# Patient Record
Sex: FEMALE | Race: BLACK OR AFRICAN AMERICAN | NOT HISPANIC OR LATINO | Employment: FULL TIME | URBAN - METROPOLITAN AREA
[De-identification: names, ages, dates, MRNs, and addresses within clinical notes are randomized per-mention and may not be internally consistent; named-entity substitution may affect disease eponyms.]

---

## 2023-04-23 ENCOUNTER — HOSPITAL ENCOUNTER (INPATIENT)
Facility: HOSPITAL | Age: 33
LOS: 2 days | Discharge: HOME | DRG: 641 | End: 2023-04-26
Attending: EMERGENCY MEDICINE | Admitting: INTERNAL MEDICINE
Payer: COMMERCIAL

## 2023-04-23 ENCOUNTER — APPOINTMENT (OUTPATIENT)
Dept: CT IMAGING | Facility: HOSPITAL | Age: 33
DRG: 641 | End: 2023-04-23
Payer: COMMERCIAL

## 2023-04-23 DIAGNOSIS — E87.20 METABOLIC ACIDOSIS: Primary | ICD-10-CM

## 2023-04-23 DIAGNOSIS — E86.0 DEHYDRATION: ICD-10-CM

## 2023-04-23 DIAGNOSIS — E87.29 ALCOHOLIC KETOACIDOSIS: ICD-10-CM

## 2023-04-23 LAB
ALBUMIN SERPL-MCNC: 5.8 G/DL (ref 3.5–5.2)
ALBUMIN/GLOB SERPL: 2 G/DL
ALP SERPL-CCNC: 103 U/L (ref 39–117)
ALT SERPL W P-5'-P-CCNC: 107 U/L (ref 1–33)
ANION GAP SERPL CALCULATED.3IONS-SCNC: 31 MMOL/L (ref 5–15)
AST SERPL-CCNC: 165 U/L (ref 1–32)
BASOPHILS # BLD AUTO: 0.1 10*3/MM3 (ref 0–0.2)
BASOPHILS NFR BLD AUTO: 0.8 % (ref 0–1.5)
BILIRUB SERPL-MCNC: 1.3 MG/DL (ref 0–1.2)
BUN SERPL-MCNC: 10 MG/DL (ref 6–20)
BUN/CREAT SERPL: 13.3 (ref 7–25)
CALCIUM SPEC-SCNC: 9.2 MG/DL (ref 8.6–10.5)
CHLORIDE SERPL-SCNC: 102 MMOL/L (ref 98–107)
CO2 SERPL-SCNC: 5 MMOL/L (ref 22–29)
CREAT SERPL-MCNC: 0.75 MG/DL (ref 0.57–1)
DEPRECATED RDW RBC AUTO: 53.4 FL (ref 37–54)
EGFRCR SERPLBLD CKD-EPI 2021: 109.3 ML/MIN/1.73
EOSINOPHIL # BLD AUTO: 0 10*3/MM3 (ref 0–0.4)
EOSINOPHIL NFR BLD AUTO: 0.1 % (ref 0.3–6.2)
ERYTHROCYTE [DISTWIDTH] IN BLOOD BY AUTOMATED COUNT: 13.6 % (ref 12.3–15.4)
GLOBULIN UR ELPH-MCNC: 2.9 GM/DL
GLUCOSE SERPL-MCNC: 92 MG/DL (ref 65–99)
HCG SERPL QL: NEGATIVE
HCT VFR BLD AUTO: 42.5 % (ref 34–46.6)
HGB BLD-MCNC: 13.7 G/DL (ref 12–15.9)
LIPASE SERPL-CCNC: 27 U/L (ref 13–60)
LYMPHOCYTES # BLD AUTO: 0.9 10*3/MM3 (ref 0.7–3.1)
LYMPHOCYTES NFR BLD AUTO: 5.6 % (ref 19.6–45.3)
MCH RBC QN AUTO: 33.7 PG (ref 26.6–33)
MCHC RBC AUTO-ENTMCNC: 32.2 G/DL (ref 31.5–35.7)
MCV RBC AUTO: 104.9 FL (ref 79–97)
MONOCYTES # BLD AUTO: 0.7 10*3/MM3 (ref 0.1–0.9)
MONOCYTES NFR BLD AUTO: 4.4 % (ref 5–12)
NEUTROPHILS NFR BLD AUTO: 13.9 10*3/MM3 (ref 1.7–7)
NEUTROPHILS NFR BLD AUTO: 89.1 % (ref 42.7–76)
NRBC BLD AUTO-RTO: 0.2 /100 WBC (ref 0–0.2)
PLATELET # BLD AUTO: 320 10*3/MM3 (ref 140–450)
PMV BLD AUTO: 7.2 FL (ref 6–12)
POTASSIUM SERPL-SCNC: 4.9 MMOL/L (ref 3.5–5.2)
PROT SERPL-MCNC: 8.7 G/DL (ref 6–8.5)
RBC # BLD AUTO: 4.06 10*6/MM3 (ref 3.77–5.28)
SALICYLATES SERPL-MCNC: <0.3 MG/DL
SODIUM SERPL-SCNC: 138 MMOL/L (ref 136–145)
WBC NRBC COR # BLD: 15.6 10*3/MM3 (ref 3.4–10.8)

## 2023-04-23 PROCEDURE — 25010000002 MORPHINE PER 10 MG: Performed by: EMERGENCY MEDICINE

## 2023-04-23 PROCEDURE — 74176 CT ABD & PELVIS W/O CONTRAST: CPT

## 2023-04-23 PROCEDURE — 84703 CHORIONIC GONADOTROPIN ASSAY: CPT | Performed by: EMERGENCY MEDICINE

## 2023-04-23 PROCEDURE — 83690 ASSAY OF LIPASE: CPT | Performed by: EMERGENCY MEDICINE

## 2023-04-23 PROCEDURE — 80179 DRUG ASSAY SALICYLATE: CPT | Performed by: EMERGENCY MEDICINE

## 2023-04-23 PROCEDURE — 99285 EMERGENCY DEPT VISIT HI MDM: CPT

## 2023-04-23 PROCEDURE — 25010000002 ONDANSETRON PER 1 MG: Performed by: EMERGENCY MEDICINE

## 2023-04-23 PROCEDURE — 85025 COMPLETE CBC W/AUTO DIFF WBC: CPT | Performed by: EMERGENCY MEDICINE

## 2023-04-23 PROCEDURE — 25010000002 KETOROLAC TROMETHAMINE PER 15 MG: Performed by: EMERGENCY MEDICINE

## 2023-04-23 PROCEDURE — 80053 COMPREHEN METABOLIC PANEL: CPT | Performed by: EMERGENCY MEDICINE

## 2023-04-23 RX ORDER — KETOROLAC TROMETHAMINE 15 MG/ML
15 INJECTION, SOLUTION INTRAMUSCULAR; INTRAVENOUS ONCE
Status: COMPLETED | OUTPATIENT
Start: 2023-04-23 | End: 2023-04-23

## 2023-04-23 RX ORDER — SODIUM CHLORIDE 0.9 % (FLUSH) 0.9 %
10 SYRINGE (ML) INJECTION AS NEEDED
Status: DISCONTINUED | OUTPATIENT
Start: 2023-04-23 | End: 2023-04-26 | Stop reason: HOSPADM

## 2023-04-23 RX ORDER — ONDANSETRON 2 MG/ML
4 INJECTION INTRAMUSCULAR; INTRAVENOUS ONCE
Status: COMPLETED | OUTPATIENT
Start: 2023-04-23 | End: 2023-04-23

## 2023-04-23 RX ADMIN — KETOROLAC TROMETHAMINE 15 MG: 15 INJECTION, SOLUTION INTRAMUSCULAR; INTRAVENOUS at 23:59

## 2023-04-23 RX ADMIN — ONDANSETRON 4 MG: 2 INJECTION INTRAMUSCULAR; INTRAVENOUS at 23:13

## 2023-04-23 RX ADMIN — SODIUM CHLORIDE 1000 ML: 9 INJECTION, SOLUTION INTRAVENOUS at 23:14

## 2023-04-23 NOTE — Clinical Note
Level of Care: Critical Care [6]   Admitting Physician: JANIE PAYTON [[id number]]   Attending Physician: JANIE PAYTON [[id number]]

## 2023-04-24 ENCOUNTER — APPOINTMENT (OUTPATIENT)
Dept: GENERAL RADIOLOGY | Facility: HOSPITAL | Age: 33
DRG: 641 | End: 2023-04-24
Payer: COMMERCIAL

## 2023-04-24 ENCOUNTER — APPOINTMENT (OUTPATIENT)
Dept: ULTRASOUND IMAGING | Facility: HOSPITAL | Age: 33
DRG: 641 | End: 2023-04-24
Payer: COMMERCIAL

## 2023-04-24 PROBLEM — E87.20 METABOLIC ACIDOSIS: Status: ACTIVE | Noted: 2023-04-24

## 2023-04-24 PROBLEM — E11.10 DIABETIC KETOACIDOSIS WITHOUT COMA ASSOCIATED WITH TYPE 2 DIABETES MELLITUS: Status: ACTIVE | Noted: 2023-04-24

## 2023-04-24 LAB
ACETONE BLD QL: ABNORMAL
ALBUMIN SERPL-MCNC: 4.9 G/DL (ref 3.5–5.2)
ALBUMIN/GLOB SERPL: 1.8 G/DL
ALP SERPL-CCNC: 84 U/L (ref 39–117)
ALT SERPL W P-5'-P-CCNC: 93 U/L (ref 1–33)
AMPHET+METHAMPHET UR QL: NEGATIVE
ANION GAP SERPL CALCULATED.3IONS-SCNC: 25 MMOL/L (ref 5–15)
ARTERIAL PATENCY WRIST A: POSITIVE
AST SERPL-CCNC: 142 U/L (ref 1–32)
ATMOSPHERIC PRESS: ABNORMAL MM[HG]
ATMOSPHERIC PRESS: ABNORMAL MM[HG]
BACTERIA UR QL AUTO: ABNORMAL /HPF
BARBITURATES UR QL SCN: NEGATIVE
BASE EXCESS BLDA CALC-SCNC: -17.3 MMOL/L (ref 0–3)
BASE EXCESS BLDV CALC-SCNC: -27.7 MMOL/L (ref -2–2)
BDY SITE: ABNORMAL
BDY SITE: ABNORMAL
BENZODIAZ UR QL SCN: NEGATIVE
BILIRUB SERPL-MCNC: 1.6 MG/DL (ref 0–1.2)
BILIRUB UR QL STRIP: NEGATIVE
BUN SERPL-MCNC: 9 MG/DL (ref 6–20)
BUN/CREAT SERPL: 13 (ref 7–25)
CALCIUM SPEC-SCNC: 8.4 MG/DL (ref 8.6–10.5)
CANNABINOIDS SERPL QL: NEGATIVE
CHLORIDE SERPL-SCNC: 107 MMOL/L (ref 98–107)
CLARITY UR: CLEAR
CO2 BLDA-SCNC: 9.4 MMOL/L (ref 22–29)
CO2 BLDA-SCNC: <5 MMOL/L (ref 22–29)
CO2 SERPL-SCNC: 8 MMOL/L (ref 22–29)
COCAINE UR QL: NEGATIVE
COLOR UR: YELLOW
CREAT SERPL-MCNC: 0.69 MG/DL (ref 0.57–1)
D-LACTATE SERPL-SCNC: 1.2 MMOL/L (ref 0.5–2)
D-LACTATE SERPL-SCNC: 3.3 MMOL/L (ref 0.5–2)
D-LACTATE SERPL-SCNC: 3.5 MMOL/L (ref 0.3–2)
EGFRCR SERPLBLD CKD-EPI 2021: 119.2 ML/MIN/1.73
ETHANOL UR QL: <0.01 %
GLOBULIN UR ELPH-MCNC: 2.8 GM/DL
GLUCOSE SERPL-MCNC: 119 MG/DL (ref 65–99)
GLUCOSE UR STRIP-MCNC: NEGATIVE MG/DL
HCO3 BLDA-SCNC: 8.7 MMOL/L (ref 21–28)
HCO3 BLDV-SCNC: 3.5 MMOL/L (ref 22–26)
HEMODILUTION: NO
HGB UR QL STRIP.AUTO: ABNORMAL
HYALINE CASTS UR QL AUTO: ABNORMAL /LPF
INHALED O2 CONCENTRATION: 21 %
INHALED O2 CONCENTRATION: 21 %
KETONES UR QL STRIP: ABNORMAL
LEUKOCYTE ESTERASE UR QL STRIP.AUTO: NEGATIVE
MAGNESIUM SERPL-MCNC: 1.9 MG/DL (ref 1.6–2.6)
METHADONE UR QL SCN: NEGATIVE
MODALITY: ABNORMAL
MODALITY: ABNORMAL
NITRITE UR QL STRIP: NEGATIVE
OPIATES UR QL: NEGATIVE
OXYCODONE UR QL SCN: NEGATIVE
PCO2 BLDA: 21.9 MM HG (ref 35–48)
PCO2 BLDV: 17.1 MM HG (ref 42–51)
PH BLDA: 7.21 PH UNITS (ref 7.35–7.45)
PH BLDV: 6.92 PH UNITS (ref 7.32–7.43)
PH UR STRIP.AUTO: 5.5 [PH] (ref 5–8)
PHOSPHATE SERPL-MCNC: 2.3 MG/DL (ref 2.5–4.5)
PO2 BLDA: 110 MM HG (ref 83–108)
PO2 BLDV: 72.5 MM HG (ref 40–42)
POTASSIUM SERPL-SCNC: 5 MMOL/L (ref 3.5–5.2)
PROT SERPL-MCNC: 7.7 G/DL (ref 6–8.5)
PROT UR QL STRIP: ABNORMAL
RBC # UR STRIP: ABNORMAL /HPF
REF LAB TEST METHOD: ABNORMAL
RESPIRATORY RATE: 24
SAO2 % BLDCOA: 97.3 % (ref 94–98)
SAO2 % BLDCOV: 81.3 % (ref 45–75)
SODIUM SERPL-SCNC: 140 MMOL/L (ref 136–145)
SP GR UR STRIP: 1.01 (ref 1–1.03)
SQUAMOUS #/AREA URNS HPF: ABNORMAL /HPF
UROBILINOGEN UR QL STRIP: ABNORMAL
WBC # UR STRIP: ABNORMAL /HPF
WHOLE BLOOD HOLD COAG: NORMAL

## 2023-04-24 PROCEDURE — 25010000002 MORPHINE PER 10 MG: Performed by: NURSE PRACTITIONER

## 2023-04-24 PROCEDURE — 76705 ECHO EXAM OF ABDOMEN: CPT

## 2023-04-24 PROCEDURE — 83605 ASSAY OF LACTIC ACID: CPT | Performed by: NURSE PRACTITIONER

## 2023-04-24 PROCEDURE — 93005 ELECTROCARDIOGRAM TRACING: CPT | Performed by: NURSE PRACTITIONER

## 2023-04-24 PROCEDURE — 84100 ASSAY OF PHOSPHORUS: CPT | Performed by: NURSE PRACTITIONER

## 2023-04-24 PROCEDURE — 81001 URINALYSIS AUTO W/SCOPE: CPT | Performed by: EMERGENCY MEDICINE

## 2023-04-24 PROCEDURE — 80053 COMPREHEN METABOLIC PANEL: CPT | Performed by: NURSE PRACTITIONER

## 2023-04-24 PROCEDURE — 80307 DRUG TEST PRSMV CHEM ANLYZR: CPT | Performed by: EMERGENCY MEDICINE

## 2023-04-24 PROCEDURE — 83605 ASSAY OF LACTIC ACID: CPT

## 2023-04-24 PROCEDURE — 82077 ASSAY SPEC XCP UR&BREATH IA: CPT | Performed by: EMERGENCY MEDICINE

## 2023-04-24 PROCEDURE — 82803 BLOOD GASES ANY COMBINATION: CPT

## 2023-04-24 PROCEDURE — 36600 WITHDRAWAL OF ARTERIAL BLOOD: CPT

## 2023-04-24 PROCEDURE — 71045 X-RAY EXAM CHEST 1 VIEW: CPT

## 2023-04-24 PROCEDURE — 82009 KETONE BODYS QUAL: CPT | Performed by: EMERGENCY MEDICINE

## 2023-04-24 PROCEDURE — 36415 COLL VENOUS BLD VENIPUNCTURE: CPT

## 2023-04-24 PROCEDURE — 87040 BLOOD CULTURE FOR BACTERIA: CPT | Performed by: EMERGENCY MEDICINE

## 2023-04-24 PROCEDURE — 25010000002 MORPHINE PER 10 MG: Performed by: EMERGENCY MEDICINE

## 2023-04-24 PROCEDURE — 25010000002 PROCHLORPERAZINE 10 MG/2ML SOLUTION: Performed by: INTERNAL MEDICINE

## 2023-04-24 PROCEDURE — 25010000002 ONDANSETRON PER 1 MG: Performed by: NURSE PRACTITIONER

## 2023-04-24 PROCEDURE — 83735 ASSAY OF MAGNESIUM: CPT | Performed by: NURSE PRACTITIONER

## 2023-04-24 RX ORDER — FENTANYL/ROPIVACAINE/NS/PF 2-625MCG/1
15 PLASTIC BAG, INJECTION (ML) EPIDURAL AS NEEDED
Status: DISCONTINUED | OUTPATIENT
Start: 2023-04-24 | End: 2023-04-26 | Stop reason: HOSPADM

## 2023-04-24 RX ORDER — ONDANSETRON 2 MG/ML
4 INJECTION INTRAMUSCULAR; INTRAVENOUS EVERY 6 HOURS PRN
Status: DISCONTINUED | OUTPATIENT
Start: 2023-04-24 | End: 2023-04-26 | Stop reason: HOSPADM

## 2023-04-24 RX ORDER — SODIUM CHLORIDE 0.9 % (FLUSH) 0.9 %
10 SYRINGE (ML) INJECTION AS NEEDED
Status: DISCONTINUED | OUTPATIENT
Start: 2023-04-24 | End: 2023-04-26 | Stop reason: HOSPADM

## 2023-04-24 RX ORDER — SODIUM BICARBONATE IN D5W 150/1000ML
150 PLASTIC BAG, INJECTION (ML) INTRAVENOUS CONTINUOUS
Status: DISCONTINUED | OUTPATIENT
Start: 2023-04-24 | End: 2023-04-25

## 2023-04-24 RX ORDER — PROCHLORPERAZINE EDISYLATE 5 MG/ML
2.5 INJECTION INTRAMUSCULAR; INTRAVENOUS EVERY 6 HOURS PRN
Status: DISCONTINUED | OUTPATIENT
Start: 2023-04-24 | End: 2023-04-26 | Stop reason: HOSPADM

## 2023-04-24 RX ORDER — SODIUM CHLORIDE 9 MG/ML
40 INJECTION, SOLUTION INTRAVENOUS AS NEEDED
Status: DISCONTINUED | OUTPATIENT
Start: 2023-04-24 | End: 2023-04-26 | Stop reason: HOSPADM

## 2023-04-24 RX ORDER — CYCLOBENZAPRINE HCL 10 MG
10 TABLET ORAL 3 TIMES DAILY PRN
Status: DISCONTINUED | OUTPATIENT
Start: 2023-04-24 | End: 2023-04-26 | Stop reason: HOSPADM

## 2023-04-24 RX ORDER — MORPHINE SULFATE 2 MG/ML
2 INJECTION, SOLUTION INTRAMUSCULAR; INTRAVENOUS ONCE
Status: COMPLETED | OUTPATIENT
Start: 2023-04-24 | End: 2023-04-24

## 2023-04-24 RX ORDER — SODIUM PHOSPHATE IN 0.9 % NACL 15MMOL/100
15 PLASTIC BAG, INJECTION (ML) INTRAVENOUS AS NEEDED
Status: DISCONTINUED | OUTPATIENT
Start: 2023-04-24 | End: 2023-04-26 | Stop reason: HOSPADM

## 2023-04-24 RX ORDER — HYDRALAZINE HYDROCHLORIDE 20 MG/ML
10 INJECTION INTRAMUSCULAR; INTRAVENOUS ONCE
Status: DISCONTINUED | OUTPATIENT
Start: 2023-04-24 | End: 2023-04-26 | Stop reason: HOSPADM

## 2023-04-24 RX ORDER — SODIUM CHLORIDE 0.9 % (FLUSH) 0.9 %
10 SYRINGE (ML) INJECTION EVERY 12 HOURS SCHEDULED
Status: DISCONTINUED | OUTPATIENT
Start: 2023-04-24 | End: 2023-04-26 | Stop reason: HOSPADM

## 2023-04-24 RX ORDER — SCOLOPAMINE TRANSDERMAL SYSTEM 1 MG/1
1 PATCH, EXTENDED RELEASE TRANSDERMAL
Status: DISCONTINUED | OUTPATIENT
Start: 2023-04-24 | End: 2023-04-26 | Stop reason: HOSPADM

## 2023-04-24 RX ORDER — POTASSIUM PHOS IN 0.9 % NACL 30MMOL/250
30 PLASTIC BAG, INJECTION (ML) INTRAVENOUS AS NEEDED
Status: DISCONTINUED | OUTPATIENT
Start: 2023-04-24 | End: 2023-04-26 | Stop reason: HOSPADM

## 2023-04-24 RX ORDER — ONDANSETRON 4 MG/1
4 TABLET, FILM COATED ORAL EVERY 6 HOURS PRN
Status: DISCONTINUED | OUTPATIENT
Start: 2023-04-24 | End: 2023-04-26 | Stop reason: HOSPADM

## 2023-04-24 RX ADMIN — MORPHINE SULFATE 2 MG: 2 INJECTION, SOLUTION INTRAMUSCULAR; INTRAVENOUS at 06:03

## 2023-04-24 RX ADMIN — Medication 150 MEQ: at 16:20

## 2023-04-24 RX ADMIN — SODIUM BICARBONATE 75 MEQ: 84 INJECTION, SOLUTION INTRAVENOUS at 01:43

## 2023-04-24 RX ADMIN — SODIUM PHOSPHATE, MONOBASIC, MONOHYDRATE 15 MMOL: 276; 142 INJECTION, SOLUTION INTRAVENOUS at 06:18

## 2023-04-24 RX ADMIN — Medication 10 ML: at 08:50

## 2023-04-24 RX ADMIN — SCOPALAMINE 1 PATCH: 1 PATCH, EXTENDED RELEASE TRANSDERMAL at 18:50

## 2023-04-24 RX ADMIN — Medication 10 ML: at 01:46

## 2023-04-24 RX ADMIN — SODIUM BICARBONATE 50 MEQ: 84 INJECTION, SOLUTION INTRAVENOUS at 01:39

## 2023-04-24 RX ADMIN — Medication 150 MEQ: at 05:36

## 2023-04-24 RX ADMIN — ONDANSETRON 4 MG: 2 INJECTION INTRAMUSCULAR; INTRAVENOUS at 13:27

## 2023-04-24 RX ADMIN — MORPHINE SULFATE 4 MG: 4 INJECTION INTRAVENOUS at 01:27

## 2023-04-24 RX ADMIN — SODIUM CHLORIDE, POTASSIUM CHLORIDE, SODIUM LACTATE AND CALCIUM CHLORIDE 1000 ML: 600; 310; 30; 20 INJECTION, SOLUTION INTRAVENOUS at 00:49

## 2023-04-24 RX ADMIN — PROCHLORPERAZINE EDISYLATE 2.5 MG: 5 INJECTION INTRAMUSCULAR; INTRAVENOUS at 10:23

## 2023-04-24 RX ADMIN — SODIUM CHLORIDE, POTASSIUM CHLORIDE, SODIUM LACTATE AND CALCIUM CHLORIDE 1000 ML: 600; 310; 30; 20 INJECTION, SOLUTION INTRAVENOUS at 10:19

## 2023-04-24 RX ADMIN — ONDANSETRON 4 MG: 2 INJECTION INTRAMUSCULAR; INTRAVENOUS at 05:32

## 2023-04-24 NOTE — SIGNIFICANT NOTE
Patient has been deemed appropriate for downgrade by intensivist service, hospital service consulted.

## 2023-04-24 NOTE — CASE MANAGEMENT/SOCIAL WORK
"Social Work Assessment  [facility]     Patient Name: hSirin Canales  MRN: [id number]  Today's Date: 4/24/2023    Admit Date: 4/23/2023     Substance Abuse     Row Name 04/24/23 1456       Substance Use    Substance Use Status current alcohol use    Substance Use Comment SW noted primary diagnosis is etoh related, so met with pt re:  and CD screen. Pt lying in bed, no visitors present. Pt affirmed Anxiety diagnosis and Rxs of hydroxyzine and sertraline, but stated, \"I don't take them like I'm supposed to.\" She couldn't explain this, but stated she would try to start doing better. She was previously in counseling, but couldn't recall the name of the agency. She stopped going when she could no longer pay the $50 co-pay. SW informed her of possibly starting an FSA when open enrollment comes around, but in the meantime, info for [facility] was placed on AVS - pt was informed of their services. In re: to CD, pt denies tob or illicit substance use. She said she drank an unknown amount of vodka cranberry cocktails without having eaten anything while her child was with his father, and that this was an isolated incident - she doesn't normally consume etoh on a regular basis. She denies ever having an issues with etoh or going to etoh tx. Pt denies further needs at this time.              JOHANA Boyce, [facility]  Medical Social Worker  Ph [phone]  Fax [phone]  [email or web address]    "

## 2023-04-24 NOTE — CASE MANAGEMENT/SOCIAL WORK
Discharge Planning Assessment  [facility]     Patient Name: Shirin Canales  MRN: [id number]  Today's Date: 4/24/2023    Admit Date: 4/23/2023    Plan: DC Plan: Return home with mother, PCP appt with [facility] on AVS. MSW has seen.   Discharge Needs Assessment     Row Name 04/24/23 1526       Living Environment    People in Home parent(s)    Name(s) of People in Home Mother, staying with her mother currently.    Current Living Arrangements home    Primary Care Provided by self    Provides Primary Care For child(stacey)    Family Caregiver if Needed parent(s)    Quality of Family Relationships unable to assess    Able to Return to Prior Arrangements yes       Resource/Environmental Concerns    Resource/Environmental Concerns none    Transportation Concerns none       Transition Planning    Patient/Family Anticipates Transition to home with family    Patient/Family Anticipated Services at Transition none    Transportation Anticipated family or friend will provide       Discharge Needs Assessment    Readmission Within the Last 30 Days no previous admission in last 30 days    Equipment Currently Used at Home none    Concerns to be Addressed discharge planning    Anticipated Changes Related to Illness none    Equipment Needed After Discharge none    Discharge Coordination/Progress DC Plan: Return home with mother, PCP appt with [facility] on AVS. MSW has seen.               Discharge Plan     Row Name 04/24/23 1538       Plan    Plan DC Plan: Return home with mother, PCP appt with [facility] on AVS. MSW has seen.    Plan Comments Phergan for nausea, Reglan, Ihydralaline for BP. States he has insurance per employer, note that insurance is now in system. No PCP. Pharmacy [facility] Requests Meds to Beds.              Continued Care and Services - Admitted Since 4/23/2023    Coordination has not been started for this encounter.          Demographic Summary     Row Name 04/24/23 1528       General Information    Admission  "Type inpatient    Arrived From emergency department    Referral Source admission list    Reason for Consult discharge planning    Preferred Language English    General Information Comments Spoke to pt in room.               Functional Status     Row Name 04/24/23 1525       Functional Status    Usual Activity Tolerance good    Current Activity Tolerance moderate       Functional Status, IADL    Medications independent    Meal Preparation independent    Housekeeping independent    Laundry independent    Shopping independent       Mental Status    General Appearance WDL WDL       Mental Status Summary    Recent Changes in Mental Status/Cognitive Functioning no changes              Met with patient in room.            Substance Abuse     Row Name 04/24/23 3346       Substance Use    Substance Use Status current alcohol use    Substance Use Comment MIKAYLA noted primary diagnosis is etoh related, so met with pt re: MH and CD screen. Pt lying in bed, no visitors present. Pt affirmed Anxiety diagnosis and Rxs of hydroxyzine and sertraline, but stated, \"I don't take them like I'm supposed to.\" She couldn't explain this, but stated she would try to start doing better. She was previously in counseling, but couldn't recall the name of the agency. She stopped going when she could no longer pay the $50 co-pay. MIKAYLA informed her of possibly starting an FSA when open enrollment comes around, but in the meantime, info for [facility] was placed on AVS - pt was informed of their services. In re: to CD, pt denies tob or illicit substance use. She said she drank an unknown amount of vodka cranberry cocktails without having eaten anything while her child was with his father, and that this was an isolated incident - she doesn't normally consume etoh on a regular basis. She denies ever having an issues with etoh or going to etoh tx. Pt denies further needs at this time.               Patient Forms    No documentation.                   Neetu ARIAS" Rangel, RN

## 2023-04-24 NOTE — ED PROVIDER NOTES
"Subjective   History of Present Illness  32-year-old female describes severe pain in her right flank with gradual onset starting this morning.  States it is associated with nausea vomiting all day.  She denies fevers or chills.  She reports no diarrhea or trauma or any known ill contacts.  She reports no vaginal bleeding.  She states she has had some urinary frequency but reports no dysuria  Review of Systems  She reports no chest pain or shortness of breath.  She is on Depo-Provera and states she does not have menstrual cycles.  No past medical history on file.  Reportedly negative,  Allergies   Allergen Reactions   • Latex Unknown - Low Severity   • Penicillins Unknown - High Severity       No past surgical history on file.    No family history on file.    Social History     Socioeconomic History   • Marital status: Single       Prior to Admission medications    Not on File     /99   Pulse 108   Temp 98.1 °F (36.7 °C) (Oral)   Resp 16   Ht 160 cm (63\")   Wt 59 kg (130 lb)   LMP  (LMP Unknown) Comment: patient does not have periods- on BC  SpO2 100%   BMI 23.03 kg/m²       Objective   Physical Exam  General: Well-developed well-appearing, no acute distress, alert and appropriate  Eyes:  sclera nonicteric  HEENT: Mucous membranes dry, no mucosal swelling  Neck: Supple, no nuchal rigidity,   Respirations: Respirations nonlabored, equal breath sounds bilaterally, clear lungs  Heart regular rate and rhythm, no murmurs rubs or gallops,   Abdomen soft nontender nondistended, no hepatosplenomegaly, no hernia, no mass, normal bowel sounds, mild right CVA tenderness  Extremities no clubbing cyanosis or edema, calves are symmetric and nontender  Neuro cranial nerves grossly intact, no focal limb deficits  Psych oriented, pleasant affect  Skin no rash, brisk cap refill  Procedures           ED Course  ED Course as of 04/24/23 0103   Mon Apr 24, 2023   0015 Mother reported to the nurse that the patient drank " alcohol heavily yesterday []   0043 Patient states she has not eaten over the last 2 days and drank heavily with vodka yesterday for thunder over [place].  She denies any other unusual ingestions or any toxic alcohol ingestions such as methanol or ethylene glycol. []      ED Course User Index  [] Benjamin Decker MD            Results for orders placed or performed during the hospital encounter of 04/23/23   Comprehensive Metabolic Panel    Specimen: Blood   Result Value Ref Range    Glucose 92 65 - 99 mg/dL    BUN 10 6 - 20 mg/dL    Creatinine 0.75 0.57 - 1.00 mg/dL    Sodium 138 136 - 145 mmol/L    Potassium 4.9 3.5 - 5.2 mmol/L    Chloride 102 98 - 107 mmol/L    CO2 5.0 (L) 22.0 - 29.0 mmol/L    Calcium 9.2 8.6 - 10.5 mg/dL    Total Protein 8.7 (H) 6.0 - 8.5 g/dL    Albumin 5.8 (H) 3.5 - 5.2 g/dL    ALT (SGPT) 107 (H) 1 - 33 U/L    AST (SGOT) 165 (H) 1 - 32 U/L    Alkaline Phosphatase 103 39 - 117 U/L    Total Bilirubin 1.3 (H) 0.0 - 1.2 mg/dL    Globulin 2.9 gm/dL    A/G Ratio 2.0 g/dL    BUN/Creatinine Ratio 13.3 7.0 - 25.0    Anion Gap 31.0 (H) 5.0 - 15.0 mmol/L    eGFR 109.3 >60.0 mL/min/1.73   Lipase    Specimen: Blood   Result Value Ref Range    Lipase 27 13 - 60 U/L   hCG, Serum, Qualitative    Specimen: Blood   Result Value Ref Range    HCG Qualitative Negative Negative   Urinalysis With Culture If Indicated - Urine, Clean Catch    Specimen: Urine, Clean Catch   Result Value Ref Range    Color, UA Yellow Yellow, Straw    Appearance, UA Clear Clear    pH, UA 5.5 5.0 - 8.0    Specific Gravity, UA 1.012 1.005 - 1.030    Glucose, UA Negative Negative    Ketones, UA >=160 mg/dL (4+) (A) Negative    Bilirubin, UA Negative Negative    Blood, UA Moderate (2+) (A) Negative    Protein,  mg/dL (2+) (A) Negative    Leuk Esterase, UA Negative Negative    Nitrite, UA Negative Negative    Urobilinogen, UA 0.2 E.U./dL 0.2 - 1.0 E.U./dL   CBC Auto Differential    Specimen: Blood   Result Value Ref Range     WBC 15.60 (H) 3.40 - 10.80 10*3/mm3    RBC 4.06 3.77 - 5.28 10*6/mm3    Hemoglobin 13.7 12.0 - 15.9 g/dL    Hematocrit 42.5 34.0 - 46.6 %    .9 (H) 79.0 - 97.0 fL    MCH 33.7 (H) 26.6 - 33.0 pg    MCHC 32.2 31.5 - 35.7 g/dL    RDW 13.6 12.3 - 15.4 %    RDW-SD 53.4 37.0 - 54.0 fl    MPV 7.2 6.0 - 12.0 fL    Platelets 320 140 - 450 10*3/mm3    Neutrophil % 89.1 (H) 42.7 - 76.0 %    Lymphocyte % 5.6 (L) 19.6 - 45.3 %    Monocyte % 4.4 (L) 5.0 - 12.0 %    Eosinophil % 0.1 (L) 0.3 - 6.2 %    Basophil % 0.8 0.0 - 1.5 %    Neutrophils, Absolute 13.90 (H) 1.70 - 7.00 10*3/mm3    Lymphocytes, Absolute 0.90 0.70 - 3.10 10*3/mm3    Monocytes, Absolute 0.70 0.10 - 0.90 10*3/mm3    Eosinophils, Absolute 0.00 0.00 - 0.40 10*3/mm3    Basophils, Absolute 0.10 0.00 - 0.20 10*3/mm3    nRBC 0.2 0.0 - 0.2 /100 WBC   Ethanol    Specimen: Blood   Result Value Ref Range    Ethanol % <0.010 %   Urine Drug Screen - Urine, Clean Catch    Specimen: Urine, Clean Catch   Result Value Ref Range    Amphet/Methamphet, Screen Negative Negative    Barbiturates Screen, Urine Negative Negative    Benzodiazepine Screen, Urine Negative Negative    Cocaine Screen, Urine Negative Negative    Opiate Screen Negative Negative    THC, Screen, Urine Negative Negative    Methadone Screen, Urine Negative Negative    Oxycodone Screen, Urine Negative Negative   Salicylate Level    Specimen: Blood   Result Value Ref Range    Salicylate <0.3 <=30.0 mg/dL   Acetone    Specimen: Blood   Result Value Ref Range    Acetone Moderate (A) Negative   Blood Gas, Venous -    Specimen: Venous Blood   Result Value Ref Range    Site RV     pH, Venous 6.921 (C) 7.320 - 7.430 pH Units    pCO2, Venous 17.1 (C) 42.0 - 51.0 mm Hg    pO2, Venous 72.5 (H) 40.0 - 42.0 mm Hg    HCO3, Venous 3.5 (L) 22.0 - 26.0 mmol/L    Base Excess, Venous -27.7 (L) -2.0 - 2.0 mmol/L    O2 Saturation, Venous 81.3 (H) 45.0 - 75.0 %    CO2 Content <5.0 (L) 22 - 29 mmol/L    Barometric Pressure for  "Blood Gas      Modality Room Air     FIO2 21 %   Urinalysis, Microscopic Only - Urine, Clean Catch    Specimen: Urine, Clean Catch   Result Value Ref Range    RBC, UA 3-5 (A) None Seen /HPF    WBC, UA 0-2 (A) None Seen /HPF    Bacteria, UA None Seen None Seen /HPF    Squamous Epithelial Cells, UA 0-2 None Seen, 0-2 /HPF    Hyaline Casts, UA 0-2 None Seen /LPF    Methodology Automated Microscopy    POC Lactate    Specimen: Blood   Result Value Ref Range    Lactate 3.5 (C) 0.3 - 2.0 mmol/L   Light Blue Top   Result Value Ref Range    Extra Tube Hold for add-ons.       Adult Nutrition Assessment     Row Name 04/23/23 2247       Anthropometrics    Height 160 cm (63\")    Weight 59 kg (130 lb)            CT Abdomen Pelvis Without Contrast    Result Date: 4/24/2023  Impression: 1. Nonobstructing small intrarenal calculi bilaterally without hydronephrosis. 2. Fatty liver. Electronically signed by:  Aki Hale M.D.  4/23/2023 10:06 PM Mountain Time                                      Medical Decision Making  Patient presents with vomiting and some flank discomfort.  Differential diagnosis including obstructive uropathy, bowel obstruction, ischemic bowel, dehydration    Patient was found on lab analysis to have severe anion gap metabolic acidosis.  She does appear very dehydrated she was ordered some normal saline initially and followed by lactated Ringer's.  Venous blood gas reveals severe acidemia and she was ordered bicarb infusion.  Patient is not diabetic, DKA felt to be unlikely.  Given her history of 48 hours of fasting and heavy alcohol consumption, alcohol ketoacidosis with associated lactic acidosis is most likely.  Methanol or ethylene glycol toxicity would be another possibility although there is no report of any toxic ingestion.  Salicylate level was negative.  She is not describing any other toxic medication ingestion    Patient was resting more comfortably reexamination.  She has normal airway and " respiratory pattern.  She had no further vomiting during the emergency room course and has a benign abdominal exam.  Her and her mother were advised the findings and agreeable to the plan of admission.      Alcoholic ketoacidosis: acute illness or injury  Dehydration: acute illness or injury  Metabolic acidosis: acute illness or injury  Amount and/or Complexity of Data Reviewed  Labs: ordered. Decision-making details documented in ED Course.     Details: Severe acidemia on blood gas analysis, anion gap acidosis on comprehensive metabolic panel, leukocytosis, elevated lactic acid.  hCG negative, urinalysis negative for bacteria  Radiology: ordered and independent interpretation performed.     Details: My independent interpretation of CT abdomen images no apparent bowel obstruction or obstructive uropathy  Discussion of management or test interpretation with external provider(s): Case and findings discussed with Zenia with the ICU team for admission.    Risk  Prescription drug management.  Decision regarding hospitalization.      Critical care time 40 minutes    Final diagnoses:   Metabolic acidosis   Alcoholic ketoacidosis   Dehydration       ED Disposition  ED Disposition     ED Disposition   Decision to Admit    Condition   --    Comment   Level of Care: Critical Care [6]   Admitting Physician: JANIE PAYTON [[id number]]   Attending Physician: JANIE PAYTON [[id number]]               No follow-up provider specified.       Medication List      No changes were made to your prescriptions during this visit.          Benjamin Decker MD  04/24/23 0108

## 2023-04-24 NOTE — PLAN OF CARE
Goal Outcome Evaluation:  Plan of Care Reviewed With: patient        Progress: no change  Outcome Evaluation: Patient complains of nausea that worsens upon movement. Scopolomine patch ordered. Bicarb trip continues. Patient not tolerating solid foods at this time. Patient remains sinus tachycardia with HR at 105 at rest and upon any activity HR jumps to 140s. Patient currently resting in bed at this time with no noted distress.

## 2023-04-24 NOTE — PROGRESS NOTES
[facility] Medicine Services   Daily Progress Note    Patient Name: Shirin Canales  : 1990  MRN: [id number]  Primary Care Physician:  Provider, No Known  Date of admission: 2023  Date and Time of Service: 23 at 2;45pm      Subjective      Chief Complaint: intractable nausea and vomiting    Patient was seen and examined this afternoon. Still nauseous    ROS   Constitutional: Negative for chills and fever.   HENT: Negative for congestion and sore throat.    Respiratory: Negative for cough and shortness of breath.    Cardiovascular: Negative for chest pain and palpitations.   Gastrointestinal: Positive for nausea and vomiting. Negative for abdominal pain.   Endocrine: Negative for heat intolerance and polyuria.   Genitourinary: Positive for flank pain. Negative for dysuria and urgency.   Musculoskeletal: Positive for back pain. Negative for arthralgias and myalgias.   Skin: Negative for rash and wound.   Neurological: Negative for weakness and numbness.   Psychiatric/Behavioral: Negative for suicidal ideas. The patient is not nervous/anxious.      Objective      Vitals:   Temp:  [98.1 °F (36.7 °C)-98.6 °F (37 °C)] 98.4 °F (36.9 °C)  Heart Rate:  [103-152] 103  Resp:  [16-18] 16  BP: (108-156)/() 122/91    Physical Exam      Constitutional:       Appearance: Normal appearance. She is normal weight.   HENT:      Head: Normocephalic.      Nose: Nose normal.      Mouth/Throat:      Mouth: Mucous membranes are moist.   Eyes:      Extraocular Movements: Extraocular movements intact.      Pupils: Pupils are equal, round, and reactive to light.   Cardiovascular:      Rate and Rhythm:      Pulses: Normal pulses.      Heart sounds: Normal heart sounds.   Pulmonary:      Effort: Pulmonary effort is normal.      Breath sounds: Normal breath sounds.   Abdominal:      General: Abdomen is flat. Bowel sounds are normal.      Palpations: Abdomen is soft.   Musculoskeletal:         General: Normal  range of motion.   Skin:     General: Skin is warm.   Neurological:      General: No focal deficit present.      Mental Status: She is alert.   Psychiatric:         Mood and Affect: Mood normal.         Behavior: Behavior normal.      Result Review    Result Review:  I have personally reviewed the results from the time of this admission to 4/24/2023 14:57 EDT and agree with these findings:  [x]  Laboratory  []  Microbiology  []  Radiology  []  EKG/Telemetry   []  Cardiology/Vascular   []  Pathology  []  Old records  [x]  Other:  Most notable findings include: hyperglycemia, elevated AG, elevated LFTs          Assessment & Plan      Brief Patient Summary:  Shirin Canales is a 32 y.o. female with PMH of anxiety presented to the hospital for intractable nausea and vomiting, and was admitted with a principal diagnosis of Metabolic acidosis.  Patient complaining of nausea and vomiting which started yesterday morning.  She also endorses right flank pain.  She states that she was at [facility] and drank heavily with vodka, also stating she has not eaten food in 2 days.  Patient denies chest pain, shortness of air, palpitations, dizziness or syncope, headache, chills, or fever.  Denies diarrhea, constipation, loss of weight or loss of appetite, dysuria, blood in urine or stool.     ED course: Analysis of labs completed in ER showed patient to have a severe anion gap metabolic acidosis, with severe dehydration.  She was given 2 L fluid bolus.  CT abdomen was obtained and showed fatty liver, otherwise unremarkable.  VBG showed pH 6.921, PO2 72.5, PCO2 17.1, HCO3 3.5.  Urine tox screen was negative, and EtOH was normal.  Patient does have a moderate amount of acetone on her blood work.  UA + ketones as well.  Her flank pain was treated with Toradol and morphine.  She was started on a bicarb drip and transferred to ICU. Has been on IVfs, feeling better. Still nauseous but significantly better than she was  earlier      hydrALAZINE, 10 mg, Intravenous, Once  sodium chloride, 10 mL, Intravenous, Q12H       sodium bicarbonate, 150 mEq, Last Rate: 150 mEq (04/24/23 0389)         Active Hospital Problems:  Active Hospital Problems    Diagnosis    • **Metabolic acidosis      Plan:     Non-diabetic ketoacidosis, secondary to EtOH consumption  Intractable nausea and vomiting  -Bicarb drip initiated in ED, will continue for now  -Zofran for nausea/vomiting  -on clears     Leukocytosis  -Likely reactive secondary to nausea/vomiting and acidosis   -Patient meets SIRS criteria: WBC 15.6 and HR > 90  -Lactic acid 3.5, improved  -Blood cultures pening  -Patient is not hypotensive  -CXR -no acute abnormality  -Continue to monitor off antibiotics for now     Hypertension  -No previous history of hypertension  -Likely pain related  -We will try IV hydralazine       Anxiety  -Continue home hydroxyzine after verified by pharmacy and clinically appropriate  -Continue home sertraline after verified by pharmacy and clinically appropriate     Elevated LFTs  Right flank pain  Lower back pain  -CT abdomen/pelvis with evidence of fatty liver  -We will order abdominal ultrasound  -  -  -Continue to monitor liver function  -Pain control as tolerated     Code Status (Patient has no pulse and is not breathing): CPR (Attempt to Resuscitate)  Medical Interventions (Patient has pulse or is breathing): Full Support        DVT prophylaxis:  Mechanical DVT prophylaxis orders are present.    CODE STATUS:    Code Status (Patient has no pulse and is not breathing): CPR (Attempt to Resuscitate)  Medical Interventions (Patient has pulse or is breathing): Full Support      Disposition:  I expect patient to be discharged in am.    This patient has been assessed using appropriate electronic equipment and discussed with hospital infection control department. 04/24/23      Electronically signed by Blu Jones MD, 04/24/23, 14:57 EDT.  Brenden Pollard  Hospitalist Team

## 2023-04-24 NOTE — H&P
Critical Care History and Physical     Shirin Canales : 1990 MRN:[id number] LOS:0 ROOM:      Reason for admission: Metabolic acidosis     Assessment / Plan     Non-diabetic ketoacidosis, secondary to EtOH consumption  Intractable nausea and vomiting  -Bicarb drip initiated in ED  -Zofran for nausea/vomiting  -N.p.o. except for ice chips until nausea/vomiting resolves    Leukocytosis  -Likely reactive secondary to nausea/vomiting and acidosis   -Patient meets SIRS criteria: WBC 15.6 and HR > 90  -Lactic acid 3.5, trend until normalized  -Received 2 L bolus in ED secondary to dehydration  -Blood cultures pening  -Patient is not hypotensive  -Will check CXR for possible aspiration pneumonia  -Continue to monitor off antibiotics for now    Hypertension  -No previous history of hypertension  -Likely pain related  -We will try IV hydralazine  -Consider Cardene drip if needed    Anxiety  -Continue home hydroxyzine after verified by pharmacy and clinically appropriate  -Continue home sertraline after verified by pharmacy and clinically appropriate    Elevated LFTs  Right flank pain  Lower back pain  -CT abdomen/pelvis with evidence of fatty liver  -We will order abdominal ultrasound  -  -  -Continue to monitor liver function  -Pain control as tolerated    Code Status (Patient has no pulse and is not breathing): CPR (Attempt to Resuscitate)  Medical Interventions (Patient has pulse or is breathing): Full Support       Nutrition:   NPO Diet NPO Type: Ice Chips     DVT prophylaxis:  Mechanical DVT prophylaxis orders are present.     History of Present illness     Shirin Canales is a 32 y.o. female with PMH of anxiety presented to the hospital for intractable nausea and vomiting, and was admitted with a principal diagnosis of Metabolic acidosis.  Patient complaining of nausea and vomiting which started yesterday morning.  She also endorses right flank pain.  She states that she was at thunder over  [place] and drank heavily with vodka, also stating she has not eaten food in 2 days.  Patient denies chest pain, shortness of air, palpitations, dizziness or syncope, headache, chills, or fever.  Denies diarrhea, constipation, loss of weight or loss of appetite, dysuria, blood in urine or stool.    ED course: Analysis of labs completed in ER showed patient to have a severe anion gap metabolic acidosis, with severe dehydration.  She was given 2 L fluid bolus.  CT abdomen was obtained and showed fatty liver, otherwise unremarkable.  VBG showed pH 6.921, PO2 72.5, PCO2 17.1, HCO3 3.5.  Urine tox screen was negative, and EtOH was normal.  Patient does have a moderate amount of acetone on her blood work.  UA + ketones as well.  Her flank pain was treated with Toradol and morphine.  She was started on a bicarb drip and transferred to ICU.    ACP: Patient does not have advanced directive on file at this facility.  She is alert and oriented x4 and declares herself full code.    Patient was seen and examined on 04/24/23 at 01:40 EDT .    Subjective / Review of systems     Review of Systems   Constitutional: Negative for chills and fever.   HENT: Negative for congestion and sore throat.    Respiratory: Negative for cough and shortness of breath.    Cardiovascular: Negative for chest pain and palpitations.   Gastrointestinal: Positive for nausea and vomiting. Negative for abdominal pain.   Endocrine: Negative for heat intolerance and polyuria.   Genitourinary: Positive for flank pain. Negative for dysuria and urgency.   Musculoskeletal: Positive for back pain. Negative for arthralgias and myalgias.   Skin: Negative for rash and wound.   Neurological: Negative for weakness and numbness.   Psychiatric/Behavioral: Negative for suicidal ideas. The patient is not nervous/anxious.         Past Medical/Surgical/Social/Family History & Allergies     No past medical history on file.   No past surgical history on file.   Social  History     Socioeconomic History   • Marital status: Single      No family history on file.   Allergies   Allergen Reactions   • Latex Unknown - Low Severity   • Penicillins Unknown - High Severity      Social Determinants of Health     Tobacco Use: Not on file   Alcohol Use: Not on file   Financial Resource Strain: Not on file   Food Insecurity: Not on file   Transportation Needs: Not on file   Physical Activity: Not on file   Stress: Not on file   Social Connections: Not on file   Intimate Partner Violence: Not on file   Depression: Not on file   Housing Stability: Not on file        Home Medications     Prior to Admission medications    Not on File        Objective / Physical Exam     Vital signs:  Temp: 98.1 °F (36.7 °C)  BP: (!) 156/101  Heart Rate: 116  Resp: 16  SpO2: 100 %  Weight: 59 kg (130 lb)    Admission Weight: Weight: 59 kg (130 lb)    Physical Exam  Constitutional:       Appearance: Normal appearance. She is normal weight.   HENT:      Head: Normocephalic.      Nose: Nose normal.      Mouth/Throat:      Mouth: Mucous membranes are moist.   Eyes:      Extraocular Movements: Extraocular movements intact.      Pupils: Pupils are equal, round, and reactive to light.   Cardiovascular:      Rate and Rhythm: Tachycardia present.      Pulses: Normal pulses.      Heart sounds: Normal heart sounds.   Pulmonary:      Effort: Pulmonary effort is normal.      Breath sounds: Normal breath sounds.   Abdominal:      General: Abdomen is flat. Bowel sounds are normal.      Palpations: Abdomen is soft.   Musculoskeletal:         General: Normal range of motion.   Skin:     General: Skin is warm.   Neurological:      General: No focal deficit present.      Mental Status: She is alert.   Psychiatric:         Mood and Affect: Mood normal.         Behavior: Behavior normal.          Labs     Results from last 7 days   Lab Units 04/23/23  2302   WBC 10*3/mm3 15.60*   HEMATOCRIT % 42.5   PLATELETS 10*3/mm3 320      Results  from last 7 days   Lab Units 04/23/23  2302   SODIUM mmol/L 138   POTASSIUM mmol/L 4.9   CHLORIDE mmol/L 102   CO2 mmol/L 5.0*   BUN mg/dL 10   CREATININE mg/dL 0.75        Imaging     EKG pending at time of note.    Current Medications     Scheduled Meds:  sodium chloride, 10 mL, Intravenous, Q12H         Continuous Infusions:  sodium bicarbonate drip (greater than 75 mEq/bag), 75 mEq       Patient continues to be critically ill, remains at risk of clinical deterioration or death and needed high complexity decision making. I have spent a total of 40 minutes providing critical care services to this patient including but not limited to: review of labs/ microbiology/imaging/medications, serial monitoring of vital signs, review of other consultant's notes, review of events in the last 24 hrs, monitoring input/output, review of treatment plan with bedside nurse, RT and other treatment team, management of life support and nutrition needs.     No family at bedside.    Time spent in performing separately billable procedures and updating family is not included in the critical care time.       BEVERLY Queen   Critical Care  04/24/23   01:40 EDT

## 2023-04-24 NOTE — PLAN OF CARE
Goal Outcome Evaluation:            Patient still tachycardic. Other vitals stable. ABG pH only slightly improved, bicarb drip adjusted by provider. Pain improved with morphine.

## 2023-04-25 LAB
ALBUMIN SERPL-MCNC: 4.2 G/DL (ref 3.5–5.2)
ALBUMIN/GLOB SERPL: 1.9 G/DL
ALP SERPL-CCNC: 70 U/L (ref 39–117)
ALT SERPL W P-5'-P-CCNC: 61 U/L (ref 1–33)
ANION GAP SERPL CALCULATED.3IONS-SCNC: 10 MMOL/L (ref 5–15)
AST SERPL-CCNC: 96 U/L (ref 1–32)
BASOPHILS # BLD AUTO: 0 10*3/MM3 (ref 0–0.2)
BASOPHILS # BLD AUTO: 0 10*3/MM3 (ref 0–0.2)
BASOPHILS NFR BLD AUTO: 0.2 % (ref 0–1.5)
BASOPHILS NFR BLD AUTO: 0.6 % (ref 0–1.5)
BILIRUB SERPL-MCNC: 1.8 MG/DL (ref 0–1.2)
BUN SERPL-MCNC: 7 MG/DL (ref 6–20)
BUN/CREAT SERPL: 12.1 (ref 7–25)
CALCIUM SPEC-SCNC: 9.1 MG/DL (ref 8.6–10.5)
CHLORIDE SERPL-SCNC: 97 MMOL/L (ref 98–107)
CO2 SERPL-SCNC: 33 MMOL/L (ref 22–29)
CREAT SERPL-MCNC: 0.58 MG/DL (ref 0.57–1)
DEPRECATED RDW RBC AUTO: 43.8 FL (ref 37–54)
DEPRECATED RDW RBC AUTO: 45.1 FL (ref 37–54)
EGFRCR SERPLBLD CKD-EPI 2021: 124.3 ML/MIN/1.73
EOSINOPHIL # BLD AUTO: 0 10*3/MM3 (ref 0–0.4)
EOSINOPHIL # BLD AUTO: 0 10*3/MM3 (ref 0–0.4)
EOSINOPHIL NFR BLD AUTO: 0.2 % (ref 0.3–6.2)
EOSINOPHIL NFR BLD AUTO: 0.4 % (ref 0.3–6.2)
ERYTHROCYTE [DISTWIDTH] IN BLOOD BY AUTOMATED COUNT: 12.6 % (ref 12.3–15.4)
ERYTHROCYTE [DISTWIDTH] IN BLOOD BY AUTOMATED COUNT: 12.7 % (ref 12.3–15.4)
GLOBULIN UR ELPH-MCNC: 2.2 GM/DL
GLUCOSE SERPL-MCNC: 113 MG/DL (ref 65–99)
HCT VFR BLD AUTO: 30.4 % (ref 34–46.6)
HCT VFR BLD AUTO: 32.5 % (ref 34–46.6)
HGB BLD-MCNC: 10.6 G/DL (ref 12–15.9)
HGB BLD-MCNC: 11 G/DL (ref 12–15.9)
LYMPHOCYTES # BLD AUTO: 1.3 10*3/MM3 (ref 0.7–3.1)
LYMPHOCYTES # BLD AUTO: 1.4 10*3/MM3 (ref 0.7–3.1)
LYMPHOCYTES NFR BLD AUTO: 30.6 % (ref 19.6–45.3)
LYMPHOCYTES NFR BLD AUTO: 33.4 % (ref 19.6–45.3)
MAGNESIUM SERPL-MCNC: 1.7 MG/DL (ref 1.6–2.6)
MCH RBC QN AUTO: 33.4 PG (ref 26.6–33)
MCH RBC QN AUTO: 34.1 PG (ref 26.6–33)
MCHC RBC AUTO-ENTMCNC: 33.8 G/DL (ref 31.5–35.7)
MCHC RBC AUTO-ENTMCNC: 34.7 G/DL (ref 31.5–35.7)
MCV RBC AUTO: 98.2 FL (ref 79–97)
MCV RBC AUTO: 98.8 FL (ref 79–97)
MONOCYTES # BLD AUTO: 0.3 10*3/MM3 (ref 0.1–0.9)
MONOCYTES # BLD AUTO: 0.3 10*3/MM3 (ref 0.1–0.9)
MONOCYTES NFR BLD AUTO: 7.1 % (ref 5–12)
MONOCYTES NFR BLD AUTO: 7.2 % (ref 5–12)
NEUTROPHILS NFR BLD AUTO: 2.4 10*3/MM3 (ref 1.7–7)
NEUTROPHILS NFR BLD AUTO: 2.6 10*3/MM3 (ref 1.7–7)
NEUTROPHILS NFR BLD AUTO: 58.4 % (ref 42.7–76)
NEUTROPHILS NFR BLD AUTO: 61.9 % (ref 42.7–76)
NRBC BLD AUTO-RTO: 0 /100 WBC (ref 0–0.2)
NRBC BLD AUTO-RTO: 0.3 /100 WBC (ref 0–0.2)
PHOSPHATE SERPL-MCNC: 0.5 MG/DL (ref 2.5–4.5)
PLATELET # BLD AUTO: 177 10*3/MM3 (ref 140–450)
PLATELET # BLD AUTO: 180 10*3/MM3 (ref 140–450)
PMV BLD AUTO: 7 FL (ref 6–12)
PMV BLD AUTO: 7.1 FL (ref 6–12)
POTASSIUM SERPL-SCNC: 2.7 MMOL/L (ref 3.5–5.2)
PROT SERPL-MCNC: 6.4 G/DL (ref 6–8.5)
QT INTERVAL: 359 MS
RBC # BLD AUTO: 3.1 10*6/MM3 (ref 3.77–5.28)
RBC # BLD AUTO: 3.29 10*6/MM3 (ref 3.77–5.28)
SODIUM SERPL-SCNC: 140 MMOL/L (ref 136–145)
WBC NRBC COR # BLD: 4.1 10*3/MM3 (ref 3.4–10.8)
WBC NRBC COR # BLD: 4.2 10*3/MM3 (ref 3.4–10.8)

## 2023-04-25 PROCEDURE — 36415 COLL VENOUS BLD VENIPUNCTURE: CPT | Performed by: NURSE PRACTITIONER

## 2023-04-25 PROCEDURE — 85025 COMPLETE CBC W/AUTO DIFF WBC: CPT | Performed by: NURSE PRACTITIONER

## 2023-04-25 PROCEDURE — 93005 ELECTROCARDIOGRAM TRACING: CPT | Performed by: INTERNAL MEDICINE

## 2023-04-25 PROCEDURE — 83735 ASSAY OF MAGNESIUM: CPT | Performed by: NURSE PRACTITIONER

## 2023-04-25 PROCEDURE — 80053 COMPREHEN METABOLIC PANEL: CPT | Performed by: NURSE PRACTITIONER

## 2023-04-25 PROCEDURE — 80053 COMPREHEN METABOLIC PANEL: CPT | Performed by: INTERNAL MEDICINE

## 2023-04-25 PROCEDURE — 93010 ELECTROCARDIOGRAM REPORT: CPT | Performed by: INTERNAL MEDICINE

## 2023-04-25 PROCEDURE — 84100 ASSAY OF PHOSPHORUS: CPT | Performed by: INTERNAL MEDICINE

## 2023-04-25 PROCEDURE — 84100 ASSAY OF PHOSPHORUS: CPT | Performed by: NURSE PRACTITIONER

## 2023-04-25 PROCEDURE — 97162 PT EVAL MOD COMPLEX 30 MIN: CPT

## 2023-04-25 RX ORDER — CYCLOBENZAPRINE HCL 10 MG
10 TABLET ORAL NIGHTLY PRN
COMMUNITY

## 2023-04-25 RX ADMIN — CYCLOBENZAPRINE 10 MG: 10 TABLET, FILM COATED ORAL at 18:49

## 2023-04-25 RX ADMIN — CYCLOBENZAPRINE 10 MG: 10 TABLET, FILM COATED ORAL at 08:33

## 2023-04-25 RX ADMIN — Medication 10 ML: at 20:28

## 2023-04-25 RX ADMIN — POTASSIUM PHOSPHATE, MONOBASIC AND POTASSIUM PHOSPHATE, DIBASIC 45 MMOL: 224; 236 INJECTION, SOLUTION, CONCENTRATE INTRAVENOUS at 12:15

## 2023-04-25 RX ADMIN — CYCLOBENZAPRINE 10 MG: 10 TABLET, FILM COATED ORAL at 00:46

## 2023-04-25 RX ADMIN — Medication 150 MEQ: at 02:56

## 2023-04-25 NOTE — THERAPY EVALUATION
Patient Name: Shirin Canales  : 1990    MRN: [id number]                              Today's Date: 2023       Admit Date: 2023    Visit Dx:     ICD-10-CM ICD-9-CM   1. Metabolic acidosis  E87.20 276.2   2. Alcoholic ketoacidosis  E87.29 276.2   3. Dehydration  E86.0 276.51     Patient Active Problem List   Diagnosis   • Metabolic acidosis     History reviewed. No pertinent past medical history.  History reviewed. No pertinent surgical history.   General Information     Row Name 23 [facility]          Physical Therapy Time and Intention    Document Type evaluation  -AM     Mode of Treatment physical therapy  -AM     Row Name 23 1331          General Information    Patient Profile Reviewed yes  -AM     Prior Level of Function independent:;all household mobility;community mobility;gait;transfer;bed mobility;driving  -AM     Existing Precautions/Restrictions no known precautions/restrictions  -AM     Barriers to Rehab none identified  -AM     Row Name 23 1331          Living Environment    People in Home parent(s)  -AM     Row Name 23 1331          Home Main Entrance    Number of Stairs, Main Entrance none  -AM     Row Name 23 1331          Stairs Within Home, Primary    Number of Stairs, Within Home, Primary none  -AM     Row Name 23 1331          Cognition    Orientation Status (Cognition) oriented x 4  -AM     Row Name 23 1331          Safety Issues, Functional Mobility    Impairments Affecting Function (Mobility) balance;endurance/activity tolerance;pain;strength  -AM     Comment, Safety Issues/Impairments (Mobility) gait belt utilized  -AM           User Key  (r) = Recorded By, (t) = Taken By, (c) = Cosigned By    Initials Name Provider Type    AM Sulaiman Cortez, PT Physical Therapist               Mobility     Row Name 23 133          Bed Mobility    Bed Mobility bed mobility (all) activities  -AM     All Activities, [place] (Bed Mobility) modified  independence  -AM     Row Name 04/25/23 1332          Sit-Stand Transfer    Sit-Stand [place] (Transfers) contact guard;1 person assist  -AM     [facility] Name 04/25/23 1332          Gait/Stairs (Locomotion)    [place] Level (Gait) contact guard  -AM     Distance in Feet (Gait) 40'  -AM     Comment, (Gait/Stairs) decreased speed, flexed trunk, gaze looks down at feet during the entire time despite verbal cueing to look up, decreased endurance  -AM           User Key  (r) = Recorded By, (t) = Taken By, (c) = Cosigned By    Initials Name Provider Type    AM Sulaiman Cortez, PT Physical Therapist               Obj/Interventions     [facility] Name 04/25/23 1333          Range of Motion Comprehensive    General Range of Motion no range of motion deficits identified  -AM     [facility] 04/25/23 1333          Strength Comprehensive (MMT)    Comment, General Manual Muscle Testing (MMT) Assessment 4+/5  -AM     Row Name 04/25/23 1333          Motor Skills    Motor Skills functional endurance  -AM     Functional Endurance fair  -AM     Row Name 04/25/23 1333          Balance    Balance Assessment sitting static balance;sitting dynamic balance;sit to stand dynamic balance;standing static balance;standing dynamic balance  -AM     Static Sitting Balance independent  -AM     Dynamic Sitting Balance independent  -AM     Position, Sitting Balance unsupported;sitting edge of bed  -AM     Sit to Stand Dynamic Balance contact guard  -AM     Static Standing Balance contact guard  -AM     Dynamic Standing Balance contact guard  -AM     Position/Device Used, Standing Balance unsupported  -AM     Row Name 04/25/23 1333          Sensory Assessment (Somatosensory)    Sensory Assessment (Somatosensory) --  Pt reports tingling B calves that moves up into thighs with ambulation  -AM           User Key  (r) = Recorded By, (t) = Taken By, (c) = Cosigned By    Initials Name Provider Type    AM Sulaiman Cortez, PT Physical Therapist                "Goals/Plan     Row Name 04/25/23 1332          Transfer Goal 1 (PT)    Activity/Assistive Device (Transfer Goal 1, PT) transfers, all  -AM     [place] Level/Cues Needed (Transfer Goal 1, PT) modified independence  -AM     Time Frame (Transfer Goal 1, PT) long term goal (LTG)  -AM     Row Name 04/25/23 1336          Gait Training Goal 1 (PT)    Activity/Assistive Device (Gait Training Goal 1, PT) gait (walking locomotion)  -AM     [place] Level (Gait Training Goal 1, PT) modified independence  -AM     Distance (Gait Training Goal 1, PT) 150'  -AM     Time Frame (Gait Training Goal 1, PT) long term goal (LTG)  -AM     Row Name 04/25/23 3426          Therapy Assessment/Plan (PT)    Planned Therapy Interventions (PT) balance training;gait training;strengthening;patient/family education;transfer training  -AM           User Key  (r) = Recorded By, (t) = Taken By, (c) = Cosigned By    Initials Name Provider Type    AM Sulaiman Cortez, PT Physical Therapist               Clinical Impression     Row Name 04/25/23 2778          Pain    Pretreatment Pain Rating 7/10  -AM     Posttreatment Pain Rating 5/10  -AM     Pain Location lower  -AM     Pain Location - back  -AM     Pain Intervention(s) Repositioned;Emotional support  -AM     Row Name 04/25/23 4484          Plan of Care Review    Plan of Care Reviewed With patient  -AM     Outcome Evaluation Pt is a 31 y/o female with c/o intractable nausea and vomiting with R flank pain with principle dx of metabolic acidosis and severe dehydration.  Chest X-ray: (-) acute, U/S abdomen: Heptaic steatosis.  Pt reports she plans to d/c to her mother's apartment which is on the first floor and has no steps to navigate into.  Pt was mod I with all functional mobiity tasks and did not utilize an assitive device prior to hospitalization.  Pt on room air, IV and telemetry this date.  Pt reports \"tingling in the back of my calves since all of this has happened'.  Pt was mod I for bed " mobility, CGA for transfers and ambulated 40' with CGA with decreased gait speed, gaze looking downward at feet.  Tingling sensation spreading up to thighs with ambulation.  Pt is only agreeable to PT services in the hospital declines need for PT after hospital d/c  -AM     Row Name 04/25/23 1334          Therapy Assessment/Plan (PT)    Patient/Family Therapy Goals Statement (PT) To go home  -AM     Rehab Potential (PT) good, to achieve stated therapy goals  -AM     Criteria for Skilled Interventions Met (PT) yes;skilled treatment is necessary  -AM     Therapy Frequency (PT) 3 times/wk  -AM     Predicted Duration of Therapy Intervention (PT) until d/c  -AM     Row Name 04/25/23 1334          Vital Signs    O2 Delivery Pre Treatment room air  -AM     O2 Delivery Intra Treatment room air  -AM     O2 Delivery Post Treatment room air  -AM     Pre Patient Position Supine  -AM     Intra Patient Position Standing  -AM     Post Patient Position Supine  -AM     Row Name 04/25/23 1334          Positioning and Restraints    Pre-Treatment Position in bed  -AM     Post Treatment Position bed  -AM     In Bed supine;call light within reach;encouraged to call for assist  -AM           User Key  (r) = Recorded By, (t) = Taken By, (c) = Cosigned By    Initials Name Provider Type    AM Sulaiman Cortez, PT Physical Therapist               Outcome Measures     Row Name 04/25/23 1339 04/25/23 0833       How much help from another person do you currently need...    Turning from your back to your side while in flat bed without using bedrails? 4  -AM 4  -HJ    Moving from lying on back to sitting on the side of a flat bed without bedrails? 4  -AM 4  -HJ    Moving to and from a bed to a chair (including a wheelchair)? 3  -AM 4  -HJ    Standing up from a chair using your arms (e.g., wheelchair, bedside chair)? 3  -AM 4  -HJ    Climbing 3-5 steps with a railing? 3  -AM 3  -HJ    To walk in hospital room? 3  -AM 3  -HJ    AM-PAC 6 Clicks Score  (PT) 20  -AM 22  -HJ    Highest level of mobility 6 --> Walked 10 steps or more  -AM 7 --> Walked 25 feet or more  -    Row Name 04/25/23 1339          Functional Assessment    Outcome Measure Options AM-PAC 6 Clicks Basic Mobility (PT)  -AM           User Key  (r) = Recorded By, (t) = Taken By, (c) = Cosigned By    Initials Name Provider Type    HJ Barb Eugene RN Registered Nurse    AM Sulaiman Cortez, ALEJANDRO Physical Therapist                             Physical Therapy Education     Title: PT OT SLP Therapies (Done)     Topic: Physical Therapy (Done)     Point: Mobility training (Done)     Learning Progress Summary           Patient Acceptance, E,TB, VU by AM at 4/25/2023 1340                   Point: Home exercise program (Done)     Learning Progress Summary           Patient Acceptance, E,TB, VU by AM at 4/25/2023 1340                   Point: Body mechanics (Done)     Learning Progress Summary           Patient Acceptance, E,TB, VU by AM at 4/25/2023 1340                   Point: Precautions (Done)     Learning Progress Summary           Patient Acceptance, E,TB, VU by AM at 4/25/2023 1340                               User Key     Initials Effective Dates Name Provider Type Discipline    AM 05/10/21 -  Sulaiman Cortez, ALEJANDRO Physical Therapist PT              PT Recommendation and Plan  Planned Therapy Interventions (PT): balance training, gait training, strengthening, patient/family education, transfer training  Plan of Care Reviewed With: patient  Outcome Evaluation: Pt is a 33 y/o female with c/o intractable nausea and vomiting with R flank pain with principle dx of metabolic acidosis and severe dehydration.  Chest X-ray: (-) acute, U/S abdomen: Heptaic steatosis.  Pt reports she plans to d/c to her mother's apartment which is on the first floor and has no steps to navigate into.  Pt was mod I with all functional mobiity tasks and did not utilize an assitive device prior to hospitalization.  Pt on room air,  "IV and telemetry this date.  Pt reports \"tingling in the back of my calves since all of this has happened'.  Pt was mod I for bed mobility, CGA for transfers and ambulated 40' with CGA with decreased gait speed, gaze looking downward at feet.  Tingling sensation spreading up to thighs with ambulation.  Pt is only agreeable to PT services in the hospital declines need for PT after hospital d/c     Time Calculation:    PT Charges     Row Name 04/25/23 1341             Time Calculation    Start Time 0802  -AM      Stop Time 0820  -AM      Time Calculation (min) 18 min  -AM      PT Received On 04/25/23  -AM      PT - Next Appointment 04/27/23  -AM      PT Goal Re-Cert Due Date 05/09/23  -AM            User Key  (r) = Recorded By, (t) = Taken By, (c) = Cosigned By    Initials Name Provider Type    Sulaiman Oakley, PT Physical Therapist              Therapy Charges for Today     Code Description Service Date Service Provider Modifiers Qty    14408316846 HC PT EVAL MOD COMPLEXITY 3 4/25/2023 Sulaiman Cortez, PT GP 1          PT G-Codes  Outcome Measure Options: AM-PAC 6 Clicks Basic Mobility (PT)  AM-PAC 6 Clicks Score (PT): 20  PT Discharge Summary  Anticipated Discharge Disposition (PT): home with assist (refuses HHPT at this time)    Sulaiman Cortez, PT  4/25/2023    "

## 2023-04-25 NOTE — PROGRESS NOTES
[facility] Medicine Services   Daily Progress Note    Patient Name: Shirin Canales  : 1990  MRN: [id number]  Primary Care Physician:  Provider, No Known  Date of admission: 2023  Date and Time of Service: 23 at 10am      Subjective      Chief Complaint: intractable nausea and vomiting    Patient was seen and examined this am. Tolerated dinner yesterday. Having tingling to LE, awaiting labs this am    ROS   Constitutional: Negative for chills and fever.   HENT: Negative for congestion and sore throat.    Respiratory: Negative for cough and shortness of breath.    Cardiovascular: Negative for chest pain and palpitations.   Gastrointestinal: Positive for nausea and vomiting. Negative for abdominal pain.   Endocrine: Negative for heat intolerance and polyuria.   Genitourinary: Positive for flank pain. Negative for dysuria and urgency.   Musculoskeletal: Positive for back pain. Negative for arthralgias and myalgias.   Skin: Negative for rash and wound.   Neurological: Negative for weakness and numbness.   Psychiatric/Behavioral: Negative for suicidal ideas. The patient is not nervous/anxious.      Objective      Vitals:   Temp:  [98.3 °F (36.8 °C)-100.4 °F (38 °C)] 98.7 °F (37.1 °C)  Heart Rate:  [] 98  Resp:  [13-17] 16  BP: (114-122)/(76-91) 118/81    Physical Exam      Constitutional:       Appearance: Normal appearance. She is normal weight.   HENT:      Head: Normocephalic.      Nose: Nose normal.      Mouth/Throat:      Mouth: Mucous membranes are moist.   Eyes:      Extraocular Movements: Extraocular movements intact.      Pupils: Pupils are equal, round, and reactive to light.   Cardiovascular:      Rate and Rhythm:      Pulses: Normal pulses.      Heart sounds: Normal heart sounds.   Pulmonary:      Effort: Pulmonary effort is normal.      Breath sounds: Normal breath sounds.   Abdominal:      General: Abdomen is flat. Bowel sounds are normal.      Palpations: Abdomen is  soft.   Musculoskeletal:         General: Normal range of motion.   Skin:     General: Skin is warm.   Neurological:      General: No focal deficit present.      Mental Status: She is alert.   Psychiatric:         Mood and Affect: Mood normal.         Behavior: Behavior normal.      Result Review    Result Review:  I have personally reviewed the results from the time of this admission to 4/25/2023 10:00 EDT and agree with these findings:  [x]  Laboratory  []  Microbiology  []  Radiology  []  EKG/Telemetry   []  Cardiology/Vascular   []  Pathology  []  Old records  [x]  Other:  Most notable findings include: hyperglycemia, elevated AG, elevated LFTs          Assessment & Plan      Brief Patient Summary:  Shirin Canales is a 32 y.o. female with PMH of anxiety presented to the hospital for intractable nausea and vomiting, and was admitted with a principal diagnosis of Metabolic acidosis.  Patient complaining of nausea and vomiting which started yesterday morning.  She also endorses right flank pain.  She states that she was at [facility] over [place] and drank heavily with vodka, also stating she has not eaten food in 2 days.  Patient denies chest pain, shortness of air, palpitations, dizziness or syncope, headache, chills, or fever.  Denies diarrhea, constipation, loss of weight or loss of appetite, dysuria, blood in urine or stool.     ED course: Analysis of labs completed in ER showed patient to have a severe anion gap metabolic acidosis, with severe dehydration.  She was given 2 L fluid bolus.  CT abdomen was obtained and showed fatty liver, otherwise unremarkable.  VBG showed pH 6.921, PO2 72.5, PCO2 17.1, HCO3 3.5.  Urine tox screen was negative, and EtOH was normal.  Patient does have a moderate amount of acetone on her blood work.  UA + ketones as well.  Her flank pain was treated with Toradol and morphine.  She was started on a bicarb drip and transferred to ICU. Has been on IVfs, feeling better. Still nauseous  but significantly better than she was earlier      hydrALAZINE, 10 mg, Intravenous, Once  Scopolamine, 1 patch, Transdermal, Q72H  sodium chloride, 10 mL, Intravenous, Q12H       sodium bicarbonate, 150 mEq, Last Rate: 150 mEq (04/25/23 0256)         Active Hospital Problems:  Active Hospital Problems    Diagnosis    • **Metabolic acidosis      Plan:     Non-diabetic ketoacidosis, secondary to EtOH consumption  Intractable nausea and vomiting  -Bicarb drip initiated in ED,dc today  -Zofran for nausea/vomiting  -on clears, advance diet     Leukocytosis  -Likely reactive secondary to nausea/vomiting and acidosis   -Patient meets SIRS criteria: WBC 15.6 and HR > 90  -Lactic acid 3.5, improved  -Blood cultures -no growth  -Patient is not hypotensive  -CXR -no acute abnormality  -Continue to monitor off antibiotics for now     Hypertension  -No previous history of hypertension  -Likely pain related  -We will try IV hydralazine       Anxiety  -Continue home hydroxyzine after verified by pharmacy and clinically appropriate  -Continue home sertraline after verified by pharmacy and clinically appropriate     Elevated LFTs  Right flank pain  Lower back pain  -CT abdomen/pelvis with evidence of fatty liver  -We will order abdominal ultrasound  -  -  -Continue to monitor liver function  -Pain control as tolerated     Code Status (Patient has no pulse and is not breathing): CPR (Attempt to Resuscitate)  Medical Interventions (Patient has pulse or is breathing): Full Support        DVT prophylaxis:  Mechanical DVT prophylaxis orders are present.    CODE STATUS:    Code Status (Patient has no pulse and is not breathing): CPR (Attempt to Resuscitate)  Medical Interventions (Patient has pulse or is breathing): Full Support      Disposition:  I expect patient to be discharged today.    This patient has been assessed using appropriate electronic equipment and discussed with hospital infection control department.  04/25/23      Electronically signed by Blu Jones MD, 04/25/23, 10:00 EDT.  [facility] Hospitalist Team

## 2023-04-25 NOTE — PLAN OF CARE
Problem: Adult Inpatient Plan of Care  Goal: Plan of Care Review  Outcome: Ongoing, Progressing  Goal: Patient-Specific Goal (Individualized)  Outcome: Ongoing, Progressing  Goal: Absence of Hospital-Acquired Illness or Injury  Outcome: Ongoing, Progressing  Intervention: Identify and Manage Fall Risk  Recent Flowsheet Documentation  Taken 4/25/2023 0000 by Anca Jon RN  Safety Promotion/Fall Prevention: safety round/check completed  Taken 4/24/2023 2200 by Anca Jon RN  Safety Promotion/Fall Prevention: safety round/check completed  Taken 4/24/2023 2000 by Anca Jon RN  Safety Promotion/Fall Prevention: safety round/check completed  Intervention: Prevent Infection  Recent Flowsheet Documentation  Taken 4/25/2023 0000 by Anca Jon RN  Infection Prevention: single patient room provided  Taken 4/24/2023 2200 by Anca Jon RN  Infection Prevention:   single patient room provided   rest/sleep promoted   hand hygiene promoted   equipment surfaces disinfected   environmental surveillance performed  Taken 4/24/2023 2000 by Anca Jon RN  Infection Prevention:   single patient room provided   rest/sleep promoted   hand hygiene promoted   equipment surfaces disinfected   environmental surveillance performed  Goal: Optimal Comfort and Wellbeing  Outcome: Ongoing, Progressing  Intervention: Provide Person-Centered Care  Recent Flowsheet Documentation  Taken 4/24/2023 2000 by Anca Jon RN  Trust Relationship/Rapport:   care explained   questions answered  Goal: Readiness for Transition of Care  Outcome: Ongoing, Progressing     Problem: Pain Acute  Goal: Acceptable Pain Control and Functional Ability  Outcome: Ongoing, Progressing  Intervention: Prevent or Manage Pain  Recent Flowsheet Documentation  Taken 4/25/2023 0000 by Anca Jon RN  Medication Review/Management: medications reviewed  Taken 4/24/2023 2200 by Anca Jon RN  Medication  Review/Management: medications reviewed  Taken 4/24/2023 2000 by Anca Jon RN  Medication Review/Management: medications reviewed  Intervention: Optimize Psychosocial Wellbeing  Recent Flowsheet Documentation  Taken 4/24/2023 2000 by Anca Jon RN  Diversional Activities:   smartphone   television     Problem: Adjustment to Illness (Sepsis/Septic Shock)  Goal: Optimal Coping  Outcome: Ongoing, Progressing  Intervention: Optimize Psychosocial Adjustment to Illness  Recent Flowsheet Documentation  Taken 4/24/2023 2000 by Anca Jon RN  Family/Support System Care: self-care encouraged     Problem: Bleeding (Sepsis/Septic Shock)  Goal: Absence of Bleeding  Outcome: Ongoing, Progressing     Problem: Glycemic Control Impaired (Sepsis/Septic Shock)  Goal: Blood Glucose Level Within Desired Range  Outcome: Ongoing, Progressing     Problem: Infection Progression (Sepsis/Septic Shock)  Goal: Absence of Infection Signs and Symptoms  Outcome: Ongoing, Progressing  Intervention: Initiate Sepsis Management  Recent Flowsheet Documentation  Taken 4/25/2023 0000 by Anca Jon RN  Infection Prevention: single patient room provided  Taken 4/24/2023 2200 by Anca Jon RN  Infection Prevention:   single patient room provided   rest/sleep promoted   hand hygiene promoted   equipment surfaces disinfected   environmental surveillance performed  Taken 4/24/2023 2000 by Anca Jon RN  Infection Prevention:   single patient room provided   rest/sleep promoted   hand hygiene promoted   equipment surfaces disinfected   environmental surveillance performed     Problem: Nutrition Impaired (Sepsis/Septic Shock)  Goal: Optimal Nutrition Intake  Outcome: Ongoing, Progressing   Goal Outcome Evaluation:

## 2023-04-25 NOTE — PLAN OF CARE
"Goal Outcome Evaluation:  Plan of Care Reviewed With: patient           Outcome Evaluation: Pt is a 33 y/o female with c/o intractable nausea and vomiting with R flank pain with principle dx of metabolic acidosis and severe dehydration.  Chest X-ray: (-) acute, U/S abdomen: Heptaic steatosis.  Pt reports she plans to d/c to her mother's apartment which is on the first floor and has no steps to navigate into.  Pt was mod I with all functional mobiity tasks and did not utilize an assitive device prior to hospitalization.  Pt on room air, IV and telemetry this date.  Pt reports \"tingling in the back of my calves since all of this has happened'.  Pt was mod I for bed mobility, CGA for transfers and ambulated 40' with CGA with decreased gait speed, gaze looking downward at feet.  Tingling sensation spreading up to thighs with ambulation.  Pt is only agreeable to PT services in the hospital declines need for PT after hospital d/c  "

## 2023-04-25 NOTE — PROGRESS NOTES
[facility] Medicine Services   Daily Progress Note    Patient Name: Shirin Canales  : 1990  MRN: [id number]  Primary Care Physician:  Provider, No Known  Date of admission: 2023  Date and Time of Service: 23 at 10am      Subjective      Chief Complaint: intractable nausea and vomiting    Patient was seen and examined this am. Tolerated dinner yesterday. Having tingling to LE, awaiting labs this am    ROS   Constitutional: Negative for chills and fever.   HENT: Negative for congestion and sore throat.    Respiratory: Negative for cough and shortness of breath.    Cardiovascular: Negative for chest pain and palpitations.   Gastrointestinal: Positive for nausea and vomiting. Negative for abdominal pain.   Endocrine: Negative for heat intolerance and polyuria.   Genitourinary: Positive for flank pain. Negative for dysuria and urgency.   Musculoskeletal: Positive for back pain. Negative for arthralgias and myalgias.   Skin: Negative for rash and wound.   Neurological: Negative for weakness and numbness.   Psychiatric/Behavioral: Negative for suicidal ideas. The patient is not nervous/anxious.      Objective      Vitals:   Temp:  [98.2 °F (36.8 °C)-100.4 °F (38 °C)] 98.2 °F (36.8 °C)  Heart Rate:  [] 92  Resp:  [13-17] 13  BP: (114-122)/(76-91) 114/82    Physical Exam      Constitutional:       Appearance: Normal appearance. She is normal weight.   HENT:      Head: Normocephalic.      Nose: Nose normal.      Mouth/Throat:      Mouth: Mucous membranes are moist.   Eyes:      Extraocular Movements: Extraocular movements intact.      Pupils: Pupils are equal, round, and reactive to light.   Cardiovascular:      Rate and Rhythm:      Pulses: Normal pulses.      Heart sounds: Normal heart sounds.   Pulmonary:      Effort: Pulmonary effort is normal.      Breath sounds: Normal breath sounds.   Abdominal:      General: Abdomen is flat. Bowel sounds are normal.      Palpations: Abdomen is  soft.   Musculoskeletal:         General: Normal range of motion.   Skin:     General: Skin is warm.   Neurological:      General: No focal deficit present.      Mental Status: She is alert.   Psychiatric:         Mood and Affect: Mood normal.         Behavior: Behavior normal.      Result Review    Result Review:  I have personally reviewed the results from the time of this admission to 4/25/2023 11:39 EDT and agree with these findings:  [x]  Laboratory  []  Microbiology  []  Radiology  []  EKG/Telemetry   []  Cardiology/Vascular   []  Pathology  []  Old records  [x]  Other:  Most notable findings include: hyperglycemia, elevated AG, elevated LFTs          Assessment & Plan      Brief Patient Summary:  Shirin Canales is a 32 y.o. female with PMH of anxiety presented to the hospital for intractable nausea and vomiting, and was admitted with a principal diagnosis of Metabolic acidosis.  Patient complaining of nausea and vomiting which started yesterday morning.  She also endorses right flank pain.  She states that she was at [facility] over [place] and drank heavily with vodka, also stating she has not eaten food in 2 days.  Patient denies chest pain, shortness of air, palpitations, dizziness or syncope, headache, chills, or fever.  Denies diarrhea, constipation, loss of weight or loss of appetite, dysuria, blood in urine or stool.     ED course: Analysis of labs completed in ER showed patient to have a severe anion gap metabolic acidosis, with severe dehydration.  She was given 2 L fluid bolus.  CT abdomen was obtained and showed fatty liver, otherwise unremarkable.  VBG showed pH 6.921, PO2 72.5, PCO2 17.1, HCO3 3.5.  Urine tox screen was negative, and EtOH was normal.  Patient does have a moderate amount of acetone on her blood work.  UA + ketones as well.  Her flank pain was treated with Toradol and morphine.  She was started on a bicarb drip and transferred to ICU. Has been on IVfs, feeling better. Still nauseous  but significantly better than she was earlier      hydrALAZINE, 10 mg, Intravenous, Once  Scopolamine, 1 patch, Transdermal, Q72H  sodium chloride, 10 mL, Intravenous, Q12H             Active Hospital Problems:  Active Hospital Problems    Diagnosis    • **Metabolic acidosis      Plan:     Non-diabetic ketoacidosis, secondary to EtOH consumption  Intractable nausea and vomiting  -Bicarb drip initiated in ED,dc today  -Zofran for nausea/vomiting  -on clears, advance diet     Leukocytosis  -Likely reactive secondary to nausea/vomiting and acidosis   -Patient meets SIRS criteria: WBC 15.6 and HR > 90  -Lactic acid 3.5, improved  -Blood cultures -no growth  -Patient is not hypotensive  -CXR -no acute abnormality  -Continue to monitor off antibiotics for now     Hypertension  -No previous history of hypertension  -Likely pain related  -We will try IV hydralazine       Anxiety  -Continue home hydroxyzine after verified by pharmacy and clinically appropriate  -Continue home sertraline after verified by pharmacy and clinically appropriate     Elevated LFTs  Right flank pain  Lower back pain  -CT abdomen/pelvis with evidence of fatty liver  -We will order abdominal ultrasound  -  -  -Continue to monitor liver function  -Pain control as tolerated     Code Status (Patient has no pulse and is not breathing): CPR (Attempt to Resuscitate)  Medical Interventions (Patient has pulse or is breathing): Full Support        DVT prophylaxis:  Mechanical DVT prophylaxis orders are present.    CODE STATUS:    Code Status (Patient has no pulse and is not breathing): CPR (Attempt to Resuscitate)  Medical Interventions (Patient has pulse or is breathing): Full Support      Disposition:  I expect patient to be discharged today or in am pending labs    This patient has been assessed using appropriate electronic equipment and discussed with hospital infection control department. 04/25/23      Electronically signed by Blu Jones MD,  04/25/23, 11:39 EDT.  [facility]

## 2023-04-25 NOTE — PAYOR COMM NOTE
"AUTHORIZATION PENDING:   PLEASE CALL OR FAX DETERMINATION TO CONTACT BELOW. THANK YOU.        Naila Ramos RN MSN  /UR  [facility]  [phone] office  [phone] fax  [email or web address]    [facility]  NPI: [id number]  Tax: [id number]              Shirin Metz (32 y.o. Female)     Date of Birth   1990    Social Security Number       Address   [address] [place] [address] [place] [facility]    Home Phone   [phone]    MRN   [id number]       Episcopal   None    Marital Status   Single                            Admission Date   23    Admission Type   Emergency    Admitting Provider   Janie Payton MD    Attending Provider   Blu Jones MD    Department, Room/Bed   [facility]A PEDIATR[facility],        Discharge Date       Discharge Disposition       Discharge Destination                               Attending Provider: Blu Jones MD    Allergies: Latex, Penicillins    Isolation: None   Infection: None   Code Status: CPR    Ht: 160 cm (63\")   Wt: 62.8 kg (138 lb 6.4 oz)    Admission Cmt: None   Principal Problem: Metabolic acidosis [E87.20]                 Active Insurance as of 2023     Primary Coverage     Payor Plan Insurance Group Employer/Plan Group    [facility] [employer] BLUE [facility] PPO [id number]     Payor Plan Address Payor Plan Phone Number Payor Plan Fax Number Effective Dates    [address] [phone]  2021 - None Entered    [place] [address]       Subscriber Name Subscriber Birth Date Member ID       SHIRIN METZ 1990 [id number]                 Emergency Contacts      (Rel.) Home Phone Work Phone Mobile Phone    Doris Landers (Mother) -- -- [phone]        23 0139  Inpatient Admission  Once     Completed     Level of Care: Critical Care    Diagnosis: Metabolic acidosis [282662]    Admitting Physician: JANIE PAYTON [[id number]]    Attending Physician: JANIE PAYTON " [[id number]]    Certification: I Certify That Inpatient Hospital Services Are Medically Necessary For Greater Than 2 Midnights                     History & Physical      Zenia KathleenBEVERLY mendieta at 23 0103     Attestation signed by Maximo Barriga MD at 23 1204      Attending Addendum     Subjective: Feels better, does not drink alcohol on a regular basis.  Some nausea but medications are helping her.  Able to tolerate ice chips okay.  No abdominal pain.  Requesting some Flexeril for her chronic back pain.    Exam: Alert and awake, dry oral mucosa, abdomen is soft and nontender    Assessment / Plan:   • Alcoholic ketoacidosis: pH improved, continue bicarb drip.  Slight tachycardia is likely from hypovolemia, will give an LR bolus.  Transfer to hospitalist service.    I have personally reviewed all labs and other data, reviewed all other pertinent notes, interviewed and examined this patient today.     Dr. Maximo Barriga MD MPH  Staff Intensivist  23   12:03 EDT                        Critical Care History and Physical     Shirin Canales : 1990 MRN:[id number] LOS:0 ROOM:      Reason for admission: Metabolic acidosis     Assessment / Plan     Non-diabetic ketoacidosis, secondary to EtOH consumption  Intractable nausea and vomiting  -Bicarb drip initiated in ED  -Zofran for nausea/vomiting  -N.p.o. except for ice chips until nausea/vomiting resolves    Leukocytosis  -Likely reactive secondary to nausea/vomiting and acidosis   -Patient meets SIRS criteria: WBC 15.6 and HR > 90  -Lactic acid 3.5, trend until normalized  -Received 2 L bolus in ED secondary to dehydration  -Blood cultures pening  -Patient is not hypotensive  -Will check CXR for possible aspiration pneumonia  -Continue to monitor off antibiotics for now    Hypertension  -No previous history of hypertension  -Likely pain related  -We will try IV hydralazine  -Consider Cardene drip if needed    Anxiety  -Continue  home hydroxyzine after verified by pharmacy and clinically appropriate  -Continue home sertraline after verified by pharmacy and clinically appropriate    Elevated LFTs  Right flank pain  Lower back pain  -CT abdomen/pelvis with evidence of fatty liver  -We will order abdominal ultrasound  -  -  -Continue to monitor liver function  -Pain control as tolerated    Code Status (Patient has no pulse and is not breathing): CPR (Attempt to Resuscitate)  Medical Interventions (Patient has pulse or is breathing): Full Support       Nutrition:   NPO Diet NPO Type: Ice Chips     DVT prophylaxis:  Mechanical DVT prophylaxis orders are present.     History of Present illness     Shirin Canales is a 32 y.o. female with PMH of anxiety presented to the hospital for intractable nausea and vomiting, and was admitted with a principal diagnosis of Metabolic acidosis.  Patient complaining of nausea and vomiting which started yesterday morning.  She also endorses right flank pain.  She states that she was at [facility] and drank heavily with vodka, also stating she has not eaten food in 2 days.  Patient denies chest pain, shortness of air, palpitations, dizziness or syncope, headache, chills, or fever.  Denies diarrhea, constipation, loss of weight or loss of appetite, dysuria, blood in urine or stool.    ED course: Analysis of labs completed in ER showed patient to have a severe anion gap metabolic acidosis, with severe dehydration.  She was given 2 L fluid bolus.  CT abdomen was obtained and showed fatty liver, otherwise unremarkable.  VBG showed pH 6.921, PO2 72.5, PCO2 17.1, HCO3 3.5.  Urine tox screen was negative, and EtOH was normal.  Patient does have a moderate amount of acetone on her blood work.  UA + ketones as well.  Her flank pain was treated with Toradol and morphine.  She was started on a bicarb drip and transferred to ICU.    ACP: Patient does not have advanced directive on file at this  facility.  She is alert and oriented x4 and declares herself full code.    Patient was seen and examined on 04/24/23 at 01:40 EDT .    Subjective / Review of systems     Review of Systems   Constitutional: Negative for chills and fever.   HENT: Negative for congestion and sore throat.    Respiratory: Negative for cough and shortness of breath.    Cardiovascular: Negative for chest pain and palpitations.   Gastrointestinal: Positive for nausea and vomiting. Negative for abdominal pain.   Endocrine: Negative for heat intolerance and polyuria.   Genitourinary: Positive for flank pain. Negative for dysuria and urgency.   Musculoskeletal: Positive for back pain. Negative for arthralgias and myalgias.   Skin: Negative for rash and wound.   Neurological: Negative for weakness and numbness.   Psychiatric/Behavioral: Negative for suicidal ideas. The patient is not nervous/anxious.         Past Medical/Surgical/Social/Family History & Allergies     No past medical history on file.   No past surgical history on file.   Social History     Socioeconomic History   • Marital status: Single      No family history on file.   Allergies   Allergen Reactions   • Latex Unknown - Low Severity   • Penicillins Unknown - High Severity      Social Determinants of Health     Tobacco Use: Not on file   Alcohol Use: Not on file   Financial Resource Strain: Not on file   Food Insecurity: Not on file   Transportation Needs: Not on file   Physical Activity: Not on file   Stress: Not on file   Social Connections: Not on file   Intimate Partner Violence: Not on file   Depression: Not on file   Housing Stability: Not on file        Home Medications     Prior to Admission medications    Not on File        Objective / Physical Exam     Vital signs:  Temp: 98.1 °F (36.7 °C)  BP: (!) 156/101  Heart Rate: 116  Resp: 16  SpO2: 100 %  Weight: 59 kg (130 lb)    Admission Weight: Weight: 59 kg (130 lb)    Physical Exam  Constitutional:       Appearance:  Normal appearance. She is normal weight.   HENT:      Head: Normocephalic.      Nose: Nose normal.      Mouth/Throat:      Mouth: Mucous membranes are moist.   Eyes:      Extraocular Movements: Extraocular movements intact.      Pupils: Pupils are equal, round, and reactive to light.   Cardiovascular:      Rate and Rhythm: Tachycardia present.      Pulses: Normal pulses.      Heart sounds: Normal heart sounds.   Pulmonary:      Effort: Pulmonary effort is normal.      Breath sounds: Normal breath sounds.   Abdominal:      General: Abdomen is flat. Bowel sounds are normal.      Palpations: Abdomen is soft.   Musculoskeletal:         General: Normal range of motion.   Skin:     General: Skin is warm.   Neurological:      General: No focal deficit present.      Mental Status: She is alert.   Psychiatric:         Mood and Affect: Mood normal.         Behavior: Behavior normal.          Labs     Results from last 7 days   Lab Units 04/23/23  2302   WBC 10*3/mm3 15.60*   HEMATOCRIT % 42.5   PLATELETS 10*3/mm3 320      Results from last 7 days   Lab Units 04/23/23  2302   SODIUM mmol/L 138   POTASSIUM mmol/L 4.9   CHLORIDE mmol/L 102   CO2 mmol/L 5.0*   BUN mg/dL 10   CREATININE mg/dL 0.75        Imaging     EKG pending at time of note.    Current Medications     Scheduled Meds:  sodium chloride, 10 mL, Intravenous, Q12H         Continuous Infusions:  sodium bicarbonate drip (greater than 75 mEq/bag), 75 mEq       Patient continues to be critically ill, remains at risk of clinical deterioration or death and needed high complexity decision making. I have spent a total of 40 minutes providing critical care services to this patient including but not limited to: review of labs/ microbiology/imaging/medications, serial monitoring of vital signs, review of other consultant's notes, review of events in the last 24 hrs, monitoring input/output, review of treatment plan with bedside nurse, RT and other treatment team, management of  "life support and nutrition needs.     No family at bedside.    Time spent in performing separately billable procedures and updating family is not included in the critical care time.       BEVERLY Queen   Critical Care  04/24/23   01:40 EDT       Electronically signed by Maximo Barriga MD at 04/24/23 1204          Emergency Department Notes      Benjamin Decker MD at 04/23/23 2302          Subjective   History of Present Illness  32-year-old female describes severe pain in her right flank with gradual onset starting this morning.  States it is associated with nausea vomiting all day.  She denies fevers or chills.  She reports no diarrhea or trauma or any known ill contacts.  She reports no vaginal bleeding.  She states she has had some urinary frequency but reports no dysuria  Review of Systems  She reports no chest pain or shortness of breath.  She is on Depo-Provera and states she does not have menstrual cycles.  No past medical history on file.  Reportedly negative,  Allergies   Allergen Reactions   • Latex Unknown - Low Severity   • Penicillins Unknown - High Severity       No past surgical history on file.    No family history on file.    Social History     Socioeconomic History   • Marital status: Single       Prior to Admission medications    Not on File     /99   Pulse 108   Temp 98.1 °F (36.7 °C) (Oral)   Resp 16   Ht 160 cm (63\")   Wt 59 kg (130 lb)   LMP  (LMP Unknown) Comment: patient does not have periods- on BC  SpO2 100%   BMI 23.03 kg/m²       Objective   Physical Exam  General: Well-developed well-appearing, no acute distress, alert and appropriate  Eyes:  sclera nonicteric  HEENT: Mucous membranes dry, no mucosal swelling  Neck: Supple, no nuchal rigidity,   Respirations: Respirations nonlabored, equal breath sounds bilaterally, clear lungs  Heart regular rate and rhythm, no murmurs rubs or gallops,   Abdomen soft nontender nondistended, no hepatosplenomegaly, no " hernia, no mass, normal bowel sounds, mild right CVA tenderness  Extremities no clubbing cyanosis or edema, calves are symmetric and nontender  Neuro cranial nerves grossly intact, no focal limb deficits  Psych oriented, pleasant affect  Skin no rash, brisk cap refill  Procedures          ED Course  ED Course as of 04/24/23 0103   Mon Apr 24, 2023   0015 Mother reported to the nurse that the patient drank alcohol heavily yesterday []   0043 Patient states she has not eaten over the last 2 days and drank heavily with vodka yesterday for thunder over [place].  She denies any other unusual ingestions or any toxic alcohol ingestions such as methanol or ethylene glycol. []      ED Course User Index  [] Benjamin Decker MD            Results for orders placed or performed during the hospital encounter of 04/23/23   Comprehensive Metabolic Panel    Specimen: Blood   Result Value Ref Range    Glucose 92 65 - 99 mg/dL    BUN 10 6 - 20 mg/dL    Creatinine 0.75 0.57 - 1.00 mg/dL    Sodium 138 136 - 145 mmol/L    Potassium 4.9 3.5 - 5.2 mmol/L    Chloride 102 98 - 107 mmol/L    CO2 5.0 (L) 22.0 - 29.0 mmol/L    Calcium 9.2 8.6 - 10.5 mg/dL    Total Protein 8.7 (H) 6.0 - 8.5 g/dL    Albumin 5.8 (H) 3.5 - 5.2 g/dL    ALT (SGPT) 107 (H) 1 - 33 U/L    AST (SGOT) 165 (H) 1 - 32 U/L    Alkaline Phosphatase 103 39 - 117 U/L    Total Bilirubin 1.3 (H) 0.0 - 1.2 mg/dL    Globulin 2.9 gm/dL    A/G Ratio 2.0 g/dL    BUN/Creatinine Ratio 13.3 7.0 - 25.0    Anion Gap 31.0 (H) 5.0 - 15.0 mmol/L    eGFR 109.3 >60.0 mL/min/1.73   Lipase    Specimen: Blood   Result Value Ref Range    Lipase 27 13 - 60 U/L   hCG, Serum, Qualitative    Specimen: Blood   Result Value Ref Range    HCG Qualitative Negative Negative   Urinalysis With Culture If Indicated - Urine, Clean Catch    Specimen: Urine, Clean Catch   Result Value Ref Range    Color, UA Yellow Yellow, Straw    Appearance, UA Clear Clear    pH, UA 5.5 5.0 - 8.0    Specific Gravity,  UA 1.012 1.005 - 1.030    Glucose, UA Negative Negative    Ketones, UA >=160 mg/dL (4+) (A) Negative    Bilirubin, UA Negative Negative    Blood, UA Moderate (2+) (A) Negative    Protein,  mg/dL (2+) (A) Negative    Leuk Esterase, UA Negative Negative    Nitrite, UA Negative Negative    Urobilinogen, UA 0.2 E.U./dL 0.2 - 1.0 E.U./dL   CBC Auto Differential    Specimen: Blood   Result Value Ref Range    WBC 15.60 (H) 3.40 - 10.80 10*3/mm3    RBC 4.06 3.77 - 5.28 10*6/mm3    Hemoglobin 13.7 12.0 - 15.9 g/dL    Hematocrit 42.5 34.0 - 46.6 %    .9 (H) 79.0 - 97.0 fL    MCH 33.7 (H) 26.6 - 33.0 pg    MCHC 32.2 31.5 - 35.7 g/dL    RDW 13.6 12.3 - 15.4 %    RDW-SD 53.4 37.0 - 54.0 fl    MPV 7.2 6.0 - 12.0 fL    Platelets 320 140 - 450 10*3/mm3    Neutrophil % 89.1 (H) 42.7 - 76.0 %    Lymphocyte % 5.6 (L) 19.6 - 45.3 %    Monocyte % 4.4 (L) 5.0 - 12.0 %    Eosinophil % 0.1 (L) 0.3 - 6.2 %    Basophil % 0.8 0.0 - 1.5 %    Neutrophils, Absolute 13.90 (H) 1.70 - 7.00 10*3/mm3    Lymphocytes, Absolute 0.90 0.70 - 3.10 10*3/mm3    Monocytes, Absolute 0.70 0.10 - 0.90 10*3/mm3    Eosinophils, Absolute 0.00 0.00 - 0.40 10*3/mm3    Basophils, Absolute 0.10 0.00 - 0.20 10*3/mm3    nRBC 0.2 0.0 - 0.2 /100 WBC   Ethanol    Specimen: Blood   Result Value Ref Range    Ethanol % <0.010 %   Urine Drug Screen - Urine, Clean Catch    Specimen: Urine, Clean Catch   Result Value Ref Range    Amphet/Methamphet, Screen Negative Negative    Barbiturates Screen, Urine Negative Negative    Benzodiazepine Screen, Urine Negative Negative    Cocaine Screen, Urine Negative Negative    Opiate Screen Negative Negative    THC, Screen, Urine Negative Negative    Methadone Screen, Urine Negative Negative    Oxycodone Screen, Urine Negative Negative   Salicylate Level    Specimen: Blood   Result Value Ref Range    Salicylate <0.3 <=30.0 mg/dL   Acetone    Specimen: Blood   Result Value Ref Range    Acetone Moderate (A) Negative   Blood Gas,  "Venous -    Specimen: Venous Blood   Result Value Ref Range    Site RV     pH, Venous 6.921 (C) 7.320 - 7.430 pH Units    pCO2, Venous 17.1 (C) 42.0 - 51.0 mm Hg    pO2, Venous 72.5 (H) 40.0 - 42.0 mm Hg    HCO3, Venous 3.5 (L) 22.0 - 26.0 mmol/L    Base Excess, Venous -27.7 (L) -2.0 - 2.0 mmol/L    O2 Saturation, Venous 81.3 (H) 45.0 - 75.0 %    CO2 Content <5.0 (L) 22 - 29 mmol/L    Barometric Pressure for Blood Gas      Modality Room Air     FIO2 21 %   Urinalysis, Microscopic Only - Urine, Clean Catch    Specimen: Urine, Clean Catch   Result Value Ref Range    RBC, UA 3-5 (A) None Seen /HPF    WBC, UA 0-2 (A) None Seen /HPF    Bacteria, UA None Seen None Seen /HPF    Squamous Epithelial Cells, UA 0-2 None Seen, 0-2 /HPF    Hyaline Casts, UA 0-2 None Seen /LPF    Methodology Automated Microscopy    POC Lactate    Specimen: Blood   Result Value Ref Range    Lactate 3.5 (C) 0.3 - 2.0 mmol/L   Light Blue Top   Result Value Ref Range    Extra Tube Hold for add-ons.       Adult Nutrition Assessment     Row Name 04/23/23 2247       Anthropometrics    Height 160 cm (63\")    Weight 59 kg (130 lb)            CT Abdomen Pelvis Without Contrast    Result Date: 4/24/2023  Impression: 1. Nonobstructing small intrarenal calculi bilaterally without hydronephrosis. 2. Fatty liver. Electronically signed by:  Aki Hale M.D.  4/23/2023 10:06 PM Mountain Time                                      Medical Decision Making  Patient presents with vomiting and some flank discomfort.  Differential diagnosis including obstructive uropathy, bowel obstruction, ischemic bowel, dehydration    Patient was found on lab analysis to have severe anion gap metabolic acidosis.  She does appear very dehydrated she was ordered some normal saline initially and followed by lactated Ringer's.  Venous blood gas reveals severe acidemia and she was ordered bicarb infusion.  Patient is not diabetic, DKA felt to be unlikely.  Given her history of 48 hours " of fasting and heavy alcohol consumption, alcohol ketoacidosis with associated lactic acidosis is most likely.  Methanol or ethylene glycol toxicity would be another possibility although there is no report of any toxic ingestion.  Salicylate level was negative.  She is not describing any other toxic medication ingestion    Patient was resting more comfortably reexamination.  She has normal airway and respiratory pattern.  She had no further vomiting during the emergency room course and has a benign abdominal exam.  Her and her mother were advised the findings and agreeable to the plan of admission.      Alcoholic ketoacidosis: acute illness or injury  Dehydration: acute illness or injury  Metabolic acidosis: acute illness or injury  Amount and/or Complexity of Data Reviewed  Labs: ordered. Decision-making details documented in ED Course.     Details: Severe acidemia on blood gas analysis, anion gap acidosis on comprehensive metabolic panel, leukocytosis, elevated lactic acid.  hCG negative, urinalysis negative for bacteria  Radiology: ordered and independent interpretation performed.     Details: My independent interpretation of CT abdomen images no apparent bowel obstruction or obstructive uropathy  Discussion of management or test interpretation with external provider(s): Case and findings discussed with Zenia with the ICU team for admission.    Risk  Prescription drug management.  Decision regarding hospitalization.      Critical care time 40 minutes    Final diagnoses:   Metabolic acidosis   Alcoholic ketoacidosis   Dehydration       ED Disposition  ED Disposition     ED Disposition   Decision to Admit    Condition   --    Comment   Level of Care: Critical Care [6]   Admitting Physician: JANIE PAYTON [[id number]]   Attending Physician: JANIE PAYTON [[id number]]               No follow-up provider specified.       Medication List      No changes were made to your prescriptions during this visit.           Benjamin Decker MD  238      Electronically signed by Benjamin Decker MD at 23          Physician Progress Notes (all)      Blu Jones MD at 23 4477              [facility]   Daily Progress Note    Patient Name: Shirin Canales  : 1990  MRN: [id number]  Primary Care Physician:  Provider, No Known  Date of admission: 2023  Date and Time of Service: 23 at 2;45pm      Subjective       Chief Complaint: intractable nausea and vomiting    Patient was seen and examined this afternoon. Still nauseous    ROS   Constitutional: Negative for chills and fever.   HENT: Negative for congestion and sore throat.    Respiratory: Negative for cough and shortness of breath.    Cardiovascular: Negative for chest pain and palpitations.   Gastrointestinal: Positive for nausea and vomiting. Negative for abdominal pain.   Endocrine: Negative for heat intolerance and polyuria.   Genitourinary: Positive for flank pain. Negative for dysuria and urgency.   Musculoskeletal: Positive for back pain. Negative for arthralgias and myalgias.   Skin: Negative for rash and wound.   Neurological: Negative for weakness and numbness.   Psychiatric/Behavioral: Negative for suicidal ideas. The patient is not nervous/anxious.      Objective       Vitals:   Temp:  [98.1 °F (36.7 °C)-98.6 °F (37 °C)] 98.4 °F (36.9 °C)  Heart Rate:  [103-152] 103  Resp:  [16-18] 16  BP: (108-156)/() 122/91    Physical Exam      Constitutional:       Appearance: Normal appearance. She is normal weight.   HENT:      Head: Normocephalic.      Nose: Nose normal.      Mouth/Throat:      Mouth: Mucous membranes are moist.   Eyes:      Extraocular Movements: Extraocular movements intact.      Pupils: Pupils are equal, round, and reactive to light.   Cardiovascular:      Rate and Rhythm:      Pulses: Normal pulses.      Heart sounds: Normal heart sounds.   Pulmonary:      Effort: Pulmonary effort is  normal.      Breath sounds: Normal breath sounds.   Abdominal:      General: Abdomen is flat. Bowel sounds are normal.      Palpations: Abdomen is soft.   Musculoskeletal:         General: Normal range of motion.   Skin:     General: Skin is warm.   Neurological:      General: No focal deficit present.      Mental Status: She is alert.   Psychiatric:         Mood and Affect: Mood normal.         Behavior: Behavior normal.      Result Review    Result Review:  I have personally reviewed the results from the time of this admission to 4/24/2023 14:57 EDT and agree with these findings:  [x]  Laboratory  []  Microbiology  []  Radiology  []  EKG/Telemetry   []  Cardiology/Vascular   []  Pathology  []  Old records  [x]  Other:  Most notable findings include: hyperglycemia, elevated AG, elevated LFTs          Assessment & Plan      Brief Patient Summary:  Shirin Canales is a 32 y.o. female with PMH of anxiety presented to the hospital for intractable nausea and vomiting, and was admitted with a principal diagnosis of Metabolic acidosis.  Patient complaining of nausea and vomiting which started yesterday morning.  She also endorses right flank pain.  She states that she was at [facility] and drank heavily with vodka, also stating she has not eaten food in 2 days.  Patient denies chest pain, shortness of air, palpitations, dizziness or syncope, headache, chills, or fever.  Denies diarrhea, constipation, loss of weight or loss of appetite, dysuria, blood in urine or stool.     ED course: Analysis of labs completed in ER showed patient to have a severe anion gap metabolic acidosis, with severe dehydration.  She was given 2 L fluid bolus.  CT abdomen was obtained and showed fatty liver, otherwise unremarkable.  VBG showed pH 6.921, PO2 72.5, PCO2 17.1, HCO3 3.5.  Urine tox screen was negative, and EtOH was normal.  Patient does have a moderate amount of acetone on her blood work.  UA + ketones as well.  Her flank  pain was treated with Toradol and morphine.  She was started on a bicarb drip and transferred to ICU. Has been on IVfs, feeling better. Still nauseous but significantly better than she was earlier      hydrALAZINE, 10 mg, Intravenous, Once  sodium chloride, 10 mL, Intravenous, Q12H       sodium bicarbonate, 150 mEq, Last Rate: 150 mEq (04/24/23 0536)         Active Hospital Problems:  Active Hospital Problems    Diagnosis    • **Metabolic acidosis      Plan:     Non-diabetic ketoacidosis, secondary to EtOH consumption  Intractable nausea and vomiting  -Bicarb drip initiated in ED, will continue for now  -Zofran for nausea/vomiting  -on clears     Leukocytosis  -Likely reactive secondary to nausea/vomiting and acidosis   -Patient meets SIRS criteria: WBC 15.6 and HR > 90  -Lactic acid 3.5, improved  -Blood cultures pening  -Patient is not hypotensive  -CXR -no acute abnormality  -Continue to monitor off antibiotics for now     Hypertension  -No previous history of hypertension  -Likely pain related  -We will try IV hydralazine       Anxiety  -Continue home hydroxyzine after verified by pharmacy and clinically appropriate  -Continue home sertraline after verified by pharmacy and clinically appropriate     Elevated LFTs  Right flank pain  Lower back pain  -CT abdomen/pelvis with evidence of fatty liver  -We will order abdominal ultrasound  -  -  -Continue to monitor liver function  -Pain control as tolerated     Code Status (Patient has no pulse and is not breathing): CPR (Attempt to Resuscitate)  Medical Interventions (Patient has pulse or is breathing): Full Support        DVT prophylaxis:  Mechanical DVT prophylaxis orders are present.    CODE STATUS:    Code Status (Patient has no pulse and is not breathing): CPR (Attempt to Resuscitate)  Medical Interventions (Patient has pulse or is breathing): Full Support      Disposition:  I expect patient to be discharged in am.    This patient has been assessed  using appropriate electronic equipment and discussed with hospital infection control department. 04/24/23      Electronically signed by Blu Jones MD, 04/24/23, 14:57 EDT.  [facility] Hospitalist Team             Electronically signed by Blu Jones MD at 04/24/23 1506       Consult Notes (all)    No notes of this type exist for this encounter.

## 2023-04-25 NOTE — PLAN OF CARE
Goal Outcome Evaluation:  Plan of Care Reviewed With: patient        Progress: improving  Outcome Evaluation: Patient Bicarb discontinued this morning. IV potassium phosphate infusing at this time. Redraw will be order once complete. Scopolamine patch remains intact and patient reports improvement with nausea. Patient tolerating solid foods at this time. Lower back pain controlled with PRN flexeril. Patient resting comfortably in bed at this time with no complaints or noted distress.

## 2023-04-26 VITALS
OXYGEN SATURATION: 98 % | HEIGHT: 63 IN | BODY MASS INDEX: 26.29 KG/M2 | WEIGHT: 148.37 LBS | TEMPERATURE: 98.2 F | SYSTOLIC BLOOD PRESSURE: 121 MMHG | DIASTOLIC BLOOD PRESSURE: 98 MMHG | HEART RATE: 98 BPM | RESPIRATION RATE: 14 BRPM

## 2023-04-26 LAB
ALBUMIN SERPL-MCNC: 4.2 G/DL (ref 3.5–5.2)
ALBUMIN SERPL-MCNC: 4.3 G/DL (ref 3.5–5.2)
ALBUMIN/GLOB SERPL: 1.7 G/DL
ALBUMIN/GLOB SERPL: 2 G/DL
ALP SERPL-CCNC: 74 U/L (ref 39–117)
ALP SERPL-CCNC: 75 U/L (ref 39–117)
ALT SERPL W P-5'-P-CCNC: 58 U/L (ref 1–33)
ALT SERPL W P-5'-P-CCNC: 60 U/L (ref 1–33)
ANION GAP SERPL CALCULATED.3IONS-SCNC: 12 MMOL/L (ref 5–15)
ANION GAP SERPL CALCULATED.3IONS-SCNC: 12 MMOL/L (ref 5–15)
AST SERPL-CCNC: 82 U/L (ref 1–32)
AST SERPL-CCNC: 90 U/L (ref 1–32)
BILIRUB SERPL-MCNC: 1.5 MG/DL (ref 0–1.2)
BILIRUB SERPL-MCNC: 1.6 MG/DL (ref 0–1.2)
BUN SERPL-MCNC: 4 MG/DL (ref 6–20)
BUN SERPL-MCNC: 6 MG/DL (ref 6–20)
BUN/CREAT SERPL: 7.7 (ref 7–25)
BUN/CREAT SERPL: 9.8 (ref 7–25)
CALCIUM SPEC-SCNC: 8.6 MG/DL (ref 8.6–10.5)
CALCIUM SPEC-SCNC: 9 MG/DL (ref 8.6–10.5)
CHLORIDE SERPL-SCNC: 104 MMOL/L (ref 98–107)
CHLORIDE SERPL-SCNC: 98 MMOL/L (ref 98–107)
CO2 SERPL-SCNC: 25 MMOL/L (ref 22–29)
CO2 SERPL-SCNC: 29 MMOL/L (ref 22–29)
CREAT SERPL-MCNC: 0.52 MG/DL (ref 0.57–1)
CREAT SERPL-MCNC: 0.61 MG/DL (ref 0.57–1)
EGFRCR SERPLBLD CKD-EPI 2021: 122.8 ML/MIN/1.73
EGFRCR SERPLBLD CKD-EPI 2021: 127.6 ML/MIN/1.73
GEN 5 2HR TROPONIN T REFLEX: <6 NG/L
GLOBULIN UR ELPH-MCNC: 2.1 GM/DL
GLOBULIN UR ELPH-MCNC: 2.5 GM/DL
GLUCOSE SERPL-MCNC: 100 MG/DL (ref 65–99)
GLUCOSE SERPL-MCNC: 101 MG/DL (ref 65–99)
MAGNESIUM SERPL-MCNC: 1.5 MG/DL (ref 1.6–2.6)
PHOSPHATE SERPL-MCNC: 1.3 MG/DL (ref 2.5–4.5)
PHOSPHATE SERPL-MCNC: 2 MG/DL (ref 2.5–4.5)
POTASSIUM SERPL-SCNC: 2.8 MMOL/L (ref 3.5–5.2)
POTASSIUM SERPL-SCNC: 3.3 MMOL/L (ref 3.5–5.2)
PROT SERPL-MCNC: 6.3 G/DL (ref 6–8.5)
PROT SERPL-MCNC: 6.8 G/DL (ref 6–8.5)
SODIUM SERPL-SCNC: 139 MMOL/L (ref 136–145)
SODIUM SERPL-SCNC: 141 MMOL/L (ref 136–145)
TROPONIN T DELTA: NORMAL
TROPONIN T SERPL HS-MCNC: <6 NG/L

## 2023-04-26 PROCEDURE — 84100 ASSAY OF PHOSPHORUS: CPT | Performed by: INTERNAL MEDICINE

## 2023-04-26 PROCEDURE — 84484 ASSAY OF TROPONIN QUANT: CPT | Performed by: NURSE PRACTITIONER

## 2023-04-26 PROCEDURE — 80053 COMPREHEN METABOLIC PANEL: CPT | Performed by: INTERNAL MEDICINE

## 2023-04-26 PROCEDURE — 25010000002 MAGNESIUM SULFATE 2 GM/50ML SOLUTION: Performed by: NURSE PRACTITIONER

## 2023-04-26 RX ORDER — MAGNESIUM SULFATE HEPTAHYDRATE 40 MG/ML
2 INJECTION, SOLUTION INTRAVENOUS AS NEEDED
Status: DISCONTINUED | OUTPATIENT
Start: 2023-04-26 | End: 2023-04-26 | Stop reason: HOSPADM

## 2023-04-26 RX ORDER — SODIUM CHLORIDE 9 MG/ML
75 INJECTION, SOLUTION INTRAVENOUS CONTINUOUS
Status: DISCONTINUED | OUTPATIENT
Start: 2023-04-26 | End: 2023-04-26 | Stop reason: HOSPADM

## 2023-04-26 RX ORDER — POTASSIUM CHLORIDE 7.45 MG/ML
10 INJECTION INTRAVENOUS
Status: DISCONTINUED | OUTPATIENT
Start: 2023-04-26 | End: 2023-04-26 | Stop reason: HOSPADM

## 2023-04-26 RX ORDER — HYDROXYZINE HYDROCHLORIDE 25 MG/1
25 TABLET, FILM COATED ORAL 3 TIMES DAILY PRN
Status: DISCONTINUED | OUTPATIENT
Start: 2023-04-26 | End: 2023-04-26

## 2023-04-26 RX ORDER — MAGNESIUM SULFATE HEPTAHYDRATE 40 MG/ML
4 INJECTION, SOLUTION INTRAVENOUS AS NEEDED
Status: DISCONTINUED | OUTPATIENT
Start: 2023-04-26 | End: 2023-04-26 | Stop reason: HOSPADM

## 2023-04-26 RX ORDER — HYDROXYZINE HYDROCHLORIDE 25 MG/1
25 TABLET, FILM COATED ORAL 3 TIMES DAILY PRN
Status: DISCONTINUED | OUTPATIENT
Start: 2023-04-26 | End: 2023-04-26 | Stop reason: HOSPADM

## 2023-04-26 RX ORDER — HYDROXYZINE HYDROCHLORIDE 25 MG/1
25 TABLET, FILM COATED ORAL 3 TIMES DAILY PRN
Qty: 10 TABLET | Refills: 0 | Status: SHIPPED | OUTPATIENT
Start: 2023-04-26

## 2023-04-26 RX ORDER — POTASSIUM CHLORIDE 20 MEQ/1
40 TABLET, EXTENDED RELEASE ORAL AS NEEDED
Status: DISCONTINUED | OUTPATIENT
Start: 2023-04-26 | End: 2023-04-26 | Stop reason: HOSPADM

## 2023-04-26 RX ORDER — POTASSIUM CHLORIDE 1.5 G/1.77G
40 POWDER, FOR SOLUTION ORAL AS NEEDED
Status: DISCONTINUED | OUTPATIENT
Start: 2023-04-26 | End: 2023-04-26 | Stop reason: HOSPADM

## 2023-04-26 RX ADMIN — CYCLOBENZAPRINE 10 MG: 10 TABLET, FILM COATED ORAL at 09:37

## 2023-04-26 RX ADMIN — MAGNESIUM SULFATE HEPTAHYDRATE 2 G: 2 INJECTION, SOLUTION INTRAVENOUS at 06:03

## 2023-04-26 RX ADMIN — POTASSIUM CHLORIDE 40 MEQ: 1500 TABLET, EXTENDED RELEASE ORAL at 09:37

## 2023-04-26 RX ADMIN — MAGNESIUM SULFATE HEPTAHYDRATE 2 G: 2 INJECTION, SOLUTION INTRAVENOUS at 03:39

## 2023-04-26 RX ADMIN — MAGNESIUM SULFATE HEPTAHYDRATE 2 G: 2 INJECTION, SOLUTION INTRAVENOUS at 01:34

## 2023-04-26 RX ADMIN — SODIUM CHLORIDE 75 ML/HR: 9 INJECTION, SOLUTION INTRAVENOUS at 06:04

## 2023-04-26 RX ADMIN — CYCLOBENZAPRINE 10 MG: 10 TABLET, FILM COATED ORAL at 01:31

## 2023-04-26 RX ADMIN — POTASSIUM CHLORIDE 40 MEQ: 1500 TABLET, EXTENDED RELEASE ORAL at 01:31

## 2023-04-26 RX ADMIN — POTASSIUM CHLORIDE 40 MEQ: 1500 TABLET, EXTENDED RELEASE ORAL at 05:13

## 2023-04-26 RX ADMIN — SODIUM PHOSPHATE, MONOBASIC, MONOHYDRATE 15 MMOL: 276; 142 INJECTION, SOLUTION INTRAVENOUS at 10:30

## 2023-04-26 RX ADMIN — HYDROXYZINE HYDROCHLORIDE 25 MG: 25 TABLET, FILM COATED ORAL at 01:32

## 2023-04-26 RX ADMIN — SODIUM CHLORIDE 500 ML: 9 INJECTION, SOLUTION INTRAVENOUS at 05:43

## 2023-04-26 NOTE — PROGRESS NOTES
[facility] Medicine Services   Daily Progress Note    Patient Name: Shirin Canales  : 1990  MRN: [id number]  Primary Care Physician:  Provider, No Known  Date of admission: 2023  Date and Time of Service: 23 at 8am      Subjective      Chief Complaint: intractable nausea and vomiting    Patient was seen and examined this am. Still replacing electrolytes    ROS   Constitutional: Negative for chills and fever.   HENT: Negative for congestion and sore throat.    Respiratory: Negative for cough and shortness of breath.    Cardiovascular: Negative for chest pain and palpitations.   Gastrointestinal: Positive for nausea and vomiting. Negative for abdominal pain.   Endocrine: Negative for heat intolerance and polyuria.   Genitourinary: Positive for flank pain. Negative for dysuria and urgency.   Musculoskeletal: Positive for back pain. Negative for arthralgias and myalgias.   Skin: Negative for rash and wound.   Neurological: Negative for weakness and numbness.   Psychiatric/Behavioral: Negative for suicidal ideas. The patient is not nervous/anxious.      Objective      Vitals:   Temp:  [98.2 °F (36.8 °C)-98.9 °F (37.2 °C)] 98.6 °F (37 °C)  Heart Rate:  [] 89  Resp:  [13-24] 24  BP: (104-124)/(75-88) 104/75    Physical Exam      Constitutional:       Appearance: Normal appearance. She is normal weight.   HENT:      Head: Normocephalic.      Nose: Nose normal.      Mouth/Throat:      Mouth: Mucous membranes are moist.   Eyes:      Extraocular Movements: Extraocular movements intact.      Pupils: Pupils are equal, round, and reactive to light.   Cardiovascular:      Rate and Rhythm:      Pulses: Normal pulses.      Heart sounds: Normal heart sounds.   Pulmonary:      Effort: Pulmonary effort is normal.      Breath sounds: Normal breath sounds.   Abdominal:      General: Abdomen is flat. Bowel sounds are normal.      Palpations: Abdomen is soft.   Musculoskeletal:         General: Normal  range of motion.   Skin:     General: Skin is warm.   Neurological:      General: No focal deficit present.      Mental Status: She is alert.   Psychiatric:         Mood and Affect: Mood normal.         Behavior: Behavior normal.      Result Review    Result Review:  I have personally reviewed the results from the time of this admission to 4/26/2023 09:26 EDT and agree with these findings:  [x]  Laboratory  []  Microbiology  []  Radiology  []  EKG/Telemetry   []  Cardiology/Vascular   []  Pathology  []  Old records  [x]  Other:  Most notable findings include: hyperglycemia, elevated AG, elevated LFTs          Assessment & Plan      Brief Patient Summary:  Shirin Canales is a 32 y.o. female with PMH of anxiety presented to the hospital for intractable nausea and vomiting, and was admitted with a principal diagnosis of Metabolic acidosis.  Patient complaining of nausea and vomiting which started yesterday morning.  She also endorses right flank pain.  She states that she was at [facility] and drank heavily with vodka, also stating she has not eaten food in 2 days.  Patient denies chest pain, shortness of air, palpitations, dizziness or syncope, headache, chills, or fever.  Denies diarrhea, constipation, loss of weight or loss of appetite, dysuria, blood in urine or stool.     ED course: Analysis of labs completed in ER showed patient to have a severe anion gap metabolic acidosis, with severe dehydration.  She was given 2 L fluid bolus.  CT abdomen was obtained and showed fatty liver, otherwise unremarkable.  VBG showed pH 6.921, PO2 72.5, PCO2 17.1, HCO3 3.5.  Urine tox screen was negative, and EtOH was normal.  Patient does have a moderate amount of acetone on her blood work.  UA + ketones as well.  Her flank pain was treated with Toradol and morphine.  She was started on a bicarb drip and transferred to ICU. Has been on IVfs, feeling better. Still nauseous but significantly better than she was  earlier      hydrALAZINE, 10 mg, Intravenous, Once  Scopolamine, 1 patch, Transdermal, Q72H  sodium chloride, 10 mL, Intravenous, Q12H       sodium chloride, 75 mL/hr, Last Rate: 75 mL/hr (04/26/23 0604)         Active Hospital Problems:  Active Hospital Problems    Diagnosis    • **Metabolic acidosis      Plan:     Non-diabetic ketoacidosis, secondary to EtOH consumption  Intractable nausea and vomiting  -Bicarb drip initiated in ED,dc today  -Zofran for nausea/vomiting  -on clears, advance diet     Leukocytosis  -Likely reactive secondary to nausea/vomiting and acidosis   -Patient meets SIRS criteria: WBC 15.6 and HR > 90  -Lactic acid 3.5, improved  -Blood cultures -no growth  -Patient is not hypotensive  -CXR -no acute abnormality  -Continue to monitor off antibiotics for now     Hypertension  -improved    Tachycardia related to anxiety     Anxiety  -on hydroxyzine   -cont sertraline      Elevated LFTs  Right flank pain  Lower back pain  -CT abdomen/pelvis with evidence of fatty liver  -Fatty liver on Liver US  -LFTs trending down  -Pain control as tolerated     Code Status (Patient has no pulse and is not breathing): CPR (Attempt to Resuscitate)  Medical Interventions (Patient has pulse or is breathing): Full Support        DVT prophylaxis:  Mechanical DVT prophylaxis orders are present.    CODE STATUS:    Code Status (Patient has no pulse and is not breathing): CPR (Attempt to Resuscitate)  Medical Interventions (Patient has pulse or is breathing): Full Support      Disposition:  I expect patient to be discharged later today   This patient has been assessed using appropriate electronic equipment and discussed with hospital infection control department. 04/26/23      Electronically signed by Blu Jones MD, 04/26/23, 09:26 EDT.  [facility] Hospitalist Team

## 2023-04-26 NOTE — PLAN OF CARE
Goal Outcome Evaluation:            Pt resting well. Back pain controlled w PRN flexeril. K and mag low, IV mag running and PO potassium replacement started. Phos has improved to 2.0 after IV phos administered on day shift 4/25. Will continue to replace per protocol. Pt restarted on home atarax PRN for anxiety.       0520 RN contacted on call provider regarding tachycardia. See orders.     0605 500 ml bolus administered, pt tolerated well. IVF continuous 75 ml/hr. Pt 3rd dose of IV mag started. One dose of PO potassium left per replacement protocol. Will pass on to day shift nurse to administer.

## 2023-04-26 NOTE — NURSING NOTE
Pt sinus tach 120's RN ordered EKG, call placed out for on call provider. Pt denies chest pain or palpitations at this time. No acute distress noted.      2235 telephone w readback for STAT mag and K labs. Orders placed.

## 2023-04-26 NOTE — DISCHARGE SUMMARY
[facility] Medicine Services  Discharge Summary    Date of Service: 23  Patient Name: Shirin Canales  : 1990  MRN: [id number]    Date of Admission: 2023  Discharge Diagnosis: Alcoholic ketoacidosis  Date of Discharge:  23  Primary Care Physician: Provider, No Known      Presenting Problem:   Dehydration [E86.0]  Alcoholic ketoacidosis [E87.29]  Metabolic acidosis [E87.20]    Active and Resolved Hospital Problems:  Active Hospital Problems    Diagnosis POA   • **Metabolic acidosis [E87.20] Yes      Resolved Hospital Problems   No resolved problems to display.         Hospital Course     Hospital Course:  Shirin Canales is a 32 y.o. female with PMH of anxiety presented to the hospital for intractable nausea and vomiting, and was admitted with a principal diagnosis of Metabolic acidosis.  Patient complaining of nausea and vomiting which started yesterday morning.  She also endorses right flank pain.  She states that she was at [facility] over [place] and drank heavily with vodka, also stating she has not eaten food in 2 days.  Patient denies chest pain, shortness of air, palpitations, dizziness or syncope, headache, chills, or fever.  Denies diarrhea, constipation, loss of weight or loss of appetite, dysuria, blood in urine or stool  ED course: Analysis of labs completed in ER showed patient to have a severe anion gap metabolic acidosis, with severe dehydration.  She was given 2 L fluid bolus.  CT abdomen was obtained and showed fatty liver, otherwise unremarkable.  VBG showed pH 6.921, PO2 72.5, PCO2 17.1, HCO3 3.5.  Urine tox screen was negative, and EtOH was normal.  Patient does have a moderate amount of acetone on her blood work.  UA + ketones as well.  Her flank pain was treated with Toradol and morphine.  She was started on a bicarb drip,electrlyte replacements.   She is feeling better and tolerating po. Clinically stable to dc      Non-diabetic ketoacidosis, secondary  to EtOH consumption  Intractable nausea and vomiting  Leukocytosis-reactive  HTN  Elevated LFTs  Tachycardia due to anxiety  Fatty liver      DISCHARGE Follow Up Recommendations for labs and diagnostics:       Reasons For Change In Medications and Indications for New Medications:      Day of Discharge     Vital Signs:  Temp:  [98.2 °F (36.8 °C)-98.9 °F (37.2 °C)] 98.2 °F (36.8 °C)  Heart Rate:  [] 99  Resp:  [14-24] 14  BP: (104-124)/(75-98) 121/98    Physical Exam:  Physical Exam   Constitutional:       Appearance: Normal appearance. She is normal weight.   HENT:      Head: Normocephalic.      Nose: Nose normal.      Mouth/Throat:      Mouth: Mucous membranes are moist.   Eyes:      Extraocular Movements: Extraocular movements intact.      Pupils: Pupils are equal, round, and reactive to light.   Cardiovascular:      Rate and Rhythm:      Pulses: Normal pulses.      Heart sounds: Normal heart sounds.   Pulmonary:      Effort: Pulmonary effort is normal.      Breath sounds: Normal breath sounds.   Abdominal:      General: Abdomen is flat. Bowel sounds are normal.      Palpations: Abdomen is soft.   Musculoskeletal:         General: Normal range of motion.   Skin:     General: Skin is warm.   Neurological:      General: No focal deficit present.      Mental Status: She is alert.   Psychiatric:         Mood and Affect: Mood normal.         Behavior: Behavior normal.     Pertinent  and/or Most Recent Results     LAB RESULTS:      Lab 04/25/23  2333 04/25/23  0722 04/24/23  0729 04/24/23  0329 04/24/23  0016 04/23/23  2302   WBC 4.10 4.20  --   --   --  15.60*   HEMOGLOBIN 11.0* 10.6*  --   --   --  13.7   HEMATOCRIT 32.5* 30.4*  --   --   --  42.5   PLATELETS 180 177  --   --   --  320   NEUTROS ABS 2.40 2.60  --   --   --  13.90*   LYMPHS ABS 1.40 1.30  --   --   --  0.90   MONOS ABS 0.30 0.30  --   --   --  0.70   EOS ABS 0.00 0.00  --   --   --  0.00   MCV 98.8* 98.2*  --   --   --  104.9*   LACTATE  --   --   1.2 3.3* 3.5*  --          Lab 04/26/23  0831 04/25/23  2333 04/25/23  1018 04/24/23  0354 04/23/23  2302   SODIUM 141 139 140 140 138   POTASSIUM 3.3* 2.8* 2.7* 5.0 4.9   CHLORIDE 104 98 97* 107 102   CO2 25.0 29.0 33.0* 8.0* 5.0*   ANION GAP 12.0 12.0 10.0 25.0* 31.0*   BUN 4* 6 7 9 10   CREATININE 0.52* 0.61 0.58 0.69 0.75   EGFR 127.6 122.8 124.3 119.2 109.3   GLUCOSE 100* 101* 113* 119* 92   CALCIUM 8.6 9.0 9.1 8.4* 9.2   MAGNESIUM  --  1.5* 1.7 1.9  --    PHOSPHORUS  --  2.0* 0.5* 2.3*  --          Lab 04/26/23  0831 04/25/23 2333 04/25/23  1018 04/24/23  0354 04/23/23  2302   TOTAL PROTEIN 6.8 6.3 6.4 7.7 8.7*   ALBUMIN 4.3 4.2 4.2 4.9 5.8*   GLOBULIN 2.5 2.1 2.2 2.8 2.9   ALT (SGPT) 58* 60* 61* 93* 107*   AST (SGOT) 90* 82* 96* 142* 165*   BILIRUBIN 1.6* 1.5* 1.8* 1.6* 1.3*   ALK PHOS 74 75 70 84 103   LIPASE  --   --   --   --  27         Lab 04/26/23  0831   HSTROP T <6                 Lab 04/24/23  0507 04/24/23  0007   PH, ARTERIAL 7.210*  --    PCO2, ARTERIAL 21.9*  --    PO2 .0*  --    O2 SATURATION ART 97.3  --    FIO2 21 21   HCO3 ART 8.7*  --    BASE EXCESS ART -17.3*  --      Brief Urine Lab Results  (Last result in the past 365 days)      Color   Clarity   Blood   Leuk Est   Nitrite   Protein   CREAT   Urine HCG        04/24/23 0024 Yellow   Clear   Moderate (2+)   Negative   Negative   100 mg/dL (2+)               Microbiology Results (last 10 days)     Procedure Component Value - Date/Time    Blood Culture - Blood, Arm, Left [[id number]]  (Normal) Collected: 04/24/23 0146    Lab Status: Preliminary result Specimen: Blood from Arm, Left Updated: 04/26/23 0200     Blood Culture No growth at 2 days    Blood Culture - Blood, Arm, Right [[id number]]  (Normal) Collected: 04/24/23 0146    Lab Status: Preliminary result Specimen: Blood from Arm, Right Updated: 04/26/23 0200     Blood Culture No growth at 2 days    Narrative:      Less than seven (7) mL's of blood was collected.  Insufficient  quantity may yield false negative results.          CT Abdomen Pelvis Without Contrast    Result Date: 4/24/2023  Impression: Impression: 1. Nonobstructing small intrarenal calculi bilaterally without hydronephrosis. 2. Fatty liver. Electronically signed by:  Aki Hale M.D.  4/23/2023 10:06 PM Mountain Time    XR Chest 1 View    Result Date: 4/24/2023  Impression: Impression: No acute cardiopulmonary disease. Electronically Signed: Valentino Aldridge  4/24/2023 7:43 AM EDT  Workstation ID: [id number]    US Abdomen Limited    Result Date: 4/24/2023  Impression: Impression: 1. Hepatic steatosis 2. No evidence of gallstones or biliary tract obstruction. Electronically Signed: Valentino Aldridge  4/24/2023 7:42 AM EDT  Workstation ID: [id number]                  Labs Pending at Discharge:  Pending Labs     Order Current Status    High Sensitivity Troponin T 2Hr Collected (04/26/23 1052)    Phosphorus Collected (04/26/23 1052)    Blood Culture - Blood, Arm, Left Preliminary result    Blood Culture - Blood, Arm, Right Preliminary result          Procedures Performed           Consults:   Consults     Date and Time Order Name Status Description    4/24/2023  8:39 AM Inpatient Hospitalist Consult              Discharge Details        Discharge Medications      New Medications      Instructions Start Date   hydrOXYzine 25 MG tablet  Commonly known as: ATARAX   25 mg, Oral, 3 Times Daily PRN         Continue These Medications      Instructions Start Date   cyclobenzaprine 10 MG tablet  Commonly known as: FLEXERIL   10 mg, Oral, Nightly PRN             Allergies   Allergen Reactions   • Latex Unknown - Low Severity   • Penicillins Unknown - High Severity         Discharge Disposition:   Home or Self Care    Diet:  Hospital:  Diet Order   Procedures   • Diet: Regular/House Diet; No Straw; Texture: Regular Texture (IDDSI 7); Fluid Consistency: Thin (IDDSI 0)         Discharge Activity:         CODE STATUS:  Code Status and Medical  Interventions:   Ordered at: 04/24/23 0140     Code Status (Patient has no pulse and is not breathing):    CPR (Attempt to Resuscitate)     Medical Interventions (Patient has pulse or is breathing):    Full Support         No future appointments.        Time spent on Discharge including face to face service:  35 minutes    This patient has been assessed using appropriate electronic equipment and discussed with hospital infection control department. 04/26/23      Signature: Electronically signed by Blu Jones MD, 04/26/23, 11:48 EDT.  [facility] Hospitalist Team

## 2023-04-26 NOTE — CASE MANAGEMENT/SOCIAL WORK
Continued Stay Note  [facility]     Patient Name: Shirin Canales  MRN: [id number]  Today's Date: 4/26/2023    Admit Date: 4/23/2023    Plan: DC Plan: Return home with mother, PCP appt with [facility] on AVS. MSW has seen.   Discharge Plan     Row Name 04/26/23 1016       Plan    Plan DC Plan: Return home with mother, PCP appt with [facility] on AVS. MSW has seen.    Plan Comments Barriers: pending labwork; nausea. At discharge Ms. Canales will return home with her mother. No anticipated discharge needs from               Phone communication or documentation only - no physical contact with patient or family.    Emily DIAZ,RN Case Manager  [facility]  Phone: Desk- [phone] cell- [phone]

## 2023-04-29 LAB
BACTERIA SPEC AEROBE CULT: NORMAL
BACTERIA SPEC AEROBE CULT: NORMAL
QT INTERVAL: 337 MS

## 2023-11-20 ENCOUNTER — HOSPITAL ENCOUNTER (OUTPATIENT)
Facility: HOSPITAL | Age: 33
Setting detail: OBSERVATION
Discharge: HOME | End: 2023-11-21
Attending: EMERGENCY MEDICINE | Admitting: INTERNAL MEDICINE
Payer: COMMERCIAL

## 2023-11-20 DIAGNOSIS — R51.9 ACUTE NONINTRACTABLE HEADACHE, UNSPECIFIED HEADACHE TYPE: ICD-10-CM

## 2023-11-20 DIAGNOSIS — E87.29 ALCOHOLIC KETOACIDOSIS: Primary | ICD-10-CM

## 2023-11-20 LAB
BACTERIA UR QL AUTO: ABNORMAL /HPF
BASOPHILS # BLD AUTO: 0.1 10*3/MM3 (ref 0–0.2)
BASOPHILS NFR BLD AUTO: 1.2 % (ref 0–1.5)
BILIRUB UR QL STRIP: NEGATIVE
CLARITY UR: CLEAR
COLOR UR: ABNORMAL
DEPRECATED RDW RBC AUTO: 52.5 FL (ref 37–54)
EOSINOPHIL # BLD AUTO: 0 10*3/MM3 (ref 0–0.4)
EOSINOPHIL NFR BLD AUTO: 0 % (ref 0.3–6.2)
ERYTHROCYTE [DISTWIDTH] IN BLOOD BY AUTOMATED COUNT: 13.6 % (ref 12.3–15.4)
ETHANOL UR QL: <0.01 %
GLUCOSE BLDC GLUCOMTR-MCNC: 80 MG/DL (ref 70–105)
GLUCOSE UR STRIP-MCNC: NEGATIVE MG/DL
HCG SERPL QL: NEGATIVE
HCT VFR BLD AUTO: 38.3 % (ref 34–46.6)
HGB BLD-MCNC: 13 G/DL (ref 12–15.9)
HGB UR QL STRIP.AUTO: ABNORMAL
HYALINE CASTS UR QL AUTO: ABNORMAL /LPF
KETONES UR QL STRIP: ABNORMAL
LEUKOCYTE ESTERASE UR QL STRIP.AUTO: ABNORMAL
LYMPHOCYTES # BLD AUTO: 0.9 10*3/MM3 (ref 0.7–3.1)
LYMPHOCYTES NFR BLD AUTO: 11.8 % (ref 19.6–45.3)
MCH RBC QN AUTO: 35.5 PG (ref 26.6–33)
MCHC RBC AUTO-ENTMCNC: 33.9 G/DL (ref 31.5–35.7)
MCV RBC AUTO: 104.6 FL (ref 79–97)
MONOCYTES # BLD AUTO: 0.6 10*3/MM3 (ref 0.1–0.9)
MONOCYTES NFR BLD AUTO: 7.7 % (ref 5–12)
NEUTROPHILS NFR BLD AUTO: 6.3 10*3/MM3 (ref 1.7–7)
NEUTROPHILS NFR BLD AUTO: 79.3 % (ref 42.7–76)
NITRITE UR QL STRIP: NEGATIVE
NRBC BLD AUTO-RTO: 0.1 /100 WBC (ref 0–0.2)
PH UR STRIP.AUTO: 6 [PH] (ref 5–8)
PLATELET # BLD AUTO: 280 10*3/MM3 (ref 140–450)
PMV BLD AUTO: 6.9 FL (ref 6–12)
PROT UR QL STRIP: ABNORMAL
RBC # BLD AUTO: 3.66 10*6/MM3 (ref 3.77–5.28)
RBC # UR STRIP: ABNORMAL /HPF
REF LAB TEST METHOD: ABNORMAL
SP GR UR STRIP: 1.02 (ref 1–1.03)
SQUAMOUS #/AREA URNS HPF: ABNORMAL /HPF
UROBILINOGEN UR QL STRIP: ABNORMAL
WBC # UR STRIP: ABNORMAL /HPF
WBC NRBC COR # BLD AUTO: 7.9 10*3/MM3 (ref 3.4–10.8)

## 2023-11-20 PROCEDURE — 25010000002 DIPHENHYDRAMINE PER 50 MG: Performed by: EMERGENCY MEDICINE

## 2023-11-20 PROCEDURE — 83735 ASSAY OF MAGNESIUM: CPT | Performed by: EMERGENCY MEDICINE

## 2023-11-20 PROCEDURE — 80053 COMPREHEN METABOLIC PANEL: CPT | Performed by: EMERGENCY MEDICINE

## 2023-11-20 PROCEDURE — 82948 REAGENT STRIP/BLOOD GLUCOSE: CPT

## 2023-11-20 PROCEDURE — 99284 EMERGENCY DEPT VISIT MOD MDM: CPT

## 2023-11-20 PROCEDURE — 96365 THER/PROPH/DIAG IV INF INIT: CPT

## 2023-11-20 PROCEDURE — 25010000002 METOCLOPRAMIDE PER 10 MG: Performed by: EMERGENCY MEDICINE

## 2023-11-20 PROCEDURE — 25810000003 SODIUM CHLORIDE 0.9 % SOLUTION 1,000 ML FLEX CONT: Performed by: EMERGENCY MEDICINE

## 2023-11-20 PROCEDURE — 25010000002 THIAMINE HCL 200 MG/2ML SOLUTION 2 ML VIAL: Performed by: EMERGENCY MEDICINE

## 2023-11-20 PROCEDURE — 96375 TX/PRO/DX INJ NEW DRUG ADDON: CPT

## 2023-11-20 PROCEDURE — 82077 ASSAY SPEC XCP UR&BREATH IA: CPT | Performed by: EMERGENCY MEDICINE

## 2023-11-20 PROCEDURE — 93005 ELECTROCARDIOGRAM TRACING: CPT | Performed by: EMERGENCY MEDICINE

## 2023-11-20 PROCEDURE — 87636 SARSCOV2 & INF A&B AMP PRB: CPT | Performed by: EMERGENCY MEDICINE

## 2023-11-20 PROCEDURE — 85025 COMPLETE CBC W/AUTO DIFF WBC: CPT | Performed by: EMERGENCY MEDICINE

## 2023-11-20 PROCEDURE — 84703 CHORIONIC GONADOTROPIN ASSAY: CPT | Performed by: EMERGENCY MEDICINE

## 2023-11-20 PROCEDURE — 81001 URINALYSIS AUTO W/SCOPE: CPT | Performed by: EMERGENCY MEDICINE

## 2023-11-20 RX ORDER — DIPHENHYDRAMINE HYDROCHLORIDE 50 MG/ML
25 INJECTION INTRAMUSCULAR; INTRAVENOUS ONCE
Status: COMPLETED | OUTPATIENT
Start: 2023-11-20 | End: 2023-11-20

## 2023-11-20 RX ORDER — METOCLOPRAMIDE HYDROCHLORIDE 5 MG/ML
10 INJECTION INTRAMUSCULAR; INTRAVENOUS ONCE
Status: COMPLETED | OUTPATIENT
Start: 2023-11-20 | End: 2023-11-20

## 2023-11-20 RX ADMIN — DIPHENHYDRAMINE HYDROCHLORIDE 25 MG: 50 INJECTION, SOLUTION INTRAMUSCULAR; INTRAVENOUS at 23:32

## 2023-11-20 RX ADMIN — THIAMINE HYDROCHLORIDE 1000 ML/HR: 100 INJECTION, SOLUTION INTRAMUSCULAR; INTRAVENOUS at 23:54

## 2023-11-20 RX ADMIN — METOCLOPRAMIDE 10 MG: 5 INJECTION, SOLUTION INTRAMUSCULAR; INTRAVENOUS at 23:32

## 2023-11-20 NOTE — Clinical Note
Level of Care: Telemetry [5]   Diagnosis: Alcoholic ketoacidosis [982534]   Admitting Physician: GUSTAVO VELA [[id number]]   Attending Physician: GUSTAVO VELA [[id number]]

## 2023-11-20 NOTE — LETTER
November 21, 2023     Patient: Shirin Canales   YOB: 1990   Date of Visit: 11/20/2023       To Whom It May Concern:    It is my medical opinion that Shirin Canales may return to work on 11/22/23 .           Sincerely,        No name on file    CC:   No Recipients

## 2023-11-21 ENCOUNTER — APPOINTMENT (OUTPATIENT)
Dept: CT IMAGING | Facility: HOSPITAL | Age: 33
End: 2023-11-21
Payer: COMMERCIAL

## 2023-11-21 VITALS
BODY MASS INDEX: 23.04 KG/M2 | HEIGHT: 63 IN | OXYGEN SATURATION: 97 % | HEART RATE: 108 BPM | SYSTOLIC BLOOD PRESSURE: 119 MMHG | DIASTOLIC BLOOD PRESSURE: 83 MMHG | RESPIRATION RATE: 16 BRPM | WEIGHT: 130 LBS | TEMPERATURE: 97.8 F

## 2023-11-21 PROBLEM — E87.29 ALCOHOLIC KETOACIDOSIS: Status: RESOLVED | Noted: 2023-11-21 | Resolved: 2023-11-21

## 2023-11-21 PROBLEM — E87.29 ALCOHOLIC KETOACIDOSIS: Status: ACTIVE | Noted: 2023-11-21

## 2023-11-21 LAB
ALBUMIN SERPL-MCNC: 4.5 G/DL (ref 3.5–5.2)
ALBUMIN SERPL-MCNC: 5.5 G/DL (ref 3.5–5.2)
ALBUMIN/GLOB SERPL: 1.8 G/DL
ALBUMIN/GLOB SERPL: 1.8 G/DL
ALP SERPL-CCNC: 105 U/L (ref 39–117)
ALP SERPL-CCNC: 80 U/L (ref 39–117)
ALT SERPL W P-5'-P-CCNC: 63 U/L (ref 1–33)
ALT SERPL W P-5'-P-CCNC: 84 U/L (ref 1–33)
ANION GAP SERPL CALCULATED.3IONS-SCNC: 24 MMOL/L (ref 5–15)
ANION GAP SERPL CALCULATED.3IONS-SCNC: 28 MMOL/L (ref 5–15)
AST SERPL-CCNC: 101 U/L (ref 1–32)
AST SERPL-CCNC: 140 U/L (ref 1–32)
BILIRUB SERPL-MCNC: 1.8 MG/DL (ref 0–1.2)
BILIRUB SERPL-MCNC: 2.2 MG/DL (ref 0–1.2)
BUN SERPL-MCNC: 5 MG/DL (ref 6–20)
BUN SERPL-MCNC: 6 MG/DL (ref 6–20)
BUN/CREAT SERPL: 7.4 (ref 7–25)
BUN/CREAT SERPL: 8.2 (ref 7–25)
CALCIUM SPEC-SCNC: 8.5 MG/DL (ref 8.6–10.5)
CALCIUM SPEC-SCNC: 9.9 MG/DL (ref 8.6–10.5)
CHLORIDE SERPL-SCNC: 102 MMOL/L (ref 98–107)
CHLORIDE SERPL-SCNC: 96 MMOL/L (ref 98–107)
CO2 SERPL-SCNC: 11 MMOL/L (ref 22–29)
CO2 SERPL-SCNC: 9 MMOL/L (ref 22–29)
CREAT SERPL-MCNC: 0.68 MG/DL (ref 0.57–1)
CREAT SERPL-MCNC: 0.73 MG/DL (ref 0.57–1)
DEPRECATED RDW RBC AUTO: 49.4 FL (ref 37–54)
EGFRCR SERPLBLD CKD-EPI 2021: 112.2 ML/MIN/1.73
EGFRCR SERPLBLD CKD-EPI 2021: 118.8 ML/MIN/1.73
ERYTHROCYTE [DISTWIDTH] IN BLOOD BY AUTOMATED COUNT: 13.6 % (ref 12.3–15.4)
FLUAV SUBTYP SPEC NAA+PROBE: NOT DETECTED
FLUBV RNA ISLT QL NAA+PROBE: NOT DETECTED
GLOBULIN UR ELPH-MCNC: 2.5 GM/DL
GLOBULIN UR ELPH-MCNC: 3 GM/DL
GLUCOSE SERPL-MCNC: 79 MG/DL (ref 65–99)
GLUCOSE SERPL-MCNC: 92 MG/DL (ref 65–99)
HCT VFR BLD AUTO: 33 % (ref 34–46.6)
HGB BLD-MCNC: 11 G/DL (ref 12–15.9)
MAGNESIUM SERPL-MCNC: 2 MG/DL (ref 1.6–2.6)
MCH RBC QN AUTO: 35.4 PG (ref 26.6–33)
MCHC RBC AUTO-ENTMCNC: 33.4 G/DL (ref 31.5–35.7)
MCV RBC AUTO: 105.9 FL (ref 79–97)
PLATELET # BLD AUTO: 226 10*3/MM3 (ref 140–450)
PMV BLD AUTO: 6.8 FL (ref 6–12)
POTASSIUM SERPL-SCNC: 3.8 MMOL/L (ref 3.5–5.2)
POTASSIUM SERPL-SCNC: 4.7 MMOL/L (ref 3.5–5.2)
PROT SERPL-MCNC: 7 G/DL (ref 6–8.5)
PROT SERPL-MCNC: 8.5 G/DL (ref 6–8.5)
QT INTERVAL: 355 MS
QTC INTERVAL: 481 MS
RBC # BLD AUTO: 3.11 10*6/MM3 (ref 3.77–5.28)
SARS-COV-2 RNA RESP QL NAA+PROBE: NOT DETECTED
SODIUM SERPL-SCNC: 135 MMOL/L (ref 136–145)
SODIUM SERPL-SCNC: 135 MMOL/L (ref 136–145)
WBC NRBC COR # BLD AUTO: 6.4 10*3/MM3 (ref 3.4–10.8)
WHOLE BLOOD HOLD COAG: NORMAL

## 2023-11-21 PROCEDURE — 96361 HYDRATE IV INFUSION ADD-ON: CPT

## 2023-11-21 PROCEDURE — G0378 HOSPITAL OBSERVATION PER HR: HCPCS

## 2023-11-21 PROCEDURE — 85027 COMPLETE CBC AUTOMATED: CPT | Performed by: STUDENT IN AN ORGANIZED HEALTH CARE EDUCATION/TRAINING PROGRAM

## 2023-11-21 PROCEDURE — 96367 TX/PROPH/DG ADDL SEQ IV INF: CPT

## 2023-11-21 PROCEDURE — 25010000002 HEPARIN (PORCINE) PER 1000 UNITS: Performed by: STUDENT IN AN ORGANIZED HEALTH CARE EDUCATION/TRAINING PROGRAM

## 2023-11-21 PROCEDURE — 96375 TX/PRO/DX INJ NEW DRUG ADDON: CPT

## 2023-11-21 PROCEDURE — 70450 CT HEAD/BRAIN W/O DYE: CPT

## 2023-11-21 PROCEDURE — 96368 THER/DIAG CONCURRENT INF: CPT

## 2023-11-21 PROCEDURE — 25010000002 THIAMINE HCL 200 MG/2ML SOLUTION: Performed by: STUDENT IN AN ORGANIZED HEALTH CARE EDUCATION/TRAINING PROGRAM

## 2023-11-21 PROCEDURE — 25810000003 DEXTROSE-NACL PER 500 ML: Performed by: EMERGENCY MEDICINE

## 2023-11-21 PROCEDURE — 25010000002 ONDANSETRON PER 1 MG: Performed by: STUDENT IN AN ORGANIZED HEALTH CARE EDUCATION/TRAINING PROGRAM

## 2023-11-21 PROCEDURE — 80053 COMPREHEN METABOLIC PANEL: CPT | Performed by: STUDENT IN AN ORGANIZED HEALTH CARE EDUCATION/TRAINING PROGRAM

## 2023-11-21 PROCEDURE — 96366 THER/PROPH/DIAG IV INF ADDON: CPT

## 2023-11-21 PROCEDURE — 96376 TX/PRO/DX INJ SAME DRUG ADON: CPT

## 2023-11-21 PROCEDURE — 25010000002 LEVOFLOXACIN PER 250 MG: Performed by: STUDENT IN AN ORGANIZED HEALTH CARE EDUCATION/TRAINING PROGRAM

## 2023-11-21 PROCEDURE — 96372 THER/PROPH/DIAG INJ SC/IM: CPT

## 2023-11-21 RX ORDER — SODIUM BICARBONATE 650 MG/1
650 TABLET ORAL 3 TIMES DAILY
Status: DISCONTINUED | OUTPATIENT
Start: 2023-11-21 | End: 2023-11-21 | Stop reason: HOSPADM

## 2023-11-21 RX ORDER — LORAZEPAM 2 MG/ML
2 INJECTION INTRAMUSCULAR
Status: DISCONTINUED | OUTPATIENT
Start: 2023-11-21 | End: 2023-11-21 | Stop reason: HOSPADM

## 2023-11-21 RX ORDER — SODIUM CHLORIDE 9 MG/ML
40 INJECTION, SOLUTION INTRAVENOUS AS NEEDED
Status: DISCONTINUED | OUTPATIENT
Start: 2023-11-21 | End: 2023-11-21 | Stop reason: HOSPADM

## 2023-11-21 RX ORDER — DEXTROSE AND SODIUM CHLORIDE 5; .9 G/100ML; G/100ML
125 INJECTION, SOLUTION INTRAVENOUS CONTINUOUS
Status: DISCONTINUED | OUTPATIENT
Start: 2023-11-21 | End: 2023-11-21 | Stop reason: HOSPADM

## 2023-11-21 RX ORDER — SODIUM CHLORIDE 0.9 % (FLUSH) 0.9 %
10 SYRINGE (ML) INJECTION AS NEEDED
Status: DISCONTINUED | OUTPATIENT
Start: 2023-11-21 | End: 2023-11-21 | Stop reason: HOSPADM

## 2023-11-21 RX ORDER — NITROGLYCERIN 0.4 MG/1
0.4 TABLET SUBLINGUAL
Status: DISCONTINUED | OUTPATIENT
Start: 2023-11-21 | End: 2023-11-21 | Stop reason: HOSPADM

## 2023-11-21 RX ORDER — HYDROXYZINE HYDROCHLORIDE 25 MG/1
25-50 TABLET, FILM COATED ORAL DAILY PRN
COMMUNITY

## 2023-11-21 RX ORDER — LORAZEPAM 2 MG/ML
1 INJECTION INTRAMUSCULAR
Status: DISCONTINUED | OUTPATIENT
Start: 2023-11-21 | End: 2023-11-21 | Stop reason: HOSPADM

## 2023-11-21 RX ORDER — ALUMINA, MAGNESIA, AND SIMETHICONE 2400; 2400; 240 MG/30ML; MG/30ML; MG/30ML
15 SUSPENSION ORAL EVERY 6 HOURS PRN
Status: DISCONTINUED | OUTPATIENT
Start: 2023-11-21 | End: 2023-11-21

## 2023-11-21 RX ORDER — CYCLOBENZAPRINE HCL 10 MG
10 TABLET ORAL NIGHTLY PRN
Status: DISCONTINUED | OUTPATIENT
Start: 2023-11-21 | End: 2023-11-21

## 2023-11-21 RX ORDER — ONDANSETRON 4 MG/1
4 TABLET, FILM COATED ORAL EVERY 6 HOURS PRN
Status: DISCONTINUED | OUTPATIENT
Start: 2023-11-21 | End: 2023-11-21 | Stop reason: HOSPADM

## 2023-11-21 RX ORDER — SODIUM CHLORIDE 0.9 % (FLUSH) 0.9 %
10 SYRINGE (ML) INJECTION EVERY 12 HOURS SCHEDULED
Status: DISCONTINUED | OUTPATIENT
Start: 2023-11-21 | End: 2023-11-21 | Stop reason: HOSPADM

## 2023-11-21 RX ORDER — LORAZEPAM 1 MG/1
2 TABLET ORAL
Status: DISCONTINUED | OUTPATIENT
Start: 2023-11-21 | End: 2023-11-21 | Stop reason: HOSPADM

## 2023-11-21 RX ORDER — LEVOFLOXACIN 5 MG/ML
750 INJECTION, SOLUTION INTRAVENOUS EVERY 24 HOURS
Qty: 750 ML | Refills: 0 | Status: DISCONTINUED | OUTPATIENT
Start: 2023-11-21 | End: 2023-11-21 | Stop reason: HOSPADM

## 2023-11-21 RX ORDER — HYDROXYZINE HYDROCHLORIDE 25 MG/1
25 TABLET, FILM COATED ORAL 3 TIMES DAILY PRN
Status: DISCONTINUED | OUTPATIENT
Start: 2023-11-21 | End: 2023-11-21 | Stop reason: HOSPADM

## 2023-11-21 RX ORDER — LORAZEPAM 1 MG/1
1 TABLET ORAL
Status: DISCONTINUED | OUTPATIENT
Start: 2023-11-21 | End: 2023-11-21 | Stop reason: HOSPADM

## 2023-11-21 RX ORDER — OXYCODONE HYDROCHLORIDE 5 MG/1
5 TABLET ORAL EVERY 4 HOURS PRN
Status: DISCONTINUED | OUTPATIENT
Start: 2023-11-21 | End: 2023-11-21 | Stop reason: HOSPADM

## 2023-11-21 RX ORDER — ONDANSETRON 2 MG/ML
4 INJECTION INTRAMUSCULAR; INTRAVENOUS EVERY 6 HOURS PRN
Status: DISCONTINUED | OUTPATIENT
Start: 2023-11-21 | End: 2023-11-21 | Stop reason: HOSPADM

## 2023-11-21 RX ORDER — CHLORDIAZEPOXIDE HYDROCHLORIDE 10 MG/1
10 CAPSULE, GELATIN COATED ORAL 4 TIMES DAILY PRN
Status: DISCONTINUED | OUTPATIENT
Start: 2023-11-21 | End: 2023-11-21 | Stop reason: HOSPADM

## 2023-11-21 RX ORDER — HEPARIN SODIUM 5000 [USP'U]/ML
5000 INJECTION, SOLUTION INTRAVENOUS; SUBCUTANEOUS EVERY 8 HOURS SCHEDULED
Status: DISCONTINUED | OUTPATIENT
Start: 2023-11-21 | End: 2023-11-21 | Stop reason: HOSPADM

## 2023-11-21 RX ORDER — THIAMINE HYDROCHLORIDE 100 MG/ML
200 INJECTION, SOLUTION INTRAMUSCULAR; INTRAVENOUS EVERY 8 HOURS SCHEDULED
Status: DISCONTINUED | OUTPATIENT
Start: 2023-11-21 | End: 2023-11-21 | Stop reason: HOSPADM

## 2023-11-21 RX ADMIN — SODIUM BICARBONATE 650 MG: 650 TABLET ORAL at 08:20

## 2023-11-21 RX ADMIN — ONDANSETRON 4 MG: 2 INJECTION INTRAMUSCULAR; INTRAVENOUS at 03:38

## 2023-11-21 RX ADMIN — THIAMINE HYDROCHLORIDE 200 MG: 100 INJECTION, SOLUTION INTRAMUSCULAR; INTRAVENOUS at 06:45

## 2023-11-21 RX ADMIN — HYDROXYZINE HYDROCHLORIDE 25 MG: 25 TABLET, FILM COATED ORAL at 06:53

## 2023-11-21 RX ADMIN — HEPARIN SODIUM 5000 UNITS: 5000 INJECTION INTRAVENOUS; SUBCUTANEOUS at 06:45

## 2023-11-21 RX ADMIN — Medication 10 ML: at 08:20

## 2023-11-21 RX ADMIN — CHLORDIAZEPOXIDE HYDROCHLORIDE 10 MG: 10 CAPSULE ORAL at 08:20

## 2023-11-21 RX ADMIN — DEXTROSE AND SODIUM CHLORIDE 125 ML/HR: 5; 900 INJECTION, SOLUTION INTRAVENOUS at 02:37

## 2023-11-21 RX ADMIN — FOLIC ACID 1 MG: 5 INJECTION, SOLUTION INTRAMUSCULAR; INTRAVENOUS; SUBCUTANEOUS at 06:44

## 2023-11-21 RX ADMIN — LEVOFLOXACIN 750 MG: 5 INJECTION, SOLUTION INTRAVENOUS at 03:38

## 2023-11-21 RX ADMIN — OXYCODONE 5 MG: 5 TABLET ORAL at 03:38

## 2023-11-21 RX ADMIN — OXYCODONE 5 MG: 5 TABLET ORAL at 10:33

## 2023-11-21 NOTE — ED PROVIDER NOTES
Subjective   History of Present Illness  33-year-old female who had a previous admission this year in April for alcoholic ketoacidosis presents with feeling lightheaded this morning around 9 AM with moderate frontal headache, nontraumatic, no fever, no neck stiffness, no focal weakness or numbness associated with nausea and vomiting.  Patient states she [place]  she felt nauseous and had very little p.o. intake with associated nausea and vomiting.      Review of Systems   Constitutional: Negative.    Gastrointestinal:  Positive for nausea and vomiting.   Neurological:  Positive for light-headedness and headaches.       No past medical history on file.    Allergies   Allergen Reactions    Latex Unknown - Low Severity    Penicillins Unknown - High Severity       No past surgical history on file.    No family history on file.    Social History     Socioeconomic History    Marital status: Single   Tobacco Use    Smoking status: Former     Types: Cigarettes     Quit date:      Years since quittin.8    Smokeless tobacco: Never   Vaping Use    Vaping Use: Never used   Substance and Sexual Activity    Alcohol use: Yes     Alcohol/week: 3.0 standard drinks of alcohol     Types: 3 Drinks containing 0.5 oz of alcohol per week    Drug use: Not Currently           Objective   Physical Exam  Constitutional:       Appearance: Normal appearance.   HENT:      Head: Normocephalic and atraumatic.      Mouth/Throat:      Mouth: Mucous membranes are moist.      Pharynx: Oropharynx is clear.   Eyes:      Extraocular Movements: Extraocular movements intact.      Conjunctiva/sclera: Conjunctivae normal.      Pupils: Pupils are equal, round, and reactive to light.   Cardiovascular:      Rate and Rhythm: Tachycardia present.      Heart sounds: Normal heart sounds.   Pulmonary:      Effort: Pulmonary effort is normal.      Breath sounds: Normal breath  sounds.   Abdominal:      General: Bowel sounds are normal. There is no distension.      Palpations: Abdomen is soft.      Tenderness: There is no abdominal tenderness.   Musculoskeletal:         General: No swelling or tenderness. Normal range of motion.      Cervical back: Normal range of motion and neck supple. No rigidity.   Skin:     General: Skin is warm and dry.      Capillary Refill: Capillary refill takes less than 2 seconds.   Neurological:      General: No focal deficit present.      Mental Status: She is alert and oriented to person, place, and time.   Psychiatric:         Mood and Affect: Mood normal.         Behavior: Behavior normal.         Procedures           ED Course                                           Medical Decision Making  33-year-old female with alcoholic ketoacidosis, headache resolved, CO2 11.  Patient had a worse version of this in April with a CO2 of 5.  Patient thinks her issue with anorexia is more related to anxiety than alcohol though I suspect she may be minimizing her alcohol intake.  No abdominal pain.    Case discussed with Dr. Vela, agrees with plan.    Problems Addressed:  Alcoholic ketoacidosis: complicated acute illness or injury    Amount and/or Complexity of Data Reviewed  Labs: ordered.  Radiology: ordered.  ECG/medicine tests: ordered and independent interpretation performed.     Details: EKG interpretation: Sinus tachycardia, rate 111, no acute ST elevation    Risk  Prescription drug management.  Decision regarding hospitalization.        Final diagnoses:   Alcoholic ketoacidosis   Acute nonintractable headache, unspecified headache type       ED Disposition  ED Disposition       ED Disposition   Decision to Admit    Condition   --    Comment   Level of Care: Telemetry [5]   Admitting Physician: GUSTAVO VELA [[id number]]                 No follow-up provider specified.       Medication List      No changes were made to your prescriptions during this visit.             Martín Anguiano MD  11/21/23 0137

## 2023-11-21 NOTE — H&P
[facility] Medicine Services      Patient Name: Shirin Canales  : 1990  MRN: [id number]  Primary Care Physician:  Provider, No Known  Date of admission: 2023      Subjective      Chief Complaint: nausea    History of Present Illness: Shirin Canales is a 33 y.o. female with PMHx of alcoholic hepatitis and alcohol use disorder who presented to [facility] on 2023 complaining of nausea and vomiting.  Admits to intermittent nausea, vomiting, and fatigue complicated by tremors worsening over the past two days.  [place] and [place] lot to drink, and symptoms started  and have been worsening since.  Denies chest pain, dyspnea, melena, fever, chills.  Do to worsening symptoms she presented to [facility] for evaluation.    In the ER, vitals 99.3F, HR 97, RR 18, /87, 98% RA.  Labs notable for sodium 135, Cl 96, bicarb 11, AG 28, , ALT 84, tbili 2.2.  HCG negative.  UA shows possible UTI.       ROS   12 point ROS reviewed and negative except as mentioned above      Personal History     No past medical history on file.    No past surgical history on file.    Family History: family history is not on file. Otherwise pertinent FHx was reviewed and not pertinent to current issue.    Social History:  reports that she quit smoking about 8 years ago. Her smoking use included cigarettes. She has never used smokeless tobacco. She reports current alcohol use of about 3.0 standard drinks of alcohol per week. She reports that she does not currently use drugs.    Home Medications:  Prior to Admission Medications       Prescriptions Last Dose Informant Patient Reported? Taking?    cyclobenzaprine (FLEXERIL) 10 MG tablet   Yes No    Take 1 tablet by mouth At Night As Needed for Muscle Spasms.    hydrOXYzine (ATARAX) 25 MG tablet   No No    Take 1 tablet by mouth 3 (Three) Times a Day As Needed for Anxiety.              Allergies:  Allergies   Allergen  Reactions    Latex Unknown - Low Severity    Penicillins Unknown - High Severity       Objective      Vitals:   Temp:  [99.3 °F (37.4 °C)] 99.3 °F (37.4 °C)  Heart Rate:  [] 97  Resp:  [18] 18  BP: (127-159)/() 134/87    Physical Exam  Constitutional:       General: She is not in acute distress.     Appearance: Normal appearance. She is not toxic-appearing.   HENT:      Head: Normocephalic and atraumatic.      Nose: Nose normal. No congestion.      Mouth/Throat:      Pharynx: Oropharynx is clear. No oropharyngeal exudate.   Eyes:      General: No scleral icterus.  Cardiovascular:      Rate and Rhythm: Normal rate and regular rhythm.      Heart sounds: No murmur heard.     No friction rub. No gallop.   Pulmonary:      Effort: No respiratory distress.      Breath sounds: No wheezing or rales.   Abdominal:      General: There is no distension.      Tenderness: There is no abdominal tenderness. There is no guarding.   Musculoskeletal:         General: No swelling, deformity or signs of injury.      Cervical back: Normal range of motion. No rigidity.   Skin:     Coloration: Skin is not jaundiced.      Findings: No bruising or lesion.   Neurological:      General: No focal deficit present.      Mental Status: She is alert and oriented to person, place, and time.      Motor: No weakness.          Result Review    Result Review:  I have personally reviewed the results from the time of this admission to 11/21/2023 02:50 EST and agree with these findings:  [x]  Laboratory  []  Microbiology  []  Radiology  []  EKG/Telemetry   []  Cardiology/Vascular   []  Pathology  [x]  Old records  []  Other:        Assessment & Plan        Active Hospital Problems:  There are no active hospital problems to display for this patient.    Plan:     #Alcohol ketoacidosis  #Alcohol hepatitis    - previously admitted this past April for alcohol ketoacidosis    - UA with 4+ ketones and bciarb 11 in setting of alcohol withdrawal and  use    -     - D5NS @ 125/hr    - start PO Bicarb    - , trend    - ALT 84, trend    - tbili 2.2    - hcg negative    - Zofran PRN      #Alcohol use disorder  #Alcohol withdrawal    - CIWA protocol    - thiamine daily    - folic acid    - SW consulted    - cessation recommended    #UTI - possible    - UA shows possible UTI    - cover with Levofloxacin    - urine culture    DVT prophylaxis: Heparin  No DVT prophylaxis order currently exists.    CODE STATUS:       Admission Status:  I believe this patient meets observation status.    I discussed the patient's findings and my recommendations with patient.    This patient has been examined wearing appropriate Personal Protective Equipment and discussed with  Patient . 11/21/23      Signature: Electronically signed by Salome Duran DO, 11/21/23, 3:09 AM EST.

## 2023-11-21 NOTE — DISCHARGE SUMMARY
"[facility]  Discharge Summary    Date of Service: 23  Patient Name: Shirin Canales  : 1990  MRN: [id number]    Date of Admission: 2023  Date of Discharge:  23  Primary Care Physician: Provider, No Known    Discharge Diagnosis: alcoholic ketoacidosis      Presenting Problem:   Alcoholic ketoacidosis [E87.29]    Active and Resolved Hospital Problems:  Active Hospital Problems   No active problems to display.      Resolved Hospital Problems    Diagnosis POA    **Alcoholic ketoacidosis [E87.29] Yes            As per the HPI of the H&P:   Shirin Canales is a 33 y.o. female with PMHx of alcoholic hepatitis and alcohol use disorder who presented to [facility] on 2023 complaining of nausea and vomiting.  Admits to intermittent nausea, vomiting, and fatigue complicated by tremors worsening over the past two days.  States she [place] and had a lot to drink, and symptoms started  and have been worsening since.  Denies chest pain, dyspnea, melena, fever, chills.  Do to worsening symptoms she presented to [facility] for evaluation.     In the ER, vitals 99.3F, HR 97, RR 18, /87, 98% RA.  Labs notable for sodium 135, Cl 96, bicarb 11, AG 28, , ALT 84, tbili 2.2.  HCG negative.  UA shows possible UTI.       Hospital Course:   The patient was admitted with Alcohol ketoacidosis.  She received IV fluids.  She also received antibiotics for a potential uti.  Urine with less than 5 wbc.  No antibiotics needed on dc.  She is tolerating a regular diet today.  The patient was seen and examined at bedside today, .  She denies fever, nausea, and vomiting.  Medically optimized for discharge home.  Denies chest pain.  Follow up with pcp in 7 days.       Discharge Exam    /83   Pulse 108   Temp 97.8 °F (36.6 °C) (Oral)   Resp 16   Ht 160 cm (63\")   Wt 59 kg (130 lb)   LMP  (LMP Unknown)   SpO2 97%   BMI 23.03 kg/m² "     General: Not in any acute distress  HEENT: Normocephalic, atraumatic  Neck: Supple, No JVD  CV: Regular rate and rhythm, S1 and S2 are normal, no murmurs/rubs/or gallops  Lungs: Clear to auscultation bilaterally, no rales/rhonchi/wheezes  Abdomen: Soft, non-distended, non-tender, bowel sounds present  EXT: No edema of lower extremities  Neuro: Cranial nerves II through XII intact  Skin: Intact, no rashes, no lesions, no erythema    Discharge Medications:     Discharge Medications        Continue These Medications        Instructions Start Date   hydrOXYzine 25 MG tablet  Commonly known as: ATARAX   25-50 mg, Oral, Daily PRN                  Diet:  Hospital:  Diet Order   Procedures    Diet: Cardiac Diets, Diabetic Diets; Healthy Heart (2-3 Na+); Consistent Carbohydrate; Texture: Regular Texture (IDDSI 7); Fluid Consistency: Thin (IDDSI 0)       Discharge Disposition:  Alcoholic ketoacidosis      Discharge Activity:   As tolerated    CODE STATUS:  There are no questions and answers to display.         No future appointments.      Time spent on Discharge including face to face service:  >30 minutes    Signature: Electronically signed by Kayli Maddox MD, 11/21/23, 13:37 EST.  [facility]ist Team

## 2023-12-28 ENCOUNTER — APPOINTMENT (OUTPATIENT)
Dept: CT IMAGING | Facility: HOSPITAL | Age: 33
End: 2023-12-28
Payer: COMMERCIAL

## 2023-12-28 ENCOUNTER — HOSPITAL ENCOUNTER (INPATIENT)
Facility: HOSPITAL | Age: 33
LOS: 4 days | Discharge: HOME | End: 2024-01-01
Attending: EMERGENCY MEDICINE | Admitting: FAMILY MEDICINE
Payer: COMMERCIAL

## 2023-12-28 ENCOUNTER — EMERGENCY ROOM – HOSPITAL (OUTPATIENT)
Dept: URBAN - METROPOLITAN AREA HOSPITAL 83 | Facility: HOSPITAL | Age: 33
End: 2023-12-28

## 2023-12-28 DIAGNOSIS — R94.5 ABNORMAL RESULTS OF LIVER FUNCTION STUDIES: ICD-10-CM

## 2023-12-28 DIAGNOSIS — E87.29 ALCOHOLIC KETOACIDOSIS: Primary | ICD-10-CM

## 2023-12-28 DIAGNOSIS — R10.13 EPIGASTRIC PAIN: ICD-10-CM

## 2023-12-28 DIAGNOSIS — D72.829 ELEVATED WHITE BLOOD CELL COUNT, UNSPECIFIED: ICD-10-CM

## 2023-12-28 DIAGNOSIS — K59.00 CONSTIPATION, UNSPECIFIED: ICD-10-CM

## 2023-12-28 DIAGNOSIS — K85.20 ALCOHOL-INDUCED ACUTE PANCREATITIS, UNSPECIFIED COMPLICATION STATUS: ICD-10-CM

## 2023-12-28 DIAGNOSIS — R93.3 ABNORMAL FINDINGS ON DIAGNOSTIC IMAGING OF OTHER PARTS OF DI: ICD-10-CM

## 2023-12-28 DIAGNOSIS — R11.2 NAUSEA WITH VOMITING, UNSPECIFIED: ICD-10-CM

## 2023-12-28 DIAGNOSIS — R74.8 ABNORMAL LEVELS OF OTHER SERUM ENZYMES: ICD-10-CM

## 2023-12-28 PROBLEM — Z78.9 ALCOHOL USE: Status: ACTIVE | Noted: 2023-12-28

## 2023-12-28 PROBLEM — F10.20 ALCOHOLISM: Chronic | Status: RESOLVED | Noted: 2023-12-28 | Resolved: 2023-12-28

## 2023-12-28 PROBLEM — F10.90 ALCOHOL USE: Status: ACTIVE | Noted: 2023-12-28

## 2023-12-28 PROBLEM — R42 DIZZINESS: Status: ACTIVE | Noted: 2023-12-28

## 2023-12-28 PROBLEM — F10.20 ALCOHOLISM: Chronic | Status: ACTIVE | Noted: 2023-12-28

## 2023-12-28 PROBLEM — F10.90 ALCOHOL USE: Chronic | Status: ACTIVE | Noted: 2023-12-28

## 2023-12-28 PROBLEM — R03.0 ELEVATED BLOOD PRESSURE READING: Status: ACTIVE | Noted: 2023-12-28

## 2023-12-28 PROBLEM — Z78.9 ALCOHOL USE: Chronic | Status: ACTIVE | Noted: 2023-12-28

## 2023-12-28 PROBLEM — F10.20 ALCOHOLISM: Status: ACTIVE | Noted: 2023-12-28

## 2023-12-28 PROBLEM — N20.0 KIDNEY STONE: Status: ACTIVE | Noted: 2023-12-28

## 2023-12-28 PROBLEM — K76.0 HEPATIC STEATOSIS: Status: ACTIVE | Noted: 2023-12-28

## 2023-12-28 LAB
ACETONE BLD QL: ABNORMAL
ALBUMIN SERPL-MCNC: 4.9 G/DL (ref 3.5–5.2)
ALBUMIN SERPL-MCNC: 6 G/DL (ref 3.5–5.2)
ALBUMIN/GLOB SERPL: 1.8 G/DL
ALBUMIN/GLOB SERPL: 1.8 G/DL
ALP SERPL-CCNC: 107 U/L (ref 39–117)
ALP SERPL-CCNC: 80 U/L (ref 39–117)
ALT SERPL W P-5'-P-CCNC: 59 U/L (ref 1–33)
ALT SERPL W P-5'-P-CCNC: 84 U/L (ref 1–33)
ANION GAP SERPL CALCULATED.3IONS-SCNC: 26 MMOL/L (ref 5–15)
ANION GAP SERPL CALCULATED.3IONS-SCNC: 36 MMOL/L (ref 5–15)
AST SERPL-CCNC: 114 U/L (ref 1–32)
AST SERPL-CCNC: 181 U/L (ref 1–32)
ATMOSPHERIC PRESS: ABNORMAL MM[HG]
BACTERIA UR QL AUTO: ABNORMAL /HPF
BASE EXCESS BLDV CALC-SCNC: -18.6 MMOL/L (ref -2–2)
BASE EXCESS BLDV CALC-SCNC: -20 MMOL/L (ref -2–2)
BASE EXCESS BLDV CALC-SCNC: -23.7 MMOL/L (ref -2–2)
BASOPHILS # BLD AUTO: 0 10*3/MM3 (ref 0–0.2)
BASOPHILS NFR BLD AUTO: 0.2 % (ref 0–1.5)
BDY SITE: ABNORMAL
BDY SITE: ABNORMAL
BILIRUB SERPL-MCNC: 2.5 MG/DL (ref 0–1.2)
BILIRUB SERPL-MCNC: 2.9 MG/DL (ref 0–1.2)
BILIRUB UR QL STRIP: NEGATIVE
BUN SERPL-MCNC: 6 MG/DL (ref 6–20)
BUN SERPL-MCNC: 8 MG/DL (ref 6–20)
BUN/CREAT SERPL: 7.7 (ref 7–25)
BUN/CREAT SERPL: 8.1 (ref 7–25)
CALCIUM SPEC-SCNC: 10 MG/DL (ref 8.6–10.5)
CALCIUM SPEC-SCNC: 9.3 MG/DL (ref 8.6–10.5)
CHLORIDE SERPL-SCNC: 102 MMOL/L (ref 98–107)
CHLORIDE SERPL-SCNC: 96 MMOL/L (ref 98–107)
CLARITY UR: CLEAR
CO2 BLDA-SCNC: 6.7 MMOL/L (ref 22–29)
CO2 BLDA-SCNC: 7.6 MMOL/L (ref 22–29)
CO2 BLDA-SCNC: 7.7 MMOL/L (ref 22–29)
CO2 SERPL-SCNC: 6 MMOL/L (ref 22–29)
CO2 SERPL-SCNC: 7 MMOL/L (ref 22–29)
COLOR UR: ABNORMAL
CREAT SERPL-MCNC: 0.78 MG/DL (ref 0.57–1)
CREAT SERPL-MCNC: 0.99 MG/DL (ref 0.57–1)
DEPRECATED RDW RBC AUTO: 53.4 FL (ref 37–54)
EGFRCR SERPLBLD CKD-EPI 2021: 103.6 ML/MIN/1.73
EGFRCR SERPLBLD CKD-EPI 2021: 77.9 ML/MIN/1.73
EOSINOPHIL # BLD AUTO: 0 10*3/MM3 (ref 0–0.4)
EOSINOPHIL NFR BLD AUTO: 0 % (ref 0.3–6.2)
ERYTHROCYTE [DISTWIDTH] IN BLOOD BY AUTOMATED COUNT: 13.4 % (ref 12.3–15.4)
ETHANOL UR QL: <0.01 %
GLOBULIN UR ELPH-MCNC: 2.7 GM/DL
GLOBULIN UR ELPH-MCNC: 3.3 GM/DL
GLUCOSE SERPL-MCNC: 128 MG/DL (ref 65–99)
GLUCOSE SERPL-MCNC: 177 MG/DL (ref 65–99)
GLUCOSE UR STRIP-MCNC: NEGATIVE MG/DL
HBA1C MFR BLD: 4.3 % (ref 4.8–5.6)
HCG SERPL QL: NEGATIVE
HCO3 BLDV-SCNC: 6 MMOL/L (ref 22–26)
HCO3 BLDV-SCNC: 7 MMOL/L (ref 22–26)
HCO3 BLDV-SCNC: 7 MMOL/L (ref 22–26)
HCT VFR BLD AUTO: 42.3 % (ref 34–46.6)
HGB BLD-MCNC: 13.7 G/DL (ref 12–15.9)
HGB UR QL STRIP.AUTO: ABNORMAL
HYALINE CASTS UR QL AUTO: ABNORMAL /LPF
INHALED O2 CONCENTRATION: 21 %
KETONES UR QL STRIP: ABNORMAL
LEUKOCYTE ESTERASE UR QL STRIP.AUTO: NEGATIVE
LIPASE SERPL-CCNC: 266 U/L (ref 13–60)
LIPASE SERPL-CCNC: 538 U/L (ref 13–60)
LYMPHOCYTES # BLD AUTO: 0.6 10*3/MM3 (ref 0.7–3.1)
LYMPHOCYTES NFR BLD AUTO: 4 % (ref 19.6–45.3)
MCH RBC QN AUTO: 34.4 PG (ref 26.6–33)
MCHC RBC AUTO-ENTMCNC: 32.3 G/DL (ref 31.5–35.7)
MCV RBC AUTO: 106.5 FL (ref 79–97)
MODALITY: ABNORMAL
MONOCYTES # BLD AUTO: 0.9 10*3/MM3 (ref 0.1–0.9)
MONOCYTES NFR BLD AUTO: 6.5 % (ref 5–12)
NEUTROPHILS NFR BLD AUTO: 12.8 10*3/MM3 (ref 1.7–7)
NEUTROPHILS NFR BLD AUTO: 89.3 % (ref 42.7–76)
NITRITE UR QL STRIP: NEGATIVE
NRBC BLD AUTO-RTO: 0.1 /100 WBC (ref 0–0.2)
PCO2 BLDV: 17.6 MM HG (ref 42–51)
PCO2 BLDV: 20.3 MM HG (ref 42–51)
PCO2 BLDV: 23.4 MM HG (ref 42–51)
PH BLDV: 7.02 PH UNITS (ref 7.32–7.43)
PH BLDV: 7.15 PH UNITS (ref 7.32–7.43)
PH BLDV: 7.21 PH UNITS (ref 7.32–7.43)
PH UR STRIP.AUTO: 6 [PH] (ref 5–8)
PLATELET # BLD AUTO: 250 10*3/MM3 (ref 140–450)
PMV BLD AUTO: 7.5 FL (ref 6–12)
PO2 BLDV: 29 MM HG (ref 40–42)
PO2 BLDV: 35.2 MM HG (ref 40–42)
PO2 BLDV: 49.1 MM HG (ref 40–42)
POTASSIUM SERPL-SCNC: 3.8 MMOL/L (ref 3.5–5.2)
POTASSIUM SERPL-SCNC: 4.2 MMOL/L (ref 3.5–5.2)
PROT SERPL-MCNC: 7.6 G/DL (ref 6–8.5)
PROT SERPL-MCNC: 9.3 G/DL (ref 6–8.5)
PROT UR QL STRIP: ABNORMAL
RBC # BLD AUTO: 3.97 10*6/MM3 (ref 3.77–5.28)
RBC # UR STRIP: ABNORMAL /HPF
REF LAB TEST METHOD: ABNORMAL
SAO2 % BLDCOV: 40.4 % (ref 45–75)
SAO2 % BLDCOV: 43.7 % (ref 45–75)
SAO2 % BLDCOV: 77.4 % (ref 45–75)
SODIUM SERPL-SCNC: 135 MMOL/L (ref 136–145)
SODIUM SERPL-SCNC: 138 MMOL/L (ref 136–145)
SP GR UR STRIP: 1.01 (ref 1–1.03)
SQUAMOUS #/AREA URNS HPF: ABNORMAL /HPF
UROBILINOGEN UR QL STRIP: ABNORMAL
WBC # UR STRIP: ABNORMAL /HPF
WBC NRBC COR # BLD AUTO: 14.4 10*3/MM3 (ref 3.4–10.8)

## 2023-12-28 PROCEDURE — 25010000002 THIAMINE HCL 200 MG/2ML SOLUTION: Performed by: NURSE PRACTITIONER

## 2023-12-28 PROCEDURE — 81001 URINALYSIS AUTO W/SCOPE: CPT | Performed by: EMERGENCY MEDICINE

## 2023-12-28 PROCEDURE — 25010000002 KETOROLAC TROMETHAMINE PER 15 MG: Performed by: EMERGENCY MEDICINE

## 2023-12-28 PROCEDURE — 25010000002 ONDANSETRON PER 1 MG: Performed by: EMERGENCY MEDICINE

## 2023-12-28 PROCEDURE — 83690 ASSAY OF LIPASE: CPT | Performed by: NURSE PRACTITIONER

## 2023-12-28 PROCEDURE — 99285 EMERGENCY DEPT VISIT HI MDM: CPT

## 2023-12-28 PROCEDURE — 25010000002 ONDANSETRON PER 1 MG: Performed by: FAMILY MEDICINE

## 2023-12-28 PROCEDURE — 25010000002 HYDRALAZINE PER 20 MG: Performed by: NURSE PRACTITIONER

## 2023-12-28 PROCEDURE — 82009 KETONE BODYS QUAL: CPT | Performed by: EMERGENCY MEDICINE

## 2023-12-28 PROCEDURE — 82803 BLOOD GASES ANY COMBINATION: CPT

## 2023-12-28 PROCEDURE — 80053 COMPREHEN METABOLIC PANEL: CPT | Performed by: EMERGENCY MEDICINE

## 2023-12-28 PROCEDURE — 82077 ASSAY SPEC XCP UR&BREATH IA: CPT | Performed by: EMERGENCY MEDICINE

## 2023-12-28 PROCEDURE — 82787 IGG 1 2 3 OR 4 EACH: CPT

## 2023-12-28 PROCEDURE — 84703 CHORIONIC GONADOTROPIN ASSAY: CPT | Performed by: EMERGENCY MEDICINE

## 2023-12-28 PROCEDURE — 25010000002 ONDANSETRON PER 1 MG: Performed by: NURSE PRACTITIONER

## 2023-12-28 PROCEDURE — 25810000003 LACTATED RINGERS PER 1000 ML: Performed by: NURSE PRACTITIONER

## 2023-12-28 PROCEDURE — 99284 EMERGENCY DEPT VISIT MOD MDM: CPT

## 2023-12-28 PROCEDURE — 85025 COMPLETE CBC W/AUTO DIFF WBC: CPT | Performed by: EMERGENCY MEDICINE

## 2023-12-28 PROCEDURE — 80053 COMPREHEN METABOLIC PANEL: CPT | Performed by: NURSE PRACTITIONER

## 2023-12-28 PROCEDURE — 25010000002 MORPHINE PER 10 MG: Performed by: NURSE PRACTITIONER

## 2023-12-28 PROCEDURE — 83036 HEMOGLOBIN GLYCOSYLATED A1C: CPT | Performed by: NURSE PRACTITIONER

## 2023-12-28 PROCEDURE — 83690 ASSAY OF LIPASE: CPT | Performed by: EMERGENCY MEDICINE

## 2023-12-28 PROCEDURE — 25810000003 LACTATED RINGERS SOLUTION: Performed by: EMERGENCY MEDICINE

## 2023-12-28 PROCEDURE — 74176 CT ABD & PELVIS W/O CONTRAST: CPT

## 2023-12-28 RX ORDER — SODIUM CHLORIDE, SODIUM LACTATE, POTASSIUM CHLORIDE, CALCIUM CHLORIDE 600; 310; 30; 20 MG/100ML; MG/100ML; MG/100ML; MG/100ML
200 INJECTION, SOLUTION INTRAVENOUS CONTINUOUS
Status: DISCONTINUED | OUTPATIENT
Start: 2023-12-28 | End: 2024-01-01 | Stop reason: HOSPADM

## 2023-12-28 RX ORDER — FOLIC ACID 1 MG/1
1 TABLET ORAL DAILY
Status: DISCONTINUED | OUTPATIENT
Start: 2023-12-28 | End: 2024-01-01 | Stop reason: HOSPADM

## 2023-12-28 RX ORDER — AMOXICILLIN 250 MG
2 CAPSULE ORAL 2 TIMES DAILY
Status: DISCONTINUED | OUTPATIENT
Start: 2023-12-28 | End: 2024-01-01 | Stop reason: HOSPADM

## 2023-12-28 RX ORDER — MIRTAZAPINE 45 MG/1
45 TABLET, FILM COATED ORAL NIGHTLY
COMMUNITY

## 2023-12-28 RX ORDER — SODIUM CHLORIDE, SODIUM LACTATE, POTASSIUM CHLORIDE, CALCIUM CHLORIDE 600; 310; 30; 20 MG/100ML; MG/100ML; MG/100ML; MG/100ML
125 INJECTION, SOLUTION INTRAVENOUS CONTINUOUS
Status: DISCONTINUED | OUTPATIENT
Start: 2023-12-28 | End: 2023-12-28

## 2023-12-28 RX ORDER — SODIUM CHLORIDE 0.9 % (FLUSH) 0.9 %
10 SYRINGE (ML) INJECTION AS NEEDED
Status: DISCONTINUED | OUTPATIENT
Start: 2023-12-28 | End: 2024-01-01 | Stop reason: HOSPADM

## 2023-12-28 RX ORDER — THIAMINE HYDROCHLORIDE 100 MG/ML
200 INJECTION, SOLUTION INTRAMUSCULAR; INTRAVENOUS EVERY 8 HOURS SCHEDULED
Status: DISCONTINUED | OUTPATIENT
Start: 2023-12-28 | End: 2024-01-01 | Stop reason: HOSPADM

## 2023-12-28 RX ORDER — SODIUM CHLORIDE 0.9 % (FLUSH) 0.9 %
10 SYRINGE (ML) INJECTION EVERY 12 HOURS SCHEDULED
Status: DISCONTINUED | OUTPATIENT
Start: 2023-12-28 | End: 2024-01-01 | Stop reason: HOSPADM

## 2023-12-28 RX ORDER — ONDANSETRON 2 MG/ML
4 INJECTION INTRAMUSCULAR; INTRAVENOUS ONCE
Status: COMPLETED | OUTPATIENT
Start: 2023-12-28 | End: 2023-12-28

## 2023-12-28 RX ORDER — BISACODYL 10 MG
10 SUPPOSITORY, RECTAL RECTAL DAILY PRN
Status: DISCONTINUED | OUTPATIENT
Start: 2023-12-28 | End: 2024-01-01 | Stop reason: HOSPADM

## 2023-12-28 RX ORDER — ACETAMINOPHEN 160 MG/5ML
650 SOLUTION ORAL EVERY 4 HOURS PRN
Status: DISCONTINUED | OUTPATIENT
Start: 2023-12-28 | End: 2024-01-01 | Stop reason: HOSPADM

## 2023-12-28 RX ORDER — POLYETHYLENE GLYCOL 3350 17 G/17G
17 POWDER, FOR SOLUTION ORAL DAILY PRN
Status: DISCONTINUED | OUTPATIENT
Start: 2023-12-28 | End: 2024-01-01 | Stop reason: HOSPADM

## 2023-12-28 RX ORDER — KETOROLAC TROMETHAMINE 30 MG/ML
15 INJECTION, SOLUTION INTRAMUSCULAR; INTRAVENOUS EVERY 6 HOURS PRN
Status: DISCONTINUED | OUTPATIENT
Start: 2023-12-28 | End: 2024-01-01 | Stop reason: HOSPADM

## 2023-12-28 RX ORDER — ACETAMINOPHEN 650 MG/1
650 SUPPOSITORY RECTAL EVERY 4 HOURS PRN
Status: DISCONTINUED | OUTPATIENT
Start: 2023-12-28 | End: 2024-01-01 | Stop reason: HOSPADM

## 2023-12-28 RX ORDER — MORPHINE SULFATE 2 MG/ML
2 INJECTION, SOLUTION INTRAMUSCULAR; INTRAVENOUS EVERY 4 HOURS PRN
Status: DISPENSED | OUTPATIENT
Start: 2023-12-28 | End: 2023-12-30

## 2023-12-28 RX ORDER — ACETAMINOPHEN 325 MG/1
650 TABLET ORAL EVERY 4 HOURS PRN
Status: DISCONTINUED | OUTPATIENT
Start: 2023-12-28 | End: 2024-01-01 | Stop reason: HOSPADM

## 2023-12-28 RX ORDER — SERTRALINE HYDROCHLORIDE 25 MG/1
25 TABLET, FILM COATED ORAL DAILY
COMMUNITY

## 2023-12-28 RX ORDER — BISACODYL 5 MG/1
5 TABLET, DELAYED RELEASE ORAL DAILY PRN
Status: DISCONTINUED | OUTPATIENT
Start: 2023-12-28 | End: 2024-01-01 | Stop reason: HOSPADM

## 2023-12-28 RX ORDER — ALUMINA, MAGNESIA, AND SIMETHICONE 2400; 2400; 240 MG/30ML; MG/30ML; MG/30ML
15 SUSPENSION ORAL EVERY 6 HOURS PRN
Status: DISCONTINUED | OUTPATIENT
Start: 2023-12-28 | End: 2024-01-01 | Stop reason: HOSPADM

## 2023-12-28 RX ORDER — CHOLECALCIFEROL (VITAMIN D3) 125 MCG
5 CAPSULE ORAL NIGHTLY PRN
Status: DISCONTINUED | OUTPATIENT
Start: 2023-12-28 | End: 2024-01-01 | Stop reason: HOSPADM

## 2023-12-28 RX ORDER — SODIUM CHLORIDE 9 MG/ML
40 INJECTION, SOLUTION INTRAVENOUS AS NEEDED
Status: DISCONTINUED | OUTPATIENT
Start: 2023-12-28 | End: 2024-01-01 | Stop reason: HOSPADM

## 2023-12-28 RX ORDER — PANTOPRAZOLE SODIUM 40 MG/10ML
40 INJECTION, POWDER, LYOPHILIZED, FOR SOLUTION INTRAVENOUS
Status: DISCONTINUED | OUTPATIENT
Start: 2023-12-29 | End: 2024-01-01 | Stop reason: HOSPADM

## 2023-12-28 RX ORDER — ONDANSETRON 2 MG/ML
4 INJECTION INTRAMUSCULAR; INTRAVENOUS EVERY 6 HOURS PRN
Status: DISCONTINUED | OUTPATIENT
Start: 2023-12-28 | End: 2024-01-01 | Stop reason: HOSPADM

## 2023-12-28 RX ORDER — KETOROLAC TROMETHAMINE 30 MG/ML
15 INJECTION, SOLUTION INTRAMUSCULAR; INTRAVENOUS ONCE
Status: COMPLETED | OUTPATIENT
Start: 2023-12-28 | End: 2023-12-28

## 2023-12-28 RX ORDER — HYDRALAZINE HYDROCHLORIDE 20 MG/ML
10 INJECTION INTRAMUSCULAR; INTRAVENOUS EVERY 6 HOURS PRN
Status: DISCONTINUED | OUTPATIENT
Start: 2023-12-28 | End: 2024-01-01 | Stop reason: HOSPADM

## 2023-12-28 RX ORDER — POLYETHYLENE GLYCOL 3350 17 G/17G
17 POWDER, FOR SOLUTION ORAL DAILY
Status: DISCONTINUED | OUTPATIENT
Start: 2023-12-28 | End: 2024-01-01 | Stop reason: HOSPADM

## 2023-12-28 RX ORDER — ONDANSETRON 4 MG/1
4 TABLET, FILM COATED ORAL EVERY 6 HOURS PRN
Status: DISCONTINUED | OUTPATIENT
Start: 2023-12-28 | End: 2024-01-01 | Stop reason: HOSPADM

## 2023-12-28 RX ADMIN — SODIUM CHLORIDE, POTASSIUM CHLORIDE, SODIUM LACTATE AND CALCIUM CHLORIDE 200 ML/HR: 600; 310; 30; 20 INJECTION, SOLUTION INTRAVENOUS at 16:04

## 2023-12-28 RX ADMIN — ONDANSETRON 4 MG: 2 INJECTION INTRAMUSCULAR; INTRAVENOUS at 19:11

## 2023-12-28 RX ADMIN — ONDANSETRON 4 MG: 2 INJECTION INTRAMUSCULAR; INTRAVENOUS at 08:43

## 2023-12-28 RX ADMIN — ONDANSETRON 4 MG: 2 INJECTION INTRAMUSCULAR; INTRAVENOUS at 16:05

## 2023-12-28 RX ADMIN — THIAMINE HYDROCHLORIDE 200 MG: 100 INJECTION, SOLUTION INTRAMUSCULAR; INTRAVENOUS at 22:51

## 2023-12-28 RX ADMIN — SODIUM CHLORIDE, POTASSIUM CHLORIDE, SODIUM LACTATE AND CALCIUM CHLORIDE 1000 ML: 600; 310; 30; 20 INJECTION, SOLUTION INTRAVENOUS at 11:57

## 2023-12-28 RX ADMIN — HYDRALAZINE HYDROCHLORIDE 10 MG: 20 INJECTION INTRAMUSCULAR; INTRAVENOUS at 16:05

## 2023-12-28 RX ADMIN — MORPHINE SULFATE 2 MG: 2 INJECTION, SOLUTION INTRAMUSCULAR; INTRAVENOUS at 22:51

## 2023-12-28 RX ADMIN — THIAMINE HYDROCHLORIDE 200 MG: 100 INJECTION, SOLUTION INTRAMUSCULAR; INTRAVENOUS at 14:02

## 2023-12-28 RX ADMIN — SODIUM CHLORIDE, POTASSIUM CHLORIDE, SODIUM LACTATE AND CALCIUM CHLORIDE 2000 ML: 600; 310; 30; 20 INJECTION, SOLUTION INTRAVENOUS at 08:41

## 2023-12-28 RX ADMIN — MORPHINE SULFATE 2 MG: 2 INJECTION, SOLUTION INTRAMUSCULAR; INTRAVENOUS at 16:05

## 2023-12-28 RX ADMIN — SODIUM CHLORIDE, POTASSIUM CHLORIDE, SODIUM LACTATE AND CALCIUM CHLORIDE 200 ML/HR: 600; 310; 30; 20 INJECTION, SOLUTION INTRAVENOUS at 20:30

## 2023-12-28 RX ADMIN — KETOROLAC TROMETHAMINE 15 MG: 30 INJECTION, SOLUTION INTRAMUSCULAR; INTRAVENOUS at 09:50

## 2023-12-28 NOTE — CONSULTS
GI CONSULT  NOTE:    Referring Provider:  Dr Silveira    Chief complaint: nausea/vomiting    Subjective .     History of present illness: Shirin Canales is a 33 y.o. female with history of alcohol ketoacidosis presents to the hospital with nausea/vomiting that began 2 days ago.  Patient reports she has been unable to keep anything down.  This began after drinking alcohol on [place].  She also complains of epigastric pain that radiates to her back.  This pain is now some better.  Main complaints are continued nausea/vomiting.  Denies fever, chills.  She reports that she only drinks alcohol on special occasions such as birthdays, [place] and other holidays.  She does report a history of heavier alcohol use in her teenage years.  Denies a personal or family history of pancreas problems.  She is a former smoker.  No marijuana use or other drugs.  Adults in her family have diabetes.  She reports occasional constipation.  This was worse when she was pregnant 2 years ago.  She has used laxatives previously.      Endo History:  No history of EGD/colonoscopy.    Past Medical History:  History reviewed. No pertinent past medical history.    Past Surgical History:  No past surgical history on file.    Social History:  Social History     Tobacco Use    Smoking status: Former     Types: Cigarettes     Quit date:      Years since quittin.9    Smokeless tobacco: Never   Vaping Use    Vaping Use: Never used   Substance Use Topics    Alcohol use: Yes     Alcohol/week: 3.0 standard drinks of alcohol     Types: 3 Drinks containing 0.5 oz of alcohol per week     Comment: OCCASIONALLY    Drug use: Not Currently       Family History:  History reviewed. No pertinent family history.    Medications:  (Not in a hospital admission)      Scheduled Meds:folic acid, 1 mg, Oral, Daily  [START ON 2023] pantoprazole, 40 mg, Intravenous, Q AM  thiamine (B-1) IV, 200 mg, Intravenous, Q8H   Followed by  [START ON 2024] thiamine, 100  mg, Oral, Daily      Continuous Infusions:lactated ringers, 125 mL/hr      PRN Meds:.  hydrALAZINE    Magnesium Standard Dose Replacement - Follow Nurse / BPA Driven Protocol    [COMPLETED] Insert Peripheral IV **AND** sodium chloride    ALLERGIES:  Latex and Penicillins    ROS:  The following systems were reviewed and negative;   Constitution:  No fevers, chills, no unintentional weight loss  Skin: no rash, no jaundice  Eyes:  No blurry vision, no eye pain  HENT:  No change in hearing or smell  Resp:  No dyspnea or cough  CV:  No chest pain or palpitations  :  No dysuria, hematuria  Musculoskeletal:  No leg cramps or arthralgias  Neuro:  No tremor, no numbness  Psych:  No depression or confusion    Objective     Vital Signs:   Vitals:    12/28/23 1101 12/28/23 1159 12/28/23 1201 12/28/23 1301   BP: (!) 141/104 (!) 140/103 (!) 142/102 (!) 141/105   BP Location:       Patient Position:       Pulse: 112 116 118 117   Resp: 15 16  16   Temp:       TempSrc:       SpO2: 100% 100% 100% 100%   Weight:       Height:           Physical Exam:     General Appearance:    Eyes closed but awake, ill appearing, emesis bag in hand   Head:    Normocephalic, without obvious abnormality, atraumatic   Throat:   No oral lesions, no thrush, oral mucosa moist   Lungs:     Respirations regular, even and unlabored   Chest Wall:    No abnormalities observed   Abdomen:     Soft, non-tender, no rebound or guarding, non-distended, no hepatosplenomegaly   Rectal:     Deferred   Extremities:   Moves all extremities, no edema, no cyanosis   Pulses:   Pulses palpable and equal bilaterally   Skin:   No rash, no jaundice, normal palpation       Neurologic:   Cranial nerves 2 - 12 grossly intact, no asterixis       Results Review:   I reviewed the patient's labs and imaging.  CBC    Results from last 7 days   Lab Units 12/28/23  0845   WBC 10*3/mm3 14.40*   HEMOGLOBIN g/dL 13.7   PLATELETS 10*3/mm3 250     CMP   Results from last 7 days   Lab Units  "12/28/23  0845   SODIUM mmol/L 138   POTASSIUM mmol/L 4.2   CHLORIDE mmol/L 96*   CO2 mmol/L 6.0*   BUN mg/dL 8   CREATININE mg/dL 0.99   GLUCOSE mg/dL 177*   ALBUMIN g/dL 6.0*   BILIRUBIN mg/dL 2.9*   ALK PHOS U/L 107   AST (SGOT) U/L 181*   ALT (SGPT) U/L 84*   LIPASE U/L 266*     Cr Clearance Estimated Creatinine Clearance: 68.5 mL/min (by C-G formula based on SCr of 0.99 mg/dL).  Coag     HbA1C   Lab Results   Component Value Date    HGBA1C 4.30 (L) 12/28/2023     Blood Glucose No results found for: \"POCGLU\"  Infection     UA    Results from last 7 days   Lab Units 12/28/23  1043   NITRITE UA  Negative   WBC UA /HPF 0-2   BACTERIA UA /HPF Trace*   SQUAM EPITHEL UA /HPF 3-6*     Imaging Results (Last 72 Hours)       Procedure Component Value Units Date/Time    CT Abdomen Pelvis Without Contrast [[id number]] Collected: 12/28/23 1026     Updated: 12/28/23 1035    Narrative:      CT ABDOMEN PELVIS WO CONTRAST    Date of Exam: 12/28/2023 10:15 AM EST    Indication: vomiting, elevated lipase.    Comparison: 4/23/2023     Technique: Axial CT images were obtained of the abdomen and pelvis without the administration of contrast. Sagittal and coronal reconstructions were performed.  Automated exposure control and iterative reconstruction methods were used.      Findings:  Visualized lung bases are clear. There is circumferential thickening of the distal esophagus which is nonspecific. This may be due to gastroesophageal reflux disease. Neoplasm not excluded. Unenhanced evaluation of the spleen, adrenals is grossly   unremarkable. The pancreatic head and neck appear edematous. There is surrounding inflammatory fat stranding. No focal associated, drainable fluid collections are identified. Findings suggest acute pancreatitis. There is diffusely increased density   throughout the gallbladder suggesting multiple fine noncalcified stones or sludge. Severe, diffuse fatty infiltration of the liver is again noted. No focal hepatic " mass lesion is identified. Nonobstructing bilateral 2-3 mm intrarenal calculi are   identified. Urinary bladder is distended, but otherwise grossly unremarkable. Unenhanced evaluation of the uterus adnexa is unremarkable. There is no evidence of small bowel obstruction, free air or free fluid. The appendix is normal. Colon is mostly   decompressed. No pericolonic inflammatory change identified. No aggressive osseous lesions are identified. No gross adenopathy is identified in the abdomen or pelvis.        Impression:      Impression:    1. Findings consistent with acute pancreatitis, as discussed above.  2. Uniformly increased density throughout the gallbladder suggesting multiple noncalcified stones or sludge.  3. Severe hepatic steatosis.  4. Nonspecific circumferential thickening of the distal esophagus as above.  5. Nonobstructing bilateral intrarenal calculi.            Electronically Signed: Derick Tillman MD    12/28/2023 10:33 AM EST    Workstation ID: [id number]            ASSESSMENT AND PLAN:    33-year-old female presents to the hospital with nausea, vomiting and abdominal pain after drinking heavily 2 days ago.    -Nausea, vomiting  -Epigastric pain  -Elevated lipase  -Elevated LFTs  -Leukocytosis  -acidosis  -Constipation  -Abnormal CT with pancreatitis and circumferential thickening in the distal esophagus  -History of alcohol abuse    Plan  Acute pancreatitis likely secondary to alcohol +/- gallstones.  Will  check autoimmune labs as well. CT does not show any biliary ductal dilatation.  Does show edematous head/neck of the pancreas with fat stranding as well as increased density throughout the gallbladder with stones vs sludge.  Continue to trend labs.  LFT elevation likely secondary to her alcohol use.  Consider MRCP pending progress.  Supportive care for pancreatitis with IV fluids, analgesics, antiemetics as needed.  Add PPI.  Miralax for constipation.   Recommend alcohol cessation.  Continue  supportive care.    I discussed the patients findings and my recommendations with the patient.    We appreciate the referral    Electronically signed by BEVERLY Estrada, 12/28/23, 2:28 PM EST.

## 2023-12-28 NOTE — ED PROVIDER NOTES
"Subjective   History of Present Illness  33-year-old female with history of alcoholic ketoacidosis presents for 2 days of nausea and vomiting.  Diffuse abdominal pain.  No bowel movements for couple days.  No dysuria.  No fevers.  States has been unable to sleep because she has been vomiting so much.  States that she did drink quite a bit [place] night.      Review of Systems  See HPI  History reviewed. No pertinent past medical history.    Allergies   Allergen Reactions    Latex Unknown - Low Severity    Penicillins Unknown - High Severity       No past surgical history on file.    History reviewed. No pertinent family history.    Social History     Socioeconomic History    Marital status: Single   Tobacco Use    Smoking status: Former     Types: Cigarettes     Quit date:      Years since quittin.9    Smokeless tobacco: Never   Vaping Use    Vaping Use: Never used   Substance and Sexual Activity    Alcohol use: Yes     Alcohol/week: 3.0 standard drinks of alcohol     Types: 3 Drinks containing 0.5 oz of alcohol per week     Comment: OCCASIONALLY    Drug use: Not Currently    Sexual activity: Defer           Objective   Physical Exam  Constitutional: Uncomfortable appearing  Head:  Atraumatic.  Normocephalic.   Eyes:  No scleral icterus. Normal conjunctivae  ENT: Dry oral mucosa.  No nasal discharge present.  Cardiovascular:  Well perfused.  Equal pulses.  Regular rhythm and tachycardic rate.  Normal capillary refill.    Pulmonary/Chest:  No respiratory distress.  Airway patent.  No tachypnea.  No accessory muscle usage.    Abdominal:  Nondistended.  Mild diffuse tenderness to palpation.  No rebound or guarding  Extremities:  No peripheral edema.  No Deformity  Skin:  Warm, dry  Neurological:  Alert, awake, and appropriate.  Normal speech.      Procedures           ED Course      BP (!) 141/105   Pulse 117   Temp 97 °F (36.1 °C) (Temporal)   Resp 16   Ht 160 cm (63\")   Wt 53.2 kg (117 lb 4.6 oz)   " SpO2 100%   BMI 20.78 kg/m²   Labs Reviewed   COMPREHENSIVE METABOLIC PANEL - Abnormal; Notable for the following components:       Result Value    Glucose 177 (*)     Chloride 96 (*)     CO2 6.0 (*)     Total Protein 9.3 (*)     Albumin 6.0 (*)     ALT (SGPT) 84 (*)     AST (SGOT) 181 (*)     Total Bilirubin 2.9 (*)     Anion Gap 36.0 (*)     All other components within normal limits    Narrative:     GFR Normal >60  Chronic Kidney Disease <60  Kidney Failure <15     LIPASE - Abnormal; Notable for the following components:    Lipase 266 (*)     All other components within normal limits   URINALYSIS W/ MICROSCOPIC IF INDICATED (NO CULTURE) - Abnormal; Notable for the following components:    Color, UA Dark Yellow (*)     Ketones, UA >=160 mg/dL (4+) (*)     Blood, UA Moderate (2+) (*)     Protein, UA >=300 mg/dL (3+) (*)     All other components within normal limits   CBC WITH AUTO DIFFERENTIAL - Abnormal; Notable for the following components:    WBC 14.40 (*)     .5 (*)     MCH 34.4 (*)     Neutrophil % 89.3 (*)     Lymphocyte % 4.0 (*)     Eosinophil % 0.0 (*)     Neutrophils, Absolute 12.80 (*)     Lymphocytes, Absolute 0.60 (*)     All other components within normal limits   BLOOD GAS, VENOUS - Abnormal; Notable for the following components:    pH, Venous 7.017 (*)     pCO2, Venous 23.4 (*)     pO2, Venous 35.2 (*)     HCO3, Venous 6.0 (*)     Base Excess, Venous -23.7 (*)     O2 Saturation, Venous 43.7 (*)     CO2 Content 6.7 (*)     All other components within normal limits   ACETONE - Abnormal; Notable for the following components:    Acetone Small (*)     All other components within normal limits   URINALYSIS, MICROSCOPIC ONLY - Abnormal; Notable for the following components:    RBC, UA 3-5 (*)     Bacteria, UA Trace (*)     Squamous Epithelial Cells, UA 3-6 (*)     All other components within normal limits   BLOOD GAS, VENOUS - Abnormal; Notable for the following components:    pH, Venous 7.146 (*)      pCO2, Venous 20.3 (*)     pO2, Venous 29.0 (*)     HCO3, Venous 7.0 (*)     Base Excess, Venous -20.0 (*)     O2 Saturation, Venous 40.4 (*)     CO2 Content 7.7 (*)     All other components within normal limits   HEMOGLOBIN A1C - Abnormal; Notable for the following components:    Hemoglobin A1C 4.30 (*)     All other components within normal limits   HCG, SERUM, QUALITATIVE - Normal   BLOOD GAS, VENOUS   ETHANOL    Narrative:     Plasma Ethanol Clinical Symptoms:    ETOH (%)               Clinical Symptom  .01-.05              No apparent influence  .03-.12              Euphoria, Diminished judgment and attention   .09-.25              Impaired comprehension, Muscle incoordination  .18-.30              Confusion, Staggered gait, Slurred speech  .25-.40              Markedly decreased response to stimuli, unable to stand or                        walk, vomitting, sleep or stupor  .35-.50              Comatose, Anesthesia, Subnormal body temperature       BLOOD GAS, ARTERIAL   BLOOD GAS, ARTERIAL   COMPREHENSIVE METABOLIC PANEL   IGG 4   HERMINIA   LIPASE   BLOOD GAS, VENOUS   CBC AND DIFFERENTIAL    Narrative:     The following orders were created for panel order CBC & Differential.  Procedure                               Abnormality         Status                     ---------                               -----------         ------                     CBC Auto Differential[[id number]]        Abnormal            Final result               Scan Slide[[id number]]                                                                    Please view results for these tests on the individual orders.     Medications   sodium chloride 0.9 % flush 10 mL (has no administration in time range)   sodium chloride 0.9 % flush 10 mL (has no administration in time range)   sodium chloride 0.9 % flush 10 mL (has no administration in time range)   sodium chloride 0.9 % infusion 40 mL (has no administration in time range)   acetaminophen  (TYLENOL) tablet 650 mg (has no administration in time range)     Or   acetaminophen (TYLENOL) 160 MG/5ML oral solution 650 mg (has no administration in time range)     Or   acetaminophen (TYLENOL) suppository 650 mg (has no administration in time range)   aluminum-magnesium hydroxide-simethicone (MAALOX MAX) 400-400-40 MG/5ML suspension 15 mL (has no administration in time range)   sennosides-docusate (PERICOLACE) 8.6-50 MG per tablet 2 tablet (has no administration in time range)     And   polyethylene glycol (MIRALAX) packet 17 g (has no administration in time range)     And   bisacodyl (DULCOLAX) EC tablet 5 mg (has no administration in time range)     And   bisacodyl (DULCOLAX) suppository 10 mg (has no administration in time range)   ondansetron (ZOFRAN) tablet 4 mg ( Oral Not Given:  See Alt 12/28/23 1605)     Or   ondansetron (ZOFRAN) injection 4 mg (4 mg Intravenous Given 12/28/23 1605)   melatonin tablet 5 mg (has no administration in time range)   lactated ringers infusion (200 mL/hr Intravenous New Bag 12/28/23 1604)   morphine injection 2 mg (2 mg Intravenous Given 12/28/23 1605)   Magnesium Standard Dose Replacement - Follow Nurse / BPA Driven Protocol (has no administration in time range)   thiamine (B-1) injection 200 mg (200 mg Intravenous Given 12/28/23 1402)     Followed by   thiamine (VITAMIN B-1) tablet 100 mg (has no administration in time range)   folic acid (FOLVITE) tablet 1 mg (0 mg Oral Hold 12/28/23 1357)   hydrALAZINE (APRESOLINE) injection 10 mg (10 mg Intravenous Given 12/28/23 1605)   pantoprazole (PROTONIX) injection 40 mg (has no administration in time range)   polyethylene glycol (MIRALAX) packet 17 g (17 g Oral Not Given 12/28/23 1615)   lactated ringers bolus 2,000 mL (0 mL Intravenous Stopped 12/28/23 1043)   ondansetron (ZOFRAN) injection 4 mg (4 mg Intravenous Given 12/28/23 0843)   ketorolac (TORADOL) injection 15 mg (15 mg Intravenous Given 12/28/23 2043)   lactated ringers  bolus 1,000 mL (0 mL Intravenous Stopped 12/28/23 1402)     CT Abdomen Pelvis Without Contrast    Result Date: 12/28/2023  Impression: 1. Findings consistent with acute pancreatitis, as discussed above. 2. Uniformly increased density throughout the gallbladder suggesting multiple noncalcified stones or sludge. 3. Severe hepatic steatosis. 4. Nonspecific circumferential thickening of the distal esophagus as above. 5. Nonobstructing bilateral intrarenal calculi. Electronically Signed: Derick Tillman MD  12/28/2023 10:33 AM EST  Workstation ID: [id number]                                          Medical Decision Making  Problems Addressed:  Alcoholic ketoacidosis: complicated acute illness or injury  Alcohol-induced acute pancreatitis, unspecified complication status: complicated acute illness or injury    Amount and/or Complexity of Data Reviewed  Labs: ordered.  Radiology: ordered.    Risk  Prescription drug management.  Decision regarding hospitalization.    My interpretation of CT abdomen pelvis concerning for peripancreatic inflammatory changes.  See above radiology interpretation.    Patient with elevated lipase and CT concerning with pancreatitis.  Patient quite acidotic.  Given 2 L of LR and recheck gas and pH did improve.  Consistent with patient's known history of alcoholic ketoacidosis.  Will admit to hospitalist service.    Final diagnoses:   Alcoholic ketoacidosis   Alcohol-induced acute pancreatitis, unspecified complication status       ED Disposition  ED Disposition       ED Disposition   Decision to Admit    Condition   --    Comment   Level of Care: Telemetry [5]   Diagnosis: Alcoholic ketoacidosis [598547]   Admitting Physician: MADONNA MOELLER [[id number]]   Attending Physician: MADONNA MOELLER [[id number]]   Certification: I Certify That Inpatient Hospital Services Are Medically Necessary For Greater Than 2 Midnights                 No follow-up provider specified.       Medication List      No changes  were made to your prescriptions during this visit.            Arvin Gerer MD  12/28/23 1045

## 2023-12-28 NOTE — Clinical Note
Level of Care: Progressive Care [20]   Admitting Physician: MADONNA MOELLER [[id number]]   Attending Physician: MADONNA MOELLER [[id number]]

## 2023-12-28 NOTE — H&P
[facility] Medicine Services  History & Physical    Patient Name: Shirin Canales  : 1990  MRN: [id number]  Primary Care Physician:  Provider, No Known  Date of admission: 2023  Date and Time of Service: 2023 at 1300    Subjective      Chief Complaint: nausea and vomiting     History of Present Illness: Shirin Canales is a 33 y.o. female with a PMH of alcoholic ketoacidosis who presented to [facility] on 2023  due to nausea and vomiting.  Patient reports on Tuesday she started to vomit and was unable to stop.  She explains she also began to have lower left-sided back pain that was sharp.  She explains the vomiting has happened before or after any holiday as she drinks with her family.  She reports on  she drinks a couple shots of liquor and a couple beers and did not utilize.  She denies any hematemesis, diarrhea, fever, or chills.  She explains her left-sided back pain is currently a 10 out of 10 radiating into her left leg.  She denies any aggravating factors.  She reports pain medication helps.  She denies that she drinks daily, but just around holidays.  She did also reports she gets some dizziness with standing.    In the ED, all labs Are unremarkable except pH 7.14, pO2 29.0, white blood cells 14.4, urinalysis showed moderate blood, greater than 300 protein, trace bacteria, 3-6 squamous epithelial.  CT abdomen pelvis showed Findings consistent with acute pancreatitis. Uniformly increased density throughout the gallbladder suggesting multiple noncalcified stones or sludge.   Severe hepatic steatosis.Nonspecific circumferential thickening of the distal esophagus. Nonobstructing bilateral intrarenal calculi.  All vital signs are stable except blood pressure 146/105 and heart rate 138.  Patient received Toradol, lactated ringer, Zofran in the ED.  Hospitalist were contacted to admit patient for further care management.      Review of Systems   Constitutional:  Negative.    HENT: Negative.     Respiratory: Negative.     Cardiovascular: Negative.    Gastrointestinal:  Positive for nausea and vomiting.   Genitourinary: Negative.    Musculoskeletal:  Positive for back pain.        Left leg pain   Skin: Negative.    Neurological:  Positive for dizziness.   Psychiatric/Behavioral: Negative.         Personal History     History reviewed. No pertinent past medical history.      No past surgical history on file.    Family History: family history is not on file. Otherwise pertinent FHx was reviewed and not pertinent to current issue.    Social History:  reports that she quit smoking about 8 years ago. Her smoking use included cigarettes. She has never used smokeless tobacco. She reports current alcohol use of about 3.0 standard drinks of alcohol per week. She reports that she does not currently use drugs.    Home Medications:  Prior to Admission Medications       Prescriptions Last Dose Informant Patient Reported? Taking?    hydrOXYzine (ATARAX) 25 MG tablet   Yes No    Take 1-2 tablets by mouth Daily As Needed.              Allergies:  Allergies   Allergen Reactions    Latex Unknown - Low Severity    Penicillins Unknown - High Severity       Objective      Vitals:   Temp:  [97 °F (36.1 °C)] 97 °F (36.1 °C)  Heart Rate:  [106-138] 117  Resp:  [15-17] 16  BP: (138-154)/(102-112) 141/105  Body mass index is 20.78 kg/m².  Physical Exam  Vitals reviewed.   Constitutional:       Appearance: She is normal weight. She is ill-appearing.   HENT:      Head: Normocephalic and atraumatic.      Nose: Nose normal.      Mouth/Throat:      Mouth: Mucous membranes are moist.      Pharynx: Oropharynx is clear.   Eyes:      Extraocular Movements: Extraocular movements intact.      Conjunctiva/sclera: Conjunctivae normal.      Pupils: Pupils are equal, round, and reactive to light.   Cardiovascular:      Rate and Rhythm: Normal rate and regular rhythm.      Pulses: Normal pulses.      Heart sounds:  Normal heart sounds.      Comments: S1, S2 audible  Pulmonary:      Effort: Pulmonary effort is normal.      Breath sounds: Normal breath sounds.      Comments: On room air   Abdominal:      General: Abdomen is flat. Bowel sounds are normal.      Palpations: Abdomen is soft.      Comments: Abdominal soreness, generalized   Musculoskeletal:         General: Normal range of motion.      Cervical back: Normal range of motion.   Skin:     General: Skin is warm and dry.   Neurological:      General: No focal deficit present.      Mental Status: She is alert and oriented to person, place, and time. Mental status is at baseline.   Psychiatric:         Mood and Affect: Mood normal.         Behavior: Behavior normal.         Thought Content: Thought content normal.         Judgment: Judgment normal.         Diagnostic Data:  Lab Results (last 24 hours)       Procedure Component Value Units Date/Time    Blood Gas, Venous - [[id number]]  (Abnormal) Collected: 12/28/23 1131    Specimen: Venous Blood Updated: 12/28/23 1135     pH, Venous 7.146 pH Units      pCO2, Venous 20.3 mm Hg      pO2, Venous 29.0 mm Hg      HCO3, Venous 7.0 mmol/L      Base Excess, Venous -20.0 mmol/L      Comment: Serial Number: [id number]:  [id number]        O2 Saturation, Venous 40.4 %      CO2 Content 7.7 mmol/L      Barometric Pressure for Blood Gas --     Comment: N/A        Modality Room Air     FIO2 21 %     Urinalysis With Microscopic If Indicated (No Culture) - Urine, Clean Catch [[id number]]  (Abnormal) Collected: 12/28/23 1043    Specimen: Urine, Clean Catch Updated: 12/28/23 1053     Color, UA Dark Yellow     Appearance, UA Clear     pH, UA 6.0     Specific Gravity, UA 1.014     Glucose, UA Negative     Ketones, UA >=160 mg/dL (4+)     Bilirubin, UA Negative     Blood, UA Moderate (2+)     Protein, UA >=300 mg/dL (3+)     Leuk Esterase, UA Negative     Nitrite, UA Negative     Urobilinogen, UA 1.0 E.U./dL    Urinalysis, Microscopic Only - Urine,  Clean Catch [[id number]]  (Abnormal) Collected: 12/28/23 1043    Specimen: Urine, Clean Catch Updated: 12/28/23 1053     RBC, UA 3-5 /HPF      WBC, UA 0-2 /HPF      Bacteria, UA Trace /HPF      Squamous Epithelial Cells, UA 3-6 /HPF      Hyaline Casts, UA 0-2 /LPF      Methodology Automated Microscopy    Acetone [[id number]]  (Abnormal) Collected: 12/28/23 0845    Specimen: Blood Updated: 12/28/23 0938     Acetone Small    Comprehensive Metabolic Panel [[id number]]  (Abnormal) Collected: 12/28/23 0845    Specimen: Blood Updated: 12/28/23 0931     Glucose 177 mg/dL      BUN 8 mg/dL      Creatinine 0.99 mg/dL      Sodium 138 mmol/L      Potassium 4.2 mmol/L      Chloride 96 mmol/L      CO2 6.0 mmol/L      Calcium 10.0 mg/dL      Total Protein 9.3 g/dL      Albumin 6.0 g/dL      ALT (SGPT) 84 U/L      AST (SGOT) 181 U/L      Alkaline Phosphatase 107 U/L      Total Bilirubin 2.9 mg/dL      Globulin 3.3 gm/dL      A/G Ratio 1.8 g/dL      BUN/Creatinine Ratio 8.1     Anion Gap 36.0 mmol/L      eGFR 77.9 mL/min/1.73     Narrative:      GFR Normal >60  Chronic Kidney Disease <60  Kidney Failure <15      Lipase [[id number]]  (Abnormal) Collected: 12/28/23 0845    Specimen: Blood Updated: 12/28/23 0931     Lipase 266 U/L     Ethanol [[id number]] Collected: 12/28/23 0845    Specimen: Blood Updated: 12/28/23 0931     Ethanol % <0.010 %     Narrative:      Plasma Ethanol Clinical Symptoms:    ETOH (%)               Clinical Symptom  .01-.05              No apparent influence  .03-.12              Euphoria, Diminished judgment and attention   .09-.25              Impaired comprehension, Muscle incoordination  .18-.30              Confusion, Staggered gait, Slurred speech  .25-.40              Markedly decreased response to stimuli, unable to stand or                        walk, vomitting, sleep or stupor  .35-.50              Comatose, Anesthesia, Subnormal body temperature        hCG, Serum, Qualitative [[id number]]  (Normal)  Collected: 12/28/23 0845    Specimen: Blood Updated: 12/28/23 0918     HCG Qualitative Negative    Blood Gas, Venous - [[id number]]  (Abnormal) Collected: 12/28/23 0840    Specimen: Venous Blood Updated: 12/28/23 0858     Site Left Brachial     pH, Venous 7.017 pH Units      pCO2, Venous 23.4 mm Hg      pO2, Venous 35.2 mm Hg      HCO3, Venous 6.0 mmol/L      Base Excess, Venous -23.7 mmol/L      Comment: Serial Number: [id number]:  [id number]        O2 Saturation, Venous 43.7 %      CO2 Content 6.7 mmol/L      Barometric Pressure for Blood Gas --     Comment: N/A        Modality Room Air     FIO2 21 %     CBC & Differential [[id number]]  (Abnormal) Collected: 12/28/23 0845    Specimen: Blood Updated: 12/28/23 0855    Narrative:      The following orders were created for panel order CBC & Differential.  Procedure                               Abnormality         Status                     ---------                               -----------         ------                     CBC Auto Differential[[id number]]        Abnormal            Final result               Scan Slide[[id number]]                                                                    Please view results for these tests on the individual orders.    CBC Auto Differential [[id number]]  (Abnormal) Collected: 12/28/23 0845    Specimen: Blood Updated: 12/28/23 0855     WBC 14.40 10*3/mm3      RBC 3.97 10*6/mm3      Hemoglobin 13.7 g/dL      Hematocrit 42.3 %      .5 fL      MCH 34.4 pg      MCHC 32.3 g/dL      RDW 13.4 %      RDW-SD 53.4 fl      MPV 7.5 fL      Platelets 250 10*3/mm3      Neutrophil % 89.3 %      Lymphocyte % 4.0 %      Monocyte % 6.5 %      Eosinophil % 0.0 %      Basophil % 0.2 %      Neutrophils, Absolute 12.80 10*3/mm3      Lymphocytes, Absolute 0.60 10*3/mm3      Monocytes, Absolute 0.90 10*3/mm3      Eosinophils, Absolute 0.00 10*3/mm3      Basophils, Absolute 0.00 10*3/mm3      nRBC 0.1 /100 WBC              Imaging Results (Last 24  Hours)       Procedure Component Value Units Date/Time    CT Abdomen Pelvis Without Contrast [[id number]] Collected: 12/28/23 1026     Updated: 12/28/23 1035    Narrative:      CT ABDOMEN PELVIS WO CONTRAST    Date of Exam: 12/28/2023 10:15 AM EST    Indication: vomiting, elevated lipase.    Comparison: 4/23/2023     Technique: Axial CT images were obtained of the abdomen and pelvis without the administration of contrast. Sagittal and coronal reconstructions were performed.  Automated exposure control and iterative reconstruction methods were used.      Findings:  Visualized lung bases are clear. There is circumferential thickening of the distal esophagus which is nonspecific. This may be due to gastroesophageal reflux disease. Neoplasm not excluded. Unenhanced evaluation of the spleen, adrenals is grossly   unremarkable. The pancreatic head and neck appear edematous. There is surrounding inflammatory fat stranding. No focal associated, drainable fluid collections are identified. Findings suggest acute pancreatitis. There is diffusely increased density   throughout the gallbladder suggesting multiple fine noncalcified stones or sludge. Severe, diffuse fatty infiltration of the liver is again noted. No focal hepatic mass lesion is identified. Nonobstructing bilateral 2-3 mm intrarenal calculi are   identified. Urinary bladder is distended, but otherwise grossly unremarkable. Unenhanced evaluation of the uterus adnexa is unremarkable. There is no evidence of small bowel obstruction, free air or free fluid. The appendix is normal. Colon is mostly   decompressed. No pericolonic inflammatory change identified. No aggressive osseous lesions are identified. No gross adenopathy is identified in the abdomen or pelvis.        Impression:      Impression:    1. Findings consistent with acute pancreatitis, as discussed above.  2. Uniformly increased density throughout the gallbladder suggesting multiple noncalcified stones or  sludge.  3. Severe hepatic steatosis.  4. Nonspecific circumferential thickening of the distal esophagus as above.  5. Nonobstructing bilateral intrarenal calculi.            Electronically Signed: Derick Tillman MD    12/28/2023 10:33 AM EST    Workstation ID: [id number]              Assessment & Plan      Alcoholic ketoacidosis  - ABG reviewed  - UA reviewed   - Alcohol withdraw protocol ordered  - IV ordered  - Monitor ABG- Recheck, Check hbg A1C     Acute pancreatitis  - Secondary to alcohol use  - Encourage alcohol cessation   - CT abd/pelvis reviewed  - WBC 14.4, monitor   - Received toradol, IVF and zofran ordered   - IVF, zofran, and pain medication ordered  - GI consulted     Hepatic steatosis  - Monitor LFT's     Nonobstructing bilateral intrarenal calculi   - Monitor kidney function labs   - UA reviewed     Acute dizziness  - Likely secondary to dehydration   - Fall risk precautions    Uncontrolled Blood pressure  - /103 upon admission  - Monitor blood pressure  - IV hydralazine ordered PRN             DVT prophylaxis: SCD's     The patient desires to be as follows:    CODE STATUS:  Full   Level Of Support Discussed With: Patient  Code Status (Patient has no pulse and is not breathing): CPR (Attempt to Resuscitate)  Medical Interventions (Patient has pulse or is breathing): Full Support        It is expected that patient's hospitalization will require greater than 2 midnights, therefore patient will be admitted as an inpatient.          PDMP and Medication Dispenses via Sidebar reviewed and consistent with patient reported medications.    I discussed the patient's findings and my recommendations with patient.      Signature:     This document has been electronically signed by BEVERLY Jennings on December 28, 2023 13:05 [place]   [facility] Hospitalist Team   Unable to answer due to medical condition/unresponsive/etc...

## 2023-12-29 ENCOUNTER — INPATIENT HOSPITAL (OUTPATIENT)
Dept: URBAN - METROPOLITAN AREA HOSPITAL 84 | Facility: HOSPITAL | Age: 33
End: 2023-12-29

## 2023-12-29 DIAGNOSIS — R74.8 ABNORMAL LEVELS OF OTHER SERUM ENZYMES: ICD-10-CM

## 2023-12-29 DIAGNOSIS — D72.829 ELEVATED WHITE BLOOD CELL COUNT, UNSPECIFIED: ICD-10-CM

## 2023-12-29 DIAGNOSIS — R10.10 UPPER ABDOMINAL PAIN, UNSPECIFIED: ICD-10-CM

## 2023-12-29 DIAGNOSIS — R94.5 ABNORMAL RESULTS OF LIVER FUNCTION STUDIES: ICD-10-CM

## 2023-12-29 DIAGNOSIS — R93.3 ABNORMAL FINDINGS ON DIAGNOSTIC IMAGING OF OTHER PARTS OF DI: ICD-10-CM

## 2023-12-29 DIAGNOSIS — K59.00 CONSTIPATION, UNSPECIFIED: ICD-10-CM

## 2023-12-29 DIAGNOSIS — K85.90 ACUTE PANCREATITIS WITHOUT NECROSIS OR INFECTION, UNSPECIFIE: ICD-10-CM

## 2023-12-29 LAB
ALBUMIN SERPL-MCNC: 3.8 G/DL (ref 3.5–5.2)
ALBUMIN/GLOB SERPL: 1.7 G/DL
ALP SERPL-CCNC: 61 U/L (ref 39–117)
ALT SERPL W P-5'-P-CCNC: 33 U/L (ref 1–33)
ANION GAP SERPL CALCULATED.3IONS-SCNC: 16 MMOL/L (ref 5–15)
AST SERPL-CCNC: 50 U/L (ref 1–32)
BASOPHILS # BLD AUTO: 0 10*3/MM3 (ref 0–0.2)
BASOPHILS NFR BLD AUTO: 0.6 % (ref 0–1.5)
BILIRUB SERPL-MCNC: 1.7 MG/DL (ref 0–1.2)
BUN SERPL-MCNC: 5 MG/DL (ref 6–20)
BUN/CREAT SERPL: 8.5 (ref 7–25)
CALCIUM SPEC-SCNC: 9.2 MG/DL (ref 8.6–10.5)
CHLORIDE SERPL-SCNC: 103 MMOL/L (ref 98–107)
CO2 SERPL-SCNC: 16 MMOL/L (ref 22–29)
CREAT SERPL-MCNC: 0.59 MG/DL (ref 0.57–1)
CRP SERPL-MCNC: 2.12 MG/DL (ref 0–0.5)
DEPRECATED RDW RBC AUTO: 45.5 FL (ref 37–54)
EGFRCR SERPLBLD CKD-EPI 2021: 123 ML/MIN/1.73
EOSINOPHIL # BLD AUTO: 0.1 10*3/MM3 (ref 0–0.4)
EOSINOPHIL NFR BLD AUTO: 1.4 % (ref 0.3–6.2)
ERYTHROCYTE [DISTWIDTH] IN BLOOD BY AUTOMATED COUNT: 12.6 % (ref 12.3–15.4)
GLOBULIN UR ELPH-MCNC: 2.3 GM/DL
GLUCOSE SERPL-MCNC: 81 MG/DL (ref 65–99)
HCT VFR BLD AUTO: 31.6 % (ref 34–46.6)
HGB BLD-MCNC: 10.8 G/DL (ref 12–15.9)
IGG4 SER-MCNC: 90 MG/DL (ref 2–96)
LIPASE SERPL-CCNC: 1160 U/L (ref 13–60)
LYMPHOCYTES # BLD AUTO: 0.8 10*3/MM3 (ref 0.7–3.1)
LYMPHOCYTES NFR BLD AUTO: 14 % (ref 19.6–45.3)
MAGNESIUM SERPL-MCNC: 1.6 MG/DL (ref 1.6–2.6)
MCH RBC QN AUTO: 35.6 PG (ref 26.6–33)
MCHC RBC AUTO-ENTMCNC: 34 G/DL (ref 31.5–35.7)
MCV RBC AUTO: 104.7 FL (ref 79–97)
MONOCYTES # BLD AUTO: 0.4 10*3/MM3 (ref 0.1–0.9)
MONOCYTES NFR BLD AUTO: 7.5 % (ref 5–12)
NEUTROPHILS NFR BLD AUTO: 4.4 10*3/MM3 (ref 1.7–7)
NEUTROPHILS NFR BLD AUTO: 76.5 % (ref 42.7–76)
NRBC BLD AUTO-RTO: 0.3 /100 WBC (ref 0–0.2)
PLATELET # BLD AUTO: 134 10*3/MM3 (ref 140–450)
PMV BLD AUTO: 7.7 FL (ref 6–12)
POTASSIUM SERPL-SCNC: 3.1 MMOL/L (ref 3.5–5.2)
PROT SERPL-MCNC: 6.1 G/DL (ref 6–8.5)
RBC # BLD AUTO: 3.02 10*6/MM3 (ref 3.77–5.28)
SODIUM SERPL-SCNC: 135 MMOL/L (ref 136–145)
WBC NRBC COR # BLD AUTO: 5.7 10*3/MM3 (ref 3.4–10.8)

## 2023-12-29 PROCEDURE — 25010000002 MORPHINE PER 10 MG: Performed by: NURSE PRACTITIONER

## 2023-12-29 PROCEDURE — 25010000002 KETOROLAC TROMETHAMINE PER 15 MG: Performed by: FAMILY MEDICINE

## 2023-12-29 PROCEDURE — 25010000002 THIAMINE HCL 200 MG/2ML SOLUTION: Performed by: NURSE PRACTITIONER

## 2023-12-29 PROCEDURE — 25010000002 ONDANSETRON PER 1 MG: Performed by: NURSE PRACTITIONER

## 2023-12-29 PROCEDURE — 25810000003 LACTATED RINGERS PER 1000 ML: Performed by: NURSE PRACTITIONER

## 2023-12-29 PROCEDURE — 99232 SBSQ HOSP IP/OBS MODERATE 35: CPT

## 2023-12-29 PROCEDURE — 80053 COMPREHEN METABOLIC PANEL: CPT

## 2023-12-29 PROCEDURE — 86140 C-REACTIVE PROTEIN: CPT

## 2023-12-29 PROCEDURE — 83690 ASSAY OF LIPASE: CPT

## 2023-12-29 PROCEDURE — 36415 COLL VENOUS BLD VENIPUNCTURE: CPT

## 2023-12-29 PROCEDURE — 85025 COMPLETE CBC W/AUTO DIFF WBC: CPT

## 2023-12-29 PROCEDURE — 83735 ASSAY OF MAGNESIUM: CPT | Performed by: INTERNAL MEDICINE

## 2023-12-29 RX ORDER — LUBIPROSTONE 24 UG/1
24 CAPSULE ORAL 2 TIMES DAILY WITH MEALS
Status: DISCONTINUED | OUTPATIENT
Start: 2023-12-29 | End: 2024-01-01 | Stop reason: HOSPADM

## 2023-12-29 RX ORDER — POTASSIUM CHLORIDE 20 MEQ/1
40 TABLET, EXTENDED RELEASE ORAL EVERY 4 HOURS
Status: COMPLETED | OUTPATIENT
Start: 2023-12-29 | End: 2023-12-30

## 2023-12-29 RX ADMIN — MORPHINE SULFATE 2 MG: 2 INJECTION, SOLUTION INTRAMUSCULAR; INTRAVENOUS at 16:28

## 2023-12-29 RX ADMIN — SODIUM CHLORIDE, POTASSIUM CHLORIDE, SODIUM LACTATE AND CALCIUM CHLORIDE 200 ML/HR: 600; 310; 30; 20 INJECTION, SOLUTION INTRAVENOUS at 22:20

## 2023-12-29 RX ADMIN — SODIUM CHLORIDE, POTASSIUM CHLORIDE, SODIUM LACTATE AND CALCIUM CHLORIDE 200 ML/HR: 600; 310; 30; 20 INJECTION, SOLUTION INTRAVENOUS at 11:16

## 2023-12-29 RX ADMIN — MORPHINE SULFATE 2 MG: 2 INJECTION, SOLUTION INTRAMUSCULAR; INTRAVENOUS at 11:52

## 2023-12-29 RX ADMIN — THIAMINE HYDROCHLORIDE 200 MG: 100 INJECTION, SOLUTION INTRAMUSCULAR; INTRAVENOUS at 21:00

## 2023-12-29 RX ADMIN — METOPROLOL TARTRATE 25 MG: 25 TABLET, FILM COATED ORAL at 10:54

## 2023-12-29 RX ADMIN — KETOROLAC TROMETHAMINE 15 MG: 30 INJECTION, SOLUTION INTRAMUSCULAR; INTRAVENOUS at 08:35

## 2023-12-29 RX ADMIN — Medication 10 ML: at 20:57

## 2023-12-29 RX ADMIN — KETOROLAC TROMETHAMINE 15 MG: 30 INJECTION, SOLUTION INTRAMUSCULAR; INTRAVENOUS at 18:53

## 2023-12-29 RX ADMIN — ONDANSETRON 4 MG: 2 INJECTION INTRAMUSCULAR; INTRAVENOUS at 11:52

## 2023-12-29 RX ADMIN — SODIUM CHLORIDE, POTASSIUM CHLORIDE, SODIUM LACTATE AND CALCIUM CHLORIDE 200 ML/HR: 600; 310; 30; 20 INJECTION, SOLUTION INTRAVENOUS at 05:48

## 2023-12-29 RX ADMIN — SENNOSIDES AND DOCUSATE SODIUM 2 TABLET: 50; 8.6 TABLET ORAL at 20:57

## 2023-12-29 RX ADMIN — ONDANSETRON 4 MG: 2 INJECTION INTRAMUSCULAR; INTRAVENOUS at 05:51

## 2023-12-29 RX ADMIN — FOLIC ACID 1 MG: 1 TABLET ORAL at 08:35

## 2023-12-29 RX ADMIN — SODIUM CHLORIDE, POTASSIUM CHLORIDE, SODIUM LACTATE AND CALCIUM CHLORIDE 200 ML/HR: 600; 310; 30; 20 INJECTION, SOLUTION INTRAVENOUS at 01:00

## 2023-12-29 RX ADMIN — SENNOSIDES AND DOCUSATE SODIUM 2 TABLET: 50; 8.6 TABLET ORAL at 08:41

## 2023-12-29 RX ADMIN — SODIUM CHLORIDE, POTASSIUM CHLORIDE, SODIUM LACTATE AND CALCIUM CHLORIDE 200 ML/HR: 600; 310; 30; 20 INJECTION, SOLUTION INTRAVENOUS at 17:34

## 2023-12-29 RX ADMIN — THIAMINE HYDROCHLORIDE 200 MG: 100 INJECTION, SOLUTION INTRAMUSCULAR; INTRAVENOUS at 15:28

## 2023-12-29 RX ADMIN — THIAMINE HYDROCHLORIDE 200 MG: 100 INJECTION, SOLUTION INTRAMUSCULAR; INTRAVENOUS at 05:48

## 2023-12-29 RX ADMIN — METOPROLOL TARTRATE 25 MG: 25 TABLET, FILM COATED ORAL at 20:56

## 2023-12-29 RX ADMIN — MORPHINE SULFATE 2 MG: 2 INJECTION, SOLUTION INTRAMUSCULAR; INTRAVENOUS at 20:57

## 2023-12-29 RX ADMIN — POTASSIUM CHLORIDE 40 MEQ: 1500 TABLET, EXTENDED RELEASE ORAL at 16:28

## 2023-12-29 RX ADMIN — MORPHINE SULFATE 2 MG: 2 INJECTION, SOLUTION INTRAMUSCULAR; INTRAVENOUS at 06:03

## 2023-12-29 RX ADMIN — LUBIPROSTONE 24 MCG: 24 CAPSULE, GELATIN COATED ORAL at 18:03

## 2023-12-29 RX ADMIN — POTASSIUM CHLORIDE 40 MEQ: 1500 TABLET, EXTENDED RELEASE ORAL at 20:57

## 2023-12-29 RX ADMIN — PANTOPRAZOLE SODIUM 40 MG: 40 INJECTION, POWDER, FOR SOLUTION INTRAVENOUS at 05:48

## 2023-12-29 RX ADMIN — ONDANSETRON 4 MG: 2 INJECTION INTRAMUSCULAR; INTRAVENOUS at 18:49

## 2023-12-29 NOTE — PROGRESS NOTES
[facility] MEDICINE SERVICE  DAILY PROGRESS NOTE    NAME: Shirin Canales  : 1990  MRN: [id number]      LOS: 1 day     PROVIDER OF SERVICE: Jhony Gustafson MD    Chief Complaint: Alcoholic ketoacidosis    Subjective:     Interval History:  History taken from: patient    Patient seen today.  Feeling better.  Vomited this AM but none since.  Mild abdominal pains.  Lipase 538.  Seen by GI.  Tachycardic    Review of Systems:   Review of Systems    Objective:     Vital Signs  Temp:  [97.6 °F (36.4 °C)-98.3 °F (36.8 °C)] 98.1 °F (36.7 °C)  Heart Rate:  [100-135] 100  Resp:  [13-20] 16  BP: (117-130)/(81-97) 117/81   Body mass index is 20.78 kg/m².    Physical Exam  Physical Exam  Constitutional:       General: She is not in acute distress.     Appearance: Normal appearance. She is not toxic-appearing.   HENT:      Nose: Nose normal.      Mouth/Throat:      Mouth: Mucous membranes are dry.   Eyes:      Pupils: Pupils are equal, round, and reactive to light.   Cardiovascular:      Rate and Rhythm: Regular rhythm. Tachycardia present.   Pulmonary:      Effort: Pulmonary effort is normal.      Breath sounds: Normal breath sounds.   Abdominal:      General: Bowel sounds are normal.      Tenderness: There is abdominal tenderness. There is no guarding.   Musculoskeletal:         General: Normal range of motion.      Cervical back: Normal range of motion and neck supple.   Skin:     General: Skin is warm.      Capillary Refill: Capillary refill takes less than 2 seconds.   Neurological:      General: No focal deficit present.      Mental Status: She is alert.   Psychiatric:         Mood and Affect: Mood normal.         Scheduled Meds   folic acid, 1 mg, Oral, Daily  lubiprostone, 24 mcg, Oral, BID With Meals  metoprolol tartrate, 25 mg, Oral, Q12H  pantoprazole, 40 mg, Intravenous, Q AM  polyethylene glycol, 17 g, Oral, Daily  potassium chloride ER, 40 mEq, Oral, Q4H  senna-docusate sodium, 2 tablet, Oral,  BID  sodium chloride, 10 mL, Intravenous, Q12H  thiamine (B-1) IV, 200 mg, Intravenous, Q8H   Followed by  [START ON 1/2/2024] thiamine, 100 mg, Oral, Daily       PRN Meds     acetaminophen **OR** acetaminophen **OR** acetaminophen    aluminum-magnesium hydroxide-simethicone    senna-docusate sodium **AND** polyethylene glycol **AND** bisacodyl **AND** bisacodyl    hydrALAZINE    ketorolac    Magnesium Standard Dose Replacement - Follow Nurse / BPA Driven Protocol    melatonin    Morphine    ondansetron **OR** ondansetron    Potassium Replacement - Follow Nurse / BPA Driven Protocol    [COMPLETED] Insert Peripheral IV **AND** sodium chloride    sodium chloride    sodium chloride   Infusions  lactated ringers, 200 mL/hr, Last Rate: 200 mL/hr (12/29/23 1734)          Diagnostic Data    Results from last 7 days   Lab Units 12/29/23  1439   WBC 10*3/mm3 5.70   HEMOGLOBIN g/dL 10.8*   HEMATOCRIT % 31.6*   PLATELETS 10*3/mm3 134*   GLUCOSE mg/dL 81   CREATININE mg/dL 0.59   BUN mg/dL 5*   SODIUM mmol/L 135*   POTASSIUM mmol/L 3.1*   AST (SGOT) U/L 50*   ALT (SGPT) U/L 33   ALK PHOS U/L 61   BILIRUBIN mg/dL 1.7*   ANION GAP mmol/L 16.0*       CT Abdomen Pelvis Without Contrast    Result Date: 12/28/2023  Impression: 1. Findings consistent with acute pancreatitis, as discussed above. 2. Uniformly increased density throughout the gallbladder suggesting multiple noncalcified stones or sludge. 3. Severe hepatic steatosis. 4. Nonspecific circumferential thickening of the distal esophagus as above. 5. Nonobstructing bilateral intrarenal calculi. Electronically Signed: Derick Tillman MD  12/28/2023 10:33 AM EST  Workstation ID: [id number]       I reviewed the patient's new clinical results.    Assessment/Plan:     Active and Resolved Problems  Active Hospital Problems    Diagnosis  POA    **Alcoholic ketoacidosis [E87.29]  Yes    Elevated blood pressure reading [R03.0]  Yes    Alcohol induced acute pancreatitis without necrosis or  infection [K85.20]  Yes    Kidney stone [N20.0]  Unknown    Hepatic steatosis [K76.0]  Unknown    Dizziness [R42]  Unknown    Alcohol use [Z78.9]  Unknown      Resolved Hospital Problems    Diagnosis Date Resolved POA    Alcoholism [F10.20] 12/28/2023 Yes       Acute alcoholic pancreatitis    2. Alcoholic ketoacidosis    3. Hypokalemia    4. Alcohol abuse    5. Sinus tachycardia-sec to some anxiety and early alcohol withdrawal.    PLAN:  -Continue IVF  -Add metoprolol 25 mg BID.  Monitor HR  -Slowly advance diet if able  -Monitor lipase, CMP  -Needs to STOP drinking completely  -Here few more days  -GI signed off.      DVT prophylaxis:  Mechanical DVT prophylaxis orders are present.     Code status is   Code Status and Medical Interventions:   Ordered at: 12/28/23 1257     Level Of Support Discussed With:    Patient     Code Status (Patient has no pulse and is not breathing):    CPR (Attempt to Resuscitate)     Medical Interventions (Patient has pulse or is breathing):    Full Support       Plan for disposition:Home in 2 days    Time: 30 minutes    Signature: Electronically signed by Jhony Gustafson MD, 12/29/23, 18:11 EST.  [facility] Hospitalist Team

## 2023-12-29 NOTE — PLAN OF CARE
Goal Outcome Evaluation:  Plan of Care Reviewed With: patient        Progress: improving  Outcome Evaluation: VSS, vomiting has improved over the shift, remains on IVF, up w/minimal assist to BSC. Pain improved, nausea improved. Alcohol withdrawal monitoring. Remains NPO.

## 2023-12-29 NOTE — PLAN OF CARE
Problem: Adult Inpatient Plan of Care  Goal: Plan of Care Review  Outcome: Ongoing, Progressing  Goal: Patient-Specific Goal (Individualized)  Outcome: Ongoing, Progressing  Goal: Absence of Hospital-Acquired Illness or Injury  Outcome: Ongoing, Progressing  Intervention: Identify and Manage Fall Risk  Recent Flowsheet Documentation  Taken 12/29/2023 1147 by Mary Bardales RN  Safety Promotion/Fall Prevention:   safety round/check completed   room organization consistent   nonskid shoes/slippers when out of bed  Taken 12/29/2023 1102 by Mary Bardales RN  Safety Promotion/Fall Prevention:   safety round/check completed   room organization consistent   nonskid shoes/slippers when out of bed  Taken 12/29/2023 0900 by Mary Bardales RN  Safety Promotion/Fall Prevention:   room organization consistent   safety round/check completed   nonskid shoes/slippers when out of bed  Taken 12/29/2023 0808 by Mary Bardales RN  Safety Promotion/Fall Prevention:   safety round/check completed   room organization consistent   nonskid shoes/slippers when out of bed  Intervention: Prevent Skin Injury  Recent Flowsheet Documentation  Taken 12/29/2023 1147 by Mary Bardales RN  Body Position: position changed independently  Taken 12/29/2023 1102 by Mary Bardales RN  Body Position: position changed independently  Taken 12/29/2023 0900 by Mary Bardales RN  Body Position: position changed independently  Taken 12/29/2023 0808 by Mary Bardales RN  Body Position: position changed independently  Intervention: Prevent and Manage VTE (Venous Thromboembolism) Risk  Recent Flowsheet Documentation  Taken 12/29/2023 1147 by Mary Bardales RN  Activity Management: up ad jenifer  Taken 12/29/2023 1102 by Mary Bardales RN  Activity Management: up ad jenifer  Taken 12/29/2023 0900 by Mary Bardales RN  Activity Management: up ad jenifer  Taken 12/29/2023 0808 by Mary Bardales RN  Activity Management:  up ad jenifer  Goal: Optimal Comfort and Wellbeing  Outcome: Ongoing, Progressing  Intervention: Provide Person-Centered Care  Recent Flowsheet Documentation  Taken 12/29/2023 0808 by Mary Bardales, RN  Trust Relationship/Rapport:   choices provided   care explained   empathic listening provided   questions answered   questions encouraged   reassurance provided   thoughts/feelings acknowledged  Goal: Readiness for Transition of Care  Outcome: Ongoing, Progressing     Problem: Fall Injury Risk  Goal: Absence of Fall and Fall-Related Injury  Outcome: Ongoing, Progressing  Intervention: Identify and Manage Contributors  Recent Flowsheet Documentation  Taken 12/29/2023 0808 by Mary Bardales, RN  Medication Review/Management: medications reviewed  Intervention: Promote Injury-Free Environment  Recent Flowsheet Documentation  Taken 12/29/2023 1147 by Mary Bardales, RN  Safety Promotion/Fall Prevention:   safety round/check completed   room organization consistent   nonskid shoes/slippers when out of bed  Taken 12/29/2023 1102 by Mary Bardales RN  Safety Promotion/Fall Prevention:   safety round/check completed   room organization consistent   nonskid shoes/slippers when out of bed  Taken 12/29/2023 0900 by Mary Bardales RN  Safety Promotion/Fall Prevention:   room organization consistent   safety round/check completed   nonskid shoes/slippers when out of bed  Taken 12/29/2023 0808 by Mary Bardales, RN  Safety Promotion/Fall Prevention:   safety round/check completed   room organization consistent   nonskid shoes/slippers when out of bed     Problem: Alcohol Withdrawal  Goal: Alcohol Withdrawal Symptom Control  Outcome: Ongoing, Progressing     Problem: Acute Neurologic Deterioration (Alcohol Withdrawal)  Goal: Optimal Neurologic Function  Outcome: Ongoing, Progressing     Problem: Substance Misuse (Alcohol Withdrawal)  Goal: Readiness for Change Identified  Outcome: Ongoing, Progressing   Goal  Outcome Evaluation:

## 2023-12-29 NOTE — PAYOR COMM NOTE
"Shirin Metz (33 y.o. Female)  1990  Policy no. [id number]   Requesting IP Auth for ER Medical Admission    AUTHORIZATION PENDING  PLEASE FORWARD DETERMINATION TO FOLLOWING CONTACT:    JOSEFINA HUDDLESTON LPN UR  Utilization Review Nurse  [facility]  Direct & confidential phone # [phone]  Fax # [phone]        Date of Birth   1990    Social Security Number       Address   [address] [place] [address] [place] [address]    Home Phone   [phone]    MRN   [id number]       Restorationism   None    Marital Status   Single                            Admission Date   23    Admission Type   Emergency    Admitting Provider   Tera Silveira MD    Attending Provider   Jhony Gustafson MD    Department, Room/Bed   [facility] EMERGENCY DEPARTMENT, [facility]       Discharge Date       Discharge Disposition       Discharge Destination                                 Attending Provider: Jhony Gustafson MD    Allergies: Latex, Penicillins    Isolation: None   Infection: None   Code Status: CPR    Ht: 160 cm (63\")   Wt: 53.2 kg (117 lb 4.6 oz)    Admission Cmt: None   Principal Problem: Alcoholic ketoacidosis [E87.29]                   Active Insurance as of 2023       Primary Coverage       Payor Plan Insurance Group Employer/Plan Group    [facility] [employer] [facility] [employer] BLUE [facility] PPO [id number]       Payor Plan Address Payor Plan Phone Number Payor Plan Fax Number Effective Dates    [address] [phone]  2021 - None Entered    [place] [address]         Subscriber Name Subscriber Birth Date Member ID       SHIRIN METZ 1990 [id number]                     Emergency Contacts        (Rel.) Home Phone Work Phone Mobile Phone    Doris Landers (Mother) -- -- [phone]    Sujey Salgado (Son) [phone] -- --                 History & Physical        Danii Pineda, APRN at 23 1203       Attestation signed by Tera Silveira " MD Fermín at 23 4329    I have reviewed this documentation and agree.                      [facility] Medicine Services  History & Physical    Patient Name: Shirin Canales  : 1990  MRN: [id number]  Primary Care Physician:  Provider, No Known  Date of admission: 2023  Date and Time of Service: 2023 at 1300    Subjective      Chief Complaint: nausea and vomiting     History of Present Illness: Shirin Canales is a 33 y.o. female with a PMH of alcoholic ketoacidosis who presented to [facility] on 2023  due to nausea and vomiting.  Patient reports on Tuesday she started to vomit and was unable to stop.  She explains she also began to have lower left-sided back pain that was sharp.  She explains the vomiting has happened before or after any holiday as she drinks with her family.  She reports on  she drinks a couple shots of liquor and a couple beers and did not utilize.  She denies any hematemesis, diarrhea, fever, or chills.  She explains her left-sided back pain is currently a 10 out of 10 radiating into her left leg.  She denies any aggravating factors.  She reports pain medication helps.  She denies that she drinks daily, but just around holidays.  She did also reports she gets some dizziness with standing.    In the ED, all labs Are unremarkable except pH 7.14, pO2 29.0, white blood cells 14.4, urinalysis showed moderate blood, greater than 300 protein, trace bacteria, 3-6 squamous epithelial.  CT abdomen pelvis showed Findings consistent with acute pancreatitis. Uniformly increased density throughout the gallbladder suggesting multiple noncalcified stones or sludge.   Severe hepatic steatosis.Nonspecific circumferential thickening of the distal esophagus. Nonobstructing bilateral intrarenal calculi.  All vital signs are stable except blood pressure 146/105 and heart rate 138.  Patient received Toradol, lactated ringer, Zofran in the ED.  Hospitalist were contacted  to admit patient for further care management.      Review of Systems   Constitutional: Negative.    HENT: Negative.     Respiratory: Negative.     Cardiovascular: Negative.    Gastrointestinal:  Positive for nausea and vomiting.   Genitourinary: Negative.    Musculoskeletal:  Positive for back pain.        Left leg pain   Skin: Negative.    Neurological:  Positive for dizziness.   Psychiatric/Behavioral: Negative.         Personal History     History reviewed. No pertinent past medical history.      No past surgical history on file.    Family History: family history is not on file. Otherwise pertinent FHx was reviewed and not pertinent to current issue.    Social History:  reports that she quit smoking about 8 years ago. Her smoking use included cigarettes. She has never used smokeless tobacco. She reports current alcohol use of about 3.0 standard drinks of alcohol per week. She reports that she does not currently use drugs.    Home Medications:  Prior to Admission Medications       Prescriptions Last Dose Informant Patient Reported? Taking?    hydrOXYzine (ATARAX) 25 MG tablet   Yes No    Take 1-2 tablets by mouth Daily As Needed.              Allergies:  Allergies   Allergen Reactions    Latex Unknown - Low Severity    Penicillins Unknown - High Severity       Objective      Vitals:   Temp:  [97 °F (36.1 °C)] 97 °F (36.1 °C)  Heart Rate:  [106-138] 117  Resp:  [15-17] 16  BP: (138-154)/(102-112) 141/105  Body mass index is 20.78 kg/m².  Physical Exam  Vitals reviewed.   Constitutional:       Appearance: She is normal weight. She is ill-appearing.   HENT:      Head: Normocephalic and atraumatic.      Nose: Nose normal.      Mouth/Throat:      Mouth: Mucous membranes are moist.      Pharynx: Oropharynx is clear.   Eyes:      Extraocular Movements: Extraocular movements intact.      Conjunctiva/sclera: Conjunctivae normal.      Pupils: Pupils are equal, round, and reactive to light.   Cardiovascular:      Rate and  Rhythm: Normal rate and regular rhythm.      Pulses: Normal pulses.      Heart sounds: Normal heart sounds.      Comments: S1, S2 audible  Pulmonary:      Effort: Pulmonary effort is normal.      Breath sounds: Normal breath sounds.      Comments: On room air   Abdominal:      General: Abdomen is flat. Bowel sounds are normal.      Palpations: Abdomen is soft.      Comments: Abdominal soreness, generalized   Musculoskeletal:         General: Normal range of motion.      Cervical back: Normal range of motion.   Skin:     General: Skin is warm and dry.   Neurological:      General: No focal deficit present.      Mental Status: She is alert and oriented to person, place, and time. Mental status is at baseline.   Psychiatric:         Mood and Affect: Mood normal.         Behavior: Behavior normal.         Thought Content: Thought content normal.         Judgment: Judgment normal.         Diagnostic Data:  Lab Results (last 24 hours)       Procedure Component Value Units Date/Time    Blood Gas, Venous - [[id number]]  (Abnormal) Collected: 12/28/23 1131    Specimen: Venous Blood Updated: 12/28/23 1135     pH, Venous 7.146 pH Units      pCO2, Venous 20.3 mm Hg      pO2, Venous 29.0 mm Hg      HCO3, Venous 7.0 mmol/L      Base Excess, Venous -20.0 mmol/L      Comment: Serial Number: [id number]:  [id number]        O2 Saturation, Venous 40.4 %      CO2 Content 7.7 mmol/L      Barometric Pressure for Blood Gas --     Comment: N/A        Modality Room Air     FIO2 21 %     Urinalysis With Microscopic If Indicated (No Culture) - Urine, Clean Catch [[id number]]  (Abnormal) Collected: 12/28/23 1043    Specimen: Urine, Clean Catch Updated: 12/28/23 1053     Color, UA Dark Yellow     Appearance, UA Clear     pH, UA 6.0     Specific Gravity, UA 1.014     Glucose, UA Negative     Ketones, UA >=160 mg/dL (4+)     Bilirubin, UA Negative     Blood, UA Moderate (2+)     Protein, UA >=300 mg/dL (3+)     Leuk Esterase, UA Negative     Nitrite,  UA Negative     Urobilinogen, UA 1.0 E.U./dL    Urinalysis, Microscopic Only - Urine, Clean Catch [[id number]]  (Abnormal) Collected: 12/28/23 1043    Specimen: Urine, Clean Catch Updated: 12/28/23 1053     RBC, UA 3-5 /HPF      WBC, UA 0-2 /HPF      Bacteria, UA Trace /HPF      Squamous Epithelial Cells, UA 3-6 /HPF      Hyaline Casts, UA 0-2 /LPF      Methodology Automated Microscopy    Acetone [[id number]]  (Abnormal) Collected: 12/28/23 0845    Specimen: Blood Updated: 12/28/23 0938     Acetone Small    Comprehensive Metabolic Panel [[id number]]  (Abnormal) Collected: 12/28/23 0845    Specimen: Blood Updated: 12/28/23 0931     Glucose 177 mg/dL      BUN 8 mg/dL      Creatinine 0.99 mg/dL      Sodium 138 mmol/L      Potassium 4.2 mmol/L      Chloride 96 mmol/L      CO2 6.0 mmol/L      Calcium 10.0 mg/dL      Total Protein 9.3 g/dL      Albumin 6.0 g/dL      ALT (SGPT) 84 U/L      AST (SGOT) 181 U/L      Alkaline Phosphatase 107 U/L      Total Bilirubin 2.9 mg/dL      Globulin 3.3 gm/dL      A/G Ratio 1.8 g/dL      BUN/Creatinine Ratio 8.1     Anion Gap 36.0 mmol/L      eGFR 77.9 mL/min/1.73     Narrative:      GFR Normal >60  Chronic Kidney Disease <60  Kidney Failure <15      Lipase [[id number]]  (Abnormal) Collected: 12/28/23 0845    Specimen: Blood Updated: 12/28/23 0931     Lipase 266 U/L     Ethanol [[id number]] Collected: 12/28/23 0845    Specimen: Blood Updated: 12/28/23 0931     Ethanol % <0.010 %     Narrative:      Plasma Ethanol Clinical Symptoms:    ETOH (%)               Clinical Symptom  .01-.05              No apparent influence  .03-.12              Euphoria, Diminished judgment and attention   .09-.25              Impaired comprehension, Muscle incoordination  .18-.30              Confusion, Staggered gait, Slurred speech  .25-.40              Markedly decreased response to stimuli, unable to stand or                        walk, vomitting, sleep or stupor  .35-.50              Comatose, Anesthesia,  Subnormal body temperature        hCG, Serum, Qualitative [[id number]]  (Normal) Collected: 12/28/23 0845    Specimen: Blood Updated: 12/28/23 0918     HCG Qualitative Negative    Blood Gas, Venous - [[id number]]  (Abnormal) Collected: 12/28/23 0840    Specimen: Venous Blood Updated: 12/28/23 0858     Site Left Brachial     pH, Venous 7.017 pH Units      pCO2, Venous 23.4 mm Hg      pO2, Venous 35.2 mm Hg      HCO3, Venous 6.0 mmol/L      Base Excess, Venous -23.7 mmol/L      Comment: Serial Number: [id number]:  [id number]        O2 Saturation, Venous 43.7 %      CO2 Content 6.7 mmol/L      Barometric Pressure for Blood Gas --     Comment: N/A        Modality Room Air     FIO2 21 %     CBC & Differential [[id number]]  (Abnormal) Collected: 12/28/23 0845    Specimen: Blood Updated: 12/28/23 0855    Narrative:      The following orders were created for panel order CBC & Differential.  Procedure                               Abnormality         Status                     ---------                               -----------         ------                     CBC Auto Differential[[id number]]        Abnormal            Final result               Scan Slide[[id number]]                                                                    Please view results for these tests on the individual orders.    CBC Auto Differential [[id number]]  (Abnormal) Collected: 12/28/23 0845    Specimen: Blood Updated: 12/28/23 0855     WBC 14.40 10*3/mm3      RBC 3.97 10*6/mm3      Hemoglobin 13.7 g/dL      Hematocrit 42.3 %      .5 fL      MCH 34.4 pg      MCHC 32.3 g/dL      RDW 13.4 %      RDW-SD 53.4 fl      MPV 7.5 fL      Platelets 250 10*3/mm3      Neutrophil % 89.3 %      Lymphocyte % 4.0 %      Monocyte % 6.5 %      Eosinophil % 0.0 %      Basophil % 0.2 %      Neutrophils, Absolute 12.80 10*3/mm3      Lymphocytes, Absolute 0.60 10*3/mm3      Monocytes, Absolute 0.90 10*3/mm3      Eosinophils, Absolute 0.00 10*3/mm3      Basophils,  Absolute 0.00 10*3/mm3      nRBC 0.1 /100 WBC              Imaging Results (Last 24 Hours)       Procedure Component Value Units Date/Time    CT Abdomen Pelvis Without Contrast [[id number]] Collected: 12/28/23 1026     Updated: 12/28/23 1035    Narrative:      CT ABDOMEN PELVIS WO CONTRAST    Date of Exam: 12/28/2023 10:15 AM EST    Indication: vomiting, elevated lipase.    Comparison: 4/23/2023     Technique: Axial CT images were obtained of the abdomen and pelvis without the administration of contrast. Sagittal and coronal reconstructions were performed.  Automated exposure control and iterative reconstruction methods were used.      Findings:  Visualized lung bases are clear. There is circumferential thickening of the distal esophagus which is nonspecific. This may be due to gastroesophageal reflux disease. Neoplasm not excluded. Unenhanced evaluation of the spleen, adrenals is grossly   unremarkable. The pancreatic head and neck appear edematous. There is surrounding inflammatory fat stranding. No focal associated, drainable fluid collections are identified. Findings suggest acute pancreatitis. There is diffusely increased density   throughout the gallbladder suggesting multiple fine noncalcified stones or sludge. Severe, diffuse fatty infiltration of the liver is again noted. No focal hepatic mass lesion is identified. Nonobstructing bilateral 2-3 mm intrarenal calculi are   identified. Urinary bladder is distended, but otherwise grossly unremarkable. Unenhanced evaluation of the uterus adnexa is unremarkable. There is no evidence of small bowel obstruction, free air or free fluid. The appendix is normal. Colon is mostly   decompressed. No pericolonic inflammatory change identified. No aggressive osseous lesions are identified. No gross adenopathy is identified in the abdomen or pelvis.        Impression:      Impression:    1. Findings consistent with acute pancreatitis, as discussed above.  2. Uniformly  increased density throughout the gallbladder suggesting multiple noncalcified stones or sludge.  3. Severe hepatic steatosis.  4. Nonspecific circumferential thickening of the distal esophagus as above.  5. Nonobstructing bilateral intrarenal calculi.            Electronically Signed: Derick Tillman MD    12/28/2023 10:33 AM EST    Workstation ID: [id number]              Assessment & Plan      Alcoholic ketoacidosis  - ABG reviewed  - UA reviewed   - Alcohol withdraw protocol ordered  - IV ordered  - Monitor ABG- Recheck, Check hbg A1C     Acute pancreatitis  - Secondary to alcohol use  - Encourage alcohol cessation   - CT abd/pelvis reviewed  - WBC 14.4, monitor   - Received toradol, IVF and zofran ordered   - IVF, zofran, and pain medication ordered  - GI consulted     Hepatic steatosis  - Monitor LFT's     Nonobstructing bilateral intrarenal calculi   - Monitor kidney function labs   - UA reviewed     Acute dizziness  - Likely secondary to dehydration   - Fall risk precautions    Uncontrolled Blood pressure  - /103 upon admission  - Monitor blood pressure  - IV hydralazine ordered PRN             DVT prophylaxis: SCD's     The patient desires to be as follows:    CODE STATUS:  Full   Level Of Support Discussed With: Patient  Code Status (Patient has no pulse and is not breathing): CPR (Attempt to Resuscitate)  Medical Interventions (Patient has pulse or is breathing): Full Support        It is expected that patient's hospitalization will require greater than 2 midnights, therefore patient will be admitted as an inpatient.          PDMP and Medication Dispenses via Sidebar reviewed and consistent with patient reported medications.    I discussed the patient's findings and my recommendations with patient.      Signature:     This document has been electronically signed by BEVERLY Jennings on December 28, 2023 13:05 [facility] Hospitalist Team    Electronically signed by Tera Silveira MD at  23 1556          Emergency Department Notes        Samina Pulido at 23 0950          [employer] transport requested to CT rm 3    Electronically signed by Samina Pulido at 23 0974       Arvin Greer MD at 23 0811          Subjective   History of Present Illness  33-year-old female with history of alcoholic ketoacidosis presents for 2 days of nausea and vomiting.  Diffuse abdominal pain.  No bowel movements for couple days.  No dysuria.  No fevers.  States has been unable to sleep because she has been vomiting so much.  States that she did drink quite a bit Brenna night.      Review of Systems  See HPI  History reviewed. No pertinent past medical history.    Allergies   Allergen Reactions    Latex Unknown - Low Severity    Penicillins Unknown - High Severity       No past surgical history on file.    History reviewed. No pertinent family history.    Social History     Socioeconomic History    Marital status: Single   Tobacco Use    Smoking status: Former     Types: Cigarettes     Quit date:      Years since quittin.9    Smokeless tobacco: Never   Vaping Use    Vaping Use: Never used   Substance and Sexual Activity    Alcohol use: Yes     Alcohol/week: 3.0 standard drinks of alcohol     Types: 3 Drinks containing 0.5 oz of alcohol per week     Comment: OCCASIONALLY    Drug use: Not Currently    Sexual activity: Defer           Objective   Physical Exam  Constitutional: Uncomfortable appearing  Head:  Atraumatic.  Normocephalic.   Eyes:  No scleral icterus. Normal conjunctivae  ENT: Dry oral mucosa.  No nasal discharge present.  Cardiovascular:  Well perfused.  Equal pulses.  Regular rhythm and tachycardic rate.  Normal capillary refill.    Pulmonary/Chest:  No respiratory distress.  Airway patent.  No tachypnea.  No accessory muscle usage.    Abdominal:  Nondistended.  Mild diffuse tenderness to palpation.  No rebound or guarding  Extremities:  No peripheral edema.  No  "Deformity  Skin:  Warm, dry  Neurological:  Alert, awake, and appropriate.  Normal speech.      Procedures          ED Course      BP (!) 141/105   Pulse 117   Temp 97 °F (36.1 °C) (Temporal)   Resp 16   Ht 160 cm (63\")   Wt 53.2 kg (117 lb 4.6 oz)   SpO2 100%   BMI 20.78 kg/m²   Labs Reviewed   COMPREHENSIVE METABOLIC PANEL - Abnormal; Notable for the following components:       Result Value    Glucose 177 (*)     Chloride 96 (*)     CO2 6.0 (*)     Total Protein 9.3 (*)     Albumin 6.0 (*)     ALT (SGPT) 84 (*)     AST (SGOT) 181 (*)     Total Bilirubin 2.9 (*)     Anion Gap 36.0 (*)     All other components within normal limits    Narrative:     GFR Normal >60  Chronic Kidney Disease <60  Kidney Failure <15     LIPASE - Abnormal; Notable for the following components:    Lipase 266 (*)     All other components within normal limits   URINALYSIS W/ MICROSCOPIC IF INDICATED (NO CULTURE) - Abnormal; Notable for the following components:    Color, UA Dark Yellow (*)     Ketones, UA >=160 mg/dL (4+) (*)     Blood, UA Moderate (2+) (*)     Protein, UA >=300 mg/dL (3+) (*)     All other components within normal limits   CBC WITH AUTO DIFFERENTIAL - Abnormal; Notable for the following components:    WBC 14.40 (*)     .5 (*)     MCH 34.4 (*)     Neutrophil % 89.3 (*)     Lymphocyte % 4.0 (*)     Eosinophil % 0.0 (*)     Neutrophils, Absolute 12.80 (*)     Lymphocytes, Absolute 0.60 (*)     All other components within normal limits   BLOOD GAS, VENOUS - Abnormal; Notable for the following components:    pH, Venous 7.017 (*)     pCO2, Venous 23.4 (*)     pO2, Venous 35.2 (*)     HCO3, Venous 6.0 (*)     Base Excess, Venous -23.7 (*)     O2 Saturation, Venous 43.7 (*)     CO2 Content 6.7 (*)     All other components within normal limits   ACETONE - Abnormal; Notable for the following components:    Acetone Small (*)     All other components within normal limits   URINALYSIS, MICROSCOPIC ONLY - Abnormal; Notable " for the following components:    RBC, UA 3-5 (*)     Bacteria, UA Trace (*)     Squamous Epithelial Cells, UA 3-6 (*)     All other components within normal limits   BLOOD GAS, VENOUS - Abnormal; Notable for the following components:    pH, Venous 7.146 (*)     pCO2, Venous 20.3 (*)     pO2, Venous 29.0 (*)     HCO3, Venous 7.0 (*)     Base Excess, Venous -20.0 (*)     O2 Saturation, Venous 40.4 (*)     CO2 Content 7.7 (*)     All other components within normal limits   HEMOGLOBIN A1C - Abnormal; Notable for the following components:    Hemoglobin A1C 4.30 (*)     All other components within normal limits   HCG, SERUM, QUALITATIVE - Normal   BLOOD GAS, VENOUS   ETHANOL    Narrative:     Plasma Ethanol Clinical Symptoms:    ETOH (%)               Clinical Symptom  .01-.05              No apparent influence  .03-.12              Euphoria, Diminished judgment and attention   .09-.25              Impaired comprehension, Muscle incoordination  .18-.30              Confusion, Staggered gait, Slurred speech  .25-.40              Markedly decreased response to stimuli, unable to stand or                        walk, vomitting, sleep or stupor  .35-.50              Comatose, Anesthesia, Subnormal body temperature       BLOOD GAS, ARTERIAL   BLOOD GAS, ARTERIAL   COMPREHENSIVE METABOLIC PANEL   IGG 4   HERMINIA   LIPASE   BLOOD GAS, VENOUS   CBC AND DIFFERENTIAL    Narrative:     The following orders were created for panel order CBC & Differential.  Procedure                               Abnormality         Status                     ---------                               -----------         ------                     CBC Auto Differential[[id number]]        Abnormal            Final result               Scan Slide[[id number]]                                                                    Please view results for these tests on the individual orders.     Medications   sodium chloride 0.9 % flush 10 mL (has no administration in  time range)   sodium chloride 0.9 % flush 10 mL (has no administration in time range)   sodium chloride 0.9 % flush 10 mL (has no administration in time range)   sodium chloride 0.9 % infusion 40 mL (has no administration in time range)   acetaminophen (TYLENOL) tablet 650 mg (has no administration in time range)     Or   acetaminophen (TYLENOL) 160 MG/5ML oral solution 650 mg (has no administration in time range)     Or   acetaminophen (TYLENOL) suppository 650 mg (has no administration in time range)   aluminum-magnesium hydroxide-simethicone (MAALOX MAX) 400-400-40 MG/5ML suspension 15 mL (has no administration in time range)   sennosides-docusate (PERICOLACE) 8.6-50 MG per tablet 2 tablet (has no administration in time range)     And   polyethylene glycol (MIRALAX) packet 17 g (has no administration in time range)     And   bisacodyl (DULCOLAX) EC tablet 5 mg (has no administration in time range)     And   bisacodyl (DULCOLAX) suppository 10 mg (has no administration in time range)   ondansetron (ZOFRAN) tablet 4 mg ( Oral Not Given:  See Alt 12/28/23 1605)     Or   ondansetron (ZOFRAN) injection 4 mg (4 mg Intravenous Given 12/28/23 1605)   melatonin tablet 5 mg (has no administration in time range)   lactated ringers infusion (200 mL/hr Intravenous New Bag 12/28/23 1604)   morphine injection 2 mg (2 mg Intravenous Given 12/28/23 1605)   Magnesium Standard Dose Replacement - Follow Nurse / BPA Driven Protocol (has no administration in time range)   thiamine (B-1) injection 200 mg (200 mg Intravenous Given 12/28/23 1402)     Followed by   thiamine (VITAMIN B-1) tablet 100 mg (has no administration in time range)   folic acid (FOLVITE) tablet 1 mg (0 mg Oral Hold 12/28/23 1357)   hydrALAZINE (APRESOLINE) injection 10 mg (10 mg Intravenous Given 12/28/23 1605)   pantoprazole (PROTONIX) injection 40 mg (has no administration in time range)   polyethylene glycol (MIRALAX) packet 17 g (17 g Oral Not Given 12/28/23  1615)   lactated ringers bolus 2,000 mL (0 mL Intravenous Stopped 12/28/23 1043)   ondansetron (ZOFRAN) injection 4 mg (4 mg Intravenous Given 12/28/23 0843)   ketorolac (TORADOL) injection 15 mg (15 mg Intravenous Given 12/28/23 0950)   lactated ringers bolus 1,000 mL (0 mL Intravenous Stopped 12/28/23 1402)     CT Abdomen Pelvis Without Contrast    Result Date: 12/28/2023  Impression: 1. Findings consistent with acute pancreatitis, as discussed above. 2. Uniformly increased density throughout the gallbladder suggesting multiple noncalcified stones or sludge. 3. Severe hepatic steatosis. 4. Nonspecific circumferential thickening of the distal esophagus as above. 5. Nonobstructing bilateral intrarenal calculi. Electronically Signed: Derick Tillman MD  12/28/2023 10:33 AM EST  Workstation ID: [id number]                                          Medical Decision Making  Problems Addressed:  Alcoholic ketoacidosis: complicated acute illness or injury  Alcohol-induced acute pancreatitis, unspecified complication status: complicated acute illness or injury    Amount and/or Complexity of Data Reviewed  Labs: ordered.  Radiology: ordered.    Risk  Prescription drug management.  Decision regarding hospitalization.    My interpretation of CT abdomen pelvis concerning for peripancreatic inflammatory changes.  See above radiology interpretation.    Patient with elevated lipase and CT concerning with pancreatitis.  Patient quite acidotic.  Given 2 L of LR and recheck gas and pH did improve.  Consistent with patient's known history of alcoholic ketoacidosis.  Will admit to hospitalist service.    Final diagnoses:   Alcoholic ketoacidosis   Alcohol-induced acute pancreatitis, unspecified complication status       ED Disposition  ED Disposition       ED Disposition   Decision to Admit    Condition   --    Comment   Level of Care: Telemetry [5]   Diagnosis: Alcoholic ketoacidosis [689445]   Admitting Physician: MADONNA MOELLER  [[id number]]   Attending Physician: MADONNA MOELLER [[id number]]   Certification: I Certify That Inpatient Hospital Services Are Medically Necessary For Greater Than 2 Midnights                 No follow-up provider specified.       Medication List      No changes were made to your prescriptions during this visit.            Arvin Greer MD  12/28/23 1622      Electronically signed by Arvin Greer MD at 12/28/23 1622          Physician Progress Notes (last 48 hours)        Gianna Thomas APRN at 12/29/23 1329           LOS: 1 day   Patient Care Team:  Provider, No Known as PCP - General      Subjective     Interval History:     Subjective: Feeling slightly improved today.  She did have some nausea overnight, but Zofran helps.  Her pain is also improved, but is still present.  Has not yet had a bowel movement.      ROS:   No chest pain, shortness of breath, or cough.        Medication Review:     Current Facility-Administered Medications:     acetaminophen (TYLENOL) tablet 650 mg, 650 mg, Oral, Q4H PRN **OR** acetaminophen (TYLENOL) 160 MG/5ML oral solution 650 mg, 650 mg, Oral, Q4H PRN **OR** acetaminophen (TYLENOL) suppository 650 mg, 650 mg, Rectal, Q4H PRN, Danii Pineda, APRN    aluminum-magnesium hydroxide-simethicone (MAALOX MAX) 400-400-40 MG/5ML suspension 15 mL, 15 mL, Oral, Q6H PRN, Chandni Pinedasea Z, APRN    sennosides-docusate (PERICOLACE) 8.6-50 MG per tablet 2 tablet, 2 tablet, Oral, BID, 2 tablet at 12/29/23 0841 **AND** polyethylene glycol (MIRALAX) packet 17 g, 17 g, Oral, Daily PRN **AND** bisacodyl (DULCOLAX) EC tablet 5 mg, 5 mg, Oral, Daily PRN **AND** bisacodyl (DULCOLAX) suppository 10 mg, 10 mg, Rectal, Daily PRN, Danii Pineda, APRN    folic acid (FOLVITE) tablet 1 mg, 1 mg, Oral, Daily, Danii Pineda, APRN, 1 mg at 12/29/23 0835    hydrALAZINE (APRESOLINE) injection 10 mg, 10 mg, Intravenous, Q6H PRN, Danii Pineda, APRN, 10 mg at 12/28/23 160    ketorolac  (TORADOL) injection 15 mg, 15 mg, Intravenous, Q6H PRN, Tera Silveira MD, 15 mg at 12/29/23 0835    lactated ringers infusion, 200 mL/hr, Intravenous, Continuous, Britt Pinedaa SHRADDHA, APRN, Last Rate: 200 mL/hr at 12/29/23 1116, 200 mL/hr at 12/29/23 1116    Magnesium Standard Dose Replacement - Follow Nurse / BPA Driven Protocol, , Does not apply, PRN, Chandni Pinedasea Z, APRN    melatonin tablet 5 mg, 5 mg, Oral, Nightly PRN, Chandni Pinedasea Z, APRN    metoprolol tartrate (LOPRESSOR) tablet 25 mg, 25 mg, Oral, Q12H, Jhony Gustafson MD, 25 mg at 12/29/23 1054    morphine injection 2 mg, 2 mg, Intravenous, Q4H PRN, Chandni Pinedasea SHRADDHA, APRN, 2 mg at 12/29/23 1152    ondansetron (ZOFRAN) tablet 4 mg, 4 mg, Oral, Q6H PRN **OR** ondansetron (ZOFRAN) injection 4 mg, 4 mg, Intravenous, Q6H PRN, Danii Pineda, APRN, 4 mg at 12/29/23 1152    pantoprazole (PROTONIX) injection 40 mg, 40 mg, Intravenous, Q AM, Gianna Thomas, APRN, 40 mg at 12/29/23 0548    polyethylene glycol (MIRALAX) packet 17 g, 17 g, Oral, Daily, IlGianna de la torre, APRN    [COMPLETED] Insert Peripheral IV, , , Once **AND** sodium chloride 0.9 % flush 10 mL, 10 mL, Intravenous, PRN, Arvin Greer MD    sodium chloride 0.9 % flush 10 mL, 10 mL, Intravenous, Q12H, Chandni Pinedasea Z, APRN    sodium chloride 0.9 % flush 10 mL, 10 mL, Intravenous, PRN, Edwin Danii Z, APRN    sodium chloride 0.9 % infusion 40 mL, 40 mL, Intravenous, PRN, G[place] Danii Z, APRN    thiamine (B-1) injection 200 mg, 200 mg, Intravenous, Q8H, 200 mg at 12/29/23 0548 **FOLLOWED BY** [START ON 1/2/2024] thiamine (VITAMIN B-1) tablet 100 mg, 100 mg, Oral, Daily, Danii Pineda, BEVERLY    Current Outpatient Medications:     hydrOXYzine (ATARAX) 25 MG tablet, Take 1-2 tablets by mouth Daily As Needed., Disp: , Rfl:     mirtazapine (REMERON) 45 MG tablet, Take 1 tablet by mouth Every Night., Disp: , Rfl:     sertraline (ZOLOFT) 25 MG tablet, Take 1 tablet by  "mouth Daily., Disp: , Rfl:       Objective     Vital Signs  Vitals:    12/29/23 0100 12/29/23 0808 12/29/23 1054 12/29/23 1146   BP: 128/83 118/89  125/84   BP Location: Left arm Right arm  Right arm   Patient Position: Lying Lying  Lying   Pulse: (!) 122 (!) 135 117 112   Resp: 20 15  13   Temp: 98.2 °F (36.8 °C) 98.3 °F (36.8 °C)  97.6 °F (36.4 °C)   TempSrc: Oral Oral  Oral   SpO2: 100% 100%  100%   Weight:       Height:           Physical Exam:     General Appearance:    Awake and alert, in no acute distress   Head:    Normocephalic, without obvious abnormality   Eyes:          Conjunctivae normal, anicteric sclera   Throat:   No oral lesions, no thrush, oral mucosa moist   Neck:   No adenopathy, supple, no JVD   Lungs:     respirations regular, even and unlabored   Abdomen:     Soft, non-tender, no rebound or guarding, non-distended, no hepatosplenomegaly   Rectal:     Deferred   Extremities:   No edema, no cyanosis   Skin:   No bruising or rash, no jaundice        Results Review:    CBC    Results from last 7 days   Lab Units 12/28/23  0845   WBC 10*3/mm3 14.40*   HEMOGLOBIN g/dL 13.7   PLATELETS 10*3/mm3 250     CMP   Results from last 7 days   Lab Units 12/28/23  1600 12/28/23  0845   SODIUM mmol/L 135* 138   POTASSIUM mmol/L 3.8 4.2   CHLORIDE mmol/L 102 96*   CO2 mmol/L 7.0* 6.0*   BUN mg/dL 6 8   CREATININE mg/dL 0.78 0.99   GLUCOSE mg/dL 128* 177*   ALBUMIN g/dL 4.9 6.0*   BILIRUBIN mg/dL 2.5* 2.9*   ALK PHOS U/L 80 107   AST (SGOT) U/L 114* 181*   ALT (SGPT) U/L 59* 84*   LIPASE U/L 538* 266*     Cr Clearance Estimated Creatinine Clearance: 87 mL/min (by C-G formula based on SCr of 0.78 mg/dL).  Coag     HbA1C   Lab Results   Component Value Date    HGBA1C 4.30 (L) 12/28/2023     Blood Glucose No results found for: \"POCGLU\"  Infection     UA    Results from last 7 days   Lab Units 12/28/23  1043   NITRITE UA  Negative   WBC UA /HPF 0-2   BACTERIA UA /HPF Trace*   SQUAM EPITHEL UA /HPF 3-6*     Imaging " Results (Last 72 Hours)       Procedure Component Value Units Date/Time    CT Abdomen Pelvis Without Contrast [[id number]] Collected: 12/28/23 1026     Updated: 12/28/23 1035    Narrative:      CT ABDOMEN PELVIS WO CONTRAST    Date of Exam: 12/28/2023 10:15 AM EST    Indication: vomiting, elevated lipase.    Comparison: 4/23/2023     Technique: Axial CT images were obtained of the abdomen and pelvis without the administration of contrast. Sagittal and coronal reconstructions were performed.  Automated exposure control and iterative reconstruction methods were used.      Findings:  Visualized lung bases are clear. There is circumferential thickening of the distal esophagus which is nonspecific. This may be due to gastroesophageal reflux disease. Neoplasm not excluded. Unenhanced evaluation of the spleen, adrenals is grossly   unremarkable. The pancreatic head and neck appear edematous. There is surrounding inflammatory fat stranding. No focal associated, drainable fluid collections are identified. Findings suggest acute pancreatitis. There is diffusely increased density   throughout the gallbladder suggesting multiple fine noncalcified stones or sludge. Severe, diffuse fatty infiltration of the liver is again noted. No focal hepatic mass lesion is identified. Nonobstructing bilateral 2-3 mm intrarenal calculi are   identified. Urinary bladder is distended, but otherwise grossly unremarkable. Unenhanced evaluation of the uterus adnexa is unremarkable. There is no evidence of small bowel obstruction, free air or free fluid. The appendix is normal. Colon is mostly   decompressed. No pericolonic inflammatory change identified. No aggressive osseous lesions are identified. No gross adenopathy is identified in the abdomen or pelvis.        Impression:      Impression:    1. Findings consistent with acute pancreatitis, as discussed above.  2. Uniformly increased density throughout the gallbladder suggesting multiple  noncalcified stones or sludge.  3. Severe hepatic steatosis.  4. Nonspecific circumferential thickening of the distal esophagus as above.  5. Nonobstructing bilateral intrarenal calculi.            Electronically Signed: Derick Tillman MD    12/28/2023 10:33 AM EST    Workstation ID: [id number]            Assessment & Plan      33-year-old female presents to the hospital with nausea, vomiting and abdominal pain after drinking heavily 2 days ago.     -Nausea, vomiting  -Epigastric pain  -Elevated lipase  -Elevated LFTs  -Leukocytosis  -acidosis  -Constipation  -Abnormal CT with pancreatitis and circumferential thickening in the distal esophagus  -History of alcohol abuse     Plan  Acute pancreatitis likely secondary to alcohol +/- gallstones.  Autoimmune workup is pending. CT does not show any biliary ductal dilatation.  Does show edematous head/neck of the pancreas with fat stranding as well as increased density throughout the gallbladder with stones vs sludge.  Continue to trend labs.    Labs from yesterday afternoon -  (181), ALT 59 (84), total bilirubin 2.5 (2.9), lipase 538 (266).  Labs today pending collection.  Consider MRCP pending progress/if LFTs worsen.   Supportive care for pancreatitis with IV fluids, analgesics, antiemetics as needed.  She is feeling some better today. Trial clear liquid diet. Possibly advance to low fat diet tomorrow.   Continue PPI.  Continue MiraLAX.  Add Amitiza.  Recommend alcohol cessation.  Monitor for alcohol withdrawal.  Continue supportive care.     I discussed the patients findings and my recommendations with the patient.     We appreciate the referral    Electronically signed by BEVERLY Estrada, 12/29/23, 1:30 PM EST.            Electronically signed by Gianna Thomas APRN at 12/29/23 1335          Consult Notes (last 48 hours)        Gianna Thomas APRN at 12/28/23 1428        Consult Orders    1. Inpatient Gastroenterology Consult [[id number]] ordered by  Danii Pineda, APRN at 12/28/23 6779              Attestation signed by LORETA العلي MD at 12/28/23 1526    Electronically signed on 12/28/23 15:25 EST by JALEEL العلي MD  The patient was seen and examined with an APRN. I personally performed the entire medical decision making, and physical exam. Labs and imaging and outpatient records reviewed, ordered.    33-year-old female with history of alcohol abuse presenting with nausea and vomiting for the past 2 days, unable to keep anything down.  Also with upper abdominal discomfort with radiation to the back.  Denies history of pancreatitis in the past.  She is a former smoker.  On exam she has mild tenderness, worst in the epigastric area.  Labs reviewed lipase elevated to 66, mild transaminitis AST predominant.  Bilirubin 2.9.  CT with stranding around the pancreas consistent with acute pancreatitis.  Suspect acute pancreatitis mostly due to alcohol versus less likely gallstones.  Checking autoimmune serologies.  Recommend IV fluids with lactated Ringer's.  No biliary dilatation on imaging.  If worsening bilirubin could consider MRCP to rule out choledocholithiasis.  Add PPI for possible peptic ulcer disease with alcohol abuse.  Recommend MiraLAX daily for constipation                  GI CONSULT  NOTE:    Referring Provider:  Dr Silveira    Chief complaint: nausea/vomiting    Subjective .     History of present illness: Shirin Canales is a 33 y.o. female with history of alcohol ketoacidosis presents to the hospital with nausea/vomiting that began 2 days ago.  Patient reports she has been unable to keep anything down.  This began after drinking alcohol on [place].  She also complains of epigastric pain that radiates to her back.  This pain is now some better.  Main complaints are continued nausea/vomiting.  Denies fever, chills.  She reports that she only drinks alcohol on special occasions such as birthdays, Brenna and other holidays.  She does  report a history of heavier alcohol use in her teenage years.  Denies a personal or family history of pancreas problems.  She is a former smoker.  No marijuana use or other drugs.  Adults in her family have diabetes.  She reports occasional constipation.  This was worse when she was pregnant 2 years ago.  She has used laxatives previously.      Endo History:  No history of EGD/colonoscopy.    Past Medical History:  History reviewed. No pertinent past medical history.    Past Surgical History:  No past surgical history on file.    Social History:  Social History     Tobacco Use    Smoking status: Former     Types: Cigarettes     Quit date:      Years since quittin.9    Smokeless tobacco: Never   Vaping Use    Vaping Use: Never used   Substance Use Topics    Alcohol use: Yes     Alcohol/week: 3.0 standard drinks of alcohol     Types: 3 Drinks containing 0.5 oz of alcohol per week     Comment: OCCASIONALLY    Drug use: Not Currently       Family History:  History reviewed. No pertinent family history.    Medications:  (Not in a hospital admission)      Scheduled Meds:folic acid, 1 mg, Oral, Daily  [START ON 2023] pantoprazole, 40 mg, Intravenous, Q AM  thiamine (B-1) IV, 200 mg, Intravenous, Q8H   Followed by  [START ON 2024] thiamine, 100 mg, Oral, Daily      Continuous Infusions:lactated ringers, 125 mL/hr      PRN Meds:.  hydrALAZINE    Magnesium Standard Dose Replacement - Follow Nurse / BPA Driven Protocol    [COMPLETED] Insert Peripheral IV **AND** sodium chloride    ALLERGIES:  Latex and Penicillins    ROS:  The following systems were reviewed and negative;   Constitution:  No fevers, chills, no unintentional weight loss  Skin: no rash, no jaundice  Eyes:  No blurry vision, no eye pain  HENT:  No change in hearing or smell  Resp:  No dyspnea or cough  CV:  No chest pain or palpitations  :  No dysuria, hematuria  Musculoskeletal:  No leg cramps or arthralgias  Neuro:  No tremor, no  "numbness  Psych:  No depression or confusion    Objective     Vital Signs:   Vitals:    12/28/23 1101 12/28/23 1159 12/28/23 1201 12/28/23 1301   BP: (!) 141/104 (!) 140/103 (!) 142/102 (!) 141/105   BP Location:       Patient Position:       Pulse: 112 116 118 117   Resp: 15 16  16   Temp:       TempSrc:       SpO2: 100% 100% 100% 100%   Weight:       Height:           Physical Exam:     General Appearance:    Eyes closed but awake, ill appearing, emesis bag in hand   Head:    Normocephalic, without obvious abnormality, atraumatic   Throat:   No oral lesions, no thrush, oral mucosa moist   Lungs:     Respirations regular, even and unlabored   Chest Wall:    No abnormalities observed   Abdomen:     Soft, non-tender, no rebound or guarding, non-distended, no hepatosplenomegaly   Rectal:     Deferred   Extremities:   Moves all extremities, no edema, no cyanosis   Pulses:   Pulses palpable and equal bilaterally   Skin:   No rash, no jaundice, normal palpation       Neurologic:   Cranial nerves 2 - 12 grossly intact, no asterixis       Results Review:   I reviewed the patient's labs and imaging.  CBC    Results from last 7 days   Lab Units 12/28/23  0845   WBC 10*3/mm3 14.40*   HEMOGLOBIN g/dL 13.7   PLATELETS 10*3/mm3 250     CMP   Results from last 7 days   Lab Units 12/28/23  0845   SODIUM mmol/L 138   POTASSIUM mmol/L 4.2   CHLORIDE mmol/L 96*   CO2 mmol/L 6.0*   BUN mg/dL 8   CREATININE mg/dL 0.99   GLUCOSE mg/dL 177*   ALBUMIN g/dL 6.0*   BILIRUBIN mg/dL 2.9*   ALK PHOS U/L 107   AST (SGOT) U/L 181*   ALT (SGPT) U/L 84*   LIPASE U/L 266*     Cr Clearance Estimated Creatinine Clearance: 68.5 mL/min (by C-G formula based on SCr of 0.99 mg/dL).  Coag     HbA1C   Lab Results   Component Value Date    HGBA1C 4.30 (L) 12/28/2023     Blood Glucose No results found for: \"POCGLU\"  Infection     UA    Results from last 7 days   Lab Units 12/28/23  1043   NITRITE UA  Negative   WBC UA /HPF 0-2   BACTERIA UA /HPF Trace* "   SQUJOSELO EPITHEL UA /HPF 3-6*     Imaging Results (Last 72 Hours)       Procedure Component Value Units Date/Time    CT Abdomen Pelvis Without Contrast [[id number]] Collected: 12/28/23 1026     Updated: 12/28/23 1035    Narrative:      CT ABDOMEN PELVIS WO CONTRAST    Date of Exam: 12/28/2023 10:15 AM EST    Indication: vomiting, elevated lipase.    Comparison: 4/23/2023     Technique: Axial CT images were obtained of the abdomen and pelvis without the administration of contrast. Sagittal and coronal reconstructions were performed.  Automated exposure control and iterative reconstruction methods were used.      Findings:  Visualized lung bases are clear. There is circumferential thickening of the distal esophagus which is nonspecific. This may be due to gastroesophageal reflux disease. Neoplasm not excluded. Unenhanced evaluation of the spleen, adrenals is grossly   unremarkable. The pancreatic head and neck appear edematous. There is surrounding inflammatory fat stranding. No focal associated, drainable fluid collections are identified. Findings suggest acute pancreatitis. There is diffusely increased density   throughout the gallbladder suggesting multiple fine noncalcified stones or sludge. Severe, diffuse fatty infiltration of the liver is again noted. No focal hepatic mass lesion is identified. Nonobstructing bilateral 2-3 mm intrarenal calculi are   identified. Urinary bladder is distended, but otherwise grossly unremarkable. Unenhanced evaluation of the uterus adnexa is unremarkable. There is no evidence of small bowel obstruction, free air or free fluid. The appendix is normal. Colon is mostly   decompressed. No pericolonic inflammatory change identified. No aggressive osseous lesions are identified. No gross adenopathy is identified in the abdomen or pelvis.        Impression:      Impression:    1. Findings consistent with acute pancreatitis, as discussed above.  2. Uniformly increased density throughout the  gallbladder suggesting multiple noncalcified stones or sludge.  3. Severe hepatic steatosis.  4. Nonspecific circumferential thickening of the distal esophagus as above.  5. Nonobstructing bilateral intrarenal calculi.            Electronically Signed: Derick Tillman MD    12/28/2023 10:33 AM EST    Workstation ID: [id number]            ASSESSMENT AND PLAN:    33-year-old female presents to the hospital with nausea, vomiting and abdominal pain after drinking heavily 2 days ago.    -Nausea, vomiting  -Epigastric pain  -Elevated lipase  -Elevated LFTs  -Leukocytosis  -acidosis  -Constipation  -Abnormal CT with pancreatitis and circumferential thickening in the distal esophagus  -History of alcohol abuse    Plan  Acute pancreatitis likely secondary to alcohol +/- gallstones.  Will  check autoimmune labs as well. CT does not show any biliary ductal dilatation.  Does show edematous head/neck of the pancreas with fat stranding as well as increased density throughout the gallbladder with stones vs sludge.  Continue to trend labs.  LFT elevation likely secondary to her alcohol use.  Consider MRCP pending progress.  Supportive care for pancreatitis with IV fluids, analgesics, antiemetics as needed.  Add PPI.  Miralax for constipation.   Recommend alcohol cessation.  Continue supportive care.    I discussed the patients findings and my recommendations with the patient.    We appreciate the referral    Electronically signed by BEVERLY Estrada, 12/28/23, 2:28 PM EST.             Electronically signed by LORETA العلي MD at 12/28/23 5603

## 2023-12-29 NOTE — PROGRESS NOTES
LOS: 1 day   Patient Care Team:  Provider, No Known as PCP - General      Subjective     Interval History:     Subjective: Feeling slightly improved today.  She did have some nausea overnight, but Zofran helps.  Her pain is also improved, but is still present.  Has not yet had a bowel movement.      ROS:   No chest pain, shortness of breath, or cough.        Medication Review:     Current Facility-Administered Medications:     acetaminophen (TYLENOL) tablet 650 mg, 650 mg, Oral, Q4H PRN **OR** acetaminophen (TYLENOL) 160 MG/5ML oral solution 650 mg, 650 mg, Oral, Q4H PRN **OR** acetaminophen (TYLENOL) suppository 650 mg, 650 mg, Rectal, Q4H PRN, Danii Pineda, APRN    aluminum-magnesium hydroxide-simethicone (MAALOX MAX) 400-400-40 MG/5ML suspension 15 mL, 15 mL, Oral, Q6H PRN, Chandni Pinedasea Z, APRN    sennosides-docusate (PERICOLACE) 8.6-50 MG per tablet 2 tablet, 2 tablet, Oral, BID, 2 tablet at 12/29/23 0841 **AND** polyethylene glycol (MIRALAX) packet 17 g, 17 g, Oral, Daily PRN **AND** bisacodyl (DULCOLAX) EC tablet 5 mg, 5 mg, Oral, Daily PRN **AND** bisacodyl (DULCOLAX) suppository 10 mg, 10 mg, Rectal, Daily PRN, Chandni Pinedasea Z, APRN    folic acid (FOLVITE) tablet 1 mg, 1 mg, Oral, Daily, Chandni Pinedasea Z, APRN, 1 mg at 12/29/23 0835    hydrALAZINE (APRESOLINE) injection 10 mg, 10 mg, Intravenous, Q6H PRN, Danii Pineda, APRN, 10 mg at 12/28/23 1605    ketorolac (TORADOL) injection 15 mg, 15 mg, Intravenous, Q6H PRN, Tera Silveira MD, 15 mg at 12/29/23 0835    lactated ringers infusion, 200 mL/hr, Intravenous, Continuous, Danii Pineda APRN, Last Rate: 200 mL/hr at 12/29/23 1116, 200 mL/hr at 12/29/23 1116    Magnesium Standard Dose Replacement - Follow Nurse / BPA Driven Protocol, , Does not apply, Edwin RIDDLE Chelsea Z, APRN    melatonin tablet 5 mg, 5 mg, Oral, Nightly PRNEdwin Chelsea Z, APRN    metoprolol tartrate (LOPRESSOR) tablet 25 mg, 25 mg, Oral, Q12H,  Jhony Gustafson MD, 25 mg at 12/29/23 1054    morphine injection 2 mg, 2 mg, Intravenous, Q4H PRN, Danii Pineda APRN, 2 mg at 12/29/23 1152    ondansetron (ZOFRAN) tablet 4 mg, 4 mg, Oral, Q6H PRN **OR** ondansetron (ZOFRAN) injection 4 mg, 4 mg, Intravenous, Q6H PRN, Danii Pineda APRN, 4 mg at 12/29/23 1152    pantoprazole (PROTONIX) injection 40 mg, 40 mg, Intravenous, Q AM, Gianna Thomas, APRN, 40 mg at 12/29/23 0548    polyethylene glycol (MIRALAX) packet 17 g, 17 g, Oral, Daily, Gianna Thomas, APRN    [COMPLETED] Insert Peripheral IV, , , Once **AND** sodium chloride 0.9 % flush 10 mL, 10 mL, Intravenous, PRN, Arvin Greer MD    sodium chloride 0.9 % flush 10 mL, 10 mL, Intravenous, Q12H, Britt Pinedaa SHRADDHA, APRN    sodium chloride 0.9 % flush 10 mL, 10 mL, Intravenous, PRN, Britt Pinedaa SHRADDHA, APRN    sodium chloride 0.9 % infusion 40 mL, 40 mL, Intravenous, PRN, Chandni Pinedasea Z, APRN    thiamine (B-1) injection 200 mg, 200 mg, Intravenous, Q8H, 200 mg at 12/29/23 0548 **FOLLOWED BY** [START ON 1/2/2024] thiamine (VITAMIN B-1) tablet 100 mg, 100 mg, Oral, Daily, Danii Pineda, APRN    Current Outpatient Medications:     hydrOXYzine (ATARAX) 25 MG tablet, Take 1-2 tablets by mouth Daily As Needed., Disp: , Rfl:     mirtazapine (REMERON) 45 MG tablet, Take 1 tablet by mouth Every Night., Disp: , Rfl:     sertraline (ZOLOFT) 25 MG tablet, Take 1 tablet by mouth Daily., Disp: , Rfl:       Objective     Vital Signs  Vitals:    12/29/23 0100 12/29/23 0808 12/29/23 1054 12/29/23 1146   BP: 128/83 118/89  125/84   BP Location: Left arm Right arm  Right arm   Patient Position: Lying Lying  Lying   Pulse: (!) 122 (!) 135 117 112   Resp: 20 15  13   Temp: 98.2 °F (36.8 °C) 98.3 °F (36.8 °C)  97.6 °F (36.4 °C)   TempSrc: Oral Oral  Oral   SpO2: 100% 100%  100%   Weight:       Height:           Physical Exam:     General Appearance:    Awake and alert, in no acute distress   Head:     "Normocephalic, without obvious abnormality   Eyes:          Conjunctivae normal, anicteric sclera   Throat:   No oral lesions, no thrush, oral mucosa moist   Neck:   No adenopathy, supple, no JVD   Lungs:     respirations regular, even and unlabored   Abdomen:     Soft, non-tender, no rebound or guarding, non-distended, no hepatosplenomegaly   Rectal:     Deferred   Extremities:   No edema, no cyanosis   Skin:   No bruising or rash, no jaundice        Results Review:    CBC    Results from last 7 days   Lab Units 12/28/23  0845   WBC 10*3/mm3 14.40*   HEMOGLOBIN g/dL 13.7   PLATELETS 10*3/mm3 250     CMP   Results from last 7 days   Lab Units 12/28/23  1600 12/28/23  0845   SODIUM mmol/L 135* 138   POTASSIUM mmol/L 3.8 4.2   CHLORIDE mmol/L 102 96*   CO2 mmol/L 7.0* 6.0*   BUN mg/dL 6 8   CREATININE mg/dL 0.78 0.99   GLUCOSE mg/dL 128* 177*   ALBUMIN g/dL 4.9 6.0*   BILIRUBIN mg/dL 2.5* 2.9*   ALK PHOS U/L 80 107   AST (SGOT) U/L 114* 181*   ALT (SGPT) U/L 59* 84*   LIPASE U/L 538* 266*     Cr Clearance Estimated Creatinine Clearance: 87 mL/min (by C-G formula based on SCr of 0.78 mg/dL).  Coag     HbA1C   Lab Results   Component Value Date    HGBA1C 4.30 (L) 12/28/2023     Blood Glucose No results found for: \"POCGLU\"  Infection     UA    Results from last 7 days   Lab Units 12/28/23  1043   NITRITE UA  Negative   WBC UA /HPF 0-2   BACTERIA UA /HPF Trace*   SQUAM EPITHEL UA /HPF 3-6*     Imaging Results (Last 72 Hours)       Procedure Component Value Units Date/Time    CT Abdomen Pelvis Without Contrast [[id number]] Collected: 12/28/23 1026     Updated: 12/28/23 1035    Narrative:      CT ABDOMEN PELVIS WO CONTRAST    Date of Exam: 12/28/2023 10:15 AM EST    Indication: vomiting, elevated lipase.    Comparison: 4/23/2023     Technique: Axial CT images were obtained of the abdomen and pelvis without the administration of contrast. Sagittal and coronal reconstructions were performed.  Automated exposure control and " iterative reconstruction methods were used.      Findings:  Visualized lung bases are clear. There is circumferential thickening of the distal esophagus which is nonspecific. This may be due to gastroesophageal reflux disease. Neoplasm not excluded. Unenhanced evaluation of the spleen, adrenals is grossly   unremarkable. The pancreatic head and neck appear edematous. There is surrounding inflammatory fat stranding. No focal associated, drainable fluid collections are identified. Findings suggest acute pancreatitis. There is diffusely increased density   throughout the gallbladder suggesting multiple fine noncalcified stones or sludge. Severe, diffuse fatty infiltration of the liver is again noted. No focal hepatic mass lesion is identified. Nonobstructing bilateral 2-3 mm intrarenal calculi are   identified. Urinary bladder is distended, but otherwise grossly unremarkable. Unenhanced evaluation of the uterus adnexa is unremarkable. There is no evidence of small bowel obstruction, free air or free fluid. The appendix is normal. Colon is mostly   decompressed. No pericolonic inflammatory change identified. No aggressive osseous lesions are identified. No gross adenopathy is identified in the abdomen or pelvis.        Impression:      Impression:    1. Findings consistent with acute pancreatitis, as discussed above.  2. Uniformly increased density throughout the gallbladder suggesting multiple noncalcified stones or sludge.  3. Severe hepatic steatosis.  4. Nonspecific circumferential thickening of the distal esophagus as above.  5. Nonobstructing bilateral intrarenal calculi.            Electronically Signed: Derick Tillman MD    12/28/2023 10:33 AM EST    Workstation ID: [id number]            Assessment & Plan      33-year-old female presents to the hospital with nausea, vomiting and abdominal pain after drinking heavily 2 days ago.     -Nausea, vomiting  -Epigastric pain  -Elevated lipase  -Elevated  LFTs  -Leukocytosis  -acidosis  -Constipation  -Abnormal CT with pancreatitis and circumferential thickening in the distal esophagus  -History of alcohol abuse     Plan  Acute pancreatitis likely secondary to alcohol +/- gallstones.  Autoimmune workup is pending. CT does not show any biliary ductal dilatation.  Does show edematous head/neck of the pancreas with fat stranding as well as increased density throughout the gallbladder with stones vs sludge.  Continue to trend labs.    Labs from yesterday afternoon -  (181), ALT 59 (84), total bilirubin 2.5 (2.9), lipase 538 (266).  Labs today pending collection.  Consider MRCP pending progress/if LFTs worsen.   Supportive care for pancreatitis with IV fluids, analgesics, antiemetics as needed.  She is feeling some better today. Trial clear liquid diet. Possibly advance to low fat diet tomorrow.   Continue PPI.  Continue MiraLAX.  Add Amitiza.  Recommend alcohol cessation.  Monitor for alcohol withdrawal.  Continue supportive care.     I discussed the patients findings and my recommendations with the patient.     We appreciate the referral    Electronically signed by BEVERLY Estrada, 12/29/23, 1:30 PM [facility].

## 2023-12-29 NOTE — DISCHARGE INSTR - APPOINTMENTS
[facility] patient care [facility]  [phone] option 1  [facility] primary care [phone]  [facility] [phone]

## 2023-12-30 LAB
ALBUMIN SERPL-MCNC: 4.1 G/DL (ref 3.5–5.2)
ALBUMIN/GLOB SERPL: 1.6 G/DL
ALP SERPL-CCNC: 64 U/L (ref 39–117)
ALT SERPL W P-5'-P-CCNC: 35 U/L (ref 1–33)
ANION GAP SERPL CALCULATED.3IONS-SCNC: 16 MMOL/L (ref 5–15)
AST SERPL-CCNC: 68 U/L (ref 1–32)
BASOPHILS # BLD AUTO: 0 10*3/MM3 (ref 0–0.2)
BASOPHILS NFR BLD AUTO: 0 % (ref 0–1.5)
BILIRUB SERPL-MCNC: 1.8 MG/DL (ref 0–1.2)
BUN SERPL-MCNC: 4 MG/DL (ref 6–20)
BUN/CREAT SERPL: 7.5 (ref 7–25)
CALCIUM SPEC-SCNC: 9.9 MG/DL (ref 8.6–10.5)
CHLORIDE SERPL-SCNC: 100 MMOL/L (ref 98–107)
CO2 SERPL-SCNC: 20 MMOL/L (ref 22–29)
CREAT SERPL-MCNC: 0.53 MG/DL (ref 0.57–1)
CRP SERPL-MCNC: 5.07 MG/DL (ref 0–0.5)
DEPRECATED RDW RBC AUTO: 45.1 FL (ref 37–54)
EGFRCR SERPLBLD CKD-EPI 2021: 126.2 ML/MIN/1.73
EOSINOPHIL # BLD AUTO: 0 10*3/MM3 (ref 0–0.4)
EOSINOPHIL NFR BLD AUTO: 0.7 % (ref 0.3–6.2)
ERYTHROCYTE [DISTWIDTH] IN BLOOD BY AUTOMATED COUNT: 12.5 % (ref 12.3–15.4)
GLOBULIN UR ELPH-MCNC: 2.5 GM/DL
GLUCOSE SERPL-MCNC: 83 MG/DL (ref 65–99)
HCT VFR BLD AUTO: 29.1 % (ref 34–46.6)
HGB BLD-MCNC: 10.1 G/DL (ref 12–15.9)
LIPASE SERPL-CCNC: 1046 U/L (ref 13–60)
LYMPHOCYTES # BLD AUTO: 0.6 10*3/MM3 (ref 0.7–3.1)
LYMPHOCYTES NFR BLD AUTO: 12.5 % (ref 19.6–45.3)
MCH RBC QN AUTO: 35.8 PG (ref 26.6–33)
MCHC RBC AUTO-ENTMCNC: 34.8 G/DL (ref 31.5–35.7)
MCV RBC AUTO: 102.9 FL (ref 79–97)
MONOCYTES # BLD AUTO: 0.3 10*3/MM3 (ref 0.1–0.9)
MONOCYTES NFR BLD AUTO: 6.1 % (ref 5–12)
NEUTROPHILS NFR BLD AUTO: 3.7 10*3/MM3 (ref 1.7–7)
NEUTROPHILS NFR BLD AUTO: 80.7 % (ref 42.7–76)
NRBC BLD AUTO-RTO: 0.3 /100 WBC (ref 0–0.2)
PLATELET # BLD AUTO: 142 10*3/MM3 (ref 140–450)
PMV BLD AUTO: 7.9 FL (ref 6–12)
POTASSIUM SERPL-SCNC: 3.5 MMOL/L (ref 3.5–5.2)
POTASSIUM SERPL-SCNC: 3.5 MMOL/L (ref 3.5–5.2)
POTASSIUM SERPL-SCNC: 3.8 MMOL/L (ref 3.5–5.2)
PROT SERPL-MCNC: 6.6 G/DL (ref 6–8.5)
RBC # BLD AUTO: 2.83 10*6/MM3 (ref 3.77–5.28)
SODIUM SERPL-SCNC: 136 MMOL/L (ref 136–145)
WBC NRBC COR # BLD AUTO: 4.5 10*3/MM3 (ref 3.4–10.8)

## 2023-12-30 PROCEDURE — 36415 COLL VENOUS BLD VENIPUNCTURE: CPT | Performed by: INTERNAL MEDICINE

## 2023-12-30 PROCEDURE — 84132 ASSAY OF SERUM POTASSIUM: CPT | Performed by: INTERNAL MEDICINE

## 2023-12-30 PROCEDURE — 86140 C-REACTIVE PROTEIN: CPT

## 2023-12-30 PROCEDURE — 83690 ASSAY OF LIPASE: CPT

## 2023-12-30 PROCEDURE — 25010000002 THIAMINE HCL 200 MG/2ML SOLUTION: Performed by: NURSE PRACTITIONER

## 2023-12-30 PROCEDURE — 25010000002 MORPHINE PER 10 MG: Performed by: NURSE PRACTITIONER

## 2023-12-30 PROCEDURE — 25810000003 LACTATED RINGERS PER 1000 ML: Performed by: NURSE PRACTITIONER

## 2023-12-30 PROCEDURE — 25010000002 ONDANSETRON PER 1 MG: Performed by: NURSE PRACTITIONER

## 2023-12-30 PROCEDURE — 80053 COMPREHEN METABOLIC PANEL: CPT

## 2023-12-30 PROCEDURE — 85025 COMPLETE CBC W/AUTO DIFF WBC: CPT

## 2023-12-30 RX ORDER — HYDROCODONE BITARTRATE AND ACETAMINOPHEN 5; 325 MG/1; MG/1
1 TABLET ORAL EVERY 6 HOURS PRN
Status: DISCONTINUED | OUTPATIENT
Start: 2023-12-30 | End: 2024-01-01 | Stop reason: HOSPADM

## 2023-12-30 RX ORDER — POTASSIUM CHLORIDE 20 MEQ/1
40 TABLET, EXTENDED RELEASE ORAL EVERY 4 HOURS
Status: COMPLETED | OUTPATIENT
Start: 2023-12-30 | End: 2023-12-30

## 2023-12-30 RX ADMIN — PANTOPRAZOLE SODIUM 40 MG: 40 INJECTION, POWDER, FOR SOLUTION INTRAVENOUS at 05:51

## 2023-12-30 RX ADMIN — LUBIPROSTONE 24 MCG: 24 CAPSULE, GELATIN COATED ORAL at 08:38

## 2023-12-30 RX ADMIN — HYDROCODONE BITARTRATE AND ACETAMINOPHEN 1 TABLET: 5; 325 TABLET ORAL at 23:30

## 2023-12-30 RX ADMIN — HYDROCODONE BITARTRATE AND ACETAMINOPHEN 1 TABLET: 5; 325 TABLET ORAL at 08:41

## 2023-12-30 RX ADMIN — POTASSIUM CHLORIDE 40 MEQ: 1500 TABLET, EXTENDED RELEASE ORAL at 11:21

## 2023-12-30 RX ADMIN — POTASSIUM CHLORIDE 40 MEQ: 1500 TABLET, EXTENDED RELEASE ORAL at 15:21

## 2023-12-30 RX ADMIN — FOLIC ACID 1 MG: 1 TABLET ORAL at 08:38

## 2023-12-30 RX ADMIN — ALUMINUM HYDROXIDE, MAGNESIUM HYDROXIDE, AND DIMETHICONE 15 ML: 400; 400; 40 SUSPENSION ORAL at 23:35

## 2023-12-30 RX ADMIN — ONDANSETRON 4 MG: 2 INJECTION INTRAMUSCULAR; INTRAVENOUS at 01:36

## 2023-12-30 RX ADMIN — POTASSIUM CHLORIDE 40 MEQ: 1500 TABLET, EXTENDED RELEASE ORAL at 00:08

## 2023-12-30 RX ADMIN — THIAMINE HYDROCHLORIDE 200 MG: 100 INJECTION, SOLUTION INTRAMUSCULAR; INTRAVENOUS at 15:21

## 2023-12-30 RX ADMIN — SENNOSIDES AND DOCUSATE SODIUM 2 TABLET: 50; 8.6 TABLET ORAL at 08:38

## 2023-12-30 RX ADMIN — SENNOSIDES AND DOCUSATE SODIUM 2 TABLET: 50; 8.6 TABLET ORAL at 21:19

## 2023-12-30 RX ADMIN — SODIUM CHLORIDE, POTASSIUM CHLORIDE, SODIUM LACTATE AND CALCIUM CHLORIDE 200 ML/HR: 600; 310; 30; 20 INJECTION, SOLUTION INTRAVENOUS at 03:09

## 2023-12-30 RX ADMIN — LUBIPROSTONE 24 MCG: 24 CAPSULE, GELATIN COATED ORAL at 17:22

## 2023-12-30 RX ADMIN — SODIUM CHLORIDE, POTASSIUM CHLORIDE, SODIUM LACTATE AND CALCIUM CHLORIDE 200 ML/HR: 600; 310; 30; 20 INJECTION, SOLUTION INTRAVENOUS at 18:56

## 2023-12-30 RX ADMIN — THIAMINE HYDROCHLORIDE 200 MG: 100 INJECTION, SOLUTION INTRAMUSCULAR; INTRAVENOUS at 05:52

## 2023-12-30 RX ADMIN — METOPROLOL TARTRATE 25 MG: 25 TABLET, FILM COATED ORAL at 08:38

## 2023-12-30 RX ADMIN — Medication 10 ML: at 21:20

## 2023-12-30 RX ADMIN — METOPROLOL TARTRATE 25 MG: 25 TABLET, FILM COATED ORAL at 21:20

## 2023-12-30 RX ADMIN — MORPHINE SULFATE 2 MG: 2 INJECTION, SOLUTION INTRAMUSCULAR; INTRAVENOUS at 01:36

## 2023-12-30 RX ADMIN — HYDROCODONE BITARTRATE AND ACETAMINOPHEN 1 TABLET: 5; 325 TABLET ORAL at 17:22

## 2023-12-30 RX ADMIN — SODIUM CHLORIDE, POTASSIUM CHLORIDE, SODIUM LACTATE AND CALCIUM CHLORIDE 200 ML/HR: 600; 310; 30; 20 INJECTION, SOLUTION INTRAVENOUS at 23:30

## 2023-12-30 RX ADMIN — Medication 10 ML: at 08:38

## 2023-12-30 RX ADMIN — THIAMINE HYDROCHLORIDE 200 MG: 100 INJECTION, SOLUTION INTRAMUSCULAR; INTRAVENOUS at 21:20

## 2023-12-30 NOTE — PROGRESS NOTES
[facility] MEDICINE SERVICE  DAILY PROGRESS NOTE    NAME: Shirni Canales  : 1990  MRN: [id number]      LOS: 2 days     PROVIDER OF SERVICE: Jhony Gustafson MD    Chief Complaint: Alcoholic ketoacidosis    Subjective:     Interval History:  History taken from: patient    Patient seen today.  Feeling better. No nausea or vomiting this AM.  Mild abdominal pains.  On clears right now. Labs pending this AM    Review of Systems:   Review of Systems    Objective:     Vital Signs  Temp:  [97.6 °F (36.4 °C)-98.7 °F (37.1 °C)] 98.7 °F (37.1 °C)  Heart Rate:  [] 88  Resp:  [13-20] 16  BP: ()/(68-84) 98/68   Body mass index is 20.78 kg/m².    Physical Exam  Physical Exam  Constitutional:       General: She is not in acute distress.     Appearance: Normal appearance. She is not toxic-appearing.   HENT:      Nose: Nose normal.      Mouth/Throat:      Mouth: Mucous membranes are dry.   Eyes:      Pupils: Pupils are equal, round, and reactive to light.   Cardiovascular:      Rate and Rhythm: Regular rhythm. Tachycardia present.   Pulmonary:      Effort: Pulmonary effort is normal.      Breath sounds: Normal breath sounds.   Abdominal:      General: Bowel sounds are normal.      Tenderness: There is abdominal tenderness. There is no guarding.   Musculoskeletal:         General: Normal range of motion.      Cervical back: Normal range of motion and neck supple.   Skin:     General: Skin is warm.      Capillary Refill: Capillary refill takes less than 2 seconds.   Neurological:      General: No focal deficit present.      Mental Status: She is alert.   Psychiatric:         Mood and Affect: Mood normal.         Scheduled Meds   folic acid, 1 mg, Oral, Daily  lubiprostone, 24 mcg, Oral, BID With Meals  metoprolol tartrate, 25 mg, Oral, Q12H  pantoprazole, 40 mg, Intravenous, Q AM  polyethylene glycol, 17 g, Oral, Daily  senna-docusate sodium, 2 tablet, Oral, BID  sodium chloride, 10 mL, Intravenous,  Q12H  thiamine (B-1) IV, 200 mg, Intravenous, Q8H   Followed by  [START ON 1/2/2024] thiamine, 100 mg, Oral, Daily       PRN Meds     acetaminophen **OR** acetaminophen **OR** acetaminophen    aluminum-magnesium hydroxide-simethicone    senna-docusate sodium **AND** polyethylene glycol **AND** bisacodyl **AND** bisacodyl    hydrALAZINE    HYDROcodone-acetaminophen    ketorolac    Magnesium Standard Dose Replacement - Follow Nurse / BPA Driven Protocol    melatonin    Morphine    ondansetron **OR** ondansetron    Potassium Replacement - Follow Nurse / BPA Driven Protocol    [COMPLETED] Insert Peripheral IV **AND** sodium chloride    sodium chloride    sodium chloride   Infusions  lactated ringers, 200 mL/hr, Last Rate: 200 mL/hr (12/30/23 0309)          Diagnostic Data    Results from last 7 days   Lab Units 12/30/23  0730   WBC 10*3/mm3 4.50   HEMOGLOBIN g/dL 10.1*   HEMATOCRIT % 29.1*   PLATELETS 10*3/mm3 142   GLUCOSE mg/dL 83   CREATININE mg/dL 0.53*   BUN mg/dL 4*   SODIUM mmol/L 136   POTASSIUM mmol/L 3.5  3.5   AST (SGOT) U/L 68*   ALT (SGPT) U/L 35*   ALK PHOS U/L 64   BILIRUBIN mg/dL 1.8*   ANION GAP mmol/L 16.0*       CT Abdomen Pelvis Without Contrast    Result Date: 12/28/2023  Impression: 1. Findings consistent with acute pancreatitis, as discussed above. 2. Uniformly increased density throughout the gallbladder suggesting multiple noncalcified stones or sludge. 3. Severe hepatic steatosis. 4. Nonspecific circumferential thickening of the distal esophagus as above. 5. Nonobstructing bilateral intrarenal calculi. Electronically Signed: Derick Tillman MD  12/28/2023 10:33 AM EST  Workstation ID: [id number]       I reviewed the patient's new clinical results.    Assessment/Plan:     Active and Resolved Problems  Active Hospital Problems    Diagnosis  POA    **Alcoholic ketoacidosis [E87.29]  Yes    Elevated blood pressure reading [R03.0]  Yes    Alcohol induced acute pancreatitis without necrosis or infection  [K85.20]  Yes    Kidney stone [N20.0]  Unknown    Hepatic steatosis [K76.0]  Unknown    Dizziness [R42]  Unknown    Alcohol use [Z78.9]  Unknown      Resolved Hospital Problems    Diagnosis Date Resolved POA    Alcoholism [F10.20] 12/28/2023 Yes       Acute alcoholic pancreatitis-slowly improving    2. Alcoholic ketoacidosis    3. Hypokalemia    4. Alcohol abuse    5. Sinus tachycardia-sec to some anxiety and early alcohol withdrawal.  Metoprolol 25 mg BID has been started.    PLAN:  -Advance diet to full liquid today  -Norco ordered  -Labs pending  -PPI  -IVF  -Keep inpatient 1-2 more days.    -Needs to abstain from alcohol completely.  -GI signed off    DVT prophylaxis:  Mechanical DVT prophylaxis orders are present.     Code status is   Code Status and Medical Interventions:   Ordered at: 12/28/23 1257     Level Of Support Discussed With:    Patient     Code Status (Patient has no pulse and is not breathing):    CPR (Attempt to Resuscitate)     Medical Interventions (Patient has pulse or is breathing):    Full Support       Plan for disposition:Home in 2 days    Time: 30 minutes    Signature: Electronically signed by Jhony Gustafson MD, 12/30/23, 08:30 EST.  [facility] Hospitalist Team

## 2023-12-30 NOTE — CONSULTS
"Nutrition Services    Patient Name: Shirin Canales  YOB: 1990  MRN: [id number]  Admission date: 12/28/2023    Comment:  -- Diet advancement per GI    -- Patient declined ONS      PPE Documentation        PPE Worn By Provider gloves   PPE Worn By Patient  None      CLINICAL NUTRITION ASSESSMENT      Reason for Assessment 12/31: MST of 2 (patient unsure of weight loss, no recent decrease in appetite), NPO/liquids x 3 days      H&P      History reviewed. No pertinent past medical history.    No past surgical history on file.     Current Problems   Alcoholic ketoacidosis  - CIWA     Acute pancreatitis  - Secondary to alcohol use  - GI following      Hepatic steatosis     Nonobstructing bilateral intrarenal calculi      Acute dizziness     Uncontrolled Blood pressure       Encounter Information        Trending Narrative     12/31: Admitted for N/V.  RD visited patient at bedside.  Patient reports previous nausea (none current), no chewing/swallowing issues noted, has not eaten since 12/25, reports weight loss from 178# to CBW.  NFPE completed and not consistent with nutrition diagnosis of malnutrition at this time using AND/ASPEN criteria.         Anthropometrics        Current Height, Weight Height: 160 cm (63\")  Weight: 53.2 kg (117 lb 4.6 oz) (12/28/23 0800)       Usual Body Weight (UBW) 178# per patient        Trending Weight Hx     This admission: 12/31: 117#             PTA: 10% weight loss x 8 months     Wt Readings from Last 30 Encounters:   12/28/23 0800 53.2 kg (117 lb 4.6 oz)   11/21/23 0801 59 kg (130 lb)   11/21/23 0448 52.2 kg (115 lb)   11/21/23 0315 52.2 kg (115 lb)   11/20/23 2252 52.2 kg (115 lb 1.3 oz)   04/26/23 0508 67.3 kg (148 lb 5.9 oz)   04/25/23 0316 62.8 kg (138 lb 6.4 oz)   04/24/23 0141 61.4 kg (135 lb 5.8 oz)   04/23/23 2247 59 kg (130 lb)      BMI kg/m2 Body mass index is 20.78 kg/m².       Labs        Pertinent Labs    Results from last 7 days   Lab Units 12/30/23  0730 " 12/29/23  1439 12/28/23  1600   SODIUM mmol/L 136 135* 135*   POTASSIUM mmol/L 3.5  3.5 3.1* 3.8   CHLORIDE mmol/L 100 103 102   CO2 mmol/L 20.0* 16.0* 7.0*   BUN mg/dL 4* 5* 6   CREATININE mg/dL 0.53* 0.59 0.78   CALCIUM mg/dL 9.9 9.2 9.3   BILIRUBIN mg/dL 1.8* 1.7* 2.5*   ALK PHOS U/L 64 61 80   ALT (SGPT) U/L 35* 33 59*   AST (SGOT) U/L 68* 50* 114*   GLUCOSE mg/dL 83 81 128*     Results from last 7 days   Lab Units 12/30/23  0730 12/29/23  1439   MAGNESIUM mg/dL  --  1.6   HEMOGLOBIN g/dL 10.1* 10.8*   HEMATOCRIT % 29.1* 31.6*     Lab Results   Component Value Date    HGBA1C 4.30 (L) 12/28/2023        Medications    Scheduled Medications folic acid, 1 mg, Oral, Daily  lubiprostone, 24 mcg, Oral, BID With Meals  metoprolol tartrate, 25 mg, Oral, Q12H  pantoprazole, 40 mg, Intravenous, Q AM  polyethylene glycol, 17 g, Oral, Daily  potassium chloride ER, 40 mEq, Oral, Q4H  senna-docusate sodium, 2 tablet, Oral, BID  sodium chloride, 10 mL, Intravenous, Q12H  thiamine (B-1) IV, 200 mg, Intravenous, Q8H   Followed by  [START ON 1/2/2024] thiamine, 100 mg, Oral, Daily        Infusions lactated ringers, 200 mL/hr, Last Rate: 200 mL/hr (12/30/23 0838)        PRN Medications   acetaminophen **OR** acetaminophen **OR** acetaminophen    aluminum-magnesium hydroxide-simethicone    senna-docusate sodium **AND** polyethylene glycol **AND** bisacodyl **AND** bisacodyl    hydrALAZINE    HYDROcodone-acetaminophen    ketorolac    Magnesium Standard Dose Replacement - Follow Nurse / BPA Driven Protocol    melatonin    Morphine    ondansetron **OR** ondansetron    Potassium Replacement - Follow Nurse / BPA Driven Protocol    [COMPLETED] Insert Peripheral IV **AND** sodium chloride    sodium chloride    sodium chloride     Physical Findings        Trending Physical   Appearance, NFPE 12/31: NFPE completed and not consistent with nutrition diagnosis of malnutrition at this time using AND/ASPEN criteria      --  Edema  No edema  documented      Bowel Function Last documented BM 12/30 (yesterday)     Tubes No feeding tube      Chewing/Swallowing Patient reports no issues      Skin Intact      --  Current Nutrition Orders & Evaluation of Intake       Oral Nutrition     Food Allergies NKFA   Current PO Diet Diet: Liquid Diets; Full Liquid; Fluid Consistency: Thin (IDDSI 0)   Supplement None ordered   PO Evaluation     Trending % PO Intake 12/31: Minimal related to liquids    --  Nutritional Risk Screening        NRS-2002 Score          Nutrition Diagnosis         Nutrition Dx Problem 1 Inadequate energy intake related to clinical course as evidenced by FLD.        Nutrition Dx Problem 2        Intervention Goal         Intervention Goal(s) Diet advancement per MD  PO intakes at least 50%     Nutrition Intervention        RD Action Monitor for diet advancement and PO intakes      Nutrition Prescription          Diet Prescription FLD   Supplement Prescription Patient declined    --  Monitor/Evaluation        Monitor Per protocol, I&O, PO intake, Supplement intake, Pertinent labs, Weight, Skin status, GI status, Symptoms, POC/GOC       Electronically signed by:  Chacha Juarez RD  12/30/23 14:09 EST

## 2023-12-30 NOTE — PLAN OF CARE
Goal Outcome Evaluation:  Plan of Care Reviewed With: patient           Outcome Evaluation: Pt. transferred to [facility] this evening. Able to make needs known. Up adlib. Personal items and call light within reach. Plan of care ongoing.

## 2023-12-30 NOTE — CASE MANAGEMENT/SOCIAL WORK
Discharge Planning Assessment  [facility]     Patient Name: Shirin Canales  MRN: [id number]  Today's Date: 12/30/2023    Admit Date: 12/28/2023    Plan: home.  pcp phone numbers provided on AVS- pt wanted to make appt for her self.          Emmy Kat RN

## 2023-12-30 NOTE — CASE MANAGEMENT/SOCIAL WORK
Discharge Planning Assessment   [facility]     Patient Name: Shirin Canales  MRN: [id number]  Today's Date: 12/30/2023    Admit Date: 12/28/2023    Plan: home.  pcp phone numbers provided on AVS- pt wanted to make appt for her self.   Discharge Needs Assessment       Row Name 12/30/23 1834       Living Environment    People in Home child(stacey), dependent    Current Living Arrangements home    Potentially Unsafe Housing Conditions none    Primary Care Provided by self    Provides Primary Care For no one    Quality of Family Relationships helpful;involved;supportive    Able to Return to Prior Arrangements yes       Resource/Environmental Concerns    Resource/Environmental Concerns none    Transportation Concerns none       Transition Planning    Patient/Family Anticipates Transition to home    Patient/Family Anticipated Services at Transition none    Transportation Anticipated car, drives self;family or friend will provide       Discharge Needs Assessment    Readmission Within the Last 30 Days no previous admission in last 30 days    Anticipated Changes Related to Illness none    Equipment Needed After Discharge none                   Discharge Plan       Row Name 12/30/23 3724       Plan    Plan Comments no pcp noted.  pt would like to schedule her own appt.  provided numbers on AVS. lives at home with 2 yr old son- pt's mother and brother are caring for him while she is in hospital.  notified SW on 12/29 of etoh abut.  no home dmd.  I wiht adls.  pharm verified.                  Continued Care and Services - Admitted Since 12/28/2023    Coordination has not been started for this encounter.       Expected Discharge Date and Time       Expected Discharge Date Expected Discharge Time    Dec 31, 2023            Demographic Summary       Row Name 12/30/23 1834       General Information    Admission Type inpatient    Arrived From emergency department    Required Notices Provided Important Message from Medicare    Referral  Source admission list    Reason for Consult discharge planning    Preferred Language English                   Functional Status       Row Name 12/30/23 1834       Functional Status    Usual Activity Tolerance good    Current Activity Tolerance good       Functional Status, IADL    Medications independent    Meal Preparation independent    Housekeeping independent    Laundry independent    Shopping independent       Mental Status    General Appearance WDL WDL       Mental Status Summary    Recent Changes in Mental Status/Cognitive Functioning no changes                   Psychosocial    No documentation.                  Abuse/Neglect    No documentation.                  Legal    No documentation.                  Substance Abuse    No documentation.                  Patient Forms    No documentation.                     Emmy Kat RN

## 2023-12-31 LAB
ALBUMIN SERPL-MCNC: 3.9 G/DL (ref 3.5–5.2)
ALBUMIN/GLOB SERPL: 1.6 G/DL
ALP SERPL-CCNC: 63 U/L (ref 39–117)
ALT SERPL W P-5'-P-CCNC: 33 U/L (ref 1–33)
ANION GAP SERPL CALCULATED.3IONS-SCNC: 10 MMOL/L (ref 5–15)
AST SERPL-CCNC: 71 U/L (ref 1–32)
BILIRUB SERPL-MCNC: 1.6 MG/DL (ref 0–1.2)
BUN SERPL-MCNC: 2 MG/DL (ref 6–20)
BUN/CREAT SERPL: 5.4 (ref 7–25)
CALCIUM SPEC-SCNC: 9.6 MG/DL (ref 8.6–10.5)
CHLORIDE SERPL-SCNC: 99 MMOL/L (ref 98–107)
CO2 SERPL-SCNC: 25 MMOL/L (ref 22–29)
CREAT SERPL-MCNC: 0.37 MG/DL (ref 0.57–1)
EGFRCR SERPLBLD CKD-EPI 2021: 137.6 ML/MIN/1.73
GLOBULIN UR ELPH-MCNC: 2.4 GM/DL
GLUCOSE SERPL-MCNC: 88 MG/DL (ref 65–99)
LIPASE SERPL-CCNC: 693 U/L (ref 13–60)
POTASSIUM SERPL-SCNC: 3.5 MMOL/L (ref 3.5–5.2)
POTASSIUM SERPL-SCNC: 3.8 MMOL/L (ref 3.5–5.2)
PROT SERPL-MCNC: 6.3 G/DL (ref 6–8.5)
SODIUM SERPL-SCNC: 134 MMOL/L (ref 136–145)

## 2023-12-31 PROCEDURE — 25010000002 THIAMINE HCL 200 MG/2ML SOLUTION: Performed by: NURSE PRACTITIONER

## 2023-12-31 PROCEDURE — 25010000002 KETOROLAC TROMETHAMINE PER 15 MG: Performed by: FAMILY MEDICINE

## 2023-12-31 PROCEDURE — 80053 COMPREHEN METABOLIC PANEL: CPT | Performed by: INTERNAL MEDICINE

## 2023-12-31 PROCEDURE — 25810000003 LACTATED RINGERS PER 1000 ML: Performed by: NURSE PRACTITIONER

## 2023-12-31 PROCEDURE — 84132 ASSAY OF SERUM POTASSIUM: CPT | Performed by: FAMILY MEDICINE

## 2023-12-31 PROCEDURE — 83690 ASSAY OF LIPASE: CPT | Performed by: INTERNAL MEDICINE

## 2023-12-31 RX ORDER — POTASSIUM CHLORIDE 20 MEQ/1
40 TABLET, EXTENDED RELEASE ORAL EVERY 4 HOURS
Status: COMPLETED | OUTPATIENT
Start: 2023-12-31 | End: 2023-12-31

## 2023-12-31 RX ADMIN — HYDROCODONE BITARTRATE AND ACETAMINOPHEN 1 TABLET: 5; 325 TABLET ORAL at 06:50

## 2023-12-31 RX ADMIN — METOPROLOL TARTRATE 25 MG: 25 TABLET, FILM COATED ORAL at 09:03

## 2023-12-31 RX ADMIN — HYDROCODONE BITARTRATE AND ACETAMINOPHEN 1 TABLET: 5; 325 TABLET ORAL at 15:27

## 2023-12-31 RX ADMIN — KETOROLAC TROMETHAMINE 15 MG: 30 INJECTION, SOLUTION INTRAMUSCULAR; INTRAVENOUS at 09:03

## 2023-12-31 RX ADMIN — THIAMINE HYDROCHLORIDE 200 MG: 100 INJECTION, SOLUTION INTRAMUSCULAR; INTRAVENOUS at 05:28

## 2023-12-31 RX ADMIN — POTASSIUM CHLORIDE 40 MEQ: 1500 TABLET, EXTENDED RELEASE ORAL at 09:03

## 2023-12-31 RX ADMIN — LUBIPROSTONE 24 MCG: 24 CAPSULE, GELATIN COATED ORAL at 09:03

## 2023-12-31 RX ADMIN — SODIUM CHLORIDE, POTASSIUM CHLORIDE, SODIUM LACTATE AND CALCIUM CHLORIDE 200 ML/HR: 600; 310; 30; 20 INJECTION, SOLUTION INTRAVENOUS at 04:29

## 2023-12-31 RX ADMIN — THIAMINE HYDROCHLORIDE 200 MG: 100 INJECTION, SOLUTION INTRAMUSCULAR; INTRAVENOUS at 15:22

## 2023-12-31 RX ADMIN — KETOROLAC TROMETHAMINE 15 MG: 30 INJECTION, SOLUTION INTRAMUSCULAR; INTRAVENOUS at 20:02

## 2023-12-31 RX ADMIN — Medication 10 ML: at 09:06

## 2023-12-31 RX ADMIN — METOPROLOL TARTRATE 25 MG: 25 TABLET, FILM COATED ORAL at 20:02

## 2023-12-31 RX ADMIN — THIAMINE HYDROCHLORIDE 200 MG: 100 INJECTION, SOLUTION INTRAMUSCULAR; INTRAVENOUS at 21:23

## 2023-12-31 RX ADMIN — FOLIC ACID 1 MG: 1 TABLET ORAL at 09:03

## 2023-12-31 RX ADMIN — Medication 10 ML: at 20:02

## 2023-12-31 RX ADMIN — SODIUM CHLORIDE, POTASSIUM CHLORIDE, SODIUM LACTATE AND CALCIUM CHLORIDE 200 ML/HR: 600; 310; 30; 20 INJECTION, SOLUTION INTRAVENOUS at 12:57

## 2023-12-31 RX ADMIN — HYDROCODONE BITARTRATE AND ACETAMINOPHEN 1 TABLET: 5; 325 TABLET ORAL at 23:09

## 2023-12-31 RX ADMIN — KETOROLAC TROMETHAMINE 15 MG: 30 INJECTION, SOLUTION INTRAMUSCULAR; INTRAVENOUS at 01:27

## 2023-12-31 RX ADMIN — SENNOSIDES AND DOCUSATE SODIUM 2 TABLET: 50; 8.6 TABLET ORAL at 09:03

## 2023-12-31 RX ADMIN — POTASSIUM CHLORIDE 40 MEQ: 1500 TABLET, EXTENDED RELEASE ORAL at 05:28

## 2023-12-31 RX ADMIN — POLYETHYLENE GLYCOL 3350 17 G: 17 POWDER, FOR SOLUTION ORAL at 09:04

## 2023-12-31 RX ADMIN — SODIUM CHLORIDE, POTASSIUM CHLORIDE, SODIUM LACTATE AND CALCIUM CHLORIDE 200 ML/HR: 600; 310; 30; 20 INJECTION, SOLUTION INTRAVENOUS at 18:04

## 2023-12-31 RX ADMIN — PANTOPRAZOLE SODIUM 40 MG: 40 INJECTION, POWDER, FOR SOLUTION INTRAVENOUS at 05:28

## 2023-12-31 NOTE — PLAN OF CARE
Goal Outcome Evaluation:  Plan of Care Reviewed With: patient           Outcome Evaluation: Pt. able to make needs known. Potassium replaced per protocol. IVF continue per order. Plan of care ongoing.

## 2023-12-31 NOTE — PLAN OF CARE
Goal Outcome Evaluation:         Patient alert oriented able to make needs known. Pain controlled with po pain medication and IV Toradol. Patient on room ai. LR administering at 200ml/hr. Patient voiding with no difficulty. CIWA protocol in place. Full liquid diet tolerates well. Cardiac monitored. Patient ambulates independently in room. Bed in low position, call light in reach, room near nursing station.

## 2023-12-31 NOTE — CASE MANAGEMENT/SOCIAL WORK
Case Management/Social Work    Patient Name:  Shirin Canales  YOB: 1990  MRN: [id number]  Admit Date:  12/28/2023      SW spoke with Pt regarding alcohol cessation. Pt acknowledged that she would benefit from assistance with quitting alcohol use. She shared interest in an outpatient program as she needs to be available for her child. SW praised Pt for wanting to make a change. SW discussed programs and likelihood of success of each option. Pt shared that she has good support from family. Pt adamantly wants outpatient treatment. SW placed outpatient treatment programs on Pt's AVS.    Eliana Milligan, SISSYW, LSW, APHSW-C  Medical Social Worker  [place] [id number]  Office: [phone]  Fax: [phone]

## 2023-12-31 NOTE — PROGRESS NOTES
[facility] MEDICINE SERVICE  DAILY PROGRESS NOTE    NAME: Shirin Canales  : 1990  MRN: [id number]      LOS: 3 days     PROVIDER OF SERVICE: Christofer Jones MD    Chief Complaint: Alcoholic ketoacidosis    Subjective:     Interval History:  History taken from: patient      Patient reports her nausea has been improving.  She has been tolerating liquid diet.    Review of Systems:   Review of Systems   Respiratory:  Negative for shortness of breath.        Objective:     Vital Signs  Temp:  [98.9 °F (37.2 °C)-99.2 °F (37.3 °C)] 99.1 °F (37.3 °C)  Heart Rate:  [] 107  Resp:  [14-19] 14  BP: (101-126)/(64-84) 104/66   Body mass index is 24.3 kg/m².    Physical Exam  Physical Exam  Vitals and nursing note reviewed.   Constitutional:       General: She is not in acute distress.     Appearance: She is well-developed. She is not diaphoretic.   HENT:      Head: Normocephalic and atraumatic.   Cardiovascular:      Rate and Rhythm: Normal rate.   Pulmonary:      Effort: Pulmonary effort is normal. No respiratory distress.      Breath sounds: No wheezing.   Abdominal:      General: There is no distension.      Palpations: Abdomen is soft.   Musculoskeletal:         General: Normal range of motion.   Skin:     General: Skin is warm and dry.   Neurological:      Mental Status: She is alert.      Cranial Nerves: No cranial nerve deficit.   Psychiatric:         Behavior: Behavior normal.         Thought Content: Thought content normal.         Judgment: Judgment normal.         Scheduled Meds   folic acid, 1 mg, Oral, Daily  lubiprostone, 24 mcg, Oral, BID With Meals  metoprolol tartrate, 25 mg, Oral, Q12H  pantoprazole, 40 mg, Intravenous, Q AM  polyethylene glycol, 17 g, Oral, Daily  senna-docusate sodium, 2 tablet, Oral, BID  sodium chloride, 10 mL, Intravenous, Q12H  thiamine (B-1) IV, 200 mg, Intravenous, Q8H   Followed by  [START ON 2024] thiamine, 100 mg, Oral, Daily       PRN Meds      acetaminophen **OR** acetaminophen **OR** acetaminophen    aluminum-magnesium hydroxide-simethicone    senna-docusate sodium **AND** polyethylene glycol **AND** bisacodyl **AND** bisacodyl    hydrALAZINE    HYDROcodone-acetaminophen    ketorolac    Magnesium Standard Dose Replacement - Follow Nurse / BPA Driven Protocol    melatonin    ondansetron **OR** ondansetron    Potassium Replacement - Follow Nurse / BPA Driven Protocol    [COMPLETED] Insert Peripheral IV **AND** sodium chloride    sodium chloride    sodium chloride   Infusions  lactated ringers, 200 mL/hr, Last Rate: 200 mL/hr (12/31/23 1257)          Diagnostic Data    Results from last 7 days   Lab Units 12/31/23  0117 12/30/23  1846 12/30/23  0730   WBC 10*3/mm3  --   --  4.50   HEMOGLOBIN g/dL  --   --  10.1*   HEMATOCRIT %  --   --  29.1*   PLATELETS 10*3/mm3  --   --  142   GLUCOSE mg/dL 88  --  83   CREATININE mg/dL 0.37*  --  0.53*   BUN mg/dL 2*  --  4*   SODIUM mmol/L 134*  --  136   POTASSIUM mmol/L 3.5   < > 3.5  3.5   AST (SGOT) U/L 71*  --  68*   ALT (SGPT) U/L 33  --  35*   ALK PHOS U/L 63  --  64   BILIRUBIN mg/dL 1.6*  --  1.8*   ANION GAP mmol/L 10.0  --  16.0*    < > = values in this interval not displayed.       No radiology results for the last day      I reviewed the patient's new clinical results.    Assessment/Plan:     Active and Resolved Problems  Active Hospital Problems    Diagnosis  POA    **Alcoholic ketoacidosis [E87.29]  Yes    Elevated blood pressure reading [R03.0]  Yes    Alcohol induced acute pancreatitis without necrosis or infection [K85.20]  Yes    Kidney stone [N20.0]  Unknown    Hepatic steatosis [K76.0]  Unknown    Dizziness [R42]  Unknown    Alcohol use [Z78.9]  Unknown      Resolved Hospital Problems    Diagnosis Date Resolved POA    Alcoholism [F10.20] 12/28/2023 Yes       Alcoholic pancreatitis-continue with IV fluids continue monitoring lipase level    Alcoholic ketoacidosis-continue monitoring labs and  continue with IV fluids    Hypokalemia-continue to monitor and replace    Alcohol abuse-continue with CIWA protocol    DVT prophylaxis-SCD    Patient is full code        DVT prophylaxis:  Mechanical DVT prophylaxis orders are present.     Code status is   Code Status and Medical Interventions:   Ordered at: 12/28/23 1257     Level Of Support Discussed With:    Patient     Code Status (Patient has no pulse and is not breathing):    CPR (Attempt to Resuscitate)     Medical Interventions (Patient has pulse or is breathing):    Full Support       Plan for disposition: Home in 1-2 days    Time: 30 minutes    Signature: Electronically signed by Christofer Jones MD, 12/31/23, 16:57 EST.  [facility] Hospitalist Team

## 2024-01-01 ENCOUNTER — READMISSION MANAGEMENT (OUTPATIENT)
Dept: CALL CENTER | Facility: HOSPITAL | Age: 34
End: 2024-01-01
Payer: COMMERCIAL

## 2024-01-01 VITALS
TEMPERATURE: 98.9 F | WEIGHT: 139.99 LBS | BODY MASS INDEX: 24.8 KG/M2 | HEIGHT: 63 IN | OXYGEN SATURATION: 100 % | SYSTOLIC BLOOD PRESSURE: 117 MMHG | DIASTOLIC BLOOD PRESSURE: 87 MMHG | HEART RATE: 96 BPM | RESPIRATION RATE: 15 BRPM

## 2024-01-01 LAB
ALBUMIN SERPL-MCNC: 3.9 G/DL (ref 3.5–5.2)
ALBUMIN/GLOB SERPL: 1.5 G/DL
ALP SERPL-CCNC: 69 U/L (ref 39–117)
ALT SERPL W P-5'-P-CCNC: 29 U/L (ref 1–33)
ANION GAP SERPL CALCULATED.3IONS-SCNC: 10 MMOL/L (ref 5–15)
AST SERPL-CCNC: 52 U/L (ref 1–32)
BASOPHILS # BLD AUTO: 0.1 10*3/MM3 (ref 0–0.2)
BASOPHILS NFR BLD AUTO: 1.1 % (ref 0–1.5)
BILIRUB SERPL-MCNC: 0.9 MG/DL (ref 0–1.2)
BUN SERPL-MCNC: 3 MG/DL (ref 6–20)
BUN/CREAT SERPL: 9.1 (ref 7–25)
CALCIUM SPEC-SCNC: 9.3 MG/DL (ref 8.6–10.5)
CHLORIDE SERPL-SCNC: 100 MMOL/L (ref 98–107)
CO2 SERPL-SCNC: 27 MMOL/L (ref 22–29)
CREAT SERPL-MCNC: 0.33 MG/DL (ref 0.57–1)
DEPRECATED RDW RBC AUTO: 44.6 FL (ref 37–54)
EGFRCR SERPLBLD CKD-EPI 2021: 141.5 ML/MIN/1.73
EOSINOPHIL # BLD AUTO: 0.2 10*3/MM3 (ref 0–0.4)
EOSINOPHIL NFR BLD AUTO: 3.4 % (ref 0.3–6.2)
ERYTHROCYTE [DISTWIDTH] IN BLOOD BY AUTOMATED COUNT: 12.5 % (ref 12.3–15.4)
GLOBULIN UR ELPH-MCNC: 2.6 GM/DL
GLUCOSE SERPL-MCNC: 98 MG/DL (ref 65–99)
HCT VFR BLD AUTO: 26.5 % (ref 34–46.6)
HGB BLD-MCNC: 9.3 G/DL (ref 12–15.9)
LIPASE SERPL-CCNC: 584 U/L (ref 13–60)
LYMPHOCYTES # BLD AUTO: 1 10*3/MM3 (ref 0.7–3.1)
LYMPHOCYTES NFR BLD AUTO: 21.5 % (ref 19.6–45.3)
MCH RBC QN AUTO: 35.6 PG (ref 26.6–33)
MCHC RBC AUTO-ENTMCNC: 35 G/DL (ref 31.5–35.7)
MCV RBC AUTO: 101.8 FL (ref 79–97)
MONOCYTES # BLD AUTO: 0.6 10*3/MM3 (ref 0.1–0.9)
MONOCYTES NFR BLD AUTO: 12.7 % (ref 5–12)
NEUTROPHILS NFR BLD AUTO: 2.9 10*3/MM3 (ref 1.7–7)
NEUTROPHILS NFR BLD AUTO: 61.3 % (ref 42.7–76)
NRBC BLD AUTO-RTO: 0 /100 WBC (ref 0–0.2)
PLATELET # BLD AUTO: 178 10*3/MM3 (ref 140–450)
PMV BLD AUTO: 7.5 FL (ref 6–12)
POTASSIUM SERPL-SCNC: 3.4 MMOL/L (ref 3.5–5.2)
PROT SERPL-MCNC: 6.5 G/DL (ref 6–8.5)
RBC # BLD AUTO: 2.6 10*6/MM3 (ref 3.77–5.28)
SODIUM SERPL-SCNC: 137 MMOL/L (ref 136–145)
WBC NRBC COR # BLD AUTO: 4.8 10*3/MM3 (ref 3.4–10.8)

## 2024-01-01 PROCEDURE — 25010000002 THIAMINE HCL 200 MG/2ML SOLUTION: Performed by: NURSE PRACTITIONER

## 2024-01-01 PROCEDURE — 80053 COMPREHEN METABOLIC PANEL: CPT | Performed by: FAMILY MEDICINE

## 2024-01-01 PROCEDURE — 25010000002 KETOROLAC TROMETHAMINE PER 15 MG: Performed by: FAMILY MEDICINE

## 2024-01-01 PROCEDURE — 83690 ASSAY OF LIPASE: CPT | Performed by: FAMILY MEDICINE

## 2024-01-01 PROCEDURE — 85025 COMPLETE CBC W/AUTO DIFF WBC: CPT | Performed by: FAMILY MEDICINE

## 2024-01-01 RX ORDER — ONDANSETRON 4 MG/1
4 TABLET, FILM COATED ORAL EVERY 8 HOURS PRN
Qty: 12 TABLET | Refills: 0 | Status: SHIPPED | OUTPATIENT
Start: 2024-01-01

## 2024-01-01 RX ORDER — LANOLIN ALCOHOL/MO/W.PET/CERES
100 CREAM (GRAM) TOPICAL DAILY
Qty: 30 TABLET | Refills: 0 | Status: SHIPPED | OUTPATIENT
Start: 2024-01-02

## 2024-01-01 RX ORDER — FOLIC ACID 1 MG/1
1 TABLET ORAL DAILY
Qty: 30 TABLET | Refills: 0 | Status: SHIPPED | OUTPATIENT
Start: 2024-01-02

## 2024-01-01 RX ORDER — POTASSIUM CHLORIDE 20 MEQ/1
40 TABLET, EXTENDED RELEASE ORAL EVERY 4 HOURS
Status: COMPLETED | OUTPATIENT
Start: 2024-01-01 | End: 2024-01-01

## 2024-01-01 RX ORDER — KETOROLAC TROMETHAMINE 10 MG/1
10 TABLET, FILM COATED ORAL EVERY 6 HOURS PRN
Qty: 12 TABLET | Refills: 0 | Status: SHIPPED | OUTPATIENT
Start: 2024-01-01

## 2024-01-01 RX ORDER — PANTOPRAZOLE SODIUM 40 MG/1
40 TABLET, DELAYED RELEASE ORAL DAILY
Qty: 30 TABLET | Refills: 0 | Status: SHIPPED | OUTPATIENT
Start: 2024-01-01

## 2024-01-01 RX ADMIN — POTASSIUM CHLORIDE 40 MEQ: 1500 TABLET, EXTENDED RELEASE ORAL at 04:36

## 2024-01-01 RX ADMIN — METOPROLOL TARTRATE 25 MG: 25 TABLET, FILM COATED ORAL at 09:07

## 2024-01-01 RX ADMIN — HYDROCODONE BITARTRATE AND ACETAMINOPHEN 1 TABLET: 5; 325 TABLET ORAL at 09:46

## 2024-01-01 RX ADMIN — THIAMINE HYDROCHLORIDE 200 MG: 100 INJECTION, SOLUTION INTRAMUSCULAR; INTRAVENOUS at 03:22

## 2024-01-01 RX ADMIN — KETOROLAC TROMETHAMINE 15 MG: 30 INJECTION, SOLUTION INTRAMUSCULAR; INTRAVENOUS at 03:22

## 2024-01-01 RX ADMIN — POTASSIUM CHLORIDE 40 MEQ: 1500 TABLET, EXTENDED RELEASE ORAL at 09:07

## 2024-01-01 RX ADMIN — FOLIC ACID 1 MG: 1 TABLET ORAL at 09:07

## 2024-01-01 RX ADMIN — PANTOPRAZOLE SODIUM 40 MG: 40 INJECTION, POWDER, FOR SOLUTION INTRAVENOUS at 03:22

## 2024-01-01 NOTE — PLAN OF CARE
Goal Outcome Evaluation: Pt A&Ox4. Up ad jenifer. Pain treated per MAR. Pt to discharge home with family.

## 2024-01-01 NOTE — DISCHARGE SUMMARY
[facility] Medicine Services  Discharge Summary    Date of Service: 2024  Patient Name: Shirin Canales  : 1990  MRN: [id number]    Date of Admission: 2023  Discharge Diagnosis: pancreatitis  Date of Discharge:  2024  Primary Care Physician: Provider, No Known      Presenting Problem:   Alcoholic ketoacidosis [E87.29]  Alcohol-induced acute pancreatitis, unspecified complication status [K85.20]    Active and Resolved Hospital Problems:  Active Hospital Problems    Diagnosis POA    **Alcoholic ketoacidosis [E87.29] Yes    Elevated blood pressure reading [R03.0] Yes    Alcohol induced acute pancreatitis without necrosis or infection [K85.20] Yes    Kidney stone [N20.0] Unknown    Hepatic steatosis [K76.0] Unknown    Dizziness [R42] Unknown    Alcohol use [Z78.9] Unknown      Resolved Hospital Problems    Diagnosis POA    Alcoholism [F10.20] Yes         Hospital Course       Hospital Course:    33 y.o. female with a PMH of alcoholic ketoacidosis who presented to [facility] on 2023  due to nausea and vomiting.  Patient reports on Tuesday she started to vomit and was unable to stop.  She explains she also began to have lower left-sided back pain that was sharp.  She explains the vomiting has happened before or after any holiday as she drinks with her family.  She reports on  she drinks a couple shots of liquor and a couple beers and did not utilize.  She denies any hematemesis, diarrhea, fever, or chills.  She explains her left-sided back pain is currently a 10 out of 10 radiating into her left leg.  She denies any aggravating factors.  She reports pain medication helps.  She denies that she drinks daily, but just around holidays.  She did also reports she gets some dizziness with standing.     In the ED, all labs Are unremarkable except pH 7.14, pO2 29.0, white blood cells 14.4, urinalysis showed moderate blood, greater than 300 protein, trace bacteria, 3-6  squamous epithelial.  CT abdomen pelvis showed Findings consistent with acute pancreatitis. Uniformly increased density throughout the gallbladder suggesting multiple noncalcified stones or sludge.   Severe hepatic steatosis.Nonspecific circumferential thickening of the distal esophagus. Nonobstructing bilateral intrarenal calculi.  All vital signs are stable except blood pressure 146/105 and heart rate 138.  Patient received Toradol, lactated ringer, Zofran in the ED.  Hospitalist were contacted to admit patient for further care management.  Patient was admitted to the hospital and gastroenterology had also seen the patient.  Her lipase slowly improved after she was given IV fluids.  Her diet was slowly advanced.  Her bilirubin also trended down.  She was also given potassium replacement accordingly as well.  Patient was then discharged home with outpatient follow-up with PCP and gastroenterology.  Alcohol cessation was advised.      DISCHARGE Follow Up Recommendations for labs and diagnostics: Follow-up with PCP and gastroenterology and follow-up for labs            Day of Discharge     Vital Signs:  Temp:  [98.9 °F (37.2 °C)-99.1 °F (37.3 °C)] 98.9 °F (37.2 °C)  Heart Rate:  [] 96  Resp:  [14-15] 15  BP: (104-117)/(66-87) 117/87    Physical Exam:  Physical Exam  Vitals and nursing note reviewed.   Constitutional:       General: She is not in acute distress.     Appearance: She is well-developed. She is not diaphoretic.   HENT:      Head: Normocephalic and atraumatic.   Cardiovascular:      Rate and Rhythm: Normal rate.   Pulmonary:      Effort: Pulmonary effort is normal. No respiratory distress.      Breath sounds: No wheezing.   Abdominal:      General: There is no distension.      Palpations: Abdomen is soft.   Musculoskeletal:         General: Normal range of motion.   Skin:     General: Skin is warm and dry.   Neurological:      Mental Status: She is alert.      Cranial Nerves: No cranial nerve  deficit.   Psychiatric:         Behavior: Behavior normal.         Thought Content: Thought content normal.         Judgment: Judgment normal.            Pertinent  and/or Most Recent Results     LAB RESULTS:      Lab 01/01/24  0307 12/30/23  0730 12/29/23  1439 12/28/23  0845   WBC 4.80 4.50 5.70 14.40*   HEMOGLOBIN 9.3* 10.1* 10.8* 13.7   HEMATOCRIT 26.5* 29.1* 31.6* 42.3   PLATELETS 178 142 134* 250   NEUTROS ABS 2.90 3.70 4.40 12.80*   LYMPHS ABS 1.00 0.60* 0.80 0.60*   MONOS ABS 0.60 0.30 0.40 0.90   EOS ABS 0.20 0.00 0.10 0.00   .8* 102.9* 104.7* 106.5*   CRP  --  5.07* 2.12*  --          Lab 01/01/24  0307 12/31/23  1646 12/31/23  0117 12/30/23  1846 12/30/23  0730 12/29/23  1439 12/28/23  1600 12/28/23  0845   SODIUM 137  --  134*  --  136 135* 135* 138   POTASSIUM 3.4* 3.8 3.5 3.8 3.5  3.5 3.1* 3.8 4.2   CHLORIDE 100  --  99  --  100 103 102 96*   CO2 27.0  --  25.0  --  20.0* 16.0* 7.0* 6.0*   ANION GAP 10.0  --  10.0  --  16.0* 16.0* 26.0* 36.0*   BUN 3*  --  2*  --  4* 5* 6 8   CREATININE 0.33*  --  0.37*  --  0.53* 0.59 0.78 0.99   EGFR 141.5  --  137.6  --  126.2 123.0 103.6 77.9   GLUCOSE 98  --  88  --  83 81 128* 177*   CALCIUM 9.3  --  9.6  --  9.9 9.2 9.3 10.0   MAGNESIUM  --   --   --   --   --  1.6  --   --    HEMOGLOBIN A1C  --   --   --   --   --   --   --  4.30*         Lab 01/01/24  0307 12/31/23  0117 12/30/23  0730 12/29/23  1439 12/28/23  1600   TOTAL PROTEIN 6.5 6.3 6.6 6.1 7.6   ALBUMIN 3.9 3.9 4.1 3.8 4.9   GLOBULIN 2.6 2.4 2.5 2.3 2.7   ALT (SGPT) 29 33 35* 33 59*   AST (SGOT) 52* 71* 68* 50* 114*   BILIRUBIN 0.9 1.6* 1.8* 1.7* 2.5*   ALK PHOS 69 63 64 61 80   LIPASE 584* 693* 1,046* 1,160* 538*                     Lab 12/28/23  1557 12/28/23  1131 12/28/23  0840   FIO2 21 21 21     Brief Urine Lab Results  (Last result in the past 365 days)        Color   Clarity   Blood   Leuk Est   Nitrite   Protein   CREAT   Urine HCG        12/28/23 1043 Dark Yellow   Clear   Moderate  (2+)   Negative   Negative   >=300 mg/dL (3+)                 Microbiology Results (last 10 days)       ** No results found for the last 240 hours. **            CT Abdomen Pelvis Without Contrast    Result Date: 12/28/2023  Impression: Impression: 1. Findings consistent with acute pancreatitis, as discussed above. 2. Uniformly increased density throughout the gallbladder suggesting multiple noncalcified stones or sludge. 3. Severe hepatic steatosis. 4. Nonspecific circumferential thickening of the distal esophagus as above. 5. Nonobstructing bilateral intrarenal calculi. Electronically Signed: Derick Tillman MD  12/28/2023 10:33 AM EST  Workstation ID: [id number]                          Consults:   Consults       Date and Time Order Name Status Description    12/28/2023 12:59 PM Inpatient Gastroenterology Consult Completed               Discharge Details        Discharge Medications        New Medications        Instructions Start Date   folic acid 1 MG tablet  Commonly known as: FOLVITE   1 mg, Oral, Daily   Start Date: January 2, 2024     ketorolac 10 MG tablet  Commonly known as: TORADOL   10 mg, Oral, Every 6 Hours PRN      metoprolol tartrate 25 MG tablet  Commonly known as: LOPRESSOR   25 mg, Oral, Every 12 Hours Scheduled      ondansetron 4 MG tablet  Commonly known as: Zofran   4 mg, Oral, Every 8 Hours PRN      pantoprazole 40 MG EC tablet  Commonly known as: PROTONIX   40 mg, Oral, Daily      thiamine 100 MG tablet  Commonly known as: VITAMIN B1   100 mg, Oral, Daily   Start Date: January 2, 2024            Continue These Medications        Instructions Start Date   hydrOXYzine 25 MG tablet  Commonly known as: ATARAX   25-50 mg, Oral, Daily PRN      mirtazapine 45 MG tablet  Commonly known as: REMERON   45 mg, Oral, Nightly      sertraline 25 MG tablet  Commonly known as: ZOLOFT   25 mg, Oral, Daily               Allergies   Allergen Reactions    Latex Unknown - Low Severity    Penicillins Unknown - High  Severity         Discharge Disposition:   Home or Self Care    Diet:  Hospital:  Diet Order   Procedures    Diet: Liquid Diets; Full Liquid; Fluid Consistency: Thin (IDDSI 0)         Discharge Activity:   Activity Instructions       Activity as Tolerated                CODE STATUS:  Code Status and Medical Interventions:   Ordered at: 12/28/23 1257     Level Of Support Discussed With:    Patient     Code Status (Patient has no pulse and is not breathing):    CPR (Attempt to Resuscitate)     Medical Interventions (Patient has pulse or is breathing):    Full Support         No future appointments.    Additional Instructions for the Follow-ups that You Need to Schedule       Discharge Follow-up with PCP   As directed       Currently Documented PCP:    Provider, No Known    PCP Phone Number:    None     Follow Up Details: 1wk        Discharge Follow-up with Specified Provider: gastroenterology; 1 Week   As directed      To: gastroenterology   Follow Up: 1 Week   Follow Up Details: pancreatitis                Time spent on Discharge including face to face service:  >35 minutes    Signature: Electronically signed by Christofer Jones MD, 01/01/24, 10:38 [facility].  [facility] Hospitalist Team

## 2024-01-01 NOTE — PLAN OF CARE
Goal Outcome Evaluation:  Plan of Care Reviewed With: patient        Progress: improving  Outcome Evaluation: Pt pain managed with po norco and iv toradol. Able to ambulate independently. Tolerating full liquids. VSS and call light within reach. Plan ongoing.

## 2024-01-01 NOTE — OUTREACH NOTE
Prep Survey      Flowsheet Row Responses   [facility] patient discharged from? [facility]   Is LACE score < 7 ? No   Eligibility Readm Mgmt   Discharge diagnosis Alcoholic ketoacidosis   Does the patient have one of the following disease processes/diagnoses(primary or secondary)? Other   Does the patient have Home health ordered? No   Is there a DME ordered? No   Medication alerts for this patient see AVS   Prep survey completed? Yes            Rosie KAN - Registered Nurse

## 2024-01-02 NOTE — CASE MANAGEMENT/SOCIAL WORK
Case Management Discharge Note      Final Note: HOME         Selected Continued Care - Discharged on 1/1/2024 Admission date: 12/28/2023 - Discharge disposition: Home or Self Care            Transportation Services  Private: Car    Final Discharge Disposition Code: 01 - home or self-care

## 2024-01-04 NOTE — PAYOR COMM NOTE
"Shirin Metz (33 y.o. Female)       Date of Birth   1990    Social Security Number       Address   [address] [place] [address] [place] [facility]    Home Phone   [phone]    MRN   [id number]       Lutheran   None    Marital Status   Single                            Admission Date   23    Admission Type   Emergency    Admitting Provider   Christofer Jones MD    Attending Provider       Department, Room/Bed   [facility] SURGICAL INPATIENT, /       Discharge Date   2024    Discharge Disposition   Home or Self Care    Discharge Destination                                 Attending Provider: (none)   Allergies: Latex, Penicillins    Isolation: None   Infection: None   Code Status: Prior    Ht: 160 cm (62.99\")   Wt: 63.5 kg (139 lb 15.9 oz)    Admission Cmt: None   Principal Problem: Alcoholic ketoacidosis [E87.29]                   Active Insurance as of 2023       Primary Coverage       Payor Plan Insurance Group Employer/Plan Group    [employer] BLUE CROSS [employer] BLUE CROSS BLUE [employer]IELD PPO [id number]       Payor Plan Address Payor Plan Phone Number Payor Plan Fax Number Effective Dates    [address] [phone]  2021 - None Entered    [place] [address]         Subscriber Name Subscriber Birth Date Member ID       SHIRIN METZ 1990 [id number]                     Emergency Contacts        (Rel.) Home Phone Work Phone Mobile Phone    Doris Landers (Mother) -- -- [phone]    Sujey Salgado (Son) [phone] -- --                 Discharge Summary        Christofer Jones MD at [address] [place] [address] Medicine Services  Discharge Summary    Date of Service: 2024  Patient Name: Shirin Metz  : 1990  MRN: [id number]    Date of Admission: 2023  Discharge Diagnosis: pancreatitis  Date of Discharge:  2024  Primary Care Physician: Provider, No Known      Presenting Problem:   Alcoholic ketoacidosis " [E87.29]  Alcohol-induced acute pancreatitis, unspecified complication status [K85.20]    Active and Resolved Hospital Problems:  Active Hospital Problems    Diagnosis POA    **Alcoholic ketoacidosis [E87.29] Yes    Elevated blood pressure reading [R03.0] Yes    Alcohol induced acute pancreatitis without necrosis or infection [K85.20] Yes    Kidney stone [N20.0] Unknown    Hepatic steatosis [K76.0] Unknown    Dizziness [R42] Unknown    Alcohol use [Z78.9] Unknown      Resolved Hospital Problems    Diagnosis POA    Alcoholism [F10.20] Yes         Hospital Course       Hospital Course:    33 y.o. female with a PMH of alcoholic ketoacidosis who presented to [facility] on 12/28/2023  due to nausea and vomiting.  Patient reports on Tuesday she started to vomit and was unable to stop.  She explains she also began to have lower left-sided back pain that was sharp.  She explains the vomiting has happened before or after any holiday as she drinks with her family.  She reports on Christmas she drinks a couple shots of liquor and a couple beers and did not utilize.  She denies any hematemesis, diarrhea, fever, or chills.  She explains her left-sided back pain is currently a 10 out of 10 radiating into her left leg.  She denies any aggravating factors.  She reports pain medication helps.  She denies that she drinks daily, but just around holidays.  She did also reports she gets some dizziness with standing.     In the ED, all labs Are unremarkable except pH 7.14, pO2 29.0, white blood cells 14.4, urinalysis showed moderate blood, greater than 300 protein, trace bacteria, 3-6 squamous epithelial.  CT abdomen pelvis showed Findings consistent with acute pancreatitis. Uniformly increased density throughout the gallbladder suggesting multiple noncalcified stones or sludge.   Severe hepatic steatosis.Nonspecific circumferential thickening of the distal esophagus. Nonobstructing bilateral intrarenal calculi.  All vital  signs are stable except blood pressure 146/105 and heart rate 138.  Patient received Toradol, lactated ringer, Zofran in the ED.  Hospitalist were contacted to admit patient for further care management.  Patient was admitted to the hospital and gastroenterology had also seen the patient.  Her lipase slowly improved after she was given IV fluids.  Her diet was slowly advanced.  Her bilirubin also trended down.  She was also given potassium replacement accordingly as well.  Patient was then discharged home with outpatient follow-up with PCP and gastroenterology.  Alcohol cessation was advised.      DISCHARGE Follow Up Recommendations for labs and diagnostics: Follow-up with PCP and gastroenterology and follow-up for labs            Day of Discharge     Vital Signs:  Temp:  [98.9 °F (37.2 °C)-99.1 °F (37.3 °C)] 98.9 °F (37.2 °C)  Heart Rate:  [] 96  Resp:  [14-15] 15  BP: (104-117)/(66-87) 117/87    Physical Exam:  Physical Exam  Vitals and nursing note reviewed.   Constitutional:       General: She is not in acute distress.     Appearance: She is well-developed. She is not diaphoretic.   HENT:      Head: Normocephalic and atraumatic.   Cardiovascular:      Rate and Rhythm: Normal rate.   Pulmonary:      Effort: Pulmonary effort is normal. No respiratory distress.      Breath sounds: No wheezing.   Abdominal:      General: There is no distension.      Palpations: Abdomen is soft.   Musculoskeletal:         General: Normal range of motion.   Skin:     General: Skin is warm and dry.   Neurological:      Mental Status: She is alert.      Cranial Nerves: No cranial nerve deficit.   Psychiatric:         Behavior: Behavior normal.         Thought Content: Thought content normal.         Judgment: Judgment normal.            Pertinent  and/or Most Recent Results     LAB RESULTS:      Lab 01/01/24  0307 12/30/23  0730 12/29/23  1439 12/28/23  0845   WBC 4.80 4.50 5.70 14.40*   HEMOGLOBIN 9.3* 10.1* 10.8* 13.7    HEMATOCRIT 26.5* 29.1* 31.6* 42.3   PLATELETS 178 142 134* 250   NEUTROS ABS 2.90 3.70 4.40 12.80*   LYMPHS ABS 1.00 0.60* 0.80 0.60*   MONOS ABS 0.60 0.30 0.40 0.90   EOS ABS 0.20 0.00 0.10 0.00   .8* 102.9* 104.7* 106.5*   CRP  --  5.07* 2.12*  --          Lab 01/01/24 0307 12/31/23  1646 12/31/23 0117 12/30/23  1846 12/30/23  0730 12/29/23  1439 12/28/23  1600 12/28/23  0845   SODIUM 137  --  134*  --  136 135* 135* 138   POTASSIUM 3.4* 3.8 3.5 3.8 3.5  3.5 3.1* 3.8 4.2   CHLORIDE 100  --  99  --  100 103 102 96*   CO2 27.0  --  25.0  --  20.0* 16.0* 7.0* 6.0*   ANION GAP 10.0  --  10.0  --  16.0* 16.0* 26.0* 36.0*   BUN 3*  --  2*  --  4* 5* 6 8   CREATININE 0.33*  --  0.37*  --  0.53* 0.59 0.78 0.99   EGFR 141.5  --  137.6  --  126.2 123.0 103.6 77.9   GLUCOSE 98  --  88  --  83 81 128* 177*   CALCIUM 9.3  --  9.6  --  9.9 9.2 9.3 10.0   MAGNESIUM  --   --   --   --   --  1.6  --   --    HEMOGLOBIN A1C  --   --   --   --   --   --   --  4.30*         Lab 01/01/24 0307 12/31/23  0117 12/30/23  0730 12/29/23  1439 12/28/23  1600   TOTAL PROTEIN 6.5 6.3 6.6 6.1 7.6   ALBUMIN 3.9 3.9 4.1 3.8 4.9   GLOBULIN 2.6 2.4 2.5 2.3 2.7   ALT (SGPT) 29 33 35* 33 59*   AST (SGOT) 52* 71* 68* 50* 114*   BILIRUBIN 0.9 1.6* 1.8* 1.7* 2.5*   ALK PHOS 69 63 64 61 80   LIPASE 584* 693* 1,046* 1,160* 538*                     Lab 12/28/23  1557 12/28/23  1131 12/28/23  0840   FIO2 21 21 21     Brief Urine Lab Results  (Last result in the past 365 days)        Color   Clarity   Blood   Leuk Est   Nitrite   Protein   CREAT   Urine HCG        12/28/23 1043 Dark Yellow   Clear   Moderate (2+)   Negative   Negative   >=300 mg/dL (3+)                 Microbiology Results (last 10 days)       ** No results found for the last 240 hours. **            CT Abdomen Pelvis Without Contrast    Result Date: 12/28/2023  Impression: Impression: 1. Findings consistent with acute pancreatitis, as discussed above. 2. Uniformly increased  density throughout the gallbladder suggesting multiple noncalcified stones or sludge. 3. Severe hepatic steatosis. 4. Nonspecific circumferential thickening of the distal esophagus as above. 5. Nonobstructing bilateral intrarenal calculi. Electronically Signed: Derick Tillman MD  12/28/2023 10:33 AM EST  Workstation ID: [id number]                          Consults:   Consults       Date and Time Order Name Status Description    12/28/2023 12:59 PM Inpatient Gastroenterology Consult Completed               Discharge Details        Discharge Medications        New Medications        Instructions Start Date   folic acid 1 MG tablet  Commonly known as: FOLVITE   1 mg, Oral, Daily   Start Date: January 2, 2024     ketorolac 10 MG tablet  Commonly known as: TORADOL   10 mg, Oral, Every 6 Hours PRN      metoprolol tartrate 25 MG tablet  Commonly known as: LOPRESSOR   25 mg, Oral, Every 12 Hours Scheduled      ondansetron 4 MG tablet  Commonly known as: Zofran   4 mg, Oral, Every 8 Hours PRN      pantoprazole 40 MG EC tablet  Commonly known as: PROTONIX   40 mg, Oral, Daily      thiamine 100 MG tablet  Commonly known as: VITAMIN B1   100 mg, Oral, Daily   Start Date: January 2, 2024            Continue These Medications        Instructions Start Date   hydrOXYzine 25 MG tablet  Commonly known as: ATARAX   25-50 mg, Oral, Daily PRN      mirtazapine 45 MG tablet  Commonly known as: REMERON   45 mg, Oral, Nightly      sertraline 25 MG tablet  Commonly known as: ZOLOFT   25 mg, Oral, Daily               Allergies   Allergen Reactions    Latex Unknown - Low Severity    Penicillins Unknown - High Severity         Discharge Disposition:   Home or Self Care    Diet:  Hospital:  Diet Order   Procedures    Diet: Liquid Diets; Full Liquid; Fluid Consistency: Thin (IDDSI 0)         Discharge Activity:   Activity Instructions       Activity as Tolerated                CODE STATUS:  Code Status and Medical Interventions:   Ordered at:  12/28/23 1257     Level Of Support Discussed With:    Patient     Code Status (Patient has no pulse and is not breathing):    CPR (Attempt to Resuscitate)     Medical Interventions (Patient has pulse or is breathing):    Full Support         No future appointments.    Additional Instructions for the Follow-ups that You Need to Schedule       Discharge Follow-up with PCP   As directed       Currently Documented PCP:    Provider, No Known    PCP Phone Number:    None     Follow Up Details: 1wk        Discharge Follow-up with Specified Provider: gastroenterology; 1 Week   As directed      To: gastroenterology   Follow Up: 1 Week   Follow Up Details: pancreatitis                Time spent on Discharge including face to face service:  >35 minutes    Signature: Electronically signed by Christofer Jones MD, 01/01/24, 10:38 EST.  [facility] Hospitalist Team     Electronically signed by Christofer Jones MD at 01/01/24 1040

## 2024-01-11 ENCOUNTER — READMISSION MANAGEMENT (OUTPATIENT)
Dept: CALL CENTER | Facility: HOSPITAL | Age: 34
End: 2024-01-11
Payer: COMMERCIAL

## 2024-01-11 NOTE — OUTREACH NOTE
Medical Week 2 Survey      Flowsheet Row Responses   [facility] patient discharged from? [facility]   Does the patient have one of the following disease processes/diagnoses(primary or secondary)? Other   Week 2 attempt successful? Yes   Call start time 1224   Discharge diagnosis Alcoholic ketoacidosis   Call end time 1225   Meds reviewed with patient/caregiver? Yes   Is the patient taking all medications as directed (includes completed medication regime)? Yes   Does the patient have a primary care provider?  Yes   Has the patient kept scheduled appointments due by today? Yes   Has home health visited the patient within 72 hours of discharge? N/A   Psychosocial issues? No   Did the patient receive a copy of their discharge instructions? Yes   Nursing interventions Reviewed instructions with patient   What is the patient's perception of their health status since discharge? Returned to baseline/stable   Is the patient/caregiver able to teach back signs and symptoms related to disease process for when to call PCP? Yes   Is the patient/caregiver able to teach back signs and symptoms related to disease process for when to call 911? Yes   Is the patient/caregiver able to teach back the hierarchy of who to call/visit for symptoms/problems? PCP, Specialist, Home health nurse, Urgent Care, ED, 911 Yes   If the patient is a current smoker, are they able to teach back resources for cessation? Not a smoker   Week 2 Call Completed? Yes   Graduated Yes   Did the patient feel the follow up calls were helpful during their recovery period? Yes   Was the number of calls appropriate? Yes   Is the patient interested in additional calls from an ambulatory ? No   Would this patient benefit from a Referral to [facility] Social Work? No   Call end time 1225            Tamia Blackwood Nurse

## 2024-06-26 ENCOUNTER — HOSPITAL ENCOUNTER (INPATIENT)
Facility: HOSPITAL | Age: 34
LOS: 3 days | Discharge: HOME | DRG: 439 | End: 2024-06-29
Attending: EMERGENCY MEDICINE | Admitting: INTERNAL MEDICINE
Payer: COMMERCIAL

## 2024-06-26 ENCOUNTER — APPOINTMENT (OUTPATIENT)
Dept: CT IMAGING | Facility: HOSPITAL | Age: 34
DRG: 439 | End: 2024-06-26
Payer: COMMERCIAL

## 2024-06-26 DIAGNOSIS — N17.9 ACUTE KIDNEY INJURY: ICD-10-CM

## 2024-06-26 DIAGNOSIS — K85.90 ACUTE PANCREATITIS, UNSPECIFIED COMPLICATION STATUS, UNSPECIFIED PANCREATITIS TYPE: Primary | ICD-10-CM

## 2024-06-26 DIAGNOSIS — R11.10 VOMITING, UNSPECIFIED VOMITING TYPE, UNSPECIFIED WHETHER NAUSEA PRESENT: ICD-10-CM

## 2024-06-26 LAB
ALBUMIN SERPL-MCNC: 5.4 G/DL (ref 3.5–5.2)
ALBUMIN/GLOB SERPL: 1.2 G/DL
ALP SERPL-CCNC: 148 U/L (ref 39–117)
ALT SERPL W P-5'-P-CCNC: 87 U/L (ref 1–33)
AMPHET+METHAMPHET UR QL: NEGATIVE
ANION GAP SERPL CALCULATED.3IONS-SCNC: 21.9 MMOL/L (ref 5–15)
APAP SERPL-MCNC: <5 MCG/ML (ref 0–30)
ARTERIAL PATENCY WRIST A: POSITIVE
AST SERPL-CCNC: 97 U/L (ref 1–32)
ATMOSPHERIC PRESS: ABNORMAL MM[HG]
B PARAPERT DNA SPEC QL NAA+PROBE: NOT DETECTED
B PERT DNA SPEC QL NAA+PROBE: NOT DETECTED
B-HCG UR QL: NEGATIVE
BACTERIA UR QL AUTO: ABNORMAL /HPF
BARBITURATES UR QL SCN: NEGATIVE
BASE EXCESS BLDA CALC-SCNC: -15.1 MMOL/L (ref 0–3)
BASOPHILS # BLD AUTO: 0.02 10*3/MM3 (ref 0–0.2)
BASOPHILS NFR BLD AUTO: 0.1 % (ref 0–1.5)
BDY SITE: ABNORMAL
BENZODIAZ UR QL SCN: NEGATIVE
BILIRUB SERPL-MCNC: 1.8 MG/DL (ref 0–1.2)
BILIRUB UR QL STRIP: ABNORMAL
BUN SERPL-MCNC: 46 MG/DL (ref 6–20)
BUN/CREAT SERPL: 22 (ref 7–25)
C PNEUM DNA NPH QL NAA+NON-PROBE: NOT DETECTED
CALCIUM SPEC-SCNC: 12 MG/DL (ref 8.6–10.5)
CANNABINOIDS SERPL QL: NEGATIVE
CHLORIDE SERPL-SCNC: 107 MMOL/L (ref 98–107)
CK SERPL-CCNC: 14 U/L (ref 20–180)
CLARITY UR: ABNORMAL
CO2 BLDA-SCNC: 8.8 MMOL/L (ref 22–29)
CO2 SERPL-SCNC: 9.1 MMOL/L (ref 22–29)
COCAINE UR QL: NEGATIVE
COLOR UR: ABNORMAL
CREAT SERPL-MCNC: 2.09 MG/DL (ref 0.57–1)
D-LACTATE SERPL-SCNC: 1.1 MMOL/L (ref 0.5–2)
D-LACTATE SERPL-SCNC: 2 MMOL/L (ref 0.5–2)
DEPRECATED RDW RBC AUTO: 49.6 FL (ref 37–54)
EGFRCR SERPLBLD CKD-EPI 2021: 31.8 ML/MIN/1.73
EOSINOPHIL # BLD AUTO: 0 10*3/MM3 (ref 0–0.4)
EOSINOPHIL NFR BLD AUTO: 0 % (ref 0.3–6.2)
EOSINOPHIL SPEC QL MICRO: 0 % EOS/100 CELLS (ref 0–0)
ERYTHROCYTE [DISTWIDTH] IN BLOOD BY AUTOMATED COUNT: 13.3 % (ref 12.3–15.4)
ETHANOL UR QL: <0.01 %
FLUAV SUBTYP SPEC NAA+PROBE: NOT DETECTED
FLUBV RNA ISLT QL NAA+PROBE: NOT DETECTED
GLOBULIN UR ELPH-MCNC: 4.5 GM/DL
GLUCOSE SERPL-MCNC: 238 MG/DL (ref 65–99)
GLUCOSE UR STRIP-MCNC: NEGATIVE MG/DL
HADV DNA SPEC NAA+PROBE: NOT DETECTED
HCO3 BLDA-SCNC: 8.3 MMOL/L (ref 21–28)
HCOV 229E RNA SPEC QL NAA+PROBE: NOT DETECTED
HCOV HKU1 RNA SPEC QL NAA+PROBE: NOT DETECTED
HCOV NL63 RNA SPEC QL NAA+PROBE: NOT DETECTED
HCOV OC43 RNA SPEC QL NAA+PROBE: NOT DETECTED
HCT VFR BLD AUTO: 41.6 % (ref 34–46.6)
HEMODILUTION: NO
HGB BLD-MCNC: 14.4 G/DL (ref 12–15.9)
HGB UR QL STRIP.AUTO: ABNORMAL
HMPV RNA NPH QL NAA+NON-PROBE: NOT DETECTED
HOLD SPECIMEN: NORMAL
HPIV1 RNA ISLT QL NAA+PROBE: NOT DETECTED
HPIV2 RNA SPEC QL NAA+PROBE: NOT DETECTED
HPIV3 RNA NPH QL NAA+PROBE: NOT DETECTED
HPIV4 P GENE NPH QL NAA+PROBE: NOT DETECTED
HYALINE CASTS UR QL AUTO: ABNORMAL /LPF
IMM GRANULOCYTES # BLD AUTO: 0.1 10*3/MM3 (ref 0–0.05)
IMM GRANULOCYTES NFR BLD AUTO: 0.6 % (ref 0–0.5)
INHALED O2 CONCENTRATION: 21 %
KETONES UR QL STRIP: ABNORMAL
LEUKOCYTE ESTERASE UR QL STRIP.AUTO: ABNORMAL
LIPASE SERPL-CCNC: 461 U/L (ref 13–60)
LYMPHOCYTES # BLD AUTO: 0.53 10*3/MM3 (ref 0.7–3.1)
LYMPHOCYTES NFR BLD AUTO: 3.3 % (ref 19.6–45.3)
Lab: ABNORMAL
M PNEUMO IGG SER IA-ACNC: NOT DETECTED
MCH RBC QN AUTO: 35 PG (ref 26.6–33)
MCHC RBC AUTO-ENTMCNC: 34.6 G/DL (ref 31.5–35.7)
MCV RBC AUTO: 101.2 FL (ref 79–97)
METHADONE UR QL SCN: NEGATIVE
MODALITY: ABNORMAL
MONOCYTES # BLD AUTO: 1.54 10*3/MM3 (ref 0.1–0.9)
MONOCYTES NFR BLD AUTO: 9.7 % (ref 5–12)
NEUTROPHILS NFR BLD AUTO: 13.74 10*3/MM3 (ref 1.7–7)
NEUTROPHILS NFR BLD AUTO: 86.3 % (ref 42.7–76)
NITRITE UR QL STRIP: POSITIVE
NOTIFIED WHO: ABNORMAL
NRBC BLD AUTO-RTO: 0 /100 WBC (ref 0–0.2)
OPIATES UR QL: NEGATIVE
OSMOLALITY SERPL: 322 MOSM/KG (ref 275–295)
OXYCODONE UR QL SCN: NEGATIVE
PCO2 BLDA: 16.2 MM HG (ref 35–48)
PH BLDA: 7.32 PH UNITS (ref 7.35–7.45)
PH UR STRIP.AUTO: 5.5 [PH] (ref 5–8)
PLATELET # BLD AUTO: 169 10*3/MM3 (ref 140–450)
PMV BLD AUTO: 9.8 FL (ref 6–12)
PO2 BLD: 539 MM[HG] (ref 0–500)
PO2 BLDA: 113.2 MM HG (ref 83–108)
POTASSIUM SERPL-SCNC: 4 MMOL/L (ref 3.5–5.2)
PROT SERPL-MCNC: 9.9 G/DL (ref 6–8.5)
PROT UR QL STRIP: ABNORMAL
PTH-INTACT SERPL-MCNC: 7.9 PG/ML (ref 15–65)
RBC # BLD AUTO: 4.11 10*6/MM3 (ref 3.77–5.28)
RBC # UR STRIP: ABNORMAL /HPF
READ BACK: ABNORMAL
REF LAB TEST METHOD: ABNORMAL
RHINOVIRUS RNA SPEC NAA+PROBE: DETECTED
RSV RNA NPH QL NAA+NON-PROBE: NOT DETECTED
SALICYLATES SERPL-MCNC: <0.5 MG/DL
SAO2 % BLDCOA: 98.2 % (ref 94–98)
SARS-COV-2 RNA NPH QL NAA+NON-PROBE: NOT DETECTED
SODIUM SERPL-SCNC: 138 MMOL/L (ref 136–145)
SODIUM UR-SCNC: <20 MMOL/L
SP GR UR STRIP: 1.02 (ref 1–1.03)
SQUAMOUS #/AREA URNS HPF: ABNORMAL /HPF
TSH SERPL DL<=0.05 MIU/L-ACNC: 0.87 UIU/ML (ref 0.27–4.2)
URATE SERPL-MCNC: 12.3 MG/DL (ref 2.4–5.7)
UROBILINOGEN UR QL STRIP: ABNORMAL
WBC # UR STRIP: ABNORMAL /HPF
WBC CASTS #/AREA URNS LPF: ABNORMAL /LPF
WBC NRBC COR # BLD AUTO: 15.93 10*3/MM3 (ref 3.4–10.8)

## 2024-06-26 PROCEDURE — 80307 DRUG TEST PRSMV CHEM ANLYZR: CPT | Performed by: NURSE PRACTITIONER

## 2024-06-26 PROCEDURE — 83605 ASSAY OF LACTIC ACID: CPT | Performed by: NURSE PRACTITIONER

## 2024-06-26 PROCEDURE — 25810000003 SODIUM CHLORIDE 0.9 % SOLUTION: Performed by: EMERGENCY MEDICINE

## 2024-06-26 PROCEDURE — 25010000002 ONDANSETRON PER 1 MG: Performed by: EMERGENCY MEDICINE

## 2024-06-26 PROCEDURE — 82693 ASSAY OF ETHYLENE GLYCOL: CPT | Performed by: INTERNAL MEDICINE

## 2024-06-26 PROCEDURE — 85007 BL SMEAR W/DIFF WBC COUNT: CPT | Performed by: NURSE PRACTITIONER

## 2024-06-26 PROCEDURE — 84165 PROTEIN E-PHORESIS SERUM: CPT | Performed by: INTERNAL MEDICINE

## 2024-06-26 PROCEDURE — 82010 KETONE BODYS QUAN: CPT | Performed by: INTERNAL MEDICINE

## 2024-06-26 PROCEDURE — 84155 ASSAY OF PROTEIN SERUM: CPT | Performed by: INTERNAL MEDICINE

## 2024-06-26 PROCEDURE — 80050 GENERAL HEALTH PANEL: CPT | Performed by: EMERGENCY MEDICINE

## 2024-06-26 PROCEDURE — 25010000002 FOMEPIZOLE 1.5 GM/1.5ML SOLUTION 1.5 ML VIAL: Performed by: INTERNAL MEDICINE

## 2024-06-26 PROCEDURE — 83930 ASSAY OF BLOOD OSMOLALITY: CPT | Performed by: INTERNAL MEDICINE

## 2024-06-26 PROCEDURE — 82803 BLOOD GASES ANY COMBINATION: CPT | Performed by: NURSE PRACTITIONER

## 2024-06-26 PROCEDURE — 81025 URINE PREGNANCY TEST: CPT | Performed by: EMERGENCY MEDICINE

## 2024-06-26 PROCEDURE — 25010000002 HYDRALAZINE PER 20 MG: Performed by: INTERNAL MEDICINE

## 2024-06-26 PROCEDURE — 36600 WITHDRAWAL OF ARTERIAL BLOOD: CPT | Performed by: NURSE PRACTITIONER

## 2024-06-26 PROCEDURE — G0480 DRUG TEST DEF 1-7 CLASSES: HCPCS | Performed by: INTERNAL MEDICINE

## 2024-06-26 PROCEDURE — 83970 ASSAY OF PARATHORMONE: CPT | Performed by: INTERNAL MEDICINE

## 2024-06-26 PROCEDURE — 84300 ASSAY OF URINE SODIUM: CPT | Performed by: INTERNAL MEDICINE

## 2024-06-26 PROCEDURE — 80143 DRUG ASSAY ACETAMINOPHEN: CPT | Performed by: INTERNAL MEDICINE

## 2024-06-26 PROCEDURE — 84550 ASSAY OF BLOOD/URIC ACID: CPT | Performed by: INTERNAL MEDICINE

## 2024-06-26 PROCEDURE — 84600 ASSAY OF VOLATILES: CPT | Performed by: INTERNAL MEDICINE

## 2024-06-26 PROCEDURE — 80320 DRUG SCREEN QUANTALCOHOLS: CPT | Performed by: INTERNAL MEDICINE

## 2024-06-26 PROCEDURE — 82330 ASSAY OF CALCIUM: CPT | Performed by: INTERNAL MEDICINE

## 2024-06-26 PROCEDURE — 87040 BLOOD CULTURE FOR BACTERIA: CPT | Performed by: NURSE PRACTITIONER

## 2024-06-26 PROCEDURE — 25010000002 HYDROMORPHONE 1 MG/ML SOLUTION: Performed by: NURSE PRACTITIONER

## 2024-06-26 PROCEDURE — 25010000002 ENOXAPARIN PER 10 MG: Performed by: NURSE PRACTITIONER

## 2024-06-26 PROCEDURE — 80179 DRUG ASSAY SALICYLATE: CPT | Performed by: INTERNAL MEDICINE

## 2024-06-26 PROCEDURE — 99285 EMERGENCY DEPT VISIT HI MDM: CPT

## 2024-06-26 PROCEDURE — 25010000002 CEFTRIAXONE PER 250 MG: Performed by: NURSE PRACTITIONER

## 2024-06-26 PROCEDURE — 85025 COMPLETE CBC W/AUTO DIFF WBC: CPT | Performed by: NURSE PRACTITIONER

## 2024-06-26 PROCEDURE — 82077 ASSAY SPEC XCP UR&BREATH IA: CPT | Performed by: INTERNAL MEDICINE

## 2024-06-26 PROCEDURE — 83690 ASSAY OF LIPASE: CPT | Performed by: EMERGENCY MEDICINE

## 2024-06-26 PROCEDURE — 93005 ELECTROCARDIOGRAM TRACING: CPT | Performed by: EMERGENCY MEDICINE

## 2024-06-26 PROCEDURE — 87205 SMEAR GRAM STAIN: CPT | Performed by: INTERNAL MEDICINE

## 2024-06-26 PROCEDURE — 83605 ASSAY OF LACTIC ACID: CPT | Performed by: INTERNAL MEDICINE

## 2024-06-26 PROCEDURE — 36415 COLL VENOUS BLD VENIPUNCTURE: CPT

## 2024-06-26 PROCEDURE — 0202U NFCT DS 22 TRGT SARS-COV-2: CPT | Performed by: EMERGENCY MEDICINE

## 2024-06-26 PROCEDURE — 25810000003 SODIUM CHLORIDE 0.9 % SOLUTION: Performed by: NURSE PRACTITIONER

## 2024-06-26 PROCEDURE — 74176 CT ABD & PELVIS W/O CONTRAST: CPT

## 2024-06-26 PROCEDURE — 87086 URINE CULTURE/COLONY COUNT: CPT | Performed by: EMERGENCY MEDICINE

## 2024-06-26 PROCEDURE — 82550 ASSAY OF CK (CPK): CPT | Performed by: INTERNAL MEDICINE

## 2024-06-26 PROCEDURE — 82077 ASSAY SPEC XCP UR&BREATH IA: CPT | Performed by: NURSE PRACTITIONER

## 2024-06-26 PROCEDURE — 81001 URINALYSIS AUTO W/SCOPE: CPT | Performed by: EMERGENCY MEDICINE

## 2024-06-26 RX ORDER — METOPROLOL TARTRATE 1 MG/ML
5 INJECTION, SOLUTION INTRAVENOUS ONCE
Status: COMPLETED | OUTPATIENT
Start: 2024-06-26 | End: 2024-06-26

## 2024-06-26 RX ORDER — BISACODYL 5 MG/1
5 TABLET, DELAYED RELEASE ORAL DAILY PRN
Status: DISCONTINUED | OUTPATIENT
Start: 2024-06-26 | End: 2024-06-29 | Stop reason: HOSPADM

## 2024-06-26 RX ORDER — POLYETHYLENE GLYCOL 3350 17 G/17G
17 POWDER, FOR SOLUTION ORAL DAILY PRN
Status: DISCONTINUED | OUTPATIENT
Start: 2024-06-26 | End: 2024-06-29 | Stop reason: HOSPADM

## 2024-06-26 RX ORDER — HYDRALAZINE HYDROCHLORIDE 20 MG/ML
10 INJECTION INTRAMUSCULAR; INTRAVENOUS EVERY 6 HOURS PRN
Status: DISCONTINUED | OUTPATIENT
Start: 2024-06-26 | End: 2024-06-29 | Stop reason: HOSPADM

## 2024-06-26 RX ORDER — SODIUM CHLORIDE 9 MG/ML
100 INJECTION, SOLUTION INTRAVENOUS CONTINUOUS
Status: DISCONTINUED | OUTPATIENT
Start: 2024-06-26 | End: 2024-06-26

## 2024-06-26 RX ORDER — SODIUM CHLORIDE 0.9 % (FLUSH) 0.9 %
10 SYRINGE (ML) INJECTION EVERY 12 HOURS SCHEDULED
Status: DISCONTINUED | OUTPATIENT
Start: 2024-06-26 | End: 2024-06-29 | Stop reason: HOSPADM

## 2024-06-26 RX ORDER — SODIUM CHLORIDE 9 MG/ML
40 INJECTION, SOLUTION INTRAVENOUS AS NEEDED
Status: DISCONTINUED | OUTPATIENT
Start: 2024-06-26 | End: 2024-06-29 | Stop reason: HOSPADM

## 2024-06-26 RX ORDER — PANTOPRAZOLE SODIUM 40 MG/10ML
40 INJECTION, POWDER, LYOPHILIZED, FOR SOLUTION INTRAVENOUS
Status: DISCONTINUED | OUTPATIENT
Start: 2024-06-26 | End: 2024-06-29 | Stop reason: HOSPADM

## 2024-06-26 RX ORDER — BISACODYL 10 MG
10 SUPPOSITORY, RECTAL RECTAL DAILY PRN
Status: DISCONTINUED | OUTPATIENT
Start: 2024-06-26 | End: 2024-06-29 | Stop reason: HOSPADM

## 2024-06-26 RX ORDER — SODIUM CHLORIDE 0.9 % (FLUSH) 0.9 %
10 SYRINGE (ML) INJECTION AS NEEDED
Status: DISCONTINUED | OUTPATIENT
Start: 2024-06-26 | End: 2024-06-29 | Stop reason: HOSPADM

## 2024-06-26 RX ORDER — ONDANSETRON 2 MG/ML
4 INJECTION INTRAMUSCULAR; INTRAVENOUS ONCE
Status: COMPLETED | OUTPATIENT
Start: 2024-06-26 | End: 2024-06-26

## 2024-06-26 RX ORDER — ENOXAPARIN SODIUM 100 MG/ML
40 INJECTION SUBCUTANEOUS EVERY 24 HOURS
Status: DISCONTINUED | OUTPATIENT
Start: 2024-06-26 | End: 2024-06-29 | Stop reason: HOSPADM

## 2024-06-26 RX ORDER — ONDANSETRON 4 MG/1
4 TABLET, FILM COATED ORAL EVERY 6 HOURS PRN
COMMUNITY

## 2024-06-26 RX ORDER — AMOXICILLIN 250 MG
2 CAPSULE ORAL 2 TIMES DAILY PRN
Status: DISCONTINUED | OUTPATIENT
Start: 2024-06-26 | End: 2024-06-29 | Stop reason: HOSPADM

## 2024-06-26 RX ADMIN — METOPROLOL TARTRATE 5 MG: 1 INJECTION, SOLUTION INTRAVENOUS at 14:20

## 2024-06-26 RX ADMIN — PANTOPRAZOLE SODIUM 40 MG: 40 INJECTION, POWDER, FOR SOLUTION INTRAVENOUS at 13:38

## 2024-06-26 RX ADMIN — SODIUM BICARBONATE: 84 INJECTION, SOLUTION INTRAVENOUS at 19:15

## 2024-06-26 RX ADMIN — SODIUM CHLORIDE 100 ML/HR: 9 INJECTION, SOLUTION INTRAVENOUS at 13:38

## 2024-06-26 RX ADMIN — ENOXAPARIN SODIUM 40 MG: 100 INJECTION SUBCUTANEOUS at 16:22

## 2024-06-26 RX ADMIN — HYDROMORPHONE HYDROCHLORIDE 0.5 MG: 1 INJECTION, SOLUTION INTRAMUSCULAR; INTRAVENOUS; SUBCUTANEOUS at 16:48

## 2024-06-26 RX ADMIN — FOMEPIZOLE 950 MG: 1 INJECTION, SOLUTION INTRAVENOUS at 21:14

## 2024-06-26 RX ADMIN — CEFTRIAXONE 1000 MG: 1 INJECTION, POWDER, FOR SOLUTION INTRAMUSCULAR; INTRAVENOUS at 16:22

## 2024-06-26 RX ADMIN — ONDANSETRON 4 MG: 2 INJECTION INTRAMUSCULAR; INTRAVENOUS at 09:02

## 2024-06-26 RX ADMIN — Medication 10 ML: at 19:21

## 2024-06-26 RX ADMIN — SODIUM CHLORIDE 1000 ML: 9 INJECTION, SOLUTION INTRAVENOUS at 09:01

## 2024-06-26 RX ADMIN — HYDRALAZINE HYDROCHLORIDE 10 MG: 20 INJECTION, SOLUTION INTRAMUSCULAR; INTRAVENOUS at 22:14

## 2024-06-26 NOTE — CASE MANAGEMENT/SOCIAL WORK
Discharge Planning Assessment   [facility]     Patient Name: Shirin Canales  MRN: [id number]  Today's Date: 6/26/2024    Admit Date: 6/26/2024    Plan: From routine home.   Discharge Needs Assessment       Row Name 06/26/24 1607       Living Environment    People in Home alone    Current Living Arrangements home    Potentially Unsafe Housing Conditions none    In the past 12 months has the electric, gas, oil, or water company threatened to shut off services in your home? No    Primary Care Provided by self    Provides Primary Care For no one    Family Caregiver if Needed none    Quality of Family Relationships unable to assess    Able to Return to Prior Arrangements yes       Resource/Environmental Concerns    Resource/Environmental Concerns none    Transportation Concerns none       Transportation Needs    In the past 12 months, has lack of transportation kept you from medical appointments or from getting medications? no    In the past 12 months, has lack of transportation kept you from meetings, work, or from getting things needed for daily living? No       Food Insecurity    Within the past 12 months, you worried that your food would run out before you got the money to buy more. Never true    Within the past 12 months, the food you bought just didn't last and you didn't have money to get more. Never true       Transition Planning    Patient/Family Anticipates Transition to home    Patient/Family Anticipated Services at Transition none    Transportation Anticipated car, drives self       Discharge Needs Assessment    Readmission Within the Last 30 Days no previous admission in last 30 days    Equipment Currently Used at Home none    Concerns to be Addressed no discharge needs identified;denies needs/concerns at this time    Anticipated Changes Related to Illness none    Equipment Needed After Discharge none    Provided Post Acute Provider List? N/A    Provided Post Acute Provider Quality & Resource List? N/A                    Discharge Plan       Row Name 06/26/24 1608       Plan    Plan From routine home.    Patient/Family in Agreement with Plan yes    Provided Post Acute Provider List? N/A    Provided Post Acute Provider Quality & Resource List? N/A    Plan Comments CM met with patient at the bedside but patient was very lethargic. Confirmed PCP (Sophie Landers but unable to locate in directory), insurance, and pharmacy. Patient declines [facility]. Patient denies any difficulty affording food, utilities, or medications. Patient is not current with any HHC/OPPT/OT services. Patient lives at home alone, is IADLs and drives. Patient plans to drive herself at d/c. DC Barriers: Nephrology/GI/Nutrition following, IV abx, IVF, urine cultures pending, Elevated AST and ALT, elevated Lipase                  Continued Care and Services - Admitted Since 6/26/2024    No active coordination exists for this encounter.          Demographic Summary       Row Name 06/26/24 1605       General Information    Admission Type inpatient    Arrived From emergency department    Referral Source admission list    Reason for Consult discharge planning    Preferred Language English       Contact Information    Permission Granted to Share Info With     Contact Information Obtained for                    Functional Status       Row Name 06/26/24 1605       Functional Status    Usual Activity Tolerance good    Current Activity Tolerance good       Functional Status, IADL    Medications independent    Meal Preparation independent    Housekeeping independent    Laundry independent    Shopping independent       Mental Status    General Appearance WDL WDL       Mental Status Summary    Recent Changes in Mental Status/Cognitive Functioning no changes             Tisha Hanks RN     [phone]  [email or web address]

## 2024-06-26 NOTE — ED PROVIDER NOTES
"Subjective   History of Present Illness  Chief complaint: Vomiting    33-year-old female presents with nausea and vomiting.  Patient states she has not felt well for 2 days.  He has had general weakness and lightheadedness.  She reports some mild abdominal pain associated with her vomiting.  She has had a cough as well.  She states she had a fever yesterday up to 101.  She states her child is also sick with a respiratory virus.    History provided by:  Patient      Review of Systems   Constitutional:  Positive for fever.   HENT:  Negative for congestion.    Respiratory:  Negative for cough and shortness of breath.    Cardiovascular:  Negative for chest pain.   Gastrointestinal:  Positive for abdominal pain, nausea and vomiting. Negative for diarrhea.   Musculoskeletal:  Negative for back pain.   Neurological:  Positive for light-headedness. Negative for headaches.   Psychiatric/Behavioral:  Negative for confusion.        History reviewed. No pertinent past medical history.    Allergies   Allergen Reactions    Latex Unknown - Low Severity    Penicillins Unknown - High Severity       History reviewed. No pertinent surgical history.    History reviewed. No pertinent family history.    Social History     Socioeconomic History    Marital status: Single   Tobacco Use    Smoking status: Former     Current packs/day: 0.00     Types: Cigarettes     Quit date:      Years since quittin.4    Smokeless tobacco: Never   Vaping Use    Vaping status: Never Used   Substance and Sexual Activity    Alcohol use: Yes     Alcohol/week: 3.0 standard drinks of alcohol     Types: 3 Drinks containing 0.5 oz of alcohol per week     Comment: OCCASIONALLY    Drug use: Not Currently    Sexual activity: Defer       BP (!) 155/118   Pulse 116   Temp 97.6 °F (36.4 °C) (Oral)   Resp 16   Ht 160 cm (63\")   Wt 63 kg (139 lb)   SpO2 100%   BMI 24.62 kg/m²       Objective   Physical Exam  Vitals and nursing note reviewed. "   Constitutional:       Appearance: Normal appearance.   HENT:      Head: Normocephalic and atraumatic.      Mouth/Throat:      Mouth: Mucous membranes are moist.   Cardiovascular:      Rate and Rhythm: Regular rhythm. Tachycardia present.      Heart sounds: Normal heart sounds.   Pulmonary:      Effort: Pulmonary effort is normal. No respiratory distress.      Breath sounds: Normal breath sounds.   Abdominal:      Palpations: Abdomen is soft.      Tenderness: There is abdominal tenderness in the epigastric area. There is no guarding or rebound.   Skin:     General: Skin is warm and dry.   Neurological:      Mental Status: She is alert and oriented to person, place, and time.         Procedures           ED Course      My interpretation of EKG shows sinus tachycardia, rate of 129, no ST elevation                           Results for orders placed or performed during the hospital encounter of 06/26/24   Respiratory Panel PCR w/COVID-19(SARS-CoV-2) [facility]ILY/ROSEMARIE/MARY/[facility]/ANGEL In-House, NP Swab in UTM/VTM, 2 HR TAT - Swab, Nasopharynx    Specimen: Nasopharynx; Swab   Result Value Ref Range    ADENOVIRUS, PCR Not Detected Not Detected    Coronavirus 229E Not Detected Not Detected    Coronavirus HKU1 Not Detected Not Detected    Coronavirus NL63 Not Detected Not Detected    Coronavirus OC43 Not Detected Not Detected    COVID19 Not Detected Not Detected - Ref. Range    Human Metapneumovirus Not Detected Not Detected    Human Rhinovirus/Enterovirus Detected (A) Not Detected    Influenza A PCR Not Detected Not Detected    Influenza B PCR Not Detected Not Detected    Parainfluenza Virus 1 Not Detected Not Detected    Parainfluenza Virus 2 Not Detected Not Detected    Parainfluenza Virus 3 Not Detected Not Detected    Parainfluenza Virus 4 Not Detected Not Detected    RSV, PCR Not Detected Not Detected    Bordetella pertussis pcr Not Detected Not Detected    Bordetella parapertussis PCR Not Detected Not Detected    Chlamydophila  pneumoniae PCR Not Detected Not Detected    Mycoplasma pneumo by PCR Not Detected Not Detected   Comprehensive Metabolic Panel    Specimen: Blood   Result Value Ref Range    Glucose 238 (H) 65 - 99 mg/dL    BUN 46 (H) 6 - 20 mg/dL    Creatinine 2.09 (H) 0.57 - 1.00 mg/dL    Sodium 138 136 - 145 mmol/L    Potassium 4.0 3.5 - 5.2 mmol/L    Chloride 107 98 - 107 mmol/L    CO2 9.1 (C) 22.0 - 29.0 mmol/L    Calcium 12.0 (H) 8.6 - 10.5 mg/dL    Total Protein 9.9 (H) 6.0 - 8.5 g/dL    Albumin 5.4 (H) 3.5 - 5.2 g/dL    ALT (SGPT) 87 (H) 1 - 33 U/L    AST (SGOT) 97 (H) 1 - 32 U/L    Alkaline Phosphatase 148 (H) 39 - 117 U/L    Total Bilirubin 1.8 (H) 0.0 - 1.2 mg/dL    Globulin 4.5 gm/dL    A/G Ratio 1.2 g/dL    BUN/Creatinine Ratio 22.0 7.0 - 25.0    Anion Gap 21.9 (H) 5.0 - 15.0 mmol/L    eGFR 31.8 (L) >60.0 mL/min/1.73   Lipase    Specimen: Blood   Result Value Ref Range    Lipase 461 (H) 13 - 60 U/L   Urinalysis With Culture If Indicated - Urine, Clean Catch    Specimen: Urine, Clean Catch   Result Value Ref Range    Color, UA Dark Yellow (A) Yellow, Straw    Appearance, UA Hazy (A) Clear    pH, UA 5.5 5.0 - 8.0    Specific Gravity, UA 1.023 1.005 - 1.030    Glucose, UA Negative Negative    Ketones, UA 15 mg/dL (1+) (A) Negative    Bilirubin, UA Large (3+) (A) Negative    Blood, UA Moderate (2+) (A) Negative    Protein, UA >=300 mg/dL (3+) (A) Negative    Leuk Esterase, UA Small (1+) (A) Negative    Nitrite, UA Positive (A) Negative    Urobilinogen, UA 1.0 E.U./dL 0.2 - 1.0 E.U./dL   Pregnancy, Urine - Urine, Clean Catch    Specimen: Urine, Clean Catch   Result Value Ref Range    HCG, Urine QL Negative Negative   CBC Auto Differential    Specimen: Blood   Result Value Ref Range    WBC 15.93 (H) 3.40 - 10.80 10*3/mm3    RBC 4.11 3.77 - 5.28 10*6/mm3    Hemoglobin 14.4 12.0 - 15.9 g/dL    Hematocrit 41.6 34.0 - 46.6 %    .2 (H) 79.0 - 97.0 fL    MCH 35.0 (H) 26.6 - 33.0 pg    MCHC 34.6 31.5 - 35.7 g/dL    RDW 13.3  12.3 - 15.4 %    RDW-SD 49.6 37.0 - 54.0 fl    MPV 9.8 6.0 - 12.0 fL    Platelets 169 140 - 450 10*3/mm3    Neutrophil % 86.3 (H) 42.7 - 76.0 %    Lymphocyte % 3.3 (L) 19.6 - 45.3 %    Monocyte % 9.7 5.0 - 12.0 %    Eosinophil % 0.0 (L) 0.3 - 6.2 %    Basophil % 0.1 0.0 - 1.5 %    Immature Grans % 0.6 (H) 0.0 - 0.5 %    Neutrophils, Absolute 13.74 (H) 1.70 - 7.00 10*3/mm3    Lymphocytes, Absolute 0.53 (L) 0.70 - 3.10 10*3/mm3    Monocytes, Absolute 1.54 (H) 0.10 - 0.90 10*3/mm3    Eosinophils, Absolute 0.00 0.00 - 0.40 10*3/mm3    Basophils, Absolute 0.02 0.00 - 0.20 10*3/mm3    Immature Grans, Absolute 0.10 (H) 0.00 - 0.05 10*3/mm3    nRBC 0.0 0.0 - 0.2 /100 WBC   Urinalysis, Microscopic Only - Urine, Clean Catch    Specimen: Urine, Clean Catch   Result Value Ref Range    RBC, UA 0-2 None Seen, 0-2 /HPF    WBC, UA 6-10 (A) None Seen, 0-2 /HPF    Bacteria, UA Trace (A) None Seen /HPF    Squamous Epithelial Cells, UA 13-20 (A) None Seen, 0-2 /HPF    Hyaline Casts, UA 3-6 None Seen /LPF    WBC Casts 0-2 None Seen /LPF    Methodology Manual Light Microscopy    ECG 12 Lead Tachycardia   Result Value Ref Range    QT Interval 289 ms    QTC Interval 424 ms   Gold Top - SST   Result Value Ref Range    Extra Tube Hold for add-ons.      CT Abdomen Pelvis Without Contrast    Result Date: 6/26/2024  Impression: Severe fatty liver, stable. Findings suggest noncalcified stones or sludge throughout the gallbladder although no evidence of acute cholecystitis. Nonobstructing renal stones. No acute process. Electronically Signed: Gabriela Agrawal MD  6/26/2024 11:10 AM EDT  Workstation ID: [id number]               Medical Decision Making  Amount and/or Complexity of Data Reviewed  Labs: ordered.  Radiology: ordered.  ECG/medicine tests: ordered.    Risk  Prescription drug management.      Patient had the above evaluation.  Results were discussed with the patient.  White blood cell count is elevated at 15.9.  CMP significant for mild  elevation of LFTs as well as acute kidney injury with BUN 46 and creatinine 2.09.  Bicarb is low at 9.1.  Lipase is elevated at 461.  CT of the abdomen pelvis was obtained which showed findings suggestive of noncalcified stones or sludge throughout the gallbladder although no evidence of cholecystitis.  Patient was initially tachycardic with a heart rate in the 130s and 140s.  She was given IV fluids and heart rate is improving.  Blood pressure has been stable.  I discussed with the nurse practitioner on-call for the hospitalist and the patient will be admitted for further evaluation and management.      Final diagnoses:   Acute pancreatitis, unspecified complication status, unspecified pancreatitis type   Vomiting, unspecified vomiting type, unspecified whether nausea present   Acute kidney injury       ED Disposition  ED Disposition       ED Disposition   Decision to Admit    Condition   --    Comment   Level of Care: Med/Surg [1]   Diagnosis: Vomiting [592100]   Admitting Physician: BARBRA OCHOA [[id number]]   Attending Physician: BARRBA OCHOA [[id number]]   Isolate for COVID?: No [0]   Certification: I Certify That Inpatient Hospital Services Are Medically Necessary For Greater Than 2 Midnights                 No follow-up provider specified.       Medication List      No changes were made to your prescriptions during this visit.            David Decker MD  06/26/24 6020

## 2024-06-26 NOTE — H&P
[facility] Medicine Services  History & Physical    Patient Name: Shirin Canales  : 1990  MRN: [id number]  Primary Care Physician:  Provider, No Known  Date of admission: 2024  Date and Time of Service: 2024 at 12:25 PM EDT      Subjective      Chief Complaint: Vomiting, abdominal pain    History of Present Illness: Shirin Canales is a 33 y.o. female with a CMH of pancreatitis, alcoholic ketoacidosis, kidney stone, hepatic steatosis who presented to [facility] on 2024 with vomiting x 2 days the emesis has been bilious, nonbloody patient states exacerbating factors include movement.  There has been nausea.  Patient states that she does consume alcohol, however informed her last drink was 2 weeks ago.  Patient denies any drug use..    While in the emergency department labs remarkable for CO2 of 9.1.  Anion gap of 21.9.  BUN 46, creatinine 2.09, EGFR 31.8, alk phos 148, albumin 5.4, AST 97, ALT 87, lipase 461, WBC 15.93.  Her respiratory panel was positive for rhinovirus.  Her urine appeared dark and hazy, positive ketones blood positive nitrites and leuks greater than 300 protein, large bilirubin, 6-10 whites.  CT scan of abdomen and pelvis noted severe fatty liver, which is stable.  Also noncalcified stones or sludge throughout the gallbladder but no evidence of acute cholecystitis.    Review of Systems  Review of Systems   Constitutional: Negative.    HENT: Negative.     Respiratory: Negative.     Cardiovascular: Negative.    Gastrointestinal:  Positive for nausea and vomiting.   Genitourinary: Negative.    Musculoskeletal: Negative.  Positive for weakness  Skin: Negative.    Neurological:  Positive for dizziness.  Psychiatric/Behavioral: Negative.  Personal History     History reviewed. No pertinent past medical history.    History reviewed. No pertinent surgical history.    Family History: family history is not on file. Otherwise pertinent FHx was reviewed and not pertinent  to current issue.    Social History:  reports that she quit smoking about 9 years ago. Her smoking use included cigarettes. She has never used smokeless tobacco. She reports current alcohol use of about 3.0 standard drinks of alcohol per week. She reports that she does not currently use drugs.    Home Medications:  Prior to Admission Medications       Prescriptions Last Dose Informant Patient Reported? Taking?    mirtazapine (REMERON) 45 MG tablet Past Week  Yes Yes    Take 1 tablet by mouth At Night As Needed.    ondansetron (ZOFRAN) 4 MG tablet 6/26/2024  Yes Yes    Take 1 tablet by mouth Every 6 (Six) Hours As Needed for Nausea or Vomiting.              Allergies:  Allergies   Allergen Reactions    Latex Unknown - Low Severity    Penicillins Unknown - High Severity       Objective      Vitals:   Temp:  [97.6 °F (36.4 °C)] 97.6 °F (36.4 °C)  Heart Rate:  [116-146] 116  Resp:  [16] 16  BP: (126-155)/() 155/118  Body mass index is 24.62 kg/m².  Physical Exam  PHYSICAL EXAM  Constitutional:  Well developed, well nourished, no acute distress, non-toxic appearance   Eyes:  PERRL, conjunctiva normal, EOMI   HENT:  Atraumatic, external ears normal, nose normal, oropharynx moist, no pharyngeal exudates. Neck-normal range of motion, no tenderness, supple, trachea midline  Respiratory: CTAB, non-labored respirations without accessory muscle use  Cardiovascular:  Normal rate, normal rhythm, no murmurs, no gallops, no rubs   GI:  Soft, nondistended, normal bowel sounds, tenderness noted, no organomegaly, no mass, no rebound, no guarding   :  No costovertebral angle tenderness   Musculoskeletal:  No edema, no tenderness, no deformities  Integument:  Well hydrated, no rash   Lymphatic:  No lymphadenopathy noted   Neurologic: Drowsy & oriented x 3, CN 2-12 normal, normal motor function, normal sensory function, no focal deficits noted   Psychiatric:  Speech and behavior sluggish    Diagnostic Data:  Lab Results (last 24  hours)       Procedure Component Value Units Date/Time    Urinalysis, Microscopic Only - Urine, Clean Catch [[id number]]  (Abnormal) Collected: 06/26/24 1032    Specimen: Urine, Clean Catch Updated: 06/26/24 1057     RBC, UA 0-2 /HPF      WBC, UA 6-10 /HPF      Bacteria, UA Trace /HPF      Squamous Epithelial Cells, UA 13-20 /HPF      Hyaline Casts, UA 3-6 /LPF      WBC Casts 0-2 /LPF      Methodology Manual Light Microscopy    Urine Culture - Urine, Urine, Clean Catch [[id number]] Collected: 06/26/24 1032    Specimen: Urine, Clean Catch Updated: 06/26/24 1057    Urinalysis With Culture If Indicated - Urine, Clean Catch [[id number]]  (Abnormal) Collected: 06/26/24 1032    Specimen: Urine, Clean Catch Updated: 06/26/24 1043     Color, UA Dark Yellow     Appearance, UA Hazy     pH, UA 5.5     Specific Gravity, UA 1.023     Glucose, UA Negative     Ketones, UA 15 mg/dL (1+)     Bilirubin, UA Large (3+)     Comment: Confirmation testing is unavailable.  A serum bilirubin is recommended for further assessment.        Blood, UA Moderate (2+)     Protein, UA >=300 mg/dL (3+)     Leuk Esterase, UA Small (1+)     Nitrite, UA Positive     Urobilinogen, UA 1.0 E.U./dL    Narrative:      In absence of clinical symptoms, the presence of pyuria, bacteria, and/or nitrites on the urinalysis result does not correlate with infection.    Pregnancy, Urine - Urine, Clean Catch [[id number]]  (Normal) Collected: 06/26/24 1032    Specimen: Urine, Clean Catch Updated: 06/26/24 1043     HCG, Urine QL Negative    Extra Tubes [[id number]] Collected: 06/26/24 1026    Specimen: Blood, Venous Line Updated: 06/26/24 1030    Narrative:      The following orders were created for panel order Extra Tubes.  Procedure                               Abnormality         Status                     ---------                               -----------         ------                     Gold Top - SST[[id number]]                                   Final result                  Please view results for these tests on the individual orders.    [facility] [[id number]] Collected: 06/26/24 1026    Specimen: Blood Updated: 06/26/24 1030     Extra Tube Hold for add-ons.     Comment: Auto resulted.       Comprehensive Metabolic Panel [[id number]]  (Abnormal) Collected: 06/26/24 0900    Specimen: Blood Updated: 06/26/24 1010     Glucose 238 mg/dL      BUN 46 mg/dL      Creatinine 2.09 mg/dL      Sodium 138 mmol/L      Potassium 4.0 mmol/L      Chloride 107 mmol/L      CO2 9.1 mmol/L      Calcium 12.0 mg/dL      Total Protein 9.9 g/dL      Albumin 5.4 g/dL      ALT (SGPT) 87 U/L      AST (SGOT) 97 U/L      Alkaline Phosphatase 148 U/L      Total Bilirubin 1.8 mg/dL      Globulin 4.5 gm/dL      A/G Ratio 1.2 g/dL      BUN/Creatinine Ratio 22.0     Anion Gap 21.9 mmol/L      eGFR 31.8 mL/min/1.73     Narrative:      GFR Normal >60  Chronic Kidney Disease <60  Kidney Failure <15      Respiratory Panel PCR w/COVID-19(SARS-CoV-2) HAILY/ROSEMARIE/MARY/PAD/COR/ANGEL In-House, NP Swab in UTM/VTM, 2 HR TAT - Swab, Nasopharynx [[id number]]  (Abnormal) Collected: 06/26/24 0901    Specimen: Swab from Nasopharynx Updated: 06/26/24 1002     ADENOVIRUS, PCR Not Detected     Coronavirus 229E Not Detected     Coronavirus HKU1 Not Detected     Coronavirus NL63 Not Detected     Coronavirus OC43 Not Detected     COVID19 Not Detected     Human Metapneumovirus Not Detected     Human Rhinovirus/Enterovirus Detected     Influenza A PCR Not Detected     Influenza B PCR Not Detected     Parainfluenza Virus 1 Not Detected     Parainfluenza Virus 2 Not Detected     Parainfluenza Virus 3 Not Detected     Parainfluenza Virus 4 Not Detected     RSV, PCR Not Detected     Bordetella pertussis pcr Not Detected     Bordetella parapertussis PCR Not Detected     Chlamydophila pneumoniae PCR Not Detected     Mycoplasma pneumo by PCR Not Detected    Narrative:      In the setting of a positive respiratory panel with a viral infection PLUS  a negative procalcitonin without other underlying concern for bacterial infection, consider observing off antibiotics or discontinuation of antibiotics and continue supportive care. If the respiratory panel is positive for atypical bacterial infection (Bordetella pertussis, Chlamydophila pneumoniae, or Mycoplasma pneumoniae), consider antibiotic de-escalation to target atypical bacterial infection.    Lipase [[id number]]  (Abnormal) Collected: 06/26/24 0900    Specimen: Blood Updated: 06/26/24 0944     Lipase 461 U/L     CBC & Differential [[id number]]  (Abnormal) Collected: 06/26/24 0900    Specimen: Blood Updated: 06/26/24 0913    Narrative:      The following orders were created for panel order CBC & Differential.  Procedure                               Abnormality         Status                     ---------                               -----------         ------                     CBC Auto Differential[[id number]]        Abnormal            Final result                 Please view results for these tests on the individual orders.    CBC Auto Differential [[id number]]  (Abnormal) Collected: 06/26/24 0900    Specimen: Blood Updated: 06/26/24 0913     WBC 15.93 10*3/mm3      RBC 4.11 10*6/mm3      Hemoglobin 14.4 g/dL      Hematocrit 41.6 %      .2 fL      MCH 35.0 pg      MCHC 34.6 g/dL      RDW 13.3 %      RDW-SD 49.6 fl      MPV 9.8 fL      Platelets 169 10*3/mm3      Neutrophil % 86.3 %      Lymphocyte % 3.3 %      Monocyte % 9.7 %      Eosinophil % 0.0 %      Basophil % 0.1 %      Immature Grans % 0.6 %      Neutrophils, Absolute 13.74 10*3/mm3      Lymphocytes, Absolute 0.53 10*3/mm3      Monocytes, Absolute 1.54 10*3/mm3      Eosinophils, Absolute 0.00 10*3/mm3      Basophils, Absolute 0.02 10*3/mm3      Immature Grans, Absolute 0.10 10*3/mm3      nRBC 0.0 /100 WBC              Imaging Results (Last 24 Hours)       Procedure Component Value Units Date/Time    CT Abdomen Pelvis Without Contrast  [[id number]] Collected: 06/26/24 1107     Updated: 06/26/24 1112    Narrative:      CT ABDOMEN PELVIS WO CONTRAST    Date of Exam: 6/26/2024 11:02 AM EDT    Indication: Abdominal pain, vomiting.    Comparison: 12/28/2023    Technique: Axial CT images were obtained of the abdomen and pelvis without the administration of contrast. Sagittal and coronal reconstructions were performed.  Automated exposure control and iterative reconstruction methods were used.      Findings:  Severe fatty liver is again identified. The nonopacified solid organs are normal in size. There is diffuse increased density throughout the gallbladder as before. There is no gallbladder wall thickening or edema or pericholecystic fluid and there is no   bile duct dilatation. Punctate nonobstructing renal stones are again seen. There is no hydronephrosis. The bladder is poorly distended. There is no pelvic mass or free fluid. There is no large or small bowel dilatation. There are no focal inflammatory   changes. The abdominal aorta is normal in size.      Impression:      Impression:  Severe fatty liver, stable. Findings suggest noncalcified stones or sludge throughout the gallbladder although no evidence of acute cholecystitis.    Nonobstructing renal stones.    No acute process.            Electronically Signed: Gabriela Agrawal MD    6/26/2024 11:10 AM EDT    Workstation ID: [id number]              Assessment & Plan        This is a 33 y.o. female with:    Active and Resolved Problems  Active Hospital Problems    Diagnosis  POA    **Vomiting [R11.10]  Yes      Resolved Hospital Problems   No resolved problems to display.       Vomiting    VTE Prophylaxis:  Pharmacologic VTE prophylaxis orders are present.    Vomiting  Transaminitis  - History of alcohol consumption  - Lipase 461  - Elevated AST and ALT  - Abdominal CT scan noted severe fatty liver, stones as well as sludge in the gallbladder, no acute cholecystitis  - GI consult    UTI  -Positive  nitrates, ketones, protein in urine positive WBCs  - Rocephin ordered  -Urine culture pending    ZACHARIAH  - BUN 46, creatinine 2.08  - EGFR 31.8  - Proteinuria  - Normal saline IV fluids  - Nephrology consult    Weakness  - Likely secondary to symptoms  - PT OT      The patient desires to be as follows:    CODE STATUS:    Level Of Support Discussed With: Patient  Code Status (Patient has no pulse and is not breathing): CPR (Attempt to Resuscitate)  Medical Interventions (Patient has pulse or is breathing): Full Support        Doris Landers, who can be contacted at [phone], is the designated person to make medical decisions on the patient's behalf if She is incapable of doing so. This was clarified with patient and/or next of kin on 6/26/2024 during the course of this H&P.    Admission Status:  I believe this patient meets inpatient status.    Expected Length of Stay: 2 days    PDMP and Medication Dispenses via Sidebar reviewed and consistent with patient reported medications.    I discussed the patient's findings and my recommendations with patient and family.      Signature:     This document has been electronically signed by BEVERLY Ford on June 26, 2024 12:25 EDT   [facility] Hospitalist Team

## 2024-06-26 NOTE — CONSULTS
NEPHROLOGY CONSULTATION-----[facility]    [facility]  [phone]  Alec Morrow MD    Patient Care Team:  Provider, No Known as PCP - Yoli Arzola, RN as Registered Nurse  Odalys, MD David as Consulting Physician (Nephrology)    CC/REASON FOR CONSULTATION: RENAL FAILURE/ACIDOSIS    PHYSICIAN REQUESTING CONSULTATION:     History of Present Illness    HPI    Patient is a 33 y.o. WF  whom I was asked to see in consultation for evaluation and management of renal failure/elevated serum creatinine and acidosis. Patient was admitted after presenting to ER with generalized weakness, n/v, abdominal pain and lightheadedness. Patient denies any toxic ingestions or alcohol use.  Patient denies prior knowledge of functional kidney disease, proteinuria, or hematuria. No NSAIDs or recent IV dye exposure. No known h/o hepatitis, TB, rheumatic fever, jaundice, SLE, bleeding/bruising disorders.  No urinary sx. No edema or fluid retention.  +Compliance with home meds. Was not on diuretics prior to admission. Was not on ACE-I/ARB prior to admission. No herbal med use outside of a new B12/MVI/iron/folate/Lysine (vitamin) supplement.    Review of Systems   Constitutional:  Positive for activity change, appetite change and fatigue. Negative for chills, diaphoresis, fever and unexpected weight change.   HENT:  Negative for congestion, dental problem, drooling, ear discharge, ear pain, facial swelling, hearing loss, mouth sores, nosebleeds, postnasal drip, rhinorrhea, sinus pressure, sinus pain, sneezing, sore throat, tinnitus, trouble swallowing and voice change.    Eyes:  Negative for photophobia, pain, discharge, redness, itching and visual disturbance.   Respiratory:  Negative for apnea, cough, choking, chest tightness, shortness of breath, wheezing and stridor.    Cardiovascular:  Negative for chest pain, palpitations and leg swelling.    Gastrointestinal:  Positive for abdominal pain, nausea and vomiting. Negative for abdominal distention, anal bleeding, blood in stool, constipation, diarrhea and rectal pain.   Endocrine: Negative for cold intolerance, heat intolerance, polydipsia, polyphagia and polyuria.   Genitourinary:  Negative for decreased urine volume, difficulty urinating, dysuria, enuresis, flank pain, frequency, genital sores, hematuria and urgency.   Musculoskeletal:  Positive for arthralgias, back pain and myalgias. Negative for gait problem, joint swelling, neck pain and neck stiffness.   Skin:  Negative for color change, pallor, rash and wound.   Allergic/Immunologic: Negative for environmental allergies, food allergies and immunocompromised state.   Neurological:  Positive for weakness. Negative for dizziness, tremors, seizures, syncope, facial asymmetry, speech difficulty, light-headedness, numbness and headaches.   Hematological:  Negative for adenopathy. Does not bruise/bleed easily.   Psychiatric/Behavioral:  Positive for dysphoric mood. Negative for agitation, behavioral problems, confusion, decreased concentration, hallucinations, self-injury, sleep disturbance and suicidal ideas. The patient is not nervous/anxious and is not hyperactive.           History reviewed. No pertinent past medical history.    History reviewed. No pertinent surgical history.    History reviewed. No pertinent family history.    Social History     Tobacco Use    Smoking status: Former     Current packs/day: 0.00     Types: Cigarettes     Quit date:      Years since quittin.4    Smokeless tobacco: Never   Vaping Use    Vaping status: Never Used   Substance Use Topics    Alcohol use: Yes     Alcohol/week: 3.0 standard drinks of alcohol     Types: 3 Drinks containing 0.5 oz of alcohol per week     Comment: OCCASIONALLY    Drug use: Not Currently       Home Meds: (Not in a hospital admission)      Scheduled Meds:  cefTRIAXone, 1,000 mg,  Intravenous, Q24H  enoxaparin, 40 mg, Subcutaneous, Q24H  pantoprazole, 40 mg, Intravenous, Q AM  sodium chloride, 10 mL, Intravenous, Q12H        Continuous Infusions:  sodium chloride, 100 mL/hr, Last Rate: 100 mL/hr (06/26/24 1825)        PRN Meds:    senna-docusate sodium **AND** polyethylene glycol **AND** bisacodyl **AND** bisacodyl    Calcium Replacement - Follow Nurse / BPA Driven Protocol    HYDROmorphone    Magnesium Standard Dose Replacement - Follow Nurse / BPA Driven Protocol    Phosphorus Replacement - Follow Nurse / BPA Driven Protocol    Potassium Replacement - Follow Nurse / BPA Driven Protocol    [COMPLETED] Insert Peripheral IV **AND** sodium chloride    sodium chloride    sodium chloride    Allergies:  Latex and Penicillins    OBJECTIVE    Vital Signs  Temp:  [97.3 °F (36.3 °C)-97.6 °F (36.4 °C)] 97.4 °F (36.3 °C)  Heart Rate:  [103-146] 118  Resp:  [16-18] 18  BP: (126-155)/() 142/115    I/O this shift:  In: 1000 [IV Piggyback:1000]  Out: -   No intake/output data recorded.    Physical Exam:  General Appearance: alert, appears stated age and cooperative BUT TIRED AND WEAK  Head: normocephalic, without obvious abnormality and atraumatic. +DRY AND PARCHED OP  Eyes: conjunctivae and sclerae normal and no icterus  Neck: supple and no JVD  Lungs: clear to auscultation and respirations regular  Heart: regular rhythm & normal rate and normal S1, S2  Chest Wall: no abnormalities observed  Abdomen: normal bowel sounds and soft, nontender  Extremities: moves extremities well, no edema, no cyanosis  Skin: no bleeding, bruising or rash. +DECREASED SKIN TURGOR  Neurologic: Alert, and oriented x 4 BUT LITTLE LETHARGIC. No focal deficits    Results Review:    I reviewed the patient's new clinical results.    WBC WBC   Date Value Ref Range Status   06/26/2024 15.93 (H) 3.40 - 10.80 10*3/mm3 Final      HGB Hemoglobin   Date Value Ref Range Status   06/26/2024 14.4 12.0 - 15.9 g/dL Final      HCT Hematocrit  "  Date Value Ref Range Status   06/26/2024 41.6 34.0 - 46.6 % Final      Platelets No results found for: \"LABPLAT\"   MCV MCV   Date Value Ref Range Status   06/26/2024 101.2 (H) 79.0 - 97.0 fL Final          Sodium Sodium   Date Value Ref Range Status   06/26/2024 138 136 - 145 mmol/L Final      Potassium Potassium   Date Value Ref Range Status   06/26/2024 4.0 3.5 - 5.2 mmol/L Final      Chloride Chloride   Date Value Ref Range Status   06/26/2024 107 98 - 107 mmol/L Final      CO2 CO2   Date Value Ref Range Status   06/26/2024 9.1 (C) 22.0 - 29.0 mmol/L Final      BUN BUN   Date Value Ref Range Status   06/26/2024 46 (H) 6 - 20 mg/dL Final      Creatinine Creatinine   Date Value Ref Range Status   06/26/2024 2.09 (H) 0.57 - 1.00 mg/dL Final      Calcium Calcium   Date Value Ref Range Status   06/26/2024 12.0 (H) 8.6 - 10.5 mg/dL Final      PO4 No results found for: \"CAPO4\"   Albumin Albumin   Date Value Ref Range Status   06/26/2024 5.4 (H) 3.5 - 5.2 g/dL Final      Magnesium No results found for: \"MG\"   Uric Acid No results found for: \"URICACID\"       Imaging Results (Last 72 Hours)       Procedure Component Value Units Date/Time    CT Abdomen Pelvis Without Contrast [[id number]] Collected: 06/26/24 1107     Updated: 06/26/24 1112    Narrative:      CT ABDOMEN PELVIS WO CONTRAST    Date of Exam: 6/26/2024 11:02 AM EDT    Indication: Abdominal pain, vomiting.    Comparison: 12/28/2023    Technique: Axial CT images were obtained of the abdomen and pelvis without the administration of contrast. Sagittal and coronal reconstructions were performed.  Automated exposure control and iterative reconstruction methods were used.      Findings:  Severe fatty liver is again identified. The nonopacified solid organs are normal in size. There is diffuse increased density throughout the gallbladder as before. There is no gallbladder wall thickening or edema or pericholecystic fluid and there is no   bile duct dilatation. Punctate " nonobstructing renal stones are again seen. There is no hydronephrosis. The bladder is poorly distended. There is no pelvic mass or free fluid. There is no large or small bowel dilatation. There are no focal inflammatory   changes. The abdominal aorta is normal in size.      Impression:      Impression:  Severe fatty liver, stable. Findings suggest noncalcified stones or sludge throughout the gallbladder although no evidence of acute cholecystitis.    Nonobstructing renal stones.    No acute process.            Electronically Signed: Gabriela Agrawal MD    6/26/2024 11:10 AM EDT    Workstation ID: [id number]              Results for orders placed during the hospital encounter of 04/23/23    XR Chest 1 View    Narrative  XR CHEST 1 VW    Date of Exam: 4/24/2023 1:50 AM EDT    Indication: possible sepsis, r/o aspiration    Comparison: None available.    Findings:  Heart size and pulmonary vessels are within normal limits. Lungs are clear bilaterally. There are no pleural effusions. There is no evidence of pneumothorax.    Impression  Impression:  No acute cardiopulmonary disease.      Electronically Signed: Valentino Aldridge  4/24/2023 7:43 AM EDT  Workstation ID: [id number]            ASSESSMENT / PLAN      Vomiting      RENAL FAILURE------Nonoliguric. +ARF/ZACHARIAH with what appears to be normal   Normal baseline renal function. +ARF/ZACHARIAH appears to be secondary to severe prerenal state/intravascular volume depletion and ATN from hypercalcemia. Concern for toxic ingestion given lethargy, acidosis, and hypercalcemia. Hydrate aggressively with bicarb gtt. Check urine and serum studies and renal US. No NSAIDs or IV dye. Dose meds for CrCl less than 10 cc/min. Follow for dialysis need if acidosis is refractory or if renal function/azotemia worsen    2. ACIDOSIS------Metabolic. Acute. Severe. Elevated AGAP. Elevated Osmolar gap. STAT Ethylene Glycol and Methanol levels ordered. Lactic normal. Will also check volatile compounds,  Salicylate, Acetaminophen, Paraldahyde levels. Again, patient denies toxic ingestions but there is concern for Ethylene Glycol ingestion given acidosis, elevated osmloar gap, hypercalcemia, and lethargy. Follow for hemodialysis need    3. HYPERCALCEMIA------Follow with hydration. Check urine and serum studies and ethylene glycol levels    4. HUMAN RHINOVIRUS INFECTION    5. ABDOMINAL PAIN/N/V/FATTY LIVER/ELEVATED TRANSAMINASES    6. TOBACCO ABUSE      I discussed the patient's findings and my recommendations with patient and nursing staff and Pharmacist    Will follow along closely. Thank you for allowing us to see this patient in renal consultation.    [facility]  [phone]  MD Alec Kern MD  06/26/24  18:27 EDT

## 2024-06-26 NOTE — LETTER
June 29, 2024     Patient: Shirin Canales   YOB: 1990   Date of Visit: 6/26/2024       To Whom It May Concern:    It is my medical opinion that Shirin Canales may return to work on 7/1/2024 .           Sincerely,      Kaitlynn Freeman RN

## 2024-06-26 NOTE — PLAN OF CARE
Problem: Adult Inpatient Plan of Care  Goal: Plan of Care Review  Outcome: Ongoing, Progressing  Flowsheets (Taken 6/26/2024 1539)  Progress: no change  Outcome Evaluation: Pt admitted for intermittent nausea/vomiting. IV fluids started. GI and Neph consults pending.  Goal: Patient-Specific Goal (Individualized)  Outcome: Ongoing, Progressing  Goal: Absence of Hospital-Acquired Illness or Injury  Outcome: Ongoing, Progressing  Intervention: Identify and Manage Fall Risk  Recent Flowsheet Documentation  Taken 6/26/2024 1400 by Yoli Garza RN  Safety Promotion/Fall Prevention:   safety round/check completed   nonskid shoes/slippers when out of bed  Taken 6/26/2024 1340 by Yoli Garza RN  Safety Promotion/Fall Prevention:   safety round/check completed   nonskid shoes/slippers when out of bed  Goal: Optimal Comfort and Wellbeing  Outcome: Ongoing, Progressing  Goal: Readiness for Transition of Care  Outcome: Ongoing, Progressing  Intervention: Mutually Develop Transition Plan  Recent Flowsheet Documentation  Taken 6/26/2024 1459 by Yoli Garza RN  Equipment Currently Used at Home: none  Transportation Anticipated:   car, drives self   family or friend will provide  Patient/Family Anticipated Services at Transition: none  Patient/Family Anticipates Transition to: home with family     Problem: Fluid Imbalance (Pancreatitis)  Goal: Fluid Balance  Outcome: Ongoing, Progressing     Problem: Infection (Pancreatitis)  Goal: Infection Symptom Resolution  Outcome: Ongoing, Progressing     Problem: Nutrition Impaired (Pancreatitis)  Goal: Optimal Nutrition Intake  Outcome: Ongoing, Progressing     Problem: Pain (Pancreatitis)  Goal: Acceptable Pain Control  Outcome: Ongoing, Progressing     Problem: Respiratory Compromise (Pancreatitis)  Goal: Effective Oxygenation and Ventilation  Outcome: Ongoing, Progressing   Goal Outcome Evaluation:           Progress: no change  Outcome Evaluation: Pt admitted for intermittent  nausea/vomiting. IV fluids started. GI and Neph consults pending.

## 2024-06-27 ENCOUNTER — INPATIENT HOSPITAL (OUTPATIENT)
Dept: URBAN - METROPOLITAN AREA HOSPITAL 84 | Facility: HOSPITAL | Age: 34
End: 2024-06-27

## 2024-06-27 DIAGNOSIS — D53.9 NUTRITIONAL ANEMIA, UNSPECIFIED: ICD-10-CM

## 2024-06-27 DIAGNOSIS — K82.9 DISEASE OF GALLBLADDER, UNSPECIFIED: ICD-10-CM

## 2024-06-27 DIAGNOSIS — R11.2 NAUSEA WITH VOMITING, UNSPECIFIED: ICD-10-CM

## 2024-06-27 DIAGNOSIS — K70.10 ALCOHOLIC HEPATITIS WITHOUT ASCITES: ICD-10-CM

## 2024-06-27 DIAGNOSIS — K76.0 FATTY (CHANGE OF) LIVER, NOT ELSEWHERE CLASSIFIED: ICD-10-CM

## 2024-06-27 DIAGNOSIS — D69.59 OTHER SECONDARY THROMBOCYTOPENIA: ICD-10-CM

## 2024-06-27 LAB
ALBUMIN SERPL-MCNC: 4.5 G/DL (ref 3.5–5.2)
ALBUMIN/GLOB SERPL: 1.3 G/DL
ALP SERPL-CCNC: 115 U/L (ref 39–117)
ALT SERPL W P-5'-P-CCNC: 65 U/L (ref 1–33)
ANION GAP SERPL CALCULATED.3IONS-SCNC: 18.5 MMOL/L (ref 5–15)
ANISOCYTOSIS BLD QL: NORMAL
AST SERPL-CCNC: 86 U/L (ref 1–32)
BASOPHILS # BLD AUTO: 0.03 10*3/MM3 (ref 0–0.2)
BASOPHILS NFR BLD AUTO: 0.2 % (ref 0–1.5)
BILIRUB SERPL-MCNC: 1.6 MG/DL (ref 0–1.2)
BUN SERPL-MCNC: 44 MG/DL (ref 6–20)
BUN/CREAT SERPL: 37.9 (ref 7–25)
CA-I SERPL ISE-MCNC: 1.42 MMOL/L (ref 1.2–1.3)
CALCIUM SPEC-SCNC: 10.6 MG/DL (ref 8.6–10.5)
CHLORIDE SERPL-SCNC: 109 MMOL/L (ref 98–107)
CO2 SERPL-SCNC: 14.5 MMOL/L (ref 22–29)
CREAT SERPL-MCNC: 1.16 MG/DL (ref 0.57–1)
DEPRECATED RDW RBC AUTO: 50.7 FL (ref 37–54)
EGFRCR SERPLBLD CKD-EPI 2021: 64.4 ML/MIN/1.73
EOSINOPHIL # BLD AUTO: 0.01 10*3/MM3 (ref 0–0.4)
EOSINOPHIL NFR BLD AUTO: 0.1 % (ref 0.3–6.2)
ERYTHROCYTE [DISTWIDTH] IN BLOOD BY AUTOMATED COUNT: 13.6 % (ref 12.3–15.4)
GLOBULIN UR ELPH-MCNC: 3.4 GM/DL
GLUCOSE SERPL-MCNC: 145 MG/DL (ref 65–99)
HCT VFR BLD AUTO: 34.2 % (ref 34–46.6)
HGB BLD-MCNC: 11.8 G/DL (ref 12–15.9)
IMM GRANULOCYTES # BLD AUTO: 0.08 10*3/MM3 (ref 0–0.05)
IMM GRANULOCYTES NFR BLD AUTO: 0.6 % (ref 0–0.5)
LYMPHOCYTES # BLD AUTO: 1.12 10*3/MM3 (ref 0.7–3.1)
LYMPHOCYTES NFR BLD AUTO: 7.8 % (ref 19.6–45.3)
MAGNESIUM SERPL-MCNC: 2.1 MG/DL (ref 1.6–2.6)
MCH RBC QN AUTO: 34.9 PG (ref 26.6–33)
MCHC RBC AUTO-ENTMCNC: 34.5 G/DL (ref 31.5–35.7)
MCV RBC AUTO: 101.2 FL (ref 79–97)
MONOCYTES # BLD AUTO: 1.57 10*3/MM3 (ref 0.1–0.9)
MONOCYTES NFR BLD AUTO: 10.9 % (ref 5–12)
NEUTROPHILS NFR BLD AUTO: 11.6 10*3/MM3 (ref 1.7–7)
NEUTROPHILS NFR BLD AUTO: 80.4 % (ref 42.7–76)
NRBC BLD AUTO-RTO: 0 /100 WBC (ref 0–0.2)
PHOSPHATE SERPL-MCNC: 0.4 MG/DL (ref 2.5–4.5)
PLATELET # BLD AUTO: 133 10*3/MM3 (ref 140–450)
PMV BLD AUTO: 10.8 FL (ref 6–12)
POTASSIUM SERPL-SCNC: 3.3 MMOL/L (ref 3.5–5.2)
PROT SERPL-MCNC: 7.9 G/DL (ref 6–8.5)
QT INTERVAL: 289 MS
QTC INTERVAL: 424 MS
RBC # BLD AUTO: 3.38 10*6/MM3 (ref 3.77–5.28)
REF LAB TEST METHOD: NORMAL
SMALL PLATELETS BLD QL SMEAR: ADEQUATE
SODIUM SERPL-SCNC: 142 MMOL/L (ref 136–145)
WBC MORPH BLD: NORMAL
WBC NRBC COR # BLD AUTO: 14.41 10*3/MM3 (ref 3.4–10.8)

## 2024-06-27 PROCEDURE — 25010000002 HYDROMORPHONE 1 MG/ML SOLUTION: Performed by: NURSE PRACTITIONER

## 2024-06-27 PROCEDURE — 83735 ASSAY OF MAGNESIUM: CPT | Performed by: INTERNAL MEDICINE

## 2024-06-27 PROCEDURE — 99223 1ST HOSP IP/OBS HIGH 75: CPT | Performed by: NURSE PRACTITIONER

## 2024-06-27 PROCEDURE — 82150 ASSAY OF AMYLASE: CPT | Performed by: NURSE PRACTITIONER

## 2024-06-27 PROCEDURE — 25010000002 ENOXAPARIN PER 10 MG: Performed by: NURSE PRACTITIONER

## 2024-06-27 PROCEDURE — 25010000002 CEFTRIAXONE PER 250 MG: Performed by: NURSE PRACTITIONER

## 2024-06-27 PROCEDURE — 84100 ASSAY OF PHOSPHORUS: CPT | Performed by: HOSPITALIST

## 2024-06-27 PROCEDURE — 83690 ASSAY OF LIPASE: CPT | Performed by: NURSE PRACTITIONER

## 2024-06-27 PROCEDURE — 82330 ASSAY OF CALCIUM: CPT | Performed by: INTERNAL MEDICINE

## 2024-06-27 PROCEDURE — 85025 COMPLETE CBC W/AUTO DIFF WBC: CPT | Performed by: NURSE PRACTITIONER

## 2024-06-27 PROCEDURE — 80053 COMPREHEN METABOLIC PANEL: CPT | Performed by: NURSE PRACTITIONER

## 2024-06-27 PROCEDURE — 97162 PT EVAL MOD COMPLEX 30 MIN: CPT

## 2024-06-27 PROCEDURE — 25810000003 SODIUM CHLORIDE 0.9 % SOLUTION: Performed by: INTERNAL MEDICINE

## 2024-06-27 PROCEDURE — 36415 COLL VENOUS BLD VENIPUNCTURE: CPT | Performed by: NURSE PRACTITIONER

## 2024-06-27 PROCEDURE — 80053 COMPREHEN METABOLIC PANEL: CPT | Performed by: INTERNAL MEDICINE

## 2024-06-27 PROCEDURE — 84100 ASSAY OF PHOSPHORUS: CPT | Performed by: INTERNAL MEDICINE

## 2024-06-27 RX ORDER — FENTANYL/ROPIVACAINE/NS/PF 2-625MCG/1
15 PLASTIC BAG, INJECTION (ML) EPIDURAL
Status: COMPLETED | OUTPATIENT
Start: 2024-06-27 | End: 2024-06-27

## 2024-06-27 RX ORDER — POTASSIUM CHLORIDE 20 MEQ/1
40 TABLET, EXTENDED RELEASE ORAL EVERY 4 HOURS
Status: DISCONTINUED | OUTPATIENT
Start: 2024-06-27 | End: 2024-06-27 | Stop reason: ALTCHOICE

## 2024-06-27 RX ORDER — CARVEDILOL 3.12 MG/1
3.12 TABLET ORAL 2 TIMES DAILY WITH MEALS
Status: DISCONTINUED | OUTPATIENT
Start: 2024-06-27 | End: 2024-06-29 | Stop reason: HOSPADM

## 2024-06-27 RX ORDER — ALLOPURINOL 100 MG/1
100 TABLET ORAL DAILY
Status: DISCONTINUED | OUTPATIENT
Start: 2024-06-27 | End: 2024-06-29 | Stop reason: HOSPADM

## 2024-06-27 RX ORDER — METOPROLOL TARTRATE 1 MG/ML
5 INJECTION, SOLUTION INTRAVENOUS ONCE
Status: COMPLETED | OUTPATIENT
Start: 2024-06-27 | End: 2024-06-27

## 2024-06-27 RX ORDER — AMLODIPINE BESYLATE 5 MG/1
5 TABLET ORAL
Status: DISCONTINUED | OUTPATIENT
Start: 2024-06-27 | End: 2024-06-29

## 2024-06-27 RX ORDER — POTASSIUM CHLORIDE 20 MEQ/1
40 TABLET, EXTENDED RELEASE ORAL EVERY 6 HOURS
Status: COMPLETED | OUTPATIENT
Start: 2024-06-27 | End: 2024-06-27

## 2024-06-27 RX ADMIN — POTASSIUM CHLORIDE 40 MEQ: 1500 TABLET, EXTENDED RELEASE ORAL at 07:33

## 2024-06-27 RX ADMIN — PANTOPRAZOLE SODIUM 40 MG: 40 INJECTION, POWDER, FOR SOLUTION INTRAVENOUS at 04:52

## 2024-06-27 RX ADMIN — SODIUM BICARBONATE: 84 INJECTION, SOLUTION INTRAVENOUS at 07:42

## 2024-06-27 RX ADMIN — HYDROMORPHONE HYDROCHLORIDE 0.5 MG: 1 INJECTION, SOLUTION INTRAMUSCULAR; INTRAVENOUS; SUBCUTANEOUS at 16:59

## 2024-06-27 RX ADMIN — CEFTRIAXONE 2000 MG: 2 INJECTION, POWDER, FOR SOLUTION INTRAMUSCULAR; INTRAVENOUS at 16:59

## 2024-06-27 RX ADMIN — Medication 10 ML: at 10:24

## 2024-06-27 RX ADMIN — CARVEDILOL 3.12 MG: 3.12 TABLET, FILM COATED ORAL at 16:59

## 2024-06-27 RX ADMIN — HYDROMORPHONE HYDROCHLORIDE 0.5 MG: 1 INJECTION, SOLUTION INTRAMUSCULAR; INTRAVENOUS; SUBCUTANEOUS at 12:18

## 2024-06-27 RX ADMIN — POTASSIUM PHOSPHATE, MONOBASIC POTASSIUM PHOSPHATE, DIBASIC 15 MMOL: 224; 236 INJECTION, SOLUTION, CONCENTRATE INTRAVENOUS at 08:45

## 2024-06-27 RX ADMIN — ENOXAPARIN SODIUM 40 MG: 100 INJECTION SUBCUTANEOUS at 16:59

## 2024-06-27 RX ADMIN — ALLOPURINOL 100 MG: 100 TABLET ORAL at 10:24

## 2024-06-27 RX ADMIN — Medication 10 ML: at 07:33

## 2024-06-27 RX ADMIN — POTASSIUM CHLORIDE 40 MEQ: 1500 TABLET, EXTENDED RELEASE ORAL at 16:59

## 2024-06-27 RX ADMIN — HYDROMORPHONE HYDROCHLORIDE 0.5 MG: 1 INJECTION, SOLUTION INTRAMUSCULAR; INTRAVENOUS; SUBCUTANEOUS at 01:49

## 2024-06-27 RX ADMIN — HYDROMORPHONE HYDROCHLORIDE 0.5 MG: 1 INJECTION, SOLUTION INTRAMUSCULAR; INTRAVENOUS; SUBCUTANEOUS at 22:33

## 2024-06-27 RX ADMIN — Medication 10 ML: at 20:20

## 2024-06-27 RX ADMIN — METOPROLOL TARTRATE 5 MG: 1 INJECTION, SOLUTION INTRAVENOUS at 07:33

## 2024-06-27 RX ADMIN — CARVEDILOL 3.12 MG: 3.12 TABLET, FILM COATED ORAL at 10:24

## 2024-06-27 RX ADMIN — AMLODIPINE BESYLATE 5 MG: 5 TABLET ORAL at 10:24

## 2024-06-27 RX ADMIN — POTASSIUM PHOSPHATE, MONOBASIC POTASSIUM PHOSPHATE, DIBASIC 15 MMOL: 224; 236 INJECTION, SOLUTION, CONCENTRATE INTRAVENOUS at 14:14

## 2024-06-27 RX ADMIN — POTASSIUM PHOSPHATE, MONOBASIC POTASSIUM PHOSPHATE, DIBASIC 15 MMOL: 224; 236 INJECTION, SOLUTION, CONCENTRATE INTRAVENOUS at 04:49

## 2024-06-27 NOTE — PLAN OF CARE
Goal Outcome Evaluation:         Pt A&Ox4, VS stable, pt on RA. Pt started on PO meds for HR. C/o abdominal pain throughout the day, pt on clear liquid diet. Pt on Bicarb drip. Potassium phosphate replacement. Pt receiving IV abx. GI following. Will continue to monitor.

## 2024-06-27 NOTE — CASE MANAGEMENT/SOCIAL WORK
Continued Stay Note  CHELSIE Pollard     Patient Name: Shirin Canales  MRN: [id number]  Today's Date: 6/27/2024    Admit Date: 6/26/2024    Plan: DC PLAN: Routine home     Discharge Plan       Row Name 06/27/24 1001       Plan    Plan DC PLAN: Routine home    Plan Comments DC BARRIERS: IV Bicarb gtt. HTN, adjusting meds. Awaiting renal to sign off.                      Expected Discharge Date and Time       Expected Discharge Date Expected Discharge Time    Jun 28, 2024             Rhea Johnson RN   Case Management

## 2024-06-27 NOTE — PLAN OF CARE
Goal Outcome Evaluation:  Plan of Care Reviewed With: patient           Outcome Evaluation: 32 yo female adm 6/26/24 for acute n/v. Dx w/ rhinovirus, uti, transaminitis, and weakness. PMH significant for etoh abuse w/ pancreatitis. Pt reports she lives w/ her 3 yo son and normally works. She reports son's father is currently caring for him. At baseline, pt is independent for all mobility and drives. Today, pt is extremely lethargic. She needed multiple cues to keep eyes open throughout the eval. Needed only verbal cues to come to sit at eob, as well as to stand. Needed cga for safety w/ ambulation. Ambulated only 20 ft due to tachycardia. Pt presents w/ 4-/5 strength globally. Pt denies pain at start of eval, but then reports that pain is 10/10 after amb this very short distance. HR stays btwn 113 and 130 bpm w/ activity and at rest. Recommend home w/ OP PT to address weakness at d/c. Will follow 3xwk. Encouraged out of bed activity.

## 2024-06-27 NOTE — THERAPY EVALUATION
Patient Name: Shirin Canales  : 1990    MRN: [id number]                              Today's Date: 2024       Admit Date: 2024    Visit Dx:     ICD-10-CM ICD-9-CM   1. Acute pancreatitis, unspecified complication status, unspecified pancreatitis type  K85.90 577.0   2. Vomiting, unspecified vomiting type, unspecified whether nausea present  R11.10 787.03   3. Acute kidney injury  N17.9 584.9     Patient Active Problem List   Diagnosis    Metabolic acidosis    Alcoholic ketoacidosis    Elevated blood pressure reading    Alcohol induced acute pancreatitis without necrosis or infection    Kidney stone    Hepatic steatosis    Dizziness    Alcohol use    Vomiting     History reviewed. No pertinent past medical history.  History reviewed. No pertinent surgical history.   General Information       Row Name 24 1014          Physical Therapy Time and Intention    Document Type evaluation  -CM     Mode of Treatment physical therapy  -CM       Row Name 24 1014          General Information    Patient Profile Reviewed yes  -CM     Prior Level of Function independent:;community mobility;gait;work  -CM     Existing Precautions/Restrictions no known precautions/restrictions  -CM     Barriers to Rehab ineffective coping  etoh abuse  -CM       Row Name 24 1014          Living Environment    People in Home child(stacey), dependent;other (see comments)  reports she lives w/ her 3 yo son; says son's father is currently caring for child  -CM       Row Name 24 1014          Cognition    Orientation Status (Cognition) oriented x 3;disoriented to;time;other (see comments);verbal cues/prompts needed for orientation  thought month was May; needed multiple cues to state [employer]  -CM       Row Name 24 1014          Safety Issues, Functional Mobility    Impairments Affecting Function (Mobility) balance;endurance/activity tolerance;strength  -CM     Comment, Safety Issues/Impairments (Mobility) kept  trying to close eyes throughout eval, even when in upright positions  -CM               User Key  (r) = Recorded By, (t) = Taken By, (c) = Cosigned By      Initials Name Provider Type    Larisa Siu, PT Physical Therapist                   Mobility       Row Name 06/27/24 1016          Bed Mobility    Bed Mobility bed mobility (all) activities  -CM     All Activities, [place] (Bed Mobility) supervision;verbal cues  -CM     Assistive Device (Bed Mobility) head of bed elevated  -CM       Row Name 06/27/24 1016          Sit-Stand Transfer    Sit-Stand [place] (Transfers) supervision  -CM       Row Name 06/27/24 1016          Gait/Stairs (Locomotion)    [place] Level (Gait) contact guard;standby assist  -CM     Assistive Device (Gait) other (see comments)  gait belt, non skid socks  -CM     Patient was able to Ambulate yes  -CM     Distance in Feet (Gait) 15  slow, shuffled gait; closing eyes often, c/o fatigue  -CM               User Key  (r) = Recorded By, (t) = Taken By, (c) = Cosigned By      Initials Name Provider Type    Larisa Siu, PT Physical Therapist                   Obj/Interventions       Row Name 06/27/24 1018          Range of Motion Comprehensive    General Range of Motion no range of motion deficits identified  -       Row Name 06/27/24 1018          Strength Comprehensive (MMT)    General Manual Muscle Testing (MMT) Assessment upper extremity strength deficits identified;lower extremity strength deficits identified  -CM     Comment, General Manual Muscle Testing (MMT) Assessment global strength 4-/5  -CM       Row Name 06/27/24 1018          Balance    Balance Assessment sitting static balance;sitting dynamic balance;standing static balance;standing dynamic balance  -CM     Static Sitting Balance supervision  -CM     Dynamic Sitting Balance supervision  -CM     Position, Sitting Balance unsupported;sitting edge of bed  -CM     Static Standing Balance supervision  -CM      Dynamic Standing Balance contact guard  -CM     Position/Device Used, Standing Balance unsupported  -CM       Row Name 06/27/24 1018          Sensory Assessment (Somatosensory)    Sensory Assessment (Somatosensory) not tested  -CM               User Key  (r) = Recorded By, (t) = Taken By, (c) = Cosigned By      Initials Name Provider Type    CM Larisa Felix, PT Physical Therapist                   Goals/Plan       Row Name 06/27/24 1028          Bed Mobility Goal 1 (PT)    Activity/Assistive Device (Bed Mobility Goal 1, PT) bed mobility activities, all  -CM     [place] Level/Cues Needed (Bed Mobility Goal 1, PT) independent  -CM     Time Frame (Bed Mobility Goal 1, PT) 2 weeks  -CM       Row Name 06/27/24 1028          Transfer Goal 1 (PT)    Activity/Assistive Device (Transfer Goal 1, PT) transfers, all  -CM     [place] Level/Cues Needed (Transfer Goal 1, PT) independent  -CM     Time Frame (Transfer Goal 1, PT) 2 weeks  -CM       Row Name 06/27/24 1028          Gait Training Goal 1 (PT)    Activity/Assistive Device (Gait Training Goal 1, PT) gait (walking locomotion)  -CM     [place] Level (Gait Training Goal 1, PT) independent  -CM     Distance (Gait Training Goal 1, PT) 300 ft w/o tachycardia, tachypnea; maintains O2 sats > 95%  -CM     Time Frame (Gait Training Goal 1, PT) 2 weeks  -CM       Row Name 06/27/24 1028          Therapy Assessment/Plan (PT)    Planned Therapy Interventions (PT) balance training;bed mobility training;gait training;home exercise program;strengthening;ROM (range of motion);postural re-education;patient/family education;transfer training  -CM               User Key  (r) = Recorded By, (t) = Taken By, (c) = Cosigned By      Initials Name Provider Type    Larisa Siu, PT Physical Therapist                   Clinical Impression       Row Name 06/27/24 1021          Pain    Pretreatment Pain Rating 0/10 - no pain  -CM     Posttreatment Pain Rating 10/10  -CM      Pain Location lower  -CM     Pain Location - abdomen  -CM     Pain Intervention(s) Ambulation/increased activity;Emotional support;Repositioned  -       Row Name 06/27/24 1021          Plan of Care Review    Plan of Care Reviewed With patient  -CM     Outcome Evaluation 34 yo female adm 6/26/24 for acute n/v. Dx w/ rhinovirus, uti, transaminitis, and weakness. PMH significant for etoh abuse w/ pancreatitis. Pt reports she lives w/ her 3 yo son and normally works. She reports son's father is currently caring for him. At baseline, pt is independent for all mobility and drives. Today, pt is extremely lethargic. She needed multiple cues to keep eyes open throughout the eval. Needed only verbal cues to come to sit at eob, as well as to stand. Needed cga for safety w/ ambulation. Ambulated only 20 ft due to tachycardia. Pt presents w/ 4-/5 strength globally. Pt denies pain at start of eval, but then reports that pain is 10/10 after amb this very short distance. HR stays btwn 113 and 130 bpm w/ activity and at rest. Recommend home w/ OP PT to address weakness at d/c. Will follow 3xwk. Encouraged out of bed activity.  -       Row Name 06/27/24 1027          Therapy Assessment/Plan (PT)    Rehab Potential (PT) good, to achieve stated therapy goals  -     Criteria for Skilled Interventions Met (PT) yes;meets criteria;skilled treatment is necessary  -     Therapy Frequency (PT) 3 times/wk  -CM     Predicted Duration of Therapy Intervention (PT) until d/c or goals met  -       Row Name 06/27/24 1027          Vital Signs    Pre Systolic BP Rehab 119  -CM     Pre Treatment Diastolic BP 97  -CM     Intra Systolic BP Rehab 127  -CM     Intra Treatment Diastolic BP 98  -CM     Post Systolic BP Rehab 130  -CM     Post Treatment Diastolic BP 98  -CM     Pretreatment Heart Rate (beats/min) 114  -CM     Intratreatment Heart Rate (beats/min) 129  -CM     Posttreatment Heart Rate (beats/min) 113  -CM     Pre SpO2 (%) 100  -CM      O2 Delivery Pre Treatment room air  -CM     Intra SpO2 (%) 100  -CM     O2 Delivery Intra Treatment room air  -CM     Post SpO2 (%) 100  -CM     O2 Delivery Post Treatment room air  -CM       Row Name 06/27/24 1027          Positioning and Restraints    Pre-Treatment Position in bed  -CM     Post Treatment Position chair  -CM     In Chair notified nsg;sitting;call light within reach;encouraged to call for assist  -CM               User Key  (r) = Recorded By, (t) = Taken By, (c) = Cosigned By      Initials Name Provider Type    Larisa Siu, PT Physical Therapist                   Outcome Measures       Row Name 06/27/24 1029          How much help from another person do you currently need...    Turning from your back to your side while in flat bed without using bedrails? 4  -CM     Moving from lying on back to sitting on the side of a flat bed without bedrails? 4  -CM     Moving to and from a bed to a chair (including a wheelchair)? 4  -CM     Standing up from a chair using your arms (e.g., wheelchair, bedside chair)? 4  -CM     Climbing 3-5 steps with a railing? 3  -CM     To walk in hospital room? 4  -CM     AM-PAC 6 Clicks Score (PT) 23  -CM     Highest Level of Mobility Goal 7 --> Walk 25 feet or more  -CM               User Key  (r) = Recorded By, (t) = Taken By, (c) = Cosigned By      Initials Name Provider Type    Larisa Siu, PT Physical Therapist                                 Physical Therapy Education       Title: PT OT SLP Therapies (Done)       Topic: Physical Therapy (Done)       Point: Mobility training (Done)       Learning Progress Summary             Patient Acceptance, E,TB, VU,NR by ANNA at 6/27/2024 1029                                         User Key       Initials Effective Dates Name Provider Type Discipline    ANNA 06/16/21 -  Larisa Felix, PT Physical Therapist PT                  PT Recommendation and Plan  Planned Therapy Interventions (PT): balance training, bed  mobility training, gait training, home exercise program, strengthening, ROM (range of motion), postural re-education, patient/family education, transfer training  Plan of Care Reviewed With: patient  Outcome Evaluation: 32 yo female adm 6/26/24 for acute n/v. Dx w/ rhinovirus, uti, transaminitis, and weakness. PMH significant for etoh abuse w/ pancreatitis. Pt reports she lives w/ her 3 yo son and normally works. She reports son's father is currently caring for him. At baseline, pt is independent for all mobility and drives. Today, pt is extremely lethargic. She needed multiple cues to keep eyes open throughout the eval. Needed only verbal cues to come to sit at eob, as well as to stand. Needed cga for safety w/ ambulation. Ambulated only 20 ft due to tachycardia. Pt presents w/ 4-/5 strength globally. Pt denies pain at start of eval, but then reports that pain is 10/10 after amb this very short distance. HR stays btwn 113 and 130 bpm w/ activity and at rest. Recommend home w/ OP PT to address weakness at d/c. Will follow 3xwk. Encouraged out of bed activity.     Time Calculation:   PT Evaluation Complexity  History, PT Evaluation Complexity: 1-2 personal factors and/or comorbidities  Examination of Body Systems (PT Eval Complexity): total of 4 or more elements  Clinical Presentation (PT Evaluation Complexity): evolving  Clinical Decision Making (PT Evaluation Complexity): moderate complexity  Overall Complexity (PT Evaluation Complexity): moderate complexity     PT Charges       Row Name 06/27/24 1029             Time Calculation    Start Time 0940  -CM      Stop Time 0957  -CM      Time Calculation (min) 17 min  -CM      PT Received On 06/27/24  -CM      PT - Next Appointment 06/28/24  -CM      PT Goal Re-Cert Due Date 07/11/24  -CM         Time Calculation- PT    Total Timed Code Minutes- PT 0 minute(s)  -CM                User Key  (r) = Recorded By, (t) = Taken By, (c) = Cosigned By      Initials Name Provider  Type    CM Larisa Felix, PT Physical Therapist                  Therapy Charges for Today       Code Description Service Date Service Provider Modifiers Qty    14571267972 HC PT EVAL MOD COMPLEXITY 3 6/27/2024 Larisa Felix, PT GP 1            PT G-Codes  AM-PAC 6 Clicks Score (PT): 23  PT Discharge Summary  Anticipated Discharge Disposition (PT): home with outpatient therapy services    Larisa Felix, PT  6/27/2024

## 2024-06-27 NOTE — PROGRESS NOTES
"NEPHROLOGY PROGRESS NOTE------[facility]    [facility]  [phone]  Alec Morrow MD      Patient Care Team:  Provider, No Known as PCP - Yoli Arzola, RN as Registered Nurse  Odalys, MD David as Consulting Physician (Nephrology)      Provider:  Alec Morrow MD  Patient Name: Shirin Canales  :  1990    SUBJECTIVE:    F/U ARF/ZACHARIAH/ACIDOSIS/HYPERCALCEMIA    Still weak but feeling better this AM. No angina. No dysuria. Mild abdominal pain    Medication:  cefTRIAXone, 2,000 mg, Intravenous, Q24H  enoxaparin, 40 mg, Subcutaneous, Q24H  pantoprazole, 40 mg, Intravenous, Q AM  potassium phosphate, 15 mmol, Intravenous, Q3H  sodium chloride, 10 mL, Intravenous, Q12H      sterile water (preservative free) 850 mL with sodium bicarbonate 8.4 % 150 mEq infusion, , Last Rate: 125 mL/hr at 24 191        OBJECTIVE    Vital Sign Min/Max for last 24 hours  Temp  Min: 97.3 °F (36.3 °C)  Max: 98.1 °F (36.7 °C)   BP  Min: 119/87  Max: 155/118   Pulse  Min: 103  Max: 156   Resp  Min: 12  Max: 18   SpO2  Min: 99 %  Max: 100 %   No data recorded   Weight  Min: 63 kg (139 lb)  Max: 63 kg (139 lb)     Flowsheet Rows      Flowsheet Row First Filed Value   Admission Height 160 cm (63\") Documented at 2024 0824   Admission Weight 63 kg (139 lb) Documented at 2024 0824            No intake/output data recorded.  I/O last 3 completed shifts:  In:  [I.V.:1000; IV Piggyback:1000]  Out: -     Physical Exam:  General Appearance: alert, appears stated age and cooperative. WEAK  Head: normocephalic, without obvious abnormality and atraumatic. +DRY OP  Eyes: conjunctivae and sclerae normal and no icterus  Neck: supple and no JVD  Lungs: clear to auscultation and respirations regular  Heart: regular rhythm & normal rate and normal S1, S2  Chest Wall: no abnormalities observed  Abdomen: normal bowel sounds and soft, " "nontender  Extremities: moves extremities well, no edema, no cyanosis  Skin: no bleeding, bruising or rash. +DECREASED SKIN TURGOR  Neurologic: Alert, and oriented x 4  No focal deficits    Labs:    WBC WBC   Date Value Ref Range Status   06/26/2024 14.41 (H) 3.40 - 10.80 10*3/mm3 Final   06/26/2024 15.93 (H) 3.40 - 10.80 10*3/mm3 Final      HGB Hemoglobin   Date Value Ref Range Status   06/26/2024 11.8 (L) 12.0 - 15.9 g/dL Final   06/26/2024 14.4 12.0 - 15.9 g/dL Final      HCT Hematocrit   Date Value Ref Range Status   06/26/2024 34.2 34.0 - 46.6 % Final   06/26/2024 41.6 34.0 - 46.6 % Final      Platelets No results found for: \"LABPLAT\"   MCV MCV   Date Value Ref Range Status   06/26/2024 101.2 (H) 79.0 - 97.0 fL Final   06/26/2024 101.2 (H) 79.0 - 97.0 fL Final          Sodium Sodium   Date Value Ref Range Status   06/27/2024 142 136 - 145 mmol/L Final   06/26/2024 138 136 - 145 mmol/L Final      Potassium Potassium   Date Value Ref Range Status   06/27/2024 3.3 (L) 3.5 - 5.2 mmol/L Final   06/26/2024 4.0 3.5 - 5.2 mmol/L Final      Chloride Chloride   Date Value Ref Range Status   06/27/2024 109 (H) 98 - 107 mmol/L Final   06/26/2024 107 98 - 107 mmol/L Final      CO2 CO2   Date Value Ref Range Status   06/27/2024 14.5 (L) 22.0 - 29.0 mmol/L Final   06/26/2024 9.1 (C) 22.0 - 29.0 mmol/L Final      BUN BUN   Date Value Ref Range Status   06/27/2024 44 (H) 6 - 20 mg/dL Final   06/26/2024 46 (H) 6 - 20 mg/dL Final      Creatinine Creatinine   Date Value Ref Range Status   06/27/2024 1.16 (H) 0.57 - 1.00 mg/dL Final   06/26/2024 2.09 (H) 0.57 - 1.00 mg/dL Final      Calcium Calcium   Date Value Ref Range Status   06/27/2024 10.6 (H) 8.6 - 10.5 mg/dL Final   06/26/2024 12.0 (H) 8.6 - 10.5 mg/dL Final      PO4 No components found for: \"PO4\"   Albumin Albumin   Date Value Ref Range Status   06/27/2024 4.5 3.5 - 5.2 g/dL Final   06/26/2024 5.4 (H) 3.5 - 5.2 g/dL Final      Magnesium Magnesium   Date Value Ref Range " "Status   06/27/2024 2.1 1.6 - 2.6 mg/dL Final      Uric Acid No components found for: \"URIC ACID\"     Imaging Results (Last 72 Hours)       Procedure Component Value Units Date/Time    CT Abdomen Pelvis Without Contrast [[id number]] Collected: 06/26/24 1107     Updated: 06/26/24 1112    Narrative:      CT ABDOMEN PELVIS WO CONTRAST    Date of Exam: 6/26/2024 11:02 AM EDT    Indication: Abdominal pain, vomiting.    Comparison: 12/28/2023    Technique: Axial CT images were obtained of the abdomen and pelvis without the administration of contrast. Sagittal and coronal reconstructions were performed.  Automated exposure control and iterative reconstruction methods were used.      Findings:  Severe fatty liver is again identified. The nonopacified solid organs are normal in size. There is diffuse increased density throughout the gallbladder as before. There is no gallbladder wall thickening or edema or pericholecystic fluid and there is no   bile duct dilatation. Punctate nonobstructing renal stones are again seen. There is no hydronephrosis. The bladder is poorly distended. There is no pelvic mass or free fluid. There is no large or small bowel dilatation. There are no focal inflammatory   changes. The abdominal aorta is normal in size.      Impression:      Impression:  Severe fatty liver, stable. Findings suggest noncalcified stones or sludge throughout the gallbladder although no evidence of acute cholecystitis.    Nonobstructing renal stones.    No acute process.            Electronically Signed: Gabriela Agrawal MD    6/26/2024 11:10 AM EDT    Workstation ID: [id number]            Results for orders placed during the hospital encounter of 04/23/23    XR Chest 1 View    Narrative  XR CHEST 1 VW    Date of Exam: 4/24/2023 1:50 AM EDT    Indication: possible sepsis, r/o aspiration    Comparison: None available.    Findings:  Heart size and pulmonary vessels are within normal limits. Lungs are clear bilaterally. There are " no pleural effusions. There is no evidence of pneumothorax.    Impression  Impression:  No acute cardiopulmonary disease.      Electronically Signed: Valentino Aldridge  4/24/2023 7:43 AM EDT  Workstation ID: [id number]            ASSESSMENT / PLAN      Vomiting      ARF/ZACHARIAH-----Nonoliguric. +ARF/ZACHARIAH with what appears to be normal   Normal baseline renal function. BUN/Cr coming down. +ARF/ZACHARIAH appears to be secondary to severe prerenal state/intravascular volume depletion and ATN from hypercalcemia. Back down bicarb gtt a little.    No NSAIDs or IV dye. Dose meds for CrCl less than 10 cc/min.       2. ACIDOSIS------Metabolic. Acute. Severe. Elevated AGAP. Elevated Osmolar gap. STAT Ethylene Glycol and Methanol levels ordered and came back negative. D/C Fomepizole. Lactic normal.       3. HYPERCALCEMIA------Better with hydration. PTH appropriately low     4. HUMAN RHINOVIRUS INFECTION     5. ABDOMINAL PAIN/N/V/FATTY LIVER/ELEVATED TRANSAMINASES     6. TOBACCO ABUSE    7. HTN-----BP up. Add Amlodipine and Coreg and follow      Alec Morrow MD  [facility]  [phone]  06/27/24  07:03 EDT

## 2024-06-27 NOTE — PROGRESS NOTES
[facility] Medicine Services Daily Progress Note    Patient Name: Shirin Canales  : 1990  MRN: [id number]  Primary Care Physician:  Provider, No Known  Date of admission: 2024      Subjective      Chief Complaint: Abdominal pain    Subjective  Patient Reports feeling better with abdominal pain, denies for any nausea or vomiting.    Review of Systems   All other systems reviewed and are negative.         Objective      Vitals:   Temp:  [97.3 °F (36.3 °C)-98.1 °F (36.7 °C)] 98.1 °F (36.7 °C)  Heart Rate:  [] 116  Resp:  [12-18] 18  BP: (119-148)/() 138/89    Physical Exam  Vitals and nursing note reviewed.   Constitutional:       General: She is not in acute distress.     Appearance: Normal appearance. She is well-developed. She is not ill-appearing, toxic-appearing or diaphoretic.   HENT:      Head: Normocephalic and atraumatic.      Right Ear: Ear canal and external ear normal.      Left Ear: Ear canal and external ear normal.      Nose: Nose normal. No congestion or rhinorrhea.      Mouth/Throat:      Mouth: Mucous membranes are moist.      Pharynx: No oropharyngeal exudate.   Eyes:      General: No scleral icterus.        Right eye: No discharge.         Left eye: No discharge.      Extraocular Movements: Extraocular movements intact.      Conjunctiva/sclera: Conjunctivae normal.      Pupils: Pupils are equal, round, and reactive to light.   Neck:      Thyroid: No thyromegaly.      Vascular: No carotid bruit or JVD.      Trachea: No tracheal deviation.   Cardiovascular:      Rate and Rhythm: Normal rate and regular rhythm.      Pulses: Normal pulses.      Heart sounds: Normal heart sounds. No murmur heard.     No friction rub. No gallop.   Pulmonary:      Effort: Pulmonary effort is normal. No respiratory distress.      Breath sounds: Normal breath sounds. No stridor. No wheezing, rhonchi or rales.   Chest:      Chest wall: No tenderness.   Abdominal:      General:  Bowel sounds are normal. There is no distension.      Palpations: Abdomen is soft. There is no mass.      Tenderness: There is no abdominal tenderness. There is no guarding or rebound.      Hernia: No hernia is present.   Musculoskeletal:         General: No swelling, tenderness, deformity or signs of injury. Normal range of motion.      Cervical back: Normal range of motion and neck supple. No rigidity. No muscular tenderness.      Right lower leg: No edema.      Left lower leg: No edema.   Lymphadenopathy:      Cervical: No cervical adenopathy.   Skin:     General: Skin is warm and dry.      Coloration: Skin is not jaundiced or pale.      Findings: No bruising, erythema or rash.   Neurological:      General: No focal deficit present.      Mental Status: She is alert and oriented to person, place, and time. Mental status is at baseline.      Cranial Nerves: No cranial nerve deficit.      Sensory: No sensory deficit.      Motor: No weakness or abnormal muscle tone.      Coordination: Coordination normal.   Psychiatric:         Mood and Affect: Mood normal.         Behavior: Behavior normal.         Thought Content: Thought content normal.         Judgment: Judgment normal.                 Result Review    Result Review:  I have personally reviewed the results from the time of this admission to 6/27/2024 12:13 EDT and agree with these findings:  [x]  Laboratory  [x]  Microbiology  [x]  Radiology  []  EKG/Telemetry   []  Cardiology/Vascular   []  Pathology  []  Old records  []  Other:  Most notable findings include:           Assessment & Plan      Brief Patient Summary:  Shirin Canales is a 33 y.o. female who       allopurinol, 100 mg, Oral, Daily  amLODIPine, 5 mg, Oral, Q24H  carvedilol, 3.125 mg, Oral, BID With Meals  cefTRIAXone, 2,000 mg, Intravenous, Q24H  enoxaparin, 40 mg, Subcutaneous, Q24H  pantoprazole, 40 mg, Intravenous, Q AM  potassium chloride, 40 mEq, Oral, Q6H  potassium phosphate, 15 mmol,  Intravenous, Q3H  sodium chloride, 10 mL, Intravenous, Q12H       sterile water (preservative free) 850 mL with sodium bicarbonate 8.4 % 150 mEq infusion, , Last Rate: 100 mL/hr at 06/27/24 0742         Active Hospital Problems:  Active Hospital Problems    Diagnosis     **Vomiting      Plan:     Vomiting  Transaminitis  - History of alcohol consumption  - Lipase 461  - Elevated AST and ALT  - Abdominal CT scan noted severe fatty liver, stones as well as sludge in the gallbladder, no acute cholecystitis  - GI consulted.      UTI  -Positive nitrates, ketones, protein in urine positive WBCs  - Rocephin ordered  -Urine culture in progress.     ZACHARIAH improving.  - BUN 46, creatinine 2.08  - EGFR 31.8  - Proteinuria  - Normal saline IV fluids  - Nephrology consulted.     Weakness  - Likely secondary to symptoms  - PT OT        The patient desires to be as follows:     CODE STATUS:    Level Of Support Discussed With: Patient  Code Status (Patient has no pulse and is not breathing): CPR (Attempt to Resuscitate)  Medical Interventions (Patient has pulse or is breathing): Full Support        VTE Prophylaxis:  Pharmacologic VTE prophylaxis orders are present.        CODE STATUS:    Level Of Support Discussed With: Patient  Code Status (Patient has no pulse and is not breathing): CPR (Attempt to Resuscitate)  Medical Interventions (Patient has pulse or is breathing): Full Support      Disposition:  I expect patient to be discharged as per clinical course..    This patient has been examined wearing appropriate Personal Protective Equipment and discussed with hospital infection control department. 06/27/24      Electronically signed by Nicole Stauffer MD, 06/27/24, 12:13 EDT.  [facility], [place], operated by [facility] Hospitalist Team

## 2024-06-27 NOTE — CONSULTS
GI CONSULT  NOTE:    Referring Provider:  Dr. Stauffer    Chief complaint: Abdominal pain and fever    Subjective .     History of present illness: Patient is a 33-year-old female with history of pancreatitis in 2023 suspected to be related to alcohol.  She has history of elevated liver enzymes with hepatic steatosis and alcohol hepatitis.  She presented to the hospital yesterday with nausea/vomiting and abdominal pain over the past 2 days.  Also had fevers as high as 101.  Her child at home has been sick with a respiratory virus and she is positive for rhino/enterovirus.  She complains of feeling shaky and tired as well as having generalized myalgias.  Has back pain as well as lower abdominal pain and pain on the sides almost the flank area.  She reports nausea/vomiting without hematemesis.  Bowels have been moving daily without rectal bleeding.  She does have history of alcohol use but denies alcohol in months.  No NSAID use or Tylenol.      Endo History:  No previous endoscopy    Past Medical History:  History reviewed. No pertinent past medical history.    Past Surgical History:  History reviewed. No pertinent surgical history.    Social History:  Social History     Tobacco Use    Smoking status: Former     Current packs/day: 0.00     Types: Cigarettes     Quit date:      Years since quittin.4    Smokeless tobacco: Never   Vaping Use    Vaping status: Never Used   Substance Use Topics    Alcohol use: Yes     Alcohol/week: 3.0 standard drinks of alcohol     Types: 3 Drinks containing 0.5 oz of alcohol per week     Comment: OCCASIONALLY    Drug use: Not Currently       Family History:  History reviewed. No pertinent family history.    Medications:  Medications Prior to Admission   Medication Sig Dispense Refill Last Dose    mirtazapine (REMERON) 45 MG tablet Take 1 tablet by mouth At Night As Needed.   Past Week    ondansetron (ZOFRAN) 4 MG tablet Take 1 tablet by mouth Every 6 (Six) Hours As  "Needed for Nausea or Vomiting.   6/26/2024       Scheduled Meds:allopurinol, 100 mg, Oral, Daily  amLODIPine, 5 mg, Oral, Q24H  carvedilol, 3.125 mg, Oral, BID With Meals  cefTRIAXone, 2,000 mg, Intravenous, Q24H  enoxaparin, 40 mg, Subcutaneous, Q24H  pantoprazole, 40 mg, Intravenous, Q AM  potassium chloride, 40 mEq, Oral, Q6H  potassium phosphate, 15 mmol, Intravenous, Q3H  sodium chloride, 10 mL, Intravenous, Q12H      Continuous Infusions:sterile water (preservative free) 850 mL with sodium bicarbonate 8.4 % 150 mEq infusion, , Last Rate: 100 mL/hr at 06/27/24 0742      PRN Meds:.  senna-docusate sodium **AND** polyethylene glycol **AND** bisacodyl **AND** bisacodyl    Calcium Replacement - Follow Nurse / BPA Driven Protocol    hydrALAZINE    HYDROmorphone    Magnesium Standard Dose Replacement - Follow Nurse / BPA Driven Protocol    Phosphorus Replacement - Follow Nurse / BPA Driven Protocol    Potassium Replacement - Follow Nurse / BPA Driven Protocol    [COMPLETED] Insert Peripheral IV **AND** sodium chloride    sodium chloride    sodium chloride    ALLERGIES:  Latex and Penicillins    ROS:  Review of Systems   Constitutional:  Positive for chills and fever.   Respiratory:  Negative for cough and shortness of breath.    Cardiovascular:  Negative for chest pain and palpitations.   Gastrointestinal:  Positive for abdominal pain, nausea and vomiting.   Musculoskeletal:  Positive for back pain and myalgias. Negative for arthralgias.   Neurological:  Positive for weakness. Negative for dizziness.   Psychiatric/Behavioral:  Positive for decreased concentration. Negative for agitation and confusion.        Objective     Vital Signs:   Visit Vitals  /89   Pulse 116   Temp 98.1 °F (36.7 °C) (Oral)   Resp 18   Ht 160 cm (63\")   Wt 63 kg (139 lb)   SpO2 100%   BMI 24.62 kg/m²       Physical Exam:      General Appearance:    Awake and alert but drowsy, in no acute distress   Head:    Normocephalic, without obvious " abnormality, atraumatic   Eyes:            Conjunctivae normal, anicteric sclera   Ears:    Ears appear intact with no abnormalities noted   Throat:   No oral lesions, no thrush, oral mucosa moist       Lungs:     Respirations regular, even and unlabored       Chest Wall:    No abnormalities observed   Abdomen:    Soft, mild right upper quadrant tenderness, no rebound or guarding, non-distended, no hepatosplenomegaly   Rectal:     Deferred   Extremities:   Moves all extremities well, no edema, no cyanosis, no  redness   Pulses:   Pulses palpable and equal bilaterally   Skin:   No bleeding, bruising or rash, no jaundice   Lymph nodes:   No palpable adenopathy   Neurologic:   Sensation intact       Results Review:   I reviewed the patient's labs and imaging.  CBC  Results from last 7 days   Lab Units 06/26/24  2358 06/26/24  0900   RBC 10*6/mm3 3.38* 4.11   WBC 10*3/mm3 14.41* 15.93*   HEMOGLOBIN g/dL 11.8* 14.4   PLATELETS 10*3/mm3 133* 169       CMP  Results from last 7 days   Lab Units 06/27/24  0146 06/26/24  0900   SODIUM mmol/L 142 138   POTASSIUM mmol/L 3.3* 4.0   CHLORIDE mmol/L 109* 107   CO2 mmol/L 14.5* 9.1*   BUN mg/dL 44* 46*   CREATININE mg/dL 1.16* 2.09*   GLUCOSE mg/dL 145* 238*   ALBUMIN g/dL 4.5 5.4*   BILIRUBIN mg/dL 1.6* 1.8*   ALK PHOS U/L 115 148*   AST (SGOT) U/L 86* 97*   ALT (SGPT) U/L 65* 87*       Amylase and Lipase  Results from last 7 days   Lab Units 06/26/24  0900   LIPASE U/L 461*       CRP         Imaging Results (Last 24 Hours)       ** No results found for the last 24 hours. **              ASSESSMENT AND PLAN:  Epigastric/right upper quadrant abdominal pain  Nausea/vomiting  Elevated liver enzymes  Elevated lipase  Gallstones/gallbladder sludge  Severe fatty liver  Macrocytic anemia  Thrombocytopenia  History of alcohol pancreatitis and alcohol hepatitis  Rhinovirus/enterovirus  Fever  UTI    Principal Problem:    Vomiting     Plan:  33-year-old female admitted 6/26/2024 with  nausea/vomiting, fever, and abdominal pain over the past 2 days.  Diagnosed with rhino/enterovirus and has elevated liver enzymes and elevated lipase with gallstones.    Patient continues with abdominal pain today but it is intermittent and can be generalized or specific to the right upper quadrant.  She also has nausea/vomiting.  Been feeling poorly over the past couple days with fever and has a child sick at home.  WBC 14.4, hemoglobin 11.8, platelets 133.  Lipase 461.  Total bilirubin 1.6, AST 86, and ALT 65.  Creatinine initially elevated but has improved with IV fluids.  She was admitted in December 2023 with alcohol pancreatitis.  She had elevated liver enzymes which were higher at that time.  They have trended down since the admission.  Suspect liver enzymes elevated due to alcohol but could consider possibility of choledocholithiasis.  We will plan to monitor her liver enzymes and consider proceeding with MRCP.  There is no biliary ductal dilatation on CT.  Continue PPI, IV fluids, and clear liquid diet for now.  Supportive care.  Recommend complete cessation of alcohol.    I discussed the patients findings and my recommendations with the patient.  Bri Jacobo, BEVERLY  06/27/24  11:33 EDT

## 2024-06-27 NOTE — PLAN OF CARE
Problem: Adult Inpatient Plan of Care  Goal: Plan of Care Review  Outcome: Ongoing, Progressing   Goal Outcome Evaluation:        Pt admitted via ER for N/V, ZACHARIAH. Pt lethargic througout shift, but arouses to voice and able to answer all orientation questions correctly. Pt HR elevated, MD notified. Pt ambulated to bathroom, SBA. IV fluids infusing, electrolytes replaced per protocol. Pt c/o pain x1, treated per MAR. Pt educated on current plan of care, will cont to monitor.

## 2024-06-28 ENCOUNTER — INPATIENT HOSPITAL (OUTPATIENT)
Dept: URBAN - METROPOLITAN AREA HOSPITAL 84 | Facility: HOSPITAL | Age: 34
End: 2024-06-28

## 2024-06-28 DIAGNOSIS — R11.2 NAUSEA WITH VOMITING, UNSPECIFIED: ICD-10-CM

## 2024-06-28 LAB
ALBUMIN SERPL ELPH-MCNC: 4 G/DL (ref 2.9–4.4)
ALBUMIN SERPL-MCNC: 3.8 G/DL (ref 3.5–5.2)
ALBUMIN/GLOB SERPL: 1 {RATIO} (ref 0.7–1.7)
ALBUMIN/GLOB SERPL: 1.4 G/DL
ALP SERPL-CCNC: 92 U/L (ref 39–117)
ALPHA1 GLOB SERPL ELPH-MCNC: 0.4 G/DL (ref 0–0.4)
ALPHA2 GLOB SERPL ELPH-MCNC: 0.7 G/DL (ref 0.4–1)
ALT SERPL W P-5'-P-CCNC: 55 U/L (ref 1–33)
AMYLASE SERPL-CCNC: 64 U/L (ref 28–100)
ANION GAP SERPL CALCULATED.3IONS-SCNC: 12.9 MMOL/L (ref 5–15)
AST SERPL-CCNC: 90 U/L (ref 1–32)
B-GLOBULIN SERPL ELPH-MCNC: 1.4 G/DL (ref 0.7–1.3)
BACTERIA SPEC AEROBE CULT: NORMAL
BASOPHILS # BLD AUTO: 0.03 10*3/MM3 (ref 0–0.2)
BASOPHILS NFR BLD AUTO: 0.4 % (ref 0–1.5)
BILIRUB SERPL-MCNC: 1 MG/DL (ref 0–1.2)
BUN SERPL-MCNC: 30 MG/DL (ref 6–20)
BUN/CREAT SERPL: 45.5 (ref 7–25)
CA-I SERPL ISE-MCNC: 1.16 MMOL/L (ref 1.2–1.3)
CALCIUM SPEC-SCNC: 8.9 MG/DL (ref 8.6–10.5)
CHLORIDE SERPL-SCNC: 107 MMOL/L (ref 98–107)
CO2 SERPL-SCNC: 22.1 MMOL/L (ref 22–29)
CREAT SERPL-MCNC: 0.66 MG/DL (ref 0.57–1)
DEPRECATED RDW RBC AUTO: 50.4 FL (ref 37–54)
EGFRCR SERPLBLD CKD-EPI 2021: 119.7 ML/MIN/1.73
EOSINOPHIL # BLD AUTO: 0.08 10*3/MM3 (ref 0–0.4)
EOSINOPHIL NFR BLD AUTO: 1.2 % (ref 0.3–6.2)
ERYTHROCYTE [DISTWIDTH] IN BLOOD BY AUTOMATED COUNT: 13.6 % (ref 12.3–15.4)
GAMMA GLOB SERPL ELPH-MCNC: 1.6 G/DL (ref 0.4–1.8)
GLOBULIN SER CALC-MCNC: 4.1 G/DL (ref 2.2–3.9)
GLOBULIN UR ELPH-MCNC: 2.7 GM/DL
GLUCOSE SERPL-MCNC: 137 MG/DL (ref 65–99)
HCT VFR BLD AUTO: 26.2 % (ref 34–46.6)
HGB BLD-MCNC: 9.3 G/DL (ref 12–15.9)
IMM GRANULOCYTES # BLD AUTO: 0.02 10*3/MM3 (ref 0–0.05)
IMM GRANULOCYTES NFR BLD AUTO: 0.3 % (ref 0–0.5)
LABORATORY COMMENT REPORT: ABNORMAL
LIPASE SERPL-CCNC: 58 U/L (ref 13–60)
LYMPHOCYTES # BLD AUTO: 1.08 10*3/MM3 (ref 0.7–3.1)
LYMPHOCYTES NFR BLD AUTO: 15.9 % (ref 19.6–45.3)
M PROTEIN SERPL ELPH-MCNC: ABNORMAL G/DL
MAGNESIUM SERPL-MCNC: 1.6 MG/DL (ref 1.6–2.6)
MCH RBC QN AUTO: 35.9 PG (ref 26.6–33)
MCHC RBC AUTO-ENTMCNC: 35.5 G/DL (ref 31.5–35.7)
MCV RBC AUTO: 101.2 FL (ref 79–97)
MONOCYTES # BLD AUTO: 0.75 10*3/MM3 (ref 0.1–0.9)
MONOCYTES NFR BLD AUTO: 11.1 % (ref 5–12)
NEUTROPHILS NFR BLD AUTO: 4.82 10*3/MM3 (ref 1.7–7)
NEUTROPHILS NFR BLD AUTO: 71.1 % (ref 42.7–76)
NRBC BLD AUTO-RTO: 0 /100 WBC (ref 0–0.2)
PHOSPHATE SERPL-MCNC: 1.6 MG/DL (ref 2.5–4.5)
PHOSPHATE SERPL-MCNC: 2.2 MG/DL (ref 2.5–4.5)
PLATELET # BLD AUTO: 101 10*3/MM3 (ref 140–450)
PMV BLD AUTO: 10 FL (ref 6–12)
POTASSIUM SERPL-SCNC: 3.3 MMOL/L (ref 3.5–5.2)
POTASSIUM SERPL-SCNC: 3.3 MMOL/L (ref 3.5–5.2)
PROT PATTERN SERPL ELPH-IMP: ABNORMAL
PROT SERPL-MCNC: 6.5 G/DL (ref 6–8.5)
PROT SERPL-MCNC: 8.1 G/DL (ref 6–8.5)
RBC # BLD AUTO: 2.59 10*6/MM3 (ref 3.77–5.28)
SODIUM SERPL-SCNC: 142 MMOL/L (ref 136–145)
WBC NRBC COR # BLD AUTO: 6.78 10*3/MM3 (ref 3.4–10.8)

## 2024-06-28 PROCEDURE — 84132 ASSAY OF SERUM POTASSIUM: CPT | Performed by: INTERNAL MEDICINE

## 2024-06-28 PROCEDURE — 97530 THERAPEUTIC ACTIVITIES: CPT

## 2024-06-28 PROCEDURE — 25010000002 ENOXAPARIN PER 10 MG: Performed by: NURSE PRACTITIONER

## 2024-06-28 PROCEDURE — 99232 SBSQ HOSP IP/OBS MODERATE 35: CPT | Performed by: NURSE PRACTITIONER

## 2024-06-28 PROCEDURE — 25010000002 CEFTRIAXONE PER 250 MG: Performed by: NURSE PRACTITIONER

## 2024-06-28 PROCEDURE — 25010000002 MAGNESIUM SULFATE 2 GM/50ML SOLUTION: Performed by: INTERNAL MEDICINE

## 2024-06-28 PROCEDURE — 84100 ASSAY OF PHOSPHORUS: CPT | Performed by: INTERNAL MEDICINE

## 2024-06-28 PROCEDURE — 25010000002 HYDROMORPHONE 1 MG/ML SOLUTION: Performed by: NURSE PRACTITIONER

## 2024-06-28 RX ORDER — POTASSIUM CHLORIDE 20 MEQ/1
40 TABLET, EXTENDED RELEASE ORAL EVERY 4 HOURS
Qty: 4 TABLET | Refills: 0 | Status: COMPLETED | OUTPATIENT
Start: 2024-06-28 | End: 2024-06-28

## 2024-06-28 RX ORDER — POTASSIUM CHLORIDE 20 MEQ/1
40 TABLET, EXTENDED RELEASE ORAL EVERY 4 HOURS
Status: COMPLETED | OUTPATIENT
Start: 2024-06-28 | End: 2024-06-29

## 2024-06-28 RX ORDER — LIDOCAINE 4 G/G
1 PATCH TOPICAL
Status: DISCONTINUED | OUTPATIENT
Start: 2024-06-28 | End: 2024-06-29 | Stop reason: HOSPADM

## 2024-06-28 RX ORDER — IBUPROFEN 400 MG/1
400 TABLET ORAL ONCE
Status: COMPLETED | OUTPATIENT
Start: 2024-06-29 | End: 2024-06-29

## 2024-06-28 RX ORDER — HYDROCODONE BITARTRATE AND ACETAMINOPHEN 5; 325 MG/1; MG/1
1 TABLET ORAL EVERY 8 HOURS PRN
Status: DISCONTINUED | OUTPATIENT
Start: 2024-06-28 | End: 2024-06-29 | Stop reason: HOSPADM

## 2024-06-28 RX ORDER — MAGNESIUM SULFATE HEPTAHYDRATE 40 MG/ML
2 INJECTION, SOLUTION INTRAVENOUS EVERY 12 HOURS
Qty: 100 ML | Refills: 0 | Status: COMPLETED | OUTPATIENT
Start: 2024-06-28 | End: 2024-06-28

## 2024-06-28 RX ADMIN — HYDROCODONE BITARTRATE AND ACETAMINOPHEN 1 TABLET: 5; 325 TABLET ORAL at 12:42

## 2024-06-28 RX ADMIN — HYDROMORPHONE HYDROCHLORIDE 0.5 MG: 1 INJECTION, SOLUTION INTRAMUSCULAR; INTRAVENOUS; SUBCUTANEOUS at 04:57

## 2024-06-28 RX ADMIN — HYDROMORPHONE HYDROCHLORIDE 0.5 MG: 1 INJECTION, SOLUTION INTRAMUSCULAR; INTRAVENOUS; SUBCUTANEOUS at 01:05

## 2024-06-28 RX ADMIN — MAGNESIUM SULFATE HEPTAHYDRATE 2 G: 2 INJECTION, SOLUTION INTRAVENOUS at 09:03

## 2024-06-28 RX ADMIN — ENOXAPARIN SODIUM 40 MG: 100 INJECTION SUBCUTANEOUS at 17:34

## 2024-06-28 RX ADMIN — MAGNESIUM SULFATE HEPTAHYDRATE 2 G: 2 INJECTION, SOLUTION INTRAVENOUS at 20:23

## 2024-06-28 RX ADMIN — Medication 2 PACKET: at 20:23

## 2024-06-28 RX ADMIN — CEFTRIAXONE 2000 MG: 2 INJECTION, POWDER, FOR SOLUTION INTRAMUSCULAR; INTRAVENOUS at 15:10

## 2024-06-28 RX ADMIN — LIDOCAINE 1 PATCH: 4 PATCH TOPICAL at 12:42

## 2024-06-28 RX ADMIN — ALLOPURINOL 100 MG: 100 TABLET ORAL at 09:03

## 2024-06-28 RX ADMIN — Medication 10 ML: at 21:11

## 2024-06-28 RX ADMIN — CARVEDILOL 3.12 MG: 3.12 TABLET, FILM COATED ORAL at 09:03

## 2024-06-28 RX ADMIN — POTASSIUM CHLORIDE 40 MEQ: 1500 TABLET, EXTENDED RELEASE ORAL at 20:23

## 2024-06-28 RX ADMIN — Medication 2 PACKET: at 09:03

## 2024-06-28 RX ADMIN — HYDROCODONE BITARTRATE AND ACETAMINOPHEN 1 TABLET: 5; 325 TABLET ORAL at 20:23

## 2024-06-28 RX ADMIN — SODIUM BICARBONATE: 84 INJECTION, SOLUTION INTRAVENOUS at 05:49

## 2024-06-28 RX ADMIN — HYDROMORPHONE HYDROCHLORIDE 0.5 MG: 1 INJECTION, SOLUTION INTRAMUSCULAR; INTRAVENOUS; SUBCUTANEOUS at 08:34

## 2024-06-28 RX ADMIN — POTASSIUM CHLORIDE 40 MEQ: 1500 TABLET, EXTENDED RELEASE ORAL at 09:06

## 2024-06-28 RX ADMIN — POTASSIUM CHLORIDE 40 MEQ: 1500 TABLET, EXTENDED RELEASE ORAL at 05:49

## 2024-06-28 RX ADMIN — AMLODIPINE BESYLATE 5 MG: 5 TABLET ORAL at 09:03

## 2024-06-28 RX ADMIN — PANTOPRAZOLE SODIUM 40 MG: 40 INJECTION, POWDER, FOR SOLUTION INTRAVENOUS at 05:48

## 2024-06-28 NOTE — PROGRESS NOTES
[facility] MEDICINE SERVICE  DAILY PROGRESS NOTE    NAME: Shirin Canales  : 1990  MRN: [id number]      LOS: 2 days     PROVIDER OF SERVICE: Malu Florentino MD    Chief Complaint: Vomiting    History of Present Illness: Shirin Canales is a 33 y.o. female with a CMH of pancreatitis, alcoholic ketoacidosis, kidney stone, hepatic steatosis who presented to [facility] on 2024 with vomiting x 2 days the emesis has been bilious, nonbloody patient states exacerbating factors include movement.  There has been nausea.  Patient states that she does consume alcohol, however informed her last drink was 2 weeks ago.  Patient denies any drug use..     While in the emergency department labs remarkable for CO2 of 9.1.  Anion gap of 21.9.  BUN 46, creatinine 2.09, EGFR 31.8, alk phos 148, albumin 5.4, AST 97, ALT 87, lipase 461, WBC 15.93.  Her respiratory panel was positive for rhinovirus.  Her urine appeared dark and hazy, positive ketones blood positive nitrites and leuks greater than 300 protein, large bilirubin, 6-10 whites.  CT scan of abdomen and pelvis noted severe fatty liver, which is stable.  Also noncalcified stones or sludge throughout the gallbladder but no evidence of acute cholecystitis    Subjective:     Interval History:       -Pt very sedated today complains of pain     Review of Systems:   Negative except what is listed above     Objective:     Vital Signs  Temp:  [98 °F (36.7 °C)-98.2 °F (36.8 °C)] 98 °F (36.7 °C)  Heart Rate:  [] 101  Resp:  [15-16] 16  BP: ()/() 106/80   Body mass index is 24.62 kg/m².    Physical Exam    General Appearance:    Drowsy    Head:    Normocephalic, without obvious abnormality, atraumatic   Eyes:            conjunctivae and sclerae normal, no   icterus, no pallor, corneas  clear, PERRLA   Neck:   No adenopathy, supple, trachea midline, no thyromegaly, no   carotid bruit, no JVD   Lungs:     Clear to auscultation,respirations  regular, even and                  unlabored    Heart:    Regular rhythm and normal rate, normal S1 and S2, no            murmur, no gallop, no rub, no click   Abdomen:     Normal bowel sounds, no masses, no organomegaly, soft        non-tender, non-distended, no guarding, no rebound                No tenderness   Extremities:   Moves all extremities well, no edema, no cyanosis, no             redness   Lymph nodes:   No palpable adenopathy   Neurologic:   Cranial nerves 2 - 12 grossly intact, sensation intact, DTR       present and equal bilaterally       Scheduled Meds   allopurinol, 100 mg, Oral, Daily  amLODIPine, 5 mg, Oral, Q24H  carvedilol, 3.125 mg, Oral, BID With Meals  cefTRIAXone, 2,000 mg, Intravenous, Q24H  enoxaparin, 40 mg, Subcutaneous, Q24H  pantoprazole, 40 mg, Intravenous, Q AM  potassium & sodium phosphates, 2 packet, Oral, Once  potassium chloride ER, 40 mEq, Oral, Q4H  sodium chloride, 10 mL, Intravenous, Q12H       PRN Meds     senna-docusate sodium **AND** polyethylene glycol **AND** bisacodyl **AND** bisacodyl    Calcium Replacement - Follow Nurse / BPA Driven Protocol    hydrALAZINE    HYDROmorphone    Magnesium Standard Dose Replacement - Follow Nurse / BPA Driven Protocol    Phosphorus Replacement - Follow Nurse / BPA Driven Protocol    Potassium Replacement - Follow Nurse / BPA Driven Protocol    [COMPLETED] Insert Peripheral IV **AND** sodium chloride    sodium chloride    sodium chloride   Infusions  sterile water (preservative free) 850 mL with sodium bicarbonate 8.4 % 150 mEq infusion, , Last Rate: 100 mL/hr at 06/28/24 0549          Diagnostic Data    Results from last 7 days   Lab Units 06/27/24  2333   WBC 10*3/mm3 6.78   HEMOGLOBIN g/dL 9.3*   HEMATOCRIT % 26.2*   PLATELETS 10*3/mm3 101*   GLUCOSE mg/dL 137*   CREATININE mg/dL 0.66   BUN mg/dL 30*   SODIUM mmol/L 142   POTASSIUM mmol/L 3.3*   AST (SGOT) U/L 90*   ALT (SGPT) U/L 55*   ALK PHOS U/L 92   BILIRUBIN mg/dL 1.0   ANION  GAP mmol/L 12.9       CT Abdomen Pelvis Without Contrast    Result Date: 6/26/2024  Impression: Severe fatty liver, stable. Findings suggest noncalcified stones or sludge throughout the gallbladder although no evidence of acute cholecystitis. Nonobstructing renal stones. No acute process. Electronically Signed: Gabriela Agrawal MD  6/26/2024 11:10 AM EDT  Workstation ID: [id number]       I have personally reviewed the patient's new results.     Assessment/Plan:     Active and Resolved Problems    Episodes of nausea vomiting  Acute pancreatitis  Transaminitis  Fatty liver  Sludge in gallbladder  Metabolic acidosis  Acute cystitis  Acute kidney injury due to dehydration  Alcoholic  Rhino virus infection  Lifelong smoker    Suggestion:    6/28- patient remained stable overnight and will continue current supportive care.  Multiple subspecialty following.. D/c iv dilauded . Start po lortab   Hopefully discharge patient home soon.    VTE Prophylaxis:  Pharmacologic VTE prophylaxis orders are present.         Code status is   Code Status and Medical Interventions:   Ordered at: 06/26/24 1148     Level Of Support Discussed With:    Patient     Code Status (Patient has no pulse and is not breathing):    CPR (Attempt to Resuscitate)     Medical Interventions (Patient has pulse or is breathing):    Full Support       Plan for disposition:  6/29    Time: 30 minutes    Signature: Electronically signed by Malu Florentino MD, 06/28/24, 07:02 EDT.  [facility] Hospitalist Team

## 2024-06-28 NOTE — THERAPY TREATMENT NOTE
"Subjective: Pt agreeable to therapeutic plan of care.    Objective:     Bed mobility - SBA sup<>sit  Transfers - CGA - HHA without AD  Ambulation - 25 feet x2 CGA with HHA    Vitals: WNL    Pain: 0 VAS; pt reports nausea   Location: na  Intervention for pain: Repositioned and Therapeutic Presence    Education: Provided education on the importance of mobility in the acute care setting, Verbal/Tactile Cues, Transfer Training, and Energy conservation strategies    Assessment: Shirin Canales presents with functional mobility impairments which indicate the need for skilled intervention. Pt fatigued and requires HHA/min A. Anticipate pt's endurance to improve and expect pt to return home. Tolerating session today without incident. Will continue to follow and progress as tolerated.     Plan/Recommendations:   If medically appropriate, Low Intensity Therapy recommended post-acute care - This is recommended as therapy feels this patient would require 2-3 visits per week. OP or HH would be the best option depending on patient's home bound status. Consider, if the patient has other  \"skilled\" needs such as wounds, IV antibiotics, etc. Combined with \"low intensity\" could also equate to a SNF. If patient is medically complex, consider LTAC. Pt requires no DME at discharge.     Pt desires Home with Home Health and Home with family assist at discharge. Pt cooperative; agreeable to therapeutic recommendations and plan of care.         Basic Mobility 6-click:  Rollin = Total, A lot = 2, A little = 3; 4 = None  Supine>Sit:   1 = Total, A lot = 2, A little = 3; 4 = None   Sit>Stand with arms:  1 = Total, A lot = 2, A little = 3; 4 = None  Bed>Chair:   1 = Total, A lot = 2, A little = 3; 4 = None  Ambulate in room:  1 = Total, A lot = 2, A little = 3; 4 = None  3-5 Steps with railin = Total, A lot = 2, A little = 3; 4 = None  Score: 20    Modified Rice: N/A = No pre-op stroke/TIA    Post-Tx Position: Supine with HOB " Elevated, Alarms activated, and Call light and personal items within reach  PPE: gloves and surgical mask

## 2024-06-28 NOTE — NURSING NOTE
Patient's IV x two not working. Unable to flush and painful. Removed both IV's from right arm, intact without difficulty. Notified IV Team that patient will need another IV as patient requests due to last IV was placed by IV Team.

## 2024-06-28 NOTE — PROGRESS NOTES
LOS: 2 days   Patient Care Team:  Provider, No Known as PCP - Yoli Arzola, RN as Registered Nurse  Daivd Morrow MD as Consulting Physician (Nephrology)      Subjective     Interval History:     Subjective: Patient complains of some back pain today.  Abdominal pain has improved.  No vomiting.  No overt GI bleeding.      ROS:   No chest pain, shortness of breath, or cough.        Medication Review:     Current Facility-Administered Medications:     allopurinol (ZYLOPRIM) tablet 100 mg, 100 mg, Oral, Daily, David Morrow MD, 100 mg at 06/28/24 0903    amLODIPine (NORVASC) tablet 5 mg, 5 mg, Oral, Q24H, David Morrow MD, 5 mg at 06/28/24 0903    sennosides-docusate (PERICOLACE) 8.6-50 MG per tablet 2 tablet, 2 tablet, Oral, BID PRN **AND** polyethylene glycol (MIRALAX) packet 17 g, 17 g, Oral, Daily PRN **AND** bisacodyl (DULCOLAX) EC tablet 5 mg, 5 mg, Oral, Daily PRN **AND** bisacodyl (DULCOLAX) suppository 10 mg, 10 mg, Rectal, Daily PRN, Pema Cyr APRN    Calcium Replacement - Follow Nurse / BPA Driven Protocol, , Does not apply, PRN, Pema Cyr, APRN    carvedilol (COREG) tablet 3.125 mg, 3.125 mg, Oral, BID With Meals, David Morrow MD, 3.125 mg at 06/28/24 0903    cefTRIAXone (ROCEPHIN) 2,000 mg in sodium chloride 0.9 % 100 mL MBP, 2,000 mg, Intravenous, Q24H, Zenia Canseco APRN, Last Rate: 200 mL/hr at 06/27/24 1659, 2,000 mg at 06/27/24 1659    Enoxaparin Sodium (LOVENOX) syringe 40 mg, 40 mg, Subcutaneous, Q24H, Pema Cyr APRN, 40 mg at 06/27/24 1659    hydrALAZINE (APRESOLINE) injection 10 mg, 10 mg, Intravenous, Q6H PRN, David Morrow MD, 10 mg at 06/26/24 7277    Magnesium Standard Dose Replacement - Follow Nurse / BPA Driven Protocol, , Does not apply, PRN, Pema Cyr, BEVERLY    magnesium sulfate 2g/50 mL (PREMIX) infusion, 2 g, Intravenous, Q12H, David Morrow MD, 2 g at  06/28/24 0903    pantoprazole (PROTONIX) injection 40 mg, 40 mg, Intravenous, Q AM, Pema Cyr APRN, 40 mg at 06/28/24 0548    Phosphorus Replacement - Follow Nurse / BPA Driven Protocol, , Does not apply, PRN, JesseiteRome mendietae P, APRN    Potassium Replacement - Follow Nurse / BPA Driven Protocol, , Does not apply, PRN, Rome Cyre P APRN    [COMPLETED] Insert Peripheral IV, , , Once **AND** sodium chloride 0.9 % flush 10 mL, 10 mL, Intravenous, PRN, David Decker MD    sodium chloride 0.9 % flush 10 mL, 10 mL, Intravenous, Q12H, JesseiteRome mendietae P APRN, 10 mL at 06/27/24 2020    sodium chloride 0.9 % flush 10 mL, 10 mL, Intravenous, PRN, Griten, Pema P, APRN    sodium chloride 0.9 % infusion 40 mL, 40 mL, Intravenous, PRN, GriteAnnemarie mendietaPema P, APRN      Objective     Vital Signs  Vitals:    06/27/24 2008 06/27/24 2233 06/28/24 0440 06/28/24 0727   BP: 112/83  106/80 117/90   BP Location: Left arm  Left arm Left arm   Patient Position: Lying  Lying Sitting   Pulse: 110  101 86   Resp: 16 15 16 10   Temp: 98 °F (36.7 °C)  98 °F (36.7 °C) 97.3 °F (36.3 °C)   TempSrc: Oral  Oral Oral   SpO2: 100%  100% 100%   Weight:       Height:           Physical Exam:     General Appearance:    Awake and alert, in no acute distress   Head:    Normocephalic, without obvious abnormality   Eyes:          Conjunctivae normal, anicteric sclera   Throat:   No oral lesions, no thrush, oral mucosa moist   Neck:   No adenopathy, supple, no JVD   Lungs:     respirations regular, even and unlabored   Abdomen:     Soft, mild epigastric tenderness, no rebound or guarding, non-distended   Rectal:     Deferred   Extremities:   No edema, no cyanosis   Skin:   No bruising or rash, no jaundice        Results Review:    CBC    Results from last 7 days   Lab Units 06/27/24  2333 06/26/24  2358 06/26/24  0900   WBC 10*3/mm3 6.78 14.41* 15.93*   HEMOGLOBIN g/dL 9.3* 11.8* 14.4   PLATELETS 10*3/mm3 101* 133* 169     CMP   Results  from last 7 days   Lab Units 06/27/24  2333 06/27/24  0146 06/26/24  0900   SODIUM mmol/L 142 142 138   POTASSIUM mmol/L 3.3* 3.3* 4.0   CHLORIDE mmol/L 107 109* 107   CO2 mmol/L 22.1 14.5* 9.1*   BUN mg/dL 30* 44* 46*   CREATININE mg/dL 0.66 1.16* 2.09*   GLUCOSE mg/dL 137* 145* 238*   ALBUMIN g/dL 3.8 4.5 5.4*   BILIRUBIN mg/dL 1.0 1.6* 1.8*   ALK PHOS U/L 92 115 148*   AST (SGOT) U/L 90* 86* 97*   ALT (SGPT) U/L 55* 65* 87*   MAGNESIUM mg/dL 1.6 2.1  --    PHOSPHORUS mg/dL 2.2* 0.4*  --    AMYLASE U/L 64  --   --    LIPASE U/L 58  --  461*     Cr Clearance Estimated Creatinine Clearance: 109.5 mL/min (by C-G formula based on SCr of 0.66 mg/dL).  Coag     HbA1C   Lab Results   Component Value Date    HGBA1C 4.30 (L) 12/28/2023     Results from last 7 days   Lab Units 06/26/24  1257   CK TOTAL U/L 14*     Infection   Results from last 7 days   Lab Units 06/26/24  1614 06/26/24  1032   BLOODCX  No growth at 24 hours  No growth at 24 hours  --    URINECX   --  Culture in progress     UA    Results from last 7 days   Lab Units 06/26/24  1032   NITRITE UA  Positive*   WBC UA /HPF 6-10*   BACTERIA UA /HPF Trace*   SQUAM EPITHEL UA /HPF 13-20*   URINECX  Culture in progress     Microbiology Results (last 10 days)       Procedure Component Value - Date/Time    Blood Culture - Blood, Arm, Left [[id number]]  (Normal) Collected: 06/26/24 1614    Lab Status: Preliminary result Specimen: Blood from Arm, Left Updated: 06/27/24 1631     Blood Culture No growth at 24 hours    Blood Culture - Blood, Arm, Right [[id number]]  (Normal) Collected: 06/26/24 1614    Lab Status: Preliminary result Specimen: Blood from Arm, Right Updated: 06/27/24 1631     Blood Culture No growth at 24 hours    Narrative:      Less than seven (7) mL's of blood was collected.  Insufficient quantity may yield false negative results.    Urine Culture - Urine, Urine, Clean Catch [[id number]]  (Normal) Collected: 06/26/24 1032    Lab Status: Preliminary result  Specimen: Urine, Clean Catch Updated: 06/27/24 1136     Urine Culture Culture in progress    Eosinophil Smear - Urine, Urine, Clean Catch [[id number]]  (Normal) Collected: 06/26/24 1032    Lab Status: Final result Specimen: Urine, Clean Catch Updated: 06/26/24 1952     Eosinophil Smear 0 % EOS/100 Cells     Respiratory Panel PCR w/COVID-19(SARS-CoV-2) HAILY/ROSEMARIE/MARY/PAD/[facility]/ANGEL In-House, NP Swab in UTM/VTM, 2 HR TAT - Swab, Nasopharynx [[id number]]  (Abnormal) Collected: 06/26/24 0901    Lab Status: Final result Specimen: Swab from Nasopharynx Updated: 06/26/24 1002     ADENOVIRUS, PCR Not Detected     Coronavirus 229E Not Detected     Coronavirus HKU1 Not Detected     Coronavirus NL63 Not Detected     Coronavirus OC43 Not Detected     COVID19 Not Detected     Human Metapneumovirus Not Detected     Human Rhinovirus/Enterovirus Detected     Influenza A PCR Not Detected     Influenza B PCR Not Detected     Parainfluenza Virus 1 Not Detected     Parainfluenza Virus 2 Not Detected     Parainfluenza Virus 3 Not Detected     Parainfluenza Virus 4 Not Detected     RSV, PCR Not Detected     Bordetella pertussis pcr Not Detected     Bordetella parapertussis PCR Not Detected     Chlamydophila pneumoniae PCR Not Detected     Mycoplasma pneumo by PCR Not Detected    Narrative:      In the setting of a positive respiratory panel with a viral infection PLUS a negative procalcitonin without other underlying concern for bacterial infection, consider observing off antibiotics or discontinuation of antibiotics and continue supportive care. If the respiratory panel is positive for atypical bacterial infection (Bordetella pertussis, Chlamydophila pneumoniae, or Mycoplasma pneumoniae), consider antibiotic de-escalation to target atypical bacterial infection.          Imaging Results (Last 72 Hours)       Procedure Component Value Units Date/Time    CT Abdomen Pelvis Without Contrast [[id number]] Collected: 06/26/24 1107     Updated: 06/26/24  1112    Narrative:      CT ABDOMEN PELVIS WO CONTRAST    Date of Exam: 6/26/2024 11:02 AM EDT    Indication: Abdominal pain, vomiting.    Comparison: 12/28/2023    Technique: Axial CT images were obtained of the abdomen and pelvis without the administration of contrast. Sagittal and coronal reconstructions were performed.  Automated exposure control and iterative reconstruction methods were used.      Findings:  Severe fatty liver is again identified. The nonopacified solid organs are normal in size. There is diffuse increased density throughout the gallbladder as before. There is no gallbladder wall thickening or edema or pericholecystic fluid and there is no   bile duct dilatation. Punctate nonobstructing renal stones are again seen. There is no hydronephrosis. The bladder is poorly distended. There is no pelvic mass or free fluid. There is no large or small bowel dilatation. There are no focal inflammatory   changes. The abdominal aorta is normal in size.      Impression:      Impression:  Severe fatty liver, stable. Findings suggest noncalcified stones or sludge throughout the gallbladder although no evidence of acute cholecystitis.    Nonobstructing renal stones.    No acute process.            Electronically Signed: Gabriela Agrawal MD    6/26/2024 11:10 AM EDT    Workstation ID: [id number]            Assessment & Plan     ASSESSMENT:  -Epigastric/right upper quadrant abdominal pain  -Nausea/vomiting  -Elevated liver enzymes  -Elevated lipase  -Gallstones/gallbladder sludge  -Severe fatty liver  -Macrocytic anemia  -Thrombocytopenia  -History of alcohol pancreatitis and alcohol hepatitis  -Rhinovirus/enterovirus  -Fever  -UTI    PLAN:  Patient is a 33-year-old female admitted 6/26/2024 with nausea/vomiting, fever, and abdominal pain over the past 2 days. Diagnosed with rhino/enterovirus and has elevated liver enzymes and elevated lipase with gallstones.     Patient reports that abdominal pain is slowly improving.   Complains mostly of back pain today.  LFTs better with total bilirubin 1.0 from 1.6, alk phos 92 from 115, AST 90 from 86, and ALT 55 from 65.  Lipase now normal at 58 from 461.  No plans for MRCP at this time as LFTs and pain are improving.  Continue PPI.  Antiemetics/analgesics as needed.  Recommend complete EtOH cessation.  Will advance to low-fat diet.  Okay for discharge home from GI standpoint with outpatient follow-up in 2 to 3 months.  GI will be available as inpatient as needed.      Electronically signed by BEVERLY Payne, 06/28/24, 10:03 AM EDT.

## 2024-06-28 NOTE — PROGRESS NOTES
"NEPHROLOGY PROGRESS NOTE------[facility]    [facility]  [phone]  Alec Morrow MD      Patient Care Team:  Provider, No Known as PCP - Yoli Arzola, RN as Registered Nurse  Odalys, MD David as Consulting Physician (Nephrology)      Provider:  Alec Morrow MD  Patient Name: Shirin Canales  :  1990    SUBJECTIVE:    F/U ARF/ZACHARIAH/ACIDOSIS/HYPERCALCEMIA    Feeling better and stronger daily. No SOB, CP, dysuria.     Medication:  allopurinol, 100 mg, Oral, Daily  amLODIPine, 5 mg, Oral, Q24H  carvedilol, 3.125 mg, Oral, BID With Meals  cefTRIAXone, 2,000 mg, Intravenous, Q24H  enoxaparin, 40 mg, Subcutaneous, Q24H  pantoprazole, 40 mg, Intravenous, Q AM  potassium & sodium phosphates, 2 packet, Oral, Once  potassium chloride ER, 40 mEq, Oral, Q4H  sodium chloride, 10 mL, Intravenous, Q12H      sterile water (preservative free) 850 mL with sodium bicarbonate 8.4 % 150 mEq infusion, , Last Rate: 100 mL/hr at 24 0549        OBJECTIVE    Vital Sign Min/Max for last 24 hours  Temp  Min: 97.3 °F (36.3 °C)  Max: 98.2 °F (36.8 °C)   BP  Min: 97/70  Max: 144/110   Pulse  Min: 85  Max: 117   Resp  Min: 10  Max: 16   SpO2  Min: 98 %  Max: 100 %   No data recorded   No data recorded     Flowsheet Rows      Flowsheet Row First Filed Value   Admission Height 160 cm (63\") Documented at 2024 0824   Admission Weight 63 kg (139 lb) Documented at 2024 0824            No intake/output data recorded.  I/O last 3 completed shifts:  In: 1480 [P.O.:480; I.V.:1000]  Out: -     Physical Exam:  General Appearance: alert, appears stated age and cooperative. WEAK  Head: normocephalic, without obvious abnormality and atraumatic. +DRY OP  Eyes: conjunctivae and sclerae normal and no icterus  Neck: supple and no JVD  Lungs: clear to auscultation and respirations regular  Heart: regular rhythm & normal rate and normal S1, " "S2  Chest Wall: no abnormalities observed  Abdomen: normal bowel sounds and soft, nontender  Extremities: moves extremities well, no edema, no cyanosis  Skin: no bleeding, bruising or rash. +DECREASED SKIN TURGOR  Neurologic: Alert, and oriented x 4  No focal deficits    Labs:    WBC WBC   Date Value Ref Range Status   06/27/2024 6.78 3.40 - 10.80 10*3/mm3 Final   06/26/2024 14.41 (H) 3.40 - 10.80 10*3/mm3 Final   06/26/2024 15.93 (H) 3.40 - 10.80 10*3/mm3 Final      HGB Hemoglobin   Date Value Ref Range Status   06/27/2024 9.3 (L) 12.0 - 15.9 g/dL Final     Comment:     Result checked     06/26/2024 11.8 (L) 12.0 - 15.9 g/dL Final   06/26/2024 14.4 12.0 - 15.9 g/dL Final      HCT Hematocrit   Date Value Ref Range Status   06/27/2024 26.2 (L) 34.0 - 46.6 % Final   06/26/2024 34.2 34.0 - 46.6 % Final   06/26/2024 41.6 34.0 - 46.6 % Final      Platelets No results found for: \"LABPLAT\"   MCV MCV   Date Value Ref Range Status   06/27/2024 101.2 (H) 79.0 - 97.0 fL Final   06/26/2024 101.2 (H) 79.0 - 97.0 fL Final   06/26/2024 101.2 (H) 79.0 - 97.0 fL Final          Sodium Sodium   Date Value Ref Range Status   06/27/2024 142 136 - 145 mmol/L Final   06/27/2024 142 136 - 145 mmol/L Final   06/26/2024 138 136 - 145 mmol/L Final      Potassium Potassium   Date Value Ref Range Status   06/27/2024 3.3 (L) 3.5 - 5.2 mmol/L Final   06/27/2024 3.3 (L) 3.5 - 5.2 mmol/L Final   06/26/2024 4.0 3.5 - 5.2 mmol/L Final      Chloride Chloride   Date Value Ref Range Status   06/27/2024 107 98 - 107 mmol/L Final   06/27/2024 109 (H) 98 - 107 mmol/L Final   06/26/2024 107 98 - 107 mmol/L Final      CO2 CO2   Date Value Ref Range Status   06/27/2024 22.1 22.0 - 29.0 mmol/L Final   06/27/2024 14.5 (L) 22.0 - 29.0 mmol/L Final   06/26/2024 9.1 (C) 22.0 - 29.0 mmol/L Final      BUN BUN   Date Value Ref Range Status   06/27/2024 30 (H) 6 - 20 mg/dL Final   06/27/2024 44 (H) 6 - 20 mg/dL Final   06/26/2024 46 (H) 6 - 20 mg/dL Final    " "  Creatinine Creatinine   Date Value Ref Range Status   06/27/2024 0.66 0.57 - 1.00 mg/dL Final   06/27/2024 1.16 (H) 0.57 - 1.00 mg/dL Final   06/26/2024 2.09 (H) 0.57 - 1.00 mg/dL Final      Calcium Calcium   Date Value Ref Range Status   06/27/2024 8.9 8.6 - 10.5 mg/dL Final   06/27/2024 10.6 (H) 8.6 - 10.5 mg/dL Final   06/26/2024 12.0 (H) 8.6 - 10.5 mg/dL Final      PO4 No components found for: \"PO4\"   Albumin Albumin   Date Value Ref Range Status   06/27/2024 3.8 3.5 - 5.2 g/dL Final   06/27/2024 4.5 3.5 - 5.2 g/dL Final   06/26/2024 5.4 (H) 3.5 - 5.2 g/dL Final      Magnesium Magnesium   Date Value Ref Range Status   06/27/2024 1.6 1.6 - 2.6 mg/dL Final   06/27/2024 2.1 1.6 - 2.6 mg/dL Final      Uric Acid No components found for: \"URIC ACID\"     Imaging Results (Last 72 Hours)       Procedure Component Value Units Date/Time    CT Abdomen Pelvis Without Contrast [[id number]] Collected: 06/26/24 1107     Updated: 06/26/24 1112    Narrative:      CT ABDOMEN PELVIS WO CONTRAST    Date of Exam: 6/26/2024 11:02 AM EDT    Indication: Abdominal pain, vomiting.    Comparison: 12/28/2023    Technique: Axial CT images were obtained of the abdomen and pelvis without the administration of contrast. Sagittal and coronal reconstructions were performed.  Automated exposure control and iterative reconstruction methods were used.      Findings:  Severe fatty liver is again identified. The nonopacified solid organs are normal in size. There is diffuse increased density throughout the gallbladder as before. There is no gallbladder wall thickening or edema or pericholecystic fluid and there is no   bile duct dilatation. Punctate nonobstructing renal stones are again seen. There is no hydronephrosis. The bladder is poorly distended. There is no pelvic mass or free fluid. There is no large or small bowel dilatation. There are no focal inflammatory   changes. The abdominal aorta is normal in size.      Impression:      " Impression:  Severe fatty liver, stable. Findings suggest noncalcified stones or sludge throughout the gallbladder although no evidence of acute cholecystitis.    Nonobstructing renal stones.    No acute process.            Electronically Signed: Gabriela Agrawal MD    6/26/2024 11:10 AM EDT    Workstation ID: [id number]            Results for orders placed during the hospital encounter of 04/23/23    XR Chest 1 View    Narrative  XR CHEST 1 VW    Date of Exam: 4/24/2023 1:50 AM EDT    Indication: possible sepsis, r/o aspiration    Comparison: None available.    Findings:  Heart size and pulmonary vessels are within normal limits. Lungs are clear bilaterally. There are no pleural effusions. There is no evidence of pneumothorax.    Impression  Impression:  No acute cardiopulmonary disease.      Electronically Signed: Valentino Aldridge  4/24/2023 7:43 AM EDT  Workstation ID: [id number]            ASSESSMENT / PLAN      Vomiting      ARF/ZACHARIAH-----Nonoliguric. +ARF/ZACHARIAH with what appears to be normal   Normal baseline renal function. BUN/Cr coming down. +ARF/ZACHARIAH appears to be secondary to severe prerenal state/intravascular volume depletion and ATN from hypercalcemia. Continue IVFs.  No NSAIDs or IV dye. Dose meds for CrCl less than 10 cc/min.       2. ACIDOSIS------Resolved. D/C Bicarb gtt and follow      3. HYPERCALCEMIA------Better with hydration. PTH appropriately low     4. HUMAN RHINOVIRUS INFECTION     5. ABDOMINAL PAIN/N/V/FATTY LIVER/ELEVATED TRANSAMINASES     6. TOBACCO ABUSE    7. HTN-----BP better with addition of Amlodipine and Coreg and follow    8. HYPOKALEMIA------Replace po    9. HYPOPHOSPHATEMIA-----Replace    10. HYPOMAGNESEMIA------Replace IV      Alec Morrow MD  [facility]  [phone]  06/28/24  07:50 EDT

## 2024-06-28 NOTE — PLAN OF CARE
Assessment: Shirin Canales presents with functional mobility impairments which indicate the need for skilled intervention. Pt fatigued and requires HHA/min A. Anticipate pt's endurance to improve and expect pt to return home. Tolerating session today without incident. Will continue to follow and progress as tolerated.      Pt left a VM requesting refills on her alcohol swabs, lancets, and glucose strips. Called pt back. Informed her that refills were placed to her pharmacy. Instructed pt to follow up with her pharmacy for . Pt states understanding.

## 2024-06-28 NOTE — PLAN OF CARE
Problem: Adult Inpatient Plan of Care  Goal: Plan of Care Review  Outcome: Ongoing, Progressing  Goal: Patient-Specific Goal (Individualized)  Outcome: Ongoing, Progressing  Goal: Absence of Hospital-Acquired Illness or Injury  Outcome: Ongoing, Progressing  Intervention: Identify and Manage Fall Risk  Recent Flowsheet Documentation  Taken 6/28/2024 1200 by Mary Bardales RN  Safety Promotion/Fall Prevention:   safety round/check completed   room organization consistent   nonskid shoes/slippers when out of bed   lighting adjusted   clutter free environment maintained   assistive device/personal items within reach  Taken 6/28/2024 1000 by Mary Bardales RN  Safety Promotion/Fall Prevention:   safety round/check completed   room organization consistent   nonskid shoes/slippers when out of bed   lighting adjusted   fall prevention program maintained   clutter free environment maintained   assistive device/personal items within reach   activity supervised  Taken 6/28/2024 0800 by Mary Bardales RN  Safety Promotion/Fall Prevention:   safety round/check completed   room organization consistent   nonskid shoes/slippers when out of bed   lighting adjusted   clutter free environment maintained   assistive device/personal items within reach  Intervention: Prevent Skin Injury  Recent Flowsheet Documentation  Taken 6/28/2024 1200 by Mary Bardales RN  Body Position: position changed independently  Taken 6/28/2024 1000 by Mary Bardales RN  Body Position: position changed independently  Taken 6/28/2024 0800 by Mary Bardales RN  Body Position:   position changed independently   sitting up in bed  Intervention: Prevent and Manage VTE (Venous Thromboembolism) Risk  Recent Flowsheet Documentation  Taken 6/28/2024 1200 by Mary Bardales RN  Activity Management: activity encouraged  Taken 6/28/2024 1000 by Mary Bardales RN  Activity Management: activity encouraged  Taken 6/28/2024 0800 by  Mary Bardales RN  Activity Management:   activity encouraged   up ad jenifer  Goal: Optimal Comfort and Wellbeing  Outcome: Ongoing, Progressing  Intervention: Provide Person-Centered Care  Recent Flowsheet Documentation  Taken 6/28/2024 0800 by Mary Bardales RN  Trust Relationship/Rapport:   questions encouraged   questions answered   choices provided   care explained  Goal: Readiness for Transition of Care  Outcome: Ongoing, Progressing     Problem: Fluid Imbalance (Pancreatitis)  Goal: Fluid Balance  Outcome: Ongoing, Progressing     Problem: Infection (Pancreatitis)  Goal: Infection Symptom Resolution  Outcome: Ongoing, Progressing  Intervention: Prevent or Manage Infection  Recent Flowsheet Documentation  Taken 6/28/2024 0800 by Mary Bardales RN  Isolation Precautions:   droplet   precautions maintained     Problem: Nutrition Impaired (Pancreatitis)  Goal: Optimal Nutrition Intake  Outcome: Ongoing, Progressing     Problem: Pain (Pancreatitis)  Goal: Acceptable Pain Control  Outcome: Ongoing, Progressing  Intervention: Monitor and Manage Pain  Recent Flowsheet Documentation  Taken 6/28/2024 0800 by Mary Bardales RN  Sensory Stimulation Regulation: lighting decreased  Diversional Activities: television     Problem: Adjustment to Illness (Sepsis/Septic Shock)  Goal: Optimal Coping  Outcome: Ongoing, Progressing     Problem: Respiratory Compromise (Pancreatitis)  Goal: Effective Oxygenation and Ventilation  Outcome: Ongoing, Progressing  Intervention: Optimize Oxygenation and Ventilation  Recent Flowsheet Documentation  Taken 6/28/2024 1200 by Mary Bardales RN  Activity Management: activity encouraged  Head of Bed (HOB) Positioning: HOB elevated  Taken 6/28/2024 1000 by Mary Bardales RN  Activity Management: activity encouraged  Head of Bed (HOB) Positioning: HOB elevated  Taken 6/28/2024 0800 by Mary Bardales, RN  Activity Management:   activity encouraged   up ad jenifer  Head of  Bed (HOB) Positioning: HOB elevated     Problem: Infection Progression (Sepsis/Septic Shock)  Goal: Absence of Infection Signs and Symptoms  Outcome: Ongoing, Progressing  Intervention: Initiate Sepsis Management  Recent Flowsheet Documentation  Taken 6/28/2024 0800 by Mary Bardales, RN  Isolation Precautions:   droplet   precautions maintained  Intervention: Promote Recovery  Recent Flowsheet Documentation  Taken 6/28/2024 1200 by Mary Bardales, RN  Activity Management: activity encouraged  Taken 6/28/2024 1000 by Mary Bardales RN  Activity Management: activity encouraged  Taken 6/28/2024 0800 by Mary Bardales RN  Activity Management:   activity encouraged   up ad jenifer   Goal Outcome Evaluation:

## 2024-06-28 NOTE — CONSULTS
"Nutrition Services    Patient Name: Shirin Canales  YOB: 1990  MRN: [id number]  Admission date: 6/26/2024    Comment:    Moderate acute disease related malnutrition related to suboptimal po intake in the setting of pancreatitis as evidenced by po intake meeting < 75% of est energy requirement for > 7 days and evidence of moderate muscle wasting per NFPE.       RD to add Boost Breeze BID (provides 500 kcals, 18 g protein if consumed)     CLINICAL NUTRITION ASSESSMENT      Reason for Assessment 6/28: Malnutrition screening tool score of 5, Nursing admission assessment consult received.      H&P      History reviewed. No pertinent past medical history.    History reviewed. No pertinent surgical history.     Current Problems   Vomiting  -GI following     Pancreatitis  -hx of alcohol abuse    Alcoholic ketoacidosis    Hepatic steatosis  -nephrology following  -hx of kidney stone     Encounter Information        Trending Narrative     6/28: Pt presented to ED on 6/26 with complaints of N/V with abdominal pain and \"not feeling well\" for the previous 2 days with general weakness and lightheadedness. Abdominal pain improved today and no vomiting currently. Diet advanced from CLD to GI Fat-Restricted this am. Minimal to no po intake x last 3-4 days. RD visited pt at bedside.  Pt reports that she does have an appetite but can only tolerate small amounts of food at a time because she is still having nausea.  Pt states that vomiting has resolved at this time. Pt states that she has not had a BM in about a week but she hasn't been able to eat anything for a while either. Pt states she will drink Boost Breeze BID to supplement intake. NFPE completed, consistent with nutrition diagnosis of malnutrition using AND/ASPEN criteria. See MSA below.        Anthropometrics        Current Height, Weight Height: 160 cm (63\")  Weight: 63 kg (139 lb) (06/26/24 0824)       Usual Body Weight (UBW) C/w current weight       Trending " Weight Hx     This admission: 6/28: (last weight 6/26) 139#             PTA: 6/28: Current weight c/w wt x 3 months ago    Wt Readings from Last 30 Encounters:   06/26/24 0824 63 kg (139 lb)   01/01/24 0543 63.5 kg (139 lb 15.9 oz)   12/30/23 1657 62.2 kg (137 lb 2 oz)   12/28/23 0800 53.2 kg (117 lb 4.6 oz)   11/21/23 0801 59 kg (130 lb)   11/21/23 0448 52.2 kg (115 lb)   11/21/23 0315 52.2 kg (115 lb)   11/20/23 2252 52.2 kg (115 lb 1.3 oz)   04/26/23 0508 67.3 kg (148 lb 5.9 oz)   04/25/23 0316 62.8 kg (138 lb 6.4 oz)   04/24/23 0141 61.4 kg (135 lb 5.8 oz)   04/23/23 2247 59 kg (130 lb)      BMI kg/m2 Body mass index is 24.62 kg/m².       Labs        Pertinent Labs No lab for today.  6/27 - K+ replaced, Phos replaced.    Results from last 7 days   Lab Units 06/27/24 2333 06/27/24 0146 06/26/24  0900   SODIUM mmol/L 142 142 138   POTASSIUM mmol/L 3.3* 3.3* 4.0   CHLORIDE mmol/L 107 109* 107   CO2 mmol/L 22.1 14.5* 9.1*   BUN mg/dL 30* 44* 46*   CREATININE mg/dL 0.66 1.16* 2.09*   CALCIUM mg/dL 8.9 10.6* 12.0*   BILIRUBIN mg/dL 1.0 1.6* 1.8*   ALK PHOS U/L 92 115 148*   ALT (SGPT) U/L 55* 65* 87*   AST (SGOT) U/L 90* 86* 97*   GLUCOSE mg/dL 137* 145* 238*     Results from last 7 days   Lab Units 06/27/24 2333 06/27/24  0146   MAGNESIUM mg/dL 1.6 2.1   PHOSPHORUS mg/dL 2.2* 0.4*   HEMOGLOBIN g/dL 9.3*  --    HEMATOCRIT % 26.2*  --      Lab Results   Component Value Date    HGBA1C 4.30 (L) 12/28/2023        Medications    Scheduled Medications allopurinol, 100 mg, Oral, Daily  amLODIPine, 5 mg, Oral, Q24H  carvedilol, 3.125 mg, Oral, BID With Meals  cefTRIAXone, 2,000 mg, Intravenous, Q24H  enoxaparin, 40 mg, Subcutaneous, Q24H  magnesium sulfate, 2 g, Intravenous, Q12H  pantoprazole, 40 mg, Intravenous, Q AM  sodium chloride, 10 mL, Intravenous, Q12H        Infusions      PRN Medications   senna-docusate sodium **AND** polyethylene glycol **AND** bisacodyl **AND** bisacodyl    Calcium Replacement - Follow Nurse  / BPA Driven Protocol    hydrALAZINE    Magnesium Standard Dose Replacement - Follow Nurse / BPA Driven Protocol    Phosphorus Replacement - Follow Nurse / BPA Driven Protocol    Potassium Replacement - Follow Nurse / BPA Driven Protocol    [COMPLETED] Insert Peripheral IV **AND** sodium chloride    sodium chloride    sodium chloride     Physical Findings        Trending Physical   Appearance, NFPE 6/28: NFPE completed, consistent with nutrition diagnosis of malnutrition using AND/ASPEN criteria. See MSA below.      --  Edema  No edema documented      Bowel Function No BM documented since admit      Tubes No feeding tube in place      Chewing/Swallowing No issues reported      Skin Intact      --  Current Nutrition Orders & Evaluation of Intake       Oral Nutrition     Food Allergies NKFA   Current PO Diet Diet: Gastrointestinal; Fat-Restricted; Fluid Consistency: Thin (IDDSI 0)   Supplement None    PO Evaluation     Trending % PO Intake 6/28: Minimal due to CLD until this am   --  Nutritional Risk Screening        NRS-2002 Score          Nutrition Diagnosis         Nutrition Dx Problem 1 Moderate acute disease related malnutrition related to suboptimal po intake in the setting of pancreatitis as evidenced by po intake meeting < 75% of est energy requirement for > 7 days and evidence of moderate muscle wasting per NFPE.       Nutrition Dx Problem 2        Intervention Goal         Intervention Goal(s) Tolerate po diet  PO intake > 50%  Tolerate ONS     Nutrition Intervention        RD Action Continue to encourage good po intake   NFPE performed  RD to add Boost Breeze BID (provides 500 kcals, 18 g protein if consumed)          Nutrition Prescription          Diet Prescription GI Fat-Restricted diet    Supplement Prescription Boost Breeze BID (provides 500 kcals, 18 g protein if consumed)      --  Monitor/Evaluation        Monitor Per protocol, I&O, PO intake, Supplement intake, Pertinent labs, Weight, Skin status,  GI status, Symptoms     Malnutrition Severity Assessment      Patient meets criteria for : Moderate (non-severe) Malnutrition  Malnutrition Type (Last 8 Hours)       Malnutrition Severity Assessment       Row Name 06/28/24 1441       Malnutrition Severity Assessment    Malnutrition Type Acute Disease or Injury - Related Malnutrition      Row Name 06/28/24 1441       Insufficient Energy Intake     Insufficient Energy Intake Findings Moderate    Insufficient Energy Intake  <75% of est. energy requirement for >7d)      Row Name 06/28/24 1441       Muscle Loss    Loss of Muscle Mass Findings Moderate    Clavicle Bone Region Moderate - some protrusion in females, visible in males    Acromion Bone Region Moderate - acromion may slightly protrude    Patellar Region Moderate - patella more prominent, less muscle definition around patella      Row Name 06/28/24 1441       Criteria Met (Must meet criteria for severity in at least 2 of these categories: M Wasting, Fat Loss, Fluid, Secondary Signs, Wt. Status, Intake)    Patient meets criteria for  Moderate (non-severe) Malnutrition                           Electronically signed by:  Abby Tiwari RD  06/28/24 10:25 EDT

## 2024-06-28 NOTE — PLAN OF CARE
Problem: Adult Inpatient Plan of Care  Goal: Plan of Care Review  Outcome: Ongoing, Not Progressing  Goal: Patient-Specific Goal (Individualized)  Outcome: Ongoing, Not Progressing  Goal: Absence of Hospital-Acquired Illness or Injury  Outcome: Ongoing, Not Progressing  Intervention: Identify and Manage Fall Risk  Description: Perform standard risk assessment on admission using a validated tool or comprehensive approach appropriate to the patient; reassess fall risk frequently, with change in status or transfer to another level of care.  Communicate fall injury risk to interprofessional healthcare team.  Determine need for increased observation, equipment and environmental modification, such as low bed, signage and supportive, nonskid footwear.  Adjust safety measures to individual developmental age, stage and identified risk factors.  Reinforce the importance of safety and physical activity with patient and family.  Perform regular intentional rounding to assess need for position change, pain assessment and personal needs, including assistance with toileting.  Recent Flowsheet Documentation  Taken 6/28/2024 0400 by Yoli Moscoso RN  Safety Promotion/Fall Prevention:   activity supervised   clutter free environment maintained   fall prevention program maintained   lighting adjusted   safety round/check completed   room organization consistent  Taken 6/28/2024 0200 by Yoli Moscoso RN  Safety Promotion/Fall Prevention:   activity supervised   clutter free environment maintained   fall prevention program maintained   lighting adjusted   room organization consistent   safety round/check completed  Taken 6/27/2024 2200 by Yoli Moscoso RN  Safety Promotion/Fall Prevention:   fall prevention program maintained   clutter free environment maintained   activity supervised   lighting adjusted   room organization consistent   safety round/check completed  Taken 6/27/2024 2000 by Yoli Moscoso RN  Safety  Promotion/Fall Prevention:   clutter free environment maintained   activity supervised   fall prevention program maintained   lighting adjusted   safety round/check completed  Intervention: Prevent Skin Injury  Description: Perform a screening for skin injury risk, such as pressure or moisture associated skin damage on admission and at regular intervals throughout hospital stay.  Keep all areas of skin (especially folds) clean and dry.  Maintain adequate skin hydration.  Relieve and redistribute pressure and protect bony prominences; implement measures based on patient-specific risk factors.  Match turning and repositioning schedule to clinical condition.  Encourage weight shift frequently; assist with reposition if unable to complete independently.  Float heels off bed; avoid pressure on the Achilles tendon.  Keep skin free from extended contact with medical devices.  Encourage functional activity and mobility, as early as tolerated.  Use aids (e.g., slide boards, mechanical lift) during transfer.  Recent Flowsheet Documentation  Taken 6/28/2024 0000 by Yoli Moscoso RN  Skin Protection: adhesive use limited  Taken 6/27/2024 2000 by Yoli Moscoso RN  Skin Protection: adhesive use limited  Intervention: Prevent and Manage VTE (Venous Thromboembolism) Risk  Description: Assess for VTE (venous thromboembolism) risk.  Encourage and assist with early ambulation.  Initiate and maintain compression or other therapy, as indicated, based on identified risk in accordance with organizational protocol and provider order.  Encourage both active and passive leg exercises while in bed, if unable to ambulate.  Recent Flowsheet Documentation  Taken 6/27/2024 2000 by Yoli Moscoso RN  VTE Prevention/Management: (lovenox) other (see comments)  Intervention: Prevent Infection  Description: Maintain skin and mucous membrane integrity; promote hand, oral and pulmonary hygiene.  Optimize fluid balance, nutrition, sleep and  glycemic control to maximize infection resistance.  Identify potential sources of infection early to prevent or mitigate progression of infection (e.g., wound, lines, devices).  Evaluate ongoing need for invasive devices; remove promptly when no longer indicated.  Recent Flowsheet Documentation  Taken 6/28/2024 0400 by Yoli Moscoso RN  Infection Prevention: rest/sleep promoted  Taken 6/28/2024 0200 by Yoli Moscoso RN  Infection Prevention: rest/sleep promoted  Taken 6/27/2024 2000 by Yoli Moscoso RN  Infection Prevention:   rest/sleep promoted   hand hygiene promoted  Goal: Optimal Comfort and Wellbeing  Outcome: Ongoing, Not Progressing  Intervention: Monitor Pain and Promote Comfort  Description: Assess pain level, treatment efficacy and patient response at regular intervals using a consistent pain scale.  Consider the presence and impact of preexisting chronic pain.  Encourage patient and caregiver involvement in pain assessment, interventions and safety measures.  Recent Flowsheet Documentation  Taken 6/28/2024 0000 by Yoli Moscoso RN  Pain Management Interventions:   see MAR   quiet environment facilitated  Intervention: Provide Person-Centered Care  Description: Use a family-focused approach to care.  Develop trust and rapport by proactively providing information, encouraging questions, addressing concerns and offering reassurance.  Acknowledge emotional response to hospitalization.  Recognize and utilize personal coping strategies.  Honor spiritual and cultural preferences.  Recent Flowsheet Documentation  Taken 6/28/2024 0000 by Yoli Moscoso RN  Trust Relationship/Rapport:   care explained   choices provided  Taken 6/27/2024 2000 by Yoli Moscoso RN  Trust Relationship/Rapport:   care explained   choices provided  Goal: Readiness for Transition of Care  Outcome: Ongoing, Not Progressing     Problem: Fluid Imbalance (Pancreatitis)  Goal: Fluid Balance  Outcome: Ongoing, Not  Progressing     Problem: Infection (Pancreatitis)  Goal: Infection Symptom Resolution  Outcome: Ongoing, Not Progressing  Intervention: Prevent or Manage Infection  Description: Implement transmission-based precautions and isolation, as indicated, to prevent spread of infection.  Obtain cultures prior to initiating antimicrobial therapy, when possible. Do not delay treatment for laboratory results in the presence of high suspicion or clinical indicators.  Administer ordered antimicrobial therapy promptly; reassess need regularly.  Monitor laboratory value, diagnostic test and clinical status trends for signs of infection progression.  Identify early signs of sepsis, such as increased heart rate and decreased blood pressure, as well as changes in mental state, respiratory pattern or peripheral perfusion.  Prepare for rapid sepsis management, including lactate level, intravenous access, fluid administration and oxygen therapy.  Provide fever-reduction and comfort measures.  Recent Flowsheet Documentation  Taken 6/28/2024 0400 by Yoli Moscoso RN  Isolation Precautions: droplet  Taken 6/27/2024 2200 by Yoli Moscoso RN  Isolation Precautions: droplet  Taken 6/27/2024 2000 by Yoli Moscoso RN  Isolation Precautions: droplet     Problem: Nutrition Impaired (Pancreatitis)  Goal: Optimal Nutrition Intake  Outcome: Ongoing, Not Progressing     Problem: Pain (Pancreatitis)  Goal: Acceptable Pain Control  Outcome: Ongoing, Not Progressing  Intervention: Monitor and Manage Pain  Description: Set pain management goals; mutually determine pain management plan and review plan regularly.  Use a consistent, validated tool for pain assessment, including function and quality of life; evaluate pain level, effect of treatment and patient’s response at regular intervals.##  Consider the presence and impact of pre-existing chronic pain.  Encourage patient and family/caregiver involvement in pain assessment, interventions and  safety measures.  Match pharmacologic analgesia to severity and type of pain mechanism; evaluate risk for opioid use and dependence; consider multimodal approach and titrate to patient response.  Anticipate use of PCA (patient-controlled analgesia) or epidural analgesia for severe or uncontrolled pain.  Manage medication-induced effects, such as constipation, nausea, urinary retention, somnolence and dizziness.  Modify pain perception using techniques, such as distraction, mindfulness, guided imagery, meditation or music.  Consider and address emotional response to pain.  Recent Flowsheet Documentation  Taken 6/28/2024 0000 by Yoli Moscoso RN  Pain Management Interventions:   see MAR   quiet environment facilitated  Sensory Stimulation Regulation:   lighting decreased   care clustered  Taken 6/27/2024 2000 by Yoli Moscoso, RN  Diversional Activities: television     Problem: Respiratory Compromise (Pancreatitis)  Goal: Effective Oxygenation and Ventilation  Outcome: Ongoing, Not Progressing     Problem: Adjustment to Illness (Sepsis/Septic Shock)  Goal: Optimal Coping  Outcome: Ongoing, Not Progressing  Intervention: Optimize Psychosocial Adjustment to Illness  Description: Acknowledge, normalize, validate intensity and complexity of patient and support system response to situation.  Provide opportunity for expression of thoughts, feelings and concerns; respond with compassion and reassurance.  Decrease stress and anxiety by providing information about patient’s status and treatment.  Facilitate support system presence and participation in care; consider providing a diary in intensive care situation.  Support coping by recognizing current coping strategies; provide aid in developing new strategies.  Acknowledge and normalize difficulty in managing life-long lifestyle changes and expectations.  Assess and monitor for signs and symptoms of psychologic distress, anxiety and depression.  Consider palliative care  consult for goals of care conversation, if the condition is worsening despite treatment.  Recent Flowsheet Documentation  Taken 6/28/2024 0000 by Yoli Moscoso RN  Family/Support System Care:   involvement promoted   presence promoted  Taken 6/27/2024 2000 by Yoli Moscoso RN  Family/Support System Care: involvement promoted     Problem: Bleeding (Sepsis/Septic Shock)  Goal: Absence of Bleeding  Outcome: Ongoing, Not Progressing     Problem: Glycemic Control Impaired (Sepsis/Septic Shock)  Goal: Blood Glucose Level Within Desired Range  Outcome: Ongoing, Not Progressing     Problem: Infection Progression (Sepsis/Septic Shock)  Goal: Absence of Infection Signs and Symptoms  Outcome: Ongoing, Not Progressing  Intervention: Initiate Sepsis Management  Description: Provide fluid therapy, such as crystalloid or albumin, to increase intravascular volume, organ perfusion and oxygen delivery.  Provide respiratory support, such as oxygen therapy, noninvasive or invasive positive pressure ventilation, to achieve oxygenation and ventilation goal; avoid hyperoxemia.  Obtain cultures prior to initiating antimicrobial therapy when possible. Do not delay for laboratory results in the presence of high suspicion or clinical indicators.  Administer intravenous broad-spectrum antimicrobial therapy promptly.  Implement hemodynamic monitoring to guide intravascular support based on individual targeted parameters.  Determine and address underlying source of infection aggressively; implement transmission-based precautions and isolation, as indicated.  Recent Flowsheet Documentation  Taken 6/28/2024 0400 by Yoli Moscoso RN  Infection Prevention: rest/sleep promoted  Isolation Precautions: droplet  Taken 6/28/2024 0200 by Yoli Moscoso RN  Infection Prevention: rest/sleep promoted  Taken 6/27/2024 2200 by Yoli Moscoso RN  Isolation Precautions: droplet  Taken 6/27/2024 2000 by Yoli Moscoso RN  Infection  Prevention:   rest/sleep promoted   hand hygiene promoted  Isolation Precautions: droplet     Problem: Nutrition Impaired (Sepsis/Septic Shock)  Goal: Optimal Nutrition Intake  Outcome: Ongoing, Not Progressing   Goal Outcome Evaluation:

## 2024-06-28 NOTE — CASE MANAGEMENT/SOCIAL WORK
Continued Stay Note  CHELSIE Pollard     Patient Name: Shirin Canales  MRN: [id number]  Today's Date: 6/28/2024    Admit Date: 6/26/2024    Plan: DC PLAN: Routine home. Declines Outpatient therapy       Discharge Plan       Row Name 06/28/24 1149       Plan    Plan DC PLAN: Routine home. Declines Outpatient therapy    Patient/Family in Agreement with Plan yes    Plan Comments Met with patient regarding outpatient therapy. Declines due to not wanting to take off work. List left at bedside in case she changes her mind. DC BARRIERS: IV Dilaudid, need to get pain under control. Possible discharge for tomorrow.                      Expected Discharge Date and Time       Expected Discharge Date Expected Discharge Time    Jun 29, 2024           Rhea Johnson RN

## 2024-06-29 ENCOUNTER — READMISSION MANAGEMENT (OUTPATIENT)
Dept: CALL CENTER | Facility: HOSPITAL | Age: 34
End: 2024-06-29
Payer: COMMERCIAL

## 2024-06-29 VITALS
DIASTOLIC BLOOD PRESSURE: 72 MMHG | BODY MASS INDEX: 24.63 KG/M2 | OXYGEN SATURATION: 99 % | RESPIRATION RATE: 16 BRPM | HEIGHT: 63 IN | TEMPERATURE: 98.4 F | SYSTOLIC BLOOD PRESSURE: 118 MMHG | HEART RATE: 95 BPM | WEIGHT: 139 LBS

## 2024-06-29 LAB
ANION GAP SERPL CALCULATED.3IONS-SCNC: 10 MMOL/L (ref 5–15)
BUN SERPL-MCNC: 10 MG/DL (ref 6–20)
BUN/CREAT SERPL: 20.4 (ref 7–25)
CALCIUM SPEC-SCNC: 9.1 MG/DL (ref 8.6–10.5)
CHLORIDE SERPL-SCNC: 104 MMOL/L (ref 98–107)
CO2 SERPL-SCNC: 24 MMOL/L (ref 22–29)
CREAT SERPL-MCNC: 0.49 MG/DL (ref 0.57–1)
DEPRECATED RDW RBC AUTO: 50.8 FL (ref 37–54)
EGFRCR SERPLBLD CKD-EPI 2021: 128.6 ML/MIN/1.73
ERYTHROCYTE [DISTWIDTH] IN BLOOD BY AUTOMATED COUNT: 13.5 % (ref 12.3–15.4)
GLUCOSE SERPL-MCNC: 93 MG/DL (ref 65–99)
HCT VFR BLD AUTO: 27.4 % (ref 34–46.6)
HGB BLD-MCNC: 9.4 G/DL (ref 12–15.9)
MAGNESIUM SERPL-MCNC: 3 MG/DL (ref 1.6–2.6)
MCH RBC QN AUTO: 35.5 PG (ref 26.6–33)
MCHC RBC AUTO-ENTMCNC: 34.3 G/DL (ref 31.5–35.7)
MCV RBC AUTO: 103.4 FL (ref 79–97)
PHOSPHATE SERPL-MCNC: 2.1 MG/DL (ref 2.5–4.5)
PLATELET # BLD AUTO: 126 10*3/MM3 (ref 140–450)
PMV BLD AUTO: 11.2 FL (ref 6–12)
POTASSIUM SERPL-SCNC: 4.7 MMOL/L (ref 3.5–5.2)
RBC # BLD AUTO: 2.65 10*6/MM3 (ref 3.77–5.28)
SODIUM SERPL-SCNC: 138 MMOL/L (ref 136–145)
WBC NRBC COR # BLD AUTO: 4.93 10*3/MM3 (ref 3.4–10.8)

## 2024-06-29 PROCEDURE — 83735 ASSAY OF MAGNESIUM: CPT | Performed by: INTERNAL MEDICINE

## 2024-06-29 PROCEDURE — 84100 ASSAY OF PHOSPHORUS: CPT | Performed by: INTERNAL MEDICINE

## 2024-06-29 PROCEDURE — 25810000003 SODIUM CHLORIDE 0.9 % SOLUTION: Performed by: INTERNAL MEDICINE

## 2024-06-29 PROCEDURE — 80048 BASIC METABOLIC PNL TOTAL CA: CPT | Performed by: INTERNAL MEDICINE

## 2024-06-29 PROCEDURE — 85027 COMPLETE CBC AUTOMATED: CPT | Performed by: INTERNAL MEDICINE

## 2024-06-29 RX ORDER — FENTANYL/ROPIVACAINE/NS/PF 2-625MCG/1
15 PLASTIC BAG, INJECTION (ML) EPIDURAL ONCE
Qty: 250 ML | Refills: 0 | Status: COMPLETED | OUTPATIENT
Start: 2024-06-29 | End: 2024-06-29

## 2024-06-29 RX ORDER — CEFUROXIME AXETIL 250 MG/1
250 TABLET ORAL 2 TIMES DAILY
Qty: 10 TABLET | Refills: 0 | Status: SHIPPED | OUTPATIENT
Start: 2024-06-29

## 2024-06-29 RX ORDER — ALLOPURINOL 100 MG/1
100 TABLET ORAL DAILY
Qty: 90 TABLET | Refills: 1 | Status: SHIPPED | OUTPATIENT
Start: 2024-06-29

## 2024-06-29 RX ORDER — PANTOPRAZOLE SODIUM 40 MG/1
40 TABLET, DELAYED RELEASE ORAL DAILY
Qty: 30 TABLET | Refills: 0 | Status: SHIPPED | OUTPATIENT
Start: 2024-06-29

## 2024-06-29 RX ORDER — METOPROLOL SUCCINATE 50 MG/1
50 TABLET, EXTENDED RELEASE ORAL DAILY
Qty: 90 TABLET | Refills: 1 | Status: SHIPPED | OUTPATIENT
Start: 2024-06-29

## 2024-06-29 RX ORDER — AMLODIPINE BESYLATE 5 MG/1
2.5 TABLET ORAL
Status: DISCONTINUED | OUTPATIENT
Start: 2024-06-29 | End: 2024-06-29

## 2024-06-29 RX ADMIN — AMLODIPINE BESYLATE 2.5 MG: 5 TABLET ORAL at 07:38

## 2024-06-29 RX ADMIN — PANTOPRAZOLE SODIUM 40 MG: 40 INJECTION, POWDER, FOR SOLUTION INTRAVENOUS at 04:20

## 2024-06-29 RX ADMIN — POTASSIUM PHOSPHATE, MONOBASIC AND POTASSIUM PHOSPHATE, DIBASIC 15 MMOL: 224; 236 INJECTION, SOLUTION, CONCENTRATE INTRAVENOUS at 10:04

## 2024-06-29 RX ADMIN — ALLOPURINOL 100 MG: 100 TABLET ORAL at 07:38

## 2024-06-29 RX ADMIN — IBUPROFEN 400 MG: 400 TABLET, FILM COATED ORAL at 00:07

## 2024-06-29 RX ADMIN — HYDROCODONE BITARTRATE AND ACETAMINOPHEN 1 TABLET: 5; 325 TABLET ORAL at 12:40

## 2024-06-29 RX ADMIN — CARVEDILOL 3.12 MG: 3.12 TABLET, FILM COATED ORAL at 07:38

## 2024-06-29 RX ADMIN — LIDOCAINE 1 PATCH: 4 PATCH TOPICAL at 07:38

## 2024-06-29 RX ADMIN — HYDROCODONE BITARTRATE AND ACETAMINOPHEN 1 TABLET: 5; 325 TABLET ORAL at 04:19

## 2024-06-29 RX ADMIN — POTASSIUM CHLORIDE 40 MEQ: 1500 TABLET, EXTENDED RELEASE ORAL at 00:07

## 2024-06-29 NOTE — OUTREACH NOTE
Prep Survey      Flowsheet Row Responses   [facility] facility patient discharged from? [facility]   Is LACE score < 7 ? No   Eligibility Readm Mgmt   Discharge diagnosis Vomiting   Does the patient have one of the following disease processes/diagnoses(primary or secondary)? Other   Does the patient have Home health ordered? No   Is there a DME ordered? No   Prep survey completed? Yes            Amelia MARTINEZ - Registered Nurse

## 2024-06-29 NOTE — PLAN OF CARE
Problem: Adult Inpatient Plan of Care  Goal: Plan of Care Review  Outcome: Ongoing, Progressing  Goal: Patient-Specific Goal (Individualized)  Outcome: Ongoing, Progressing  Goal: Absence of Hospital-Acquired Illness or Injury  Outcome: Ongoing, Progressing  Goal: Optimal Comfort and Wellbeing  Outcome: Ongoing, Progressing  Goal: Readiness for Transition of Care  Outcome: Ongoing, Progressing     Problem: Fluid Imbalance (Pancreatitis)  Goal: Fluid Balance  Outcome: Ongoing, Progressing     Problem: Infection (Pancreatitis)  Goal: Infection Symptom Resolution  Outcome: Ongoing, Progressing     Problem: Nutrition Impaired (Pancreatitis)  Goal: Optimal Nutrition Intake  Outcome: Ongoing, Progressing     Problem: Pain (Pancreatitis)  Goal: Acceptable Pain Control  Outcome: Ongoing, Progressing     Problem: Respiratory Compromise (Pancreatitis)  Goal: Effective Oxygenation and Ventilation  Outcome: Ongoing, Progressing     Problem: Adjustment to Illness (Sepsis/Septic Shock)  Goal: Optimal Coping  Outcome: Ongoing, Progressing     Problem: Bleeding (Sepsis/Septic Shock)  Goal: Absence of Bleeding  Outcome: Ongoing, Progressing     Problem: Glycemic Control Impaired (Sepsis/Septic Shock)  Goal: Blood Glucose Level Within Desired Range  Outcome: Ongoing, Progressing     Problem: Infection Progression (Sepsis/Septic Shock)  Goal: Absence of Infection Signs and Symptoms  Outcome: Ongoing, Progressing     Problem: Nutrition Impaired (Sepsis/Septic Shock)  Goal: Optimal Nutrition Intake  Outcome: Ongoing, Progressing     Problem: Malnutrition  Goal: Improved Nutritional Intake  Outcome: Ongoing, Progressing   Goal Outcome Evaluation:

## 2024-06-29 NOTE — DISCHARGE SUMMARY
[facility] Medicine [facility]  Discharge Summary    Date of Service: 24  Patient Name: Shirin Canales  : 1990  MRN: [id number]    Date of Admission: 2024  Date of Discharge:  24  Primary Care Physician: Provider, No Known      Presenting Problem:   Vomiting [R11.10]  Acute kidney injury [N17.9]  Acute pancreatitis, unspecified complication status, unspecified pancreatitis type [K85.90]  Vomiting, unspecified vomiting type, unspecified whether nausea present [R11.10]    Active and Resolved Hospital Problems:  Active Hospital Problems    Diagnosis POA    **Vomiting [R11.10] Yes      Resolved Hospital Problems   No resolved problems to display.         Hospital Course     HPI: Shirin Canales is a 33 y.o. female with a CMH of pancreatitis, alcoholic ketoacidosis, kidney stone, hepatic steatosis who presented to [facility] on 2024 with vomiting x 2 days the emesis has been bilious, nonbloody patient states exacerbating factors include movement.  There has been nausea.  Patient states that she does consume alcohol, however informed her last drink was 2 weeks ago.  Patient denies any drug use..     While in the emergency department labs remarkable for CO2 of 9.1.  Anion gap of 21.9.  BUN 46, creatinine 2.09, EGFR 31.8, alk phos 148, albumin 5.4, AST 97, ALT 87, lipase 461, WBC 15.93.  Her respiratory panel was positive for rhinovirus.  Her urine appeared dark and hazy, positive ketones blood positive nitrites and leuks greater than 300 protein, large bilirubin, 6-10 whites.  CT scan of abdomen and pelvis noted severe fatty liver, which is stable.  Also noncalcified stones or sludge throughout the gallbladder but no evidence of acute cholecystitis     Subjective:      Interval History:        -Pt very sedated today complains of pain       Hospital Course:  Active and Resolved Problems     Episodes of nausea vomiting  Acute pancreatitis  Transaminitis  Fatty  liver  Sludge in gallbladder  Metabolic acidosis  Acute cystitis  Acute kidney injury due to dehydration  Alcoholic  Rhino virus infection  Lifelong smoker  Hyperuricemia started on allopurinol  Hypertension  Pain medication seeking behavior     Suggestion:    6/29-patient remained stable and eating and drinking and wants to be discharged home.     6/28- patient remained stable overnight and will continue current supportive care.  Multiple subspecialty following.. D/c iv dilauded . Start po lortab   Hopefully discharge patient home soon.        Day of Discharge     Vital Signs:  Temp:  [97.4 °F (36.3 °C)-99 °F (37.2 °C)] 98.9 °F (37.2 °C)  Heart Rate:  [] 105  Resp:  [10-18] 14  BP: (100-115)/(73-83) 112/79    Physical Exam:  General Appearance:    Alert, cooperative, in no acute distress   Head:    Normocephalic, without obvious abnormality, atraumatic   Eyes:            conjunctivae and sclerae normal, no   icterus, no pallor, corneas  clear, PERRLA   Neck:   No adenopathy, supple, trachea midline, no thyromegaly, no   carotid bruit, no JVD   Lungs:     Clear to auscultation,respirations regular, even and                  unlabored    Heart:    Regular rhythm and normal rate, normal S1 and S2, no            murmur, no gallop, no rub, no click   Abdomen:     Normal bowel sounds, no masses, no organomegaly, soft        non-tender, non-distended, no guarding, no rebound                No tenderness   Extremities:   Moves all extremities well, no edema, no cyanosis, no             redness   Lymph nodes:   No palpable adenopathy   Neurologic:   Cranial nerves 2 - 12 grossly intact, sensation intact, DTR       present and equal bilaterally          Pertinent  and/or Most Recent Results     LAB RESULTS:      Lab 06/29/24  0421 06/27/24  2333 06/26/24  2358 06/26/24  1910 06/26/24  1614 06/26/24  0900   WBC 4.93 6.78 14.41*  --   --  15.93*   HEMOGLOBIN 9.4* 9.3* 11.8*  --   --  14.4   HEMATOCRIT 27.4* 26.2* 34.2   --   --  41.6   PLATELETS 126* 101* 133*  --   --  169   NEUTROS ABS  --  4.82 11.60*  --   --  13.74*   IMMATURE GRANS (ABS)  --  0.02 0.08*  --   --  0.10*   LYMPHS ABS  --  1.08 1.12  --   --  0.53*   MONOS ABS  --  0.75 1.57*  --   --  1.54*   EOS ABS  --  0.08 0.01  --   --  0.00   .4* 101.2* 101.2*  --   --  101.2*   LACTATE  --   --   --  1.1 2.0  --          Lab 06/29/24  0421 06/28/24  1733 06/27/24 2333 06/27/24  0146 06/26/24  2358 06/26/24  1257 06/26/24  0900   SODIUM 138  --  142 142  --   --  138   POTASSIUM 4.7 3.3* 3.3* 3.3*  --   --  4.0   CHLORIDE 104  --  107 109*  --   --  107   CO2 24.0  --  22.1 14.5*  --   --  9.1*   ANION GAP 10.0  --  12.9 18.5*  --   --  21.9*   BUN 10  --  30* 44*  --   --  46*   CREATININE 0.49*  --  0.66 1.16*  --   --  2.09*   EGFR 128.6  --  119.7 64.4  --   --  31.8*   GLUCOSE 93  --  137* 145*  --   --  238*   CALCIUM 9.1  --  8.9 10.6*  --   --  12.0*   IONIZED CALCIUM  --   --  1.16*  --  1.42*  --   --    MAGNESIUM 3.0*  --  1.6 2.1  --   --   --    PHOSPHORUS 2.1* 1.6* 2.2* 0.4*  --   --   --    TSH  --   --   --   --   --  0.869  --          Lab 06/27/24 2333 06/27/24  0146 06/26/24  1910 06/26/24  0900   TOTAL PROTEIN 6.5 7.9  --  9.9*   ALBUMIN 3.8 4.5 4.0 5.4*   GLOBULIN 2.7 3.4  --  4.5   ALT (SGPT) 55* 65*  --  87*   AST (SGOT) 90* 86*  --  97*   BILIRUBIN 1.0 1.6*  --  1.8*   ALK PHOS 92 115  --  148*   AMYLASE 64  --   --   --    LIPASE 58  --   --  461*                     Lab 06/26/24  1321   PH, ARTERIAL 7.320*   PCO2, ARTERIAL 16.2*   PO2 .2*   O2 SATURATION ART 98.2*   FIO2 21   HCO3 ART 8.3*   BASE EXCESS ART -15.1*     Brief Urine Lab Results  (Last result in the past 365 days)        Color   Clarity   Blood   Leuk Est   Nitrite   Protein   CREAT   Urine HCG        06/26/24 1032 Dark Yellow   Hazy   Moderate (2+)   Small (1+)   Positive   >=300 mg/dL (3+)           06/26/24 1032               Negative             Microbiology  Results (last 10 days)       Procedure Component Value - Date/Time    Blood Culture - Blood, Arm, Left [[id number]]  (Normal) Collected: 06/26/24 1614    Lab Status: Preliminary result Specimen: Blood from Arm, Left Updated: 06/28/24 1631     Blood Culture No growth at 2 days    Blood Culture - Blood, Arm, Right [[id number]]  (Normal) Collected: 06/26/24 1614    Lab Status: Preliminary result Specimen: Blood from Arm, Right Updated: 06/28/24 1631     Blood Culture No growth at 2 days    Narrative:      Less than seven (7) mL's of blood was collected.  Insufficient quantity may yield false negative results.    Urine Culture - Urine, Urine, Clean Catch [[id number]] Collected: 06/26/24 1032    Lab Status: Final result Specimen: Urine, Clean Catch Updated: 06/28/24 1335     Urine Culture >100,000 CFU/mL Mixed Viky Isolated    Narrative:      Specimen contains mixed organisms of questionable pathogenicity suggestive of contamination. If symptoms persist, suggest recollection.  Colonization of the urinary tract without infection is common. Treatment is discouraged unless the patient is symptomatic, pregnant, or undergoing an invasive urologic procedure.    Eosinophil Smear - Urine, Urine, Clean Catch [[id number]]  (Normal) Collected: 06/26/24 1032    Lab Status: Final result Specimen: Urine, Clean Catch Updated: 06/26/24 1952     Eosinophil Smear 0 % EOS/100 Cells     Respiratory Panel PCR w/COVID-19(SARS-CoV-2) [facility]SEMARIE/MARY/[facility] In-House, NP Swab in UTM/VTM, 2 HR TAT - Swab, Nasopharynx [[id number]]  (Abnormal) Collected: 06/26/24 0901    Lab Status: Final result Specimen: Swab from Nasopharynx Updated: 06/26/24 1002     ADENOVIRUS, PCR Not Detected     Coronavirus 229E Not Detected     Coronavirus HKU1 Not Detected     Coronavirus NL63 Not Detected     Coronavirus OC43 Not Detected     COVID19 Not Detected     Human Metapneumovirus Not Detected     Human Rhinovirus/Enterovirus Detected     Influenza A PCR Not Detected      Influenza B PCR Not Detected     Parainfluenza Virus 1 Not Detected     Parainfluenza Virus 2 Not Detected     Parainfluenza Virus 3 Not Detected     Parainfluenza Virus 4 Not Detected     RSV, PCR Not Detected     Bordetella pertussis pcr Not Detected     Bordetella parapertussis PCR Not Detected     Chlamydophila pneumoniae PCR Not Detected     Mycoplasma pneumo by PCR Not Detected    Narrative:      In the setting of a positive respiratory panel with a viral infection PLUS a negative procalcitonin without other underlying concern for bacterial infection, consider observing off antibiotics or discontinuation of antibiotics and continue supportive care. If the respiratory panel is positive for atypical bacterial infection (Bordetella pertussis, Chlamydophila pneumoniae, or Mycoplasma pneumoniae), consider antibiotic de-escalation to target atypical bacterial infection.            CT Abdomen Pelvis Without Contrast    Result Date: 6/26/2024  Impression: Impression: Severe fatty liver, stable. Findings suggest noncalcified stones or sludge throughout the gallbladder although no evidence of acute cholecystitis. Nonobstructing renal stones. No acute process. Electronically Signed: Gabriela Agrawal MD  6/26/2024 11:10 AM EDT  Workstation ID: [id number]                 Labs Pending at Discharge:  Pending Labs       Order Current Status    Methanol In process    Paraldehyde In process    Volatile Compounds In process    Blood Culture - Blood, Arm, Left Preliminary result    Blood Culture - Blood, Arm, Right Preliminary result            Procedures Performed           Consults:   Consults       Date and Time Order Name Status Description    6/26/2024  1:10 PM Inpatient Nephrology Consult Completed     6/26/2024 11:48 AM Inpatient Gastroenterology Consult Completed     6/26/2024 11:18 AM Hospitalist (on-call MD unless specified)                Discharge Details        Discharge Medications        New Medications         Instructions Start Date   allopurinol 100 MG tablet  Commonly known as: ZYLOPRIM   100 mg, Oral, Daily      cefuroxime 250 MG tablet  Commonly known as: CEFTIN   250 mg, Oral, 2 Times Daily      metoprolol succinate XL 50 MG 24 hr tablet  Commonly known as: Toprol XL   50 mg, Oral, Daily      pantoprazole 40 MG EC tablet  Commonly known as: Protonix   40 mg, Oral, Daily             Continue These Medications        Instructions Start Date   mirtazapine 45 MG tablet  Commonly known as: REMERON   45 mg, Oral, Nightly PRN      ondansetron 4 MG tablet  Commonly known as: ZOFRAN   4 mg, Oral, Every 6 Hours PRN               Allergies   Allergen Reactions    Latex Unknown - Low Severity    Penicillins Unknown - High Severity         Discharge Disposition:   Home or Self Care    Diet:  Hospital:  Diet Order   Procedures    Diet: Gastrointestinal; Fat-Restricted; Fluid Consistency: Thin (IDDSI 0)         Discharge Activity:         CODE STATUS:  Code Status and Medical Interventions:   Ordered at: 06/26/24 1148     Level Of Support Discussed With:    Patient     Code Status (Patient has no pulse and is not breathing):    CPR (Attempt to Resuscitate)     Medical Interventions (Patient has pulse or is breathing):    Full Support         No future appointments.    Additional Instructions for the Follow-ups that You Need to Schedule       Discharge Follow-up with PCP   As directed       Currently Documented PCP:    Provider, No Known    PCP Phone Number:    None     Follow Up Details: In 2 weeks and repeat CBC BMP        Discharge Follow-up with Specified Provider: GI in next 4 weeks   As directed      To: GI in next 4 weeks        Discharge Follow-up with Specified Provider: Nephrology in next 2 to 3 weeks   As directed      To: Nephrology in next 2 to 3 weeks                Time spent on Discharge including face to face service:  >30 minutes    Signature: Electronically signed by Malu Florentino MD, 06/29/24, 11:10  EDT.  [facility] Hospitalist Team

## 2024-06-29 NOTE — PROGRESS NOTES
"NEPHROLOGY PROGRESS NOTE------[facility]    [facility]  [phone]  Alec Morrow MD      Patient Care Team:  Provider, No Known as PCP - Yoli Arzola, RN as Registered Nurse  Odalys, MD David as Consulting Physician (Nephrology)      Provider:  Alec Morrow MD  Patient Name: Shirin Canales  :  1990    SUBJECTIVE:    F/U ARF/ZACHARIAH/ACIDOSIS/HYPERCALCEMIA    Appetite slowly picking up. No angina. No dysuria.     Medication:  allopurinol, 100 mg, Oral, Daily  amLODIPine, 5 mg, Oral, Q24H  carvedilol, 3.125 mg, Oral, BID With Meals  cefTRIAXone, 2,000 mg, Intravenous, Q24H  enoxaparin, 40 mg, Subcutaneous, Q24H  Lidocaine, 1 patch, Transdermal, Q24H  pantoprazole, 40 mg, Intravenous, Q AM  sodium chloride, 10 mL, Intravenous, Q12H             OBJECTIVE    Vital Sign Min/Max for last 24 hours  Temp  Min: 97.4 °F (36.3 °C)  Max: 99 °F (37.2 °C)   BP  Min: 100/73  Max: 115/83   Pulse  Min: 81  Max: 117   Resp  Min: 10  Max: 18   SpO2  Min: 98 %  Max: 100 %   No data recorded   No data recorded     Flowsheet Rows      Flowsheet Row First Filed Value   Admission Height 160 cm (63\") Documented at 2024   Admission Weight 63 kg (139 lb) Documented at 2024 0824            No intake/output data recorded.  No intake/output data recorded.    Physical Exam:  General Appearance: alert, appears stated age and cooperative.    Head: normocephalic, without obvious abnormality and atraumatic.    Eyes: conjunctivae and sclerae normal and no icterus  Neck: supple and no JVD  Lungs: clear to auscultation and respirations regular  Heart: regular rhythm & normal rate and normal S1, S2  Chest Wall: no abnormalities observed  Abdomen: normal bowel sounds and soft, nontender  Extremities: moves extremities well, no edema, no cyanosis  Skin: no bleeding, bruising or rash.   Neurologic: Alert, and oriented x 4  No focal " "deficits    Labs:    WBC WBC   Date Value Ref Range Status   06/29/2024 4.93 3.40 - 10.80 10*3/mm3 Final   06/27/2024 6.78 3.40 - 10.80 10*3/mm3 Final   06/26/2024 14.41 (H) 3.40 - 10.80 10*3/mm3 Final   06/26/2024 15.93 (H) 3.40 - 10.80 10*3/mm3 Final      HGB Hemoglobin   Date Value Ref Range Status   06/29/2024 9.4 (L) 12.0 - 15.9 g/dL Final   06/27/2024 9.3 (L) 12.0 - 15.9 g/dL Final     Comment:     Result checked     06/26/2024 11.8 (L) 12.0 - 15.9 g/dL Final   06/26/2024 14.4 12.0 - 15.9 g/dL Final      HCT Hematocrit   Date Value Ref Range Status   06/29/2024 27.4 (L) 34.0 - 46.6 % Final   06/27/2024 26.2 (L) 34.0 - 46.6 % Final   06/26/2024 34.2 34.0 - 46.6 % Final   06/26/2024 41.6 34.0 - 46.6 % Final      Platelets No results found for: \"LABPLAT\"   MCV MCV   Date Value Ref Range Status   06/29/2024 103.4 (H) 79.0 - 97.0 fL Final   06/27/2024 101.2 (H) 79.0 - 97.0 fL Final   06/26/2024 101.2 (H) 79.0 - 97.0 fL Final   06/26/2024 101.2 (H) 79.0 - 97.0 fL Final          Sodium Sodium   Date Value Ref Range Status   06/29/2024 138 136 - 145 mmol/L Final   06/27/2024 142 136 - 145 mmol/L Final   06/27/2024 142 136 - 145 mmol/L Final   06/26/2024 138 136 - 145 mmol/L Final      Potassium Potassium   Date Value Ref Range Status   06/29/2024 4.7 3.5 - 5.2 mmol/L Final     Comment:     Specimen hemolyzed.  Result may be falsely elevated.   06/28/2024 3.3 (L) 3.5 - 5.2 mmol/L Final   06/27/2024 3.3 (L) 3.5 - 5.2 mmol/L Final   06/27/2024 3.3 (L) 3.5 - 5.2 mmol/L Final   06/26/2024 4.0 3.5 - 5.2 mmol/L Final      Chloride Chloride   Date Value Ref Range Status   06/29/2024 104 98 - 107 mmol/L Final   06/27/2024 107 98 - 107 mmol/L Final   06/27/2024 109 (H) 98 - 107 mmol/L Final   06/26/2024 107 98 - 107 mmol/L Final      CO2 CO2   Date Value Ref Range Status   06/29/2024 24.0 22.0 - 29.0 mmol/L Final   06/27/2024 22.1 22.0 - 29.0 mmol/L Final   06/27/2024 14.5 (L) 22.0 - 29.0 mmol/L Final   06/26/2024 9.1 (C) 22.0 " "- 29.0 mmol/L Final      BUN BUN   Date Value Ref Range Status   06/29/2024 10 6 - 20 mg/dL Final   06/27/2024 30 (H) 6 - 20 mg/dL Final   06/27/2024 44 (H) 6 - 20 mg/dL Final   06/26/2024 46 (H) 6 - 20 mg/dL Final      Creatinine Creatinine   Date Value Ref Range Status   06/29/2024 0.49 (L) 0.57 - 1.00 mg/dL Final   06/27/2024 0.66 0.57 - 1.00 mg/dL Final   06/27/2024 1.16 (H) 0.57 - 1.00 mg/dL Final   06/26/2024 2.09 (H) 0.57 - 1.00 mg/dL Final      Calcium Calcium   Date Value Ref Range Status   06/29/2024 9.1 8.6 - 10.5 mg/dL Final   06/27/2024 8.9 8.6 - 10.5 mg/dL Final   06/27/2024 10.6 (H) 8.6 - 10.5 mg/dL Final   06/26/2024 12.0 (H) 8.6 - 10.5 mg/dL Final      PO4 No components found for: \"PO4\"   Albumin Albumin   Date Value Ref Range Status   06/27/2024 3.8 3.5 - 5.2 g/dL Final   06/27/2024 4.5 3.5 - 5.2 g/dL Final   06/26/2024 4.0 2.9 - 4.4 g/dL Final   06/26/2024 5.4 (H) 3.5 - 5.2 g/dL Final      Magnesium Magnesium   Date Value Ref Range Status   06/29/2024 3.0 (H) 1.6 - 2.6 mg/dL Final   06/27/2024 1.6 1.6 - 2.6 mg/dL Final   06/27/2024 2.1 1.6 - 2.6 mg/dL Final      Uric Acid No components found for: \"URIC ACID\"     Imaging Results (Last 72 Hours)       Procedure Component Value Units Date/Time    CT Abdomen Pelvis Without Contrast [[id number]] Collected: 06/26/24 1107     Updated: 06/26/24 1112    Narrative:      CT ABDOMEN PELVIS WO CONTRAST    Date of Exam: 6/26/2024 11:02 AM EDT    Indication: Abdominal pain, vomiting.    Comparison: 12/28/2023    Technique: Axial CT images were obtained of the abdomen and pelvis without the administration of contrast. Sagittal and coronal reconstructions were performed.  Automated exposure control and iterative reconstruction methods were used.      Findings:  Severe fatty liver is again identified. The nonopacified solid organs are normal in size. There is diffuse increased density throughout the gallbladder as before. There is no gallbladder wall thickening or " edema or pericholecystic fluid and there is no   bile duct dilatation. Punctate nonobstructing renal stones are again seen. There is no hydronephrosis. The bladder is poorly distended. There is no pelvic mass or free fluid. There is no large or small bowel dilatation. There are no focal inflammatory   changes. The abdominal aorta is normal in size.      Impression:      Impression:  Severe fatty liver, stable. Findings suggest noncalcified stones or sludge throughout the gallbladder although no evidence of acute cholecystitis.    Nonobstructing renal stones.    No acute process.            Electronically Signed: Gabriela Agrawal MD    6/26/2024 11:10 AM EDT    Workstation ID: [id number]            Results for orders placed during the hospital encounter of 04/23/23    XR Chest 1 View    Narrative  XR CHEST 1 VW    Date of Exam: 4/24/2023 1:50 AM EDT    Indication: possible sepsis, r/o aspiration    Comparison: None available.    Findings:  Heart size and pulmonary vessels are within normal limits. Lungs are clear bilaterally. There are no pleural effusions. There is no evidence of pneumothorax.    Impression  Impression:  No acute cardiopulmonary disease.      Electronically Signed: Valentino Aldridge  4/24/2023 7:43 AM EDT  Workstation ID: [id number]            ASSESSMENT / PLAN      Vomiting      ARF/ZACHARIAH-----Nonoliguric. ARF/ZACHARIAH resolved     2. ACIDOSIS------Resolved.       3. HYPERCALCEMIA------Better with hydration. PTH appropriately low     4. HUMAN RHINOVIRUS INFECTION     5. ABDOMINAL PAIN/N/V/FATTY LIVER/ELEVATED TRANSAMINASES     6. TOBACCO ABUSE    7. HTN-----BP soft. D/C Amlodipine and follow    8. HYPOKALEMIA------Replaced    9. HYPOPHOSPHATEMIA-----Replace IV    10. HYPOMAGNESEMIA------Replaced    Okay from RENAL standpoint to d/c today    Alec Morrow MD  [facility]  [phone]  06/29/24  07:34 EDT

## 2024-07-01 LAB
ACETONE SERPL-MCNC: 0.01 G/DL (ref 0–0.01)
BACTERIA SPEC AEROBE CULT: NORMAL
BACTERIA SPEC AEROBE CULT: NORMAL
ETHANOL BLD GC-MCNC: <.01 G/DL (ref 0–0.01)
ISOPROPANOL SERPL-MCNC: <.01 G/DL (ref 0–0.01)
METHANOL SERPL-MCNC: <.01 G/DL (ref 0–0.01)
METHANOL SERPL-MCNC: <.01 G/DL (ref 0–0.01)

## 2024-07-01 NOTE — PAYOR COMM NOTE
"This is discharge notification and clinical for Shirin Buckenr.   Reference/Auth#: [id number]   Patient discharged routine to home on 24.     EXTENDED AUTHORIZATION PENDING:     Please call or fax determination to contact below.   Thank you.    Leora Neal, RN, CCM  Utilization Review Nurse  [address] [place] [address]   [phone]  Fx [phone]  ;      Shirin Metz (33 y.o. Female)       Date of Birth   1990    Social Security Number       Address   [address] [place] [address] [place] [address]    Home Phone   [phone]    MRN   [id number]       Sabianism   None    Marital Status   Single                            Admission Date   24    Admission Type   Emergency    Admitting Provider   Pratik Burns MD    Attending Provider       Department, Room/Bed   [address] PEDIATRICS,        Discharge Date   2024    Discharge Disposition   Home or Self Care    Discharge Destination   Home                              Attending Provider: (none)   Allergies: Latex, Penicillins    Isolation: None   Infection: Rhinovirus  (24)   Code Status: Prior    Ht: 160 cm (63\")   Wt: 63 kg (139 lb)    Admission Cmt: None   Principal Problem: Vomiting [R11.10]                   Active Insurance as of 2024       Primary Coverage       Payor Plan Insurance Group Employer/Plan Group    [facility] [employer] [facility] BLUE CROSS BLUE [facility] PPO [id number]       Payor Plan Address Payor Plan Phone Number Payor Plan Fax Number Effective Dates    [phone]  2021 - None Entered    [place] [address]         Subscriber Name Subscriber Birth Date Member ID       SHIRIN METZ 1990 [id number]                     Emergency Contacts        (Rel.) Home Phone Work Phone Mobile Phone    Doris Landers (Mother) -- -- [phone]    Sujey Salgado (Son) [phone] -- --              Operative/Procedure Notes (last " "72 hours)  Notes from 24 0932 through 24 0932   No notes of this type exist for this encounter.          Physician Progress Notes (last 72 hours)        David Morrow MD at 24 0734          NEPHROLOGY PROGRESS NOTE------[facility] [place]    [facility] [place]  [phone]  Alec Morrow MD      Patient Care Team:  Provider, No Known as PCP - Yoli Arzola, RN as Registered Nurse  David Morrow MD as Consulting Physician (Nephrology)      Provider:  Alec Morrow MD  Patient Name: Shirin Canales  :  1990    SUBJECTIVE:    F/U ARF/ZACHARIAH/ACIDOSIS/HYPERCALCEMIA    Appetite slowly picking up. No angina. No dysuria.     Medication:  allopurinol, 100 mg, Oral, Daily  amLODIPine, 5 mg, Oral, Q24H  carvedilol, 3.125 mg, Oral, BID With Meals  cefTRIAXone, 2,000 mg, Intravenous, Q24H  enoxaparin, 40 mg, Subcutaneous, Q24H  Lidocaine, 1 patch, Transdermal, Q24H  pantoprazole, 40 mg, Intravenous, Q AM  sodium chloride, 10 mL, Intravenous, Q12H             OBJECTIVE    Vital Sign Min/Max for last 24 hours  Temp  Min: 97.4 °F (36.3 °C)  Max: 99 °F (37.2 °C)   BP  Min: 100/73  Max: 115/83   Pulse  Min: 81  Max: 117   Resp  Min: 10  Max: 18   SpO2  Min: 98 %  Max: 100 %   No data recorded   No data recorded     Flowsheet Rows      Flowsheet Row First Filed Value   Admission Height 160 cm (63\") Documented at 2024   Admission Weight 63 kg (139 lb) Documented at 2024 08            No intake/output data recorded.  No intake/output data recorded.    Physical Exam:  General Appearance: alert, appears stated age and cooperative.    Head: normocephalic, without obvious abnormality and atraumatic.    Eyes: conjunctivae and sclerae normal and no icterus  Neck: supple and no JVD  Lungs: clear to auscultation and respirations regular  Heart: regular rhythm & normal rate and normal S1, S2  Chest Wall: no " "abnormalities observed  Abdomen: normal bowel sounds and soft, nontender  Extremities: moves extremities well, no edema, no cyanosis  Skin: no bleeding, bruising or rash.   Neurologic: Alert, and oriented x 4  No focal deficits    Labs:    WBC WBC   Date Value Ref Range Status   06/29/2024 4.93 3.40 - 10.80 10*3/mm3 Final   06/27/2024 6.78 3.40 - 10.80 10*3/mm3 Final   06/26/2024 14.41 (H) 3.40 - 10.80 10*3/mm3 Final   06/26/2024 15.93 (H) 3.40 - 10.80 10*3/mm3 Final      HGB Hemoglobin   Date Value Ref Range Status   06/29/2024 9.4 (L) 12.0 - 15.9 g/dL Final   06/27/2024 9.3 (L) 12.0 - 15.9 g/dL Final     Comment:     Result checked     06/26/2024 11.8 (L) 12.0 - 15.9 g/dL Final   06/26/2024 14.4 12.0 - 15.9 g/dL Final      HCT Hematocrit   Date Value Ref Range Status   06/29/2024 27.4 (L) 34.0 - 46.6 % Final   06/27/2024 26.2 (L) 34.0 - 46.6 % Final   06/26/2024 34.2 34.0 - 46.6 % Final   06/26/2024 41.6 34.0 - 46.6 % Final      Platelets No results found for: \"LABPLAT\"   MCV MCV   Date Value Ref Range Status   06/29/2024 103.4 (H) 79.0 - 97.0 fL Final   06/27/2024 101.2 (H) 79.0 - 97.0 fL Final   06/26/2024 101.2 (H) 79.0 - 97.0 fL Final   06/26/2024 101.2 (H) 79.0 - 97.0 fL Final          Sodium Sodium   Date Value Ref Range Status   06/29/2024 138 136 - 145 mmol/L Final   06/27/2024 142 136 - 145 mmol/L Final   06/27/2024 142 136 - 145 mmol/L Final   06/26/2024 138 136 - 145 mmol/L Final      Potassium Potassium   Date Value Ref Range Status   06/29/2024 4.7 3.5 - 5.2 mmol/L Final     Comment:     Specimen hemolyzed.  Result may be falsely elevated.   06/28/2024 3.3 (L) 3.5 - 5.2 mmol/L Final   06/27/2024 3.3 (L) 3.5 - 5.2 mmol/L Final   06/27/2024 3.3 (L) 3.5 - 5.2 mmol/L Final   06/26/2024 4.0 3.5 - 5.2 mmol/L Final      Chloride Chloride   Date Value Ref Range Status   06/29/2024 104 98 - 107 mmol/L Final   06/27/2024 107 98 - 107 mmol/L Final   06/27/2024 109 (H) 98 - 107 mmol/L Final   06/26/2024 107 98 - " "107 mmol/L Final      CO2 CO2   Date Value Ref Range Status   06/29/2024 24.0 22.0 - 29.0 mmol/L Final   06/27/2024 22.1 22.0 - 29.0 mmol/L Final   06/27/2024 14.5 (L) 22.0 - 29.0 mmol/L Final   06/26/2024 9.1 (C) 22.0 - 29.0 mmol/L Final      BUN BUN   Date Value Ref Range Status   06/29/2024 10 6 - 20 mg/dL Final   06/27/2024 30 (H) 6 - 20 mg/dL Final   06/27/2024 44 (H) 6 - 20 mg/dL Final   06/26/2024 46 (H) 6 - 20 mg/dL Final      Creatinine Creatinine   Date Value Ref Range Status   06/29/2024 0.49 (L) 0.57 - 1.00 mg/dL Final   06/27/2024 0.66 0.57 - 1.00 mg/dL Final   06/27/2024 1.16 (H) 0.57 - 1.00 mg/dL Final   06/26/2024 2.09 (H) 0.57 - 1.00 mg/dL Final      Calcium Calcium   Date Value Ref Range Status   06/29/2024 9.1 8.6 - 10.5 mg/dL Final   06/27/2024 8.9 8.6 - 10.5 mg/dL Final   06/27/2024 10.6 (H) 8.6 - 10.5 mg/dL Final   06/26/2024 12.0 (H) 8.6 - 10.5 mg/dL Final      PO4 No components found for: \"PO4\"   Albumin Albumin   Date Value Ref Range Status   06/27/2024 3.8 3.5 - 5.2 g/dL Final   06/27/2024 4.5 3.5 - 5.2 g/dL Final   06/26/2024 4.0 2.9 - 4.4 g/dL Final   06/26/2024 5.4 (H) 3.5 - 5.2 g/dL Final      Magnesium Magnesium   Date Value Ref Range Status   06/29/2024 3.0 (H) 1.6 - 2.6 mg/dL Final   06/27/2024 1.6 1.6 - 2.6 mg/dL Final   06/27/2024 2.1 1.6 - 2.6 mg/dL Final      Uric Acid No components found for: \"URIC ACID\"     Imaging Results (Last 72 Hours)       Procedure Component Value Units Date/Time    CT Abdomen Pelvis Without Contrast [[id number]] Collected: 06/26/24 1107     Updated: 06/26/24 1112    Narrative:      CT ABDOMEN PELVIS WO CONTRAST    Date of Exam: 6/26/2024 11:02 AM EDT    Indication: Abdominal pain, vomiting.    Comparison: 12/28/2023    Technique: Axial CT images were obtained of the abdomen and pelvis without the administration of contrast. Sagittal and coronal reconstructions were performed.  Automated exposure control and iterative reconstruction methods were " used.      Findings:  Severe fatty liver is again identified. The nonopacified solid organs are normal in size. There is diffuse increased density throughout the gallbladder as before. There is no gallbladder wall thickening or edema or pericholecystic fluid and there is no   bile duct dilatation. Punctate nonobstructing renal stones are again seen. There is no hydronephrosis. The bladder is poorly distended. There is no pelvic mass or free fluid. There is no large or small bowel dilatation. There are no focal inflammatory   changes. The abdominal aorta is normal in size.      Impression:      Impression:  Severe fatty liver, stable. Findings suggest noncalcified stones or sludge throughout the gallbladder although no evidence of acute cholecystitis.    Nonobstructing renal stones.    No acute process.            Electronically Signed: Gabriela Agrawal MD    6/26/2024 11:10 AM EDT    Workstation ID: [id number]            Results for orders placed during the hospital encounter of 04/23/23    XR Chest 1 View    Narrative  XR CHEST 1 VW    Date of Exam: 4/24/2023 1:50 AM EDT    Indication: possible sepsis, r/o aspiration    Comparison: None available.    Findings:  Heart size and pulmonary vessels are within normal limits. Lungs are clear bilaterally. There are no pleural effusions. There is no evidence of pneumothorax.    Impression  Impression:  No acute cardiopulmonary disease.      Electronically Signed: Valentino Aldridge  4/24/2023 7:43 AM EDT  Workstation ID: [id number]            ASSESSMENT / PLAN      Vomiting      ARF/ZACHARIAH-----Nonoliguric. ARF/ZACHARIAH resolved     2. ACIDOSIS------Resolved.       3. HYPERCALCEMIA------Better with hydration. PTH appropriately low     4. HUMAN RHINOVIRUS INFECTION     5. ABDOMINAL PAIN/N/V/FATTY LIVER/ELEVATED TRANSAMINASES     6. TOBACCO ABUSE    7. HTN-----BP soft. D/C Amlodipine and follow    8. HYPOKALEMIA------Replaced    9. HYPOPHOSPHATEMIA-----Replace IV    10.  HYPOMAGNESEMIA------Replaced    Okay from RENAL standpoint to d/c today    Alec Morrow MD  [facility]  [phone]  06/29/24  07:34 EDT      Electronically signed by David Morrow MD at 06/29/24 0953       Lisa Sawant APRLUIS ARMANDO at 06/28/24 1003       Attestation signed by Krish Khan MD at 06/28/24 1155    I have reviewed this documentation and agree.  33-year-old woman with entero and rhinovirus with multiple somatic complaints.  Her nausea and vomiting has improved.  She is asking for regular food.  She does complain of back pain.  On exam her abdomen is nondistended with good bowel sounds.  She is completely nontender.  White count 6.70.  Hemoglobin 9.3.  Platelets 101.  AST 90 ALT 55.  Lipase now normal.  She has a history of chronic alcoholic pancreatitis.  I encouraged her to abstain from alcohol.  She may resume a low-fat diet.  Continue PPI and antiemetics as needed.  Narcotics are not indicated.  We will see as needed.  I saw the patient today with Lisa Sawant NP.  I have performed a complete history and physical examination and reviewed appropriate laboratory studies and radiographic exams.  I have completed the majority and substantive portion of the history, physical examination.  I completed the majority and substantive portion of the medical decision making.    Krish Khan M.D.                    LOS: 2 days   Patient Care Team:  Provider, No Known as PCP - Yoli Arzola RN as Registered Nurse  David Morrow MD as Consulting Physician (Nephrology)      Subjective     Interval History:     Subjective: Patient complains of some back pain today.  Abdominal pain has improved.  No vomiting.  No overt GI bleeding.      ROS:   No chest pain, shortness of breath, or cough.        Medication Review:     Current Facility-Administered Medications:     allopurinol (ZYLOPRIM) tablet 100 mg, 100 mg, Oral, Daily, Odalys  MD David, 100 mg at 06/28/24 0903    amLODIPine (NORVASC) tablet 5 mg, 5 mg, Oral, Q24H, David Morrow MD, 5 mg at 06/28/24 0903    sennosides-docusate (PERICOLACE) 8.6-50 MG per tablet 2 tablet, 2 tablet, Oral, BID PRN **AND** polyethylene glycol (MIRALAX) packet 17 g, 17 g, Oral, Daily PRN **AND** bisacodyl (DULCOLAX) EC tablet 5 mg, 5 mg, Oral, Daily PRN **AND** bisacodyl (DULCOLAX) suppository 10 mg, 10 mg, Rectal, Daily PRN, Pema Cyr APRN    Calcium Replacement - Follow Nurse / BPA Driven Protocol, , Does not apply, PRN, Pema Cyr, APRN    carvedilol (COREG) tablet 3.125 mg, 3.125 mg, Oral, BID With Meals, David oMrrow MD, 3.125 mg at 06/28/24 0903    cefTRIAXone (ROCEPHIN) 2,000 mg in sodium chloride 0.9 % 100 mL MBP, 2,000 mg, Intravenous, Q24H, Zenia Canseco APRN, Last Rate: 200 mL/hr at 06/27/24 1659, 2,000 mg at 06/27/24 1659    Enoxaparin Sodium (LOVENOX) syringe 40 mg, 40 mg, Subcutaneous, Q24H, Pema Cyr APRN, 40 mg at 06/27/24 1659    hydrALAZINE (APRESOLINE) injection 10 mg, 10 mg, Intravenous, Q6H PRN, David Morrow MD, 10 mg at 06/26/24 2214    Magnesium Standard Dose Replacement - Follow Nurse / BPA Driven Protocol, , Does not apply, PRN, Pema Cyr, APRN    magnesium sulfate 2g/50 mL (PREMIX) infusion, 2 g, Intravenous, Q12H, David Morrow MD, 2 g at 06/28/24 0903    pantoprazole (PROTONIX) injection 40 mg, 40 mg, Intravenous, Q AM, Pema Cyr APRN, 40 mg at 06/28/24 0548    Phosphorus Replacement - Follow Nurse / BPA Driven Protocol, , Does not apply, Ger RIDDLE Adrianne P, APRN    Potassium Replacement - Follow Nurse / BPA Driven Protocol, , Does not apply, Ger RIDDLE Adrianne P, APRN    [COMPLETED] Insert Peripheral IV, , , Once **AND** sodium chloride 0.9 % flush 10 mL, 10 mL, Intravenous, PRN, David Decker MD    sodium chloride 0.9 % flush 10 mL, 10 mL, Intravenous,  Q12H, Pema Cyr APRN, 10 mL at 06/27/24 2020    sodium chloride 0.9 % flush 10 mL, 10 mL, Intravenous, PRN, Pema Cyr, APRN    sodium chloride 0.9 % infusion 40 mL, 40 mL, Intravenous, PRN, Pema Cyr, APRN      Objective     Vital Signs  Vitals:    06/27/24 2008 06/27/24 2233 06/28/24 0440 06/28/24 0727   BP: 112/83  106/80 117/90   BP Location: Left arm  Left arm Left arm   Patient Position: Lying  Lying Sitting   Pulse: 110  101 86   Resp: 16 15 16 10   Temp: 98 °F (36.7 °C)  98 °F (36.7 °C) 97.3 °F (36.3 °C)   TempSrc: Oral  Oral Oral   SpO2: 100%  100% 100%   Weight:       Height:           Physical Exam:     General Appearance:    Awake and alert, in no acute distress   Head:    Normocephalic, without obvious abnormality   Eyes:          Conjunctivae normal, anicteric sclera   Throat:   No oral lesions, no thrush, oral mucosa moist   Neck:   No adenopathy, supple, no JVD   Lungs:     respirations regular, even and unlabored   Abdomen:     Soft, mild epigastric tenderness, no rebound or guarding, non-distended   Rectal:     Deferred   Extremities:   No edema, no cyanosis   Skin:   No bruising or rash, no jaundice        Results Review:    CBC    Results from last 7 days   Lab Units 06/27/24  2333 06/26/24  2358 06/26/24  0900   WBC 10*3/mm3 6.78 14.41* 15.93*   HEMOGLOBIN g/dL 9.3* 11.8* 14.4   PLATELETS 10*3/mm3 101* 133* 169     CMP   Results from last 7 days   Lab Units 06/27/24  2333 06/27/24  0146 06/26/24  0900   SODIUM mmol/L 142 142 138   POTASSIUM mmol/L 3.3* 3.3* 4.0   CHLORIDE mmol/L 107 109* 107   CO2 mmol/L 22.1 14.5* 9.1*   BUN mg/dL 30* 44* 46*   CREATININE mg/dL 0.66 1.16* 2.09*   GLUCOSE mg/dL 137* 145* 238*   ALBUMIN g/dL 3.8 4.5 5.4*   BILIRUBIN mg/dL 1.0 1.6* 1.8*   ALK PHOS U/L 92 115 148*   AST (SGOT) U/L 90* 86* 97*   ALT (SGPT) U/L 55* 65* 87*   MAGNESIUM mg/dL 1.6 2.1  --    PHOSPHORUS mg/dL 2.2* 0.4*  --    AMYLASE U/L 64  --   --    LIPASE U/L 58  --  461*      Cr Clearance Estimated Creatinine Clearance: 109.5 mL/min (by C-G formula based on SCr of 0.66 mg/dL).  Coag     HbA1C   Lab Results   Component Value Date    HGBA1C 4.30 (L) 12/28/2023     Results from last 7 days   Lab Units 06/26/24  1257   CK TOTAL U/L 14*     Infection   Results from last 7 days   Lab Units 06/26/24  1614 06/26/24  1032   BLOODCX  No growth at 24 hours  No growth at 24 hours  --    URINECX   --  Culture in progress     UA    Results from last 7 days   Lab Units 06/26/24  1032   NITRITE UA  Positive*   WBC UA /HPF 6-10*   BACTERIA UA /HPF Trace*   SQUAM EPITHEL UA /HPF 13-20*   URINECX  Culture in progress     Microbiology Results (last 10 days)       Procedure Component Value - Date/Time    Blood Culture - Blood, Arm, Left [[id number]]  (Normal) Collected: 06/26/24 1614    Lab Status: Preliminary result Specimen: Blood from Arm, Left Updated: 06/27/24 1631     Blood Culture No growth at 24 hours    Blood Culture - Blood, Arm, Right [[id number]]  (Normal) Collected: 06/26/24 1614    Lab Status: Preliminary result Specimen: Blood from Arm, Right Updated: 06/27/24 1631     Blood Culture No growth at 24 hours    Narrative:      Less than seven (7) mL's of blood was collected.  Insufficient quantity may yield false negative results.    Urine Culture - Urine, Urine, Clean Catch [[id number]]  (Normal) Collected: 06/26/24 1032    Lab Status: Preliminary result Specimen: Urine, Clean Catch Updated: 06/27/24 1136     Urine Culture Culture in progress    Eosinophil Smear - Urine, Urine, Clean Catch [[id number]]  (Normal) Collected: 06/26/24 1032    Lab Status: Final result Specimen: Urine, Clean Catch Updated: 06/26/24 1952     Eosinophil Smear 0 % EOS/100 Cells     Respiratory Panel PCR w/COVID-19(SARS-CoV-2) HAILY/ROSEMARIE/MARY/PAD/COR/ANGEL In-House, NP Swab in UTM/VTM, 2 HR TAT - Swab, Nasopharynx [[id number]]  (Abnormal) Collected: 06/26/24 0901    Lab Status: Final result Specimen: Swab from Nasopharynx  Updated: 06/26/24 1002     ADENOVIRUS, PCR Not Detected     Coronavirus 229E Not Detected     Coronavirus HKU1 Not Detected     Coronavirus NL63 Not Detected     Coronavirus OC43 Not Detected     COVID19 Not Detected     Human Metapneumovirus Not Detected     Human Rhinovirus/Enterovirus Detected     Influenza A PCR Not Detected     Influenza B PCR Not Detected     Parainfluenza Virus 1 Not Detected     Parainfluenza Virus 2 Not Detected     Parainfluenza Virus 3 Not Detected     Parainfluenza Virus 4 Not Detected     RSV, PCR Not Detected     Bordetella pertussis pcr Not Detected     Bordetella parapertussis PCR Not Detected     Chlamydophila pneumoniae PCR Not Detected     Mycoplasma pneumo by PCR Not Detected    Narrative:      In the setting of a positive respiratory panel with a viral infection PLUS a negative procalcitonin without other underlying concern for bacterial infection, consider observing off antibiotics or discontinuation of antibiotics and continue supportive care. If the respiratory panel is positive for atypical bacterial infection (Bordetella pertussis, Chlamydophila pneumoniae, or Mycoplasma pneumoniae), consider antibiotic de-escalation to target atypical bacterial infection.          Imaging Results (Last 72 Hours)       Procedure Component Value Units Date/Time    CT Abdomen Pelvis Without Contrast [[id number]] Collected: 06/26/24 1107     Updated: 06/26/24 1112    Narrative:      CT ABDOMEN PELVIS WO CONTRAST    Date of Exam: 6/26/2024 11:02 AM EDT    Indication: Abdominal pain, vomiting.    Comparison: 12/28/2023    Technique: Axial CT images were obtained of the abdomen and pelvis without the administration of contrast. Sagittal and coronal reconstructions were performed.  Automated exposure control and iterative reconstruction methods were used.      Findings:  Severe fatty liver is again identified. The nonopacified solid organs are normal in size. There is diffuse increased density  throughout the gallbladder as before. There is no gallbladder wall thickening or edema or pericholecystic fluid and there is no   bile duct dilatation. Punctate nonobstructing renal stones are again seen. There is no hydronephrosis. The bladder is poorly distended. There is no pelvic mass or free fluid. There is no large or small bowel dilatation. There are no focal inflammatory   changes. The abdominal aorta is normal in size.      Impression:      Impression:  Severe fatty liver, stable. Findings suggest noncalcified stones or sludge throughout the gallbladder although no evidence of acute cholecystitis.    Nonobstructing renal stones.    No acute process.            Electronically Signed: Gabriela Agrawal MD    6/26/2024 11:10 AM EDT    Workstation ID: [id number]            Assessment & Plan     ASSESSMENT:  -Epigastric/right upper quadrant abdominal pain  -Nausea/vomiting  -Elevated liver enzymes  -Elevated lipase  -Gallstones/gallbladder sludge  -Severe fatty liver  -Macrocytic anemia  -Thrombocytopenia  -History of alcohol pancreatitis and alcohol hepatitis  -Rhinovirus/enterovirus  -Fever  -UTI    PLAN:  Patient is a 33-year-old female admitted 6/26/2024 with nausea/vomiting, fever, and abdominal pain over the past 2 days. Diagnosed with rhino/enterovirus and has elevated liver enzymes and elevated lipase with gallstones.     Patient reports that abdominal pain is slowly improving.  Complains mostly of back pain today.  LFTs better with total bilirubin 1.0 from 1.6, alk phos 92 from 115, AST 90 from 86, and ALT 55 from 65.  Lipase now normal at 58 from 461.  No plans for MRCP at this time as LFTs and pain are improving.  Continue PPI.  Antiemetics/analgesics as needed.  Recommend complete EtOH cessation.  Will advance to low-fat diet.  Okay for discharge home from GI standpoint with outpatient follow-up in 2 to 3 months.  GI will be available as inpatient as needed.      Electronically signed by Lisa Sawant  APRN, 24, 10:03 AM EDT.             Electronically signed by Krish Khan MD at 24 1157       Consult Notes (last 72 hours)  Notes from 24 0932 through 24 0932   No notes of this type exist for this encounter.          Discharge Summary        Malu Florentino MD at 24 1110                       [facility] Medicine Services  Discharge Summary    Date of Service: 24  Patient Name: Shirin Canales  : 1990  MRN: [id number]    Date of Admission: 2024  Date of Discharge:  24  Primary Care Physician: Provider, No Known      Presenting Problem:   Vomiting [R11.10]  Acute kidney injury [N17.9]  Acute pancreatitis, unspecified complication status, unspecified pancreatitis type [K85.90]  Vomiting, unspecified vomiting type, unspecified whether nausea present [R11.10]    Active and Resolved Hospital Problems:  Active Hospital Problems    Diagnosis POA    **Vomiting [R11.10] Yes      Resolved Hospital Problems   No resolved problems to display.         Hospital Course     HPI: Shirin Canales is a 33 y.o. female with a CMH of pancreatitis, alcoholic ketoacidosis, kidney stone, hepatic steatosis who presented to [facility] on 2024 with vomiting x 2 days the emesis has been bilious, nonbloody patient states exacerbating factors include movement.  There has been nausea.  Patient states that she does consume alcohol, however informed her last drink was 2 weeks ago.  Patient denies any drug use..     While in the emergency department labs remarkable for CO2 of 9.1.  Anion gap of 21.9.  BUN 46, creatinine 2.09, EGFR 31.8, alk phos 148, albumin 5.4, AST 97, ALT 87, lipase 461, WBC 15.93.  Her respiratory panel was positive for rhinovirus.  Her urine appeared dark and hazy, positive ketones blood positive nitrites and leuks greater than 300 protein, large bilirubin, 6-10 whites.  CT scan of abdomen and pelvis noted severe fatty liver, which is stable.   Also noncalcified stones or sludge throughout the gallbladder but no evidence of acute cholecystitis     Subjective:      Interval History:       6/28 -Pt very sedated today complains of pain       Hospital Course:  Active and Resolved Problems     Episodes of nausea vomiting  Acute pancreatitis  Transaminitis  Fatty liver  Sludge in gallbladder  Metabolic acidosis  Acute cystitis  Acute kidney injury due to dehydration  Alcoholic  Rhino virus infection  Lifelong smoker  Hyperuricemia started on allopurinol  Hypertension  Pain medication seeking behavior     Suggestion:    6/29-patient remained stable and eating and drinking and wants to be discharged home.     6/28- patient remained stable overnight and will continue current supportive care.  Multiple subspecialty following.. D/c iv dilauded . Start po lortab   Hopefully discharge patient home soon.        Day of Discharge     Vital Signs:  Temp:  [97.4 °F (36.3 °C)-99 °F (37.2 °C)] 98.9 °F (37.2 °C)  Heart Rate:  [] 105  Resp:  [10-18] 14  BP: (100-115)/(73-83) 112/79    Physical Exam:  General Appearance:    Alert, cooperative, in no acute distress   Head:    Normocephalic, without obvious abnormality, atraumatic   Eyes:            conjunctivae and sclerae normal, no   icterus, no pallor, corneas  clear, PERRLA   Neck:   No adenopathy, supple, trachea midline, no thyromegaly, no   carotid bruit, no JVD   Lungs:     Clear to auscultation,respirations regular, even and                  unlabored    Heart:    Regular rhythm and normal rate, normal S1 and S2, no            murmur, no gallop, no rub, no click   Abdomen:     Normal bowel sounds, no masses, no organomegaly, soft        non-tender, non-distended, no guarding, no rebound                No tenderness   Extremities:   Moves all extremities well, no edema, no cyanosis, no             redness   Lymph nodes:   No palpable adenopathy   Neurologic:   Cranial nerves 2 - 12 grossly intact, sensation intact,  DTR       present and equal bilaterally          Pertinent  and/or Most Recent Results     LAB RESULTS:      Lab 06/29/24  0421 06/27/24  2333 06/26/24  2358 06/26/24  1910 06/26/24  1614 06/26/24  0900   WBC 4.93 6.78 14.41*  --   --  15.93*   HEMOGLOBIN 9.4* 9.3* 11.8*  --   --  14.4   HEMATOCRIT 27.4* 26.2* 34.2  --   --  41.6   PLATELETS 126* 101* 133*  --   --  169   NEUTROS ABS  --  4.82 11.60*  --   --  13.74*   IMMATURE GRANS (ABS)  --  0.02 0.08*  --   --  0.10*   LYMPHS ABS  --  1.08 1.12  --   --  0.53*   MONOS ABS  --  0.75 1.57*  --   --  1.54*   EOS ABS  --  0.08 0.01  --   --  0.00   .4* 101.2* 101.2*  --   --  101.2*   LACTATE  --   --   --  1.1 2.0  --          Lab 06/29/24  0421 06/28/24  1733 06/27/24  2333 06/27/24  0146 06/26/24  2358 06/26/24  1257 06/26/24  0900   SODIUM 138  --  142 142  --   --  138   POTASSIUM 4.7 3.3* 3.3* 3.3*  --   --  4.0   CHLORIDE 104  --  107 109*  --   --  107   CO2 24.0  --  22.1 14.5*  --   --  9.1*   ANION GAP 10.0  --  12.9 18.5*  --   --  21.9*   BUN 10  --  30* 44*  --   --  46*   CREATININE 0.49*  --  0.66 1.16*  --   --  2.09*   EGFR 128.6  --  119.7 64.4  --   --  31.8*   GLUCOSE 93  --  137* 145*  --   --  238*   CALCIUM 9.1  --  8.9 10.6*  --   --  12.0*   IONIZED CALCIUM  --   --  1.16*  --  1.42*  --   --    MAGNESIUM 3.0*  --  1.6 2.1  --   --   --    PHOSPHORUS 2.1* 1.6* 2.2* 0.4*  --   --   --    TSH  --   --   --   --   --  0.869  --          Lab 06/27/24  2333 06/27/24  0146 06/26/24  1910 06/26/24  0900   TOTAL PROTEIN 6.5 7.9  --  9.9*   ALBUMIN 3.8 4.5 4.0 5.4*   GLOBULIN 2.7 3.4  --  4.5   ALT (SGPT) 55* 65*  --  87*   AST (SGOT) 90* 86*  --  97*   BILIRUBIN 1.0 1.6*  --  1.8*   ALK PHOS 92 115  --  148*   AMYLASE 64  --   --   --    LIPASE 58  --   --  461*                     Lab 06/26/24  1321   PH, ARTERIAL 7.320*   PCO2, ARTERIAL 16.2*   PO2 .2*   O2 SATURATION ART 98.2*   FIO2 21   HCO3 ART 8.3*   BASE EXCESS ART -15.1*      Brief Urine Lab Results  (Last result in the past 365 days)        Color   Clarity   Blood   Leuk Est   Nitrite   Protein   CREAT   Urine HCG        06/26/24 1032 Dark Yellow   Hazy   Moderate (2+)   Small (1+)   Positive   >=300 mg/dL (3+)           06/26/24 1032               Negative             Microbiology Results (last 10 days)       Procedure Component Value - Date/Time    Blood Culture - Blood, Arm, Left [[id number]]  (Normal) Collected: 06/26/24 1614    Lab Status: Preliminary result Specimen: Blood from Arm, Left Updated: 06/28/24 1631     Blood Culture No growth at 2 days    Blood Culture - Blood, Arm, Right [[id number]]  (Normal) Collected: 06/26/24 1614    Lab Status: Preliminary result Specimen: Blood from Arm, Right Updated: 06/28/24 1631     Blood Culture No growth at 2 days    Narrative:      Less than seven (7) mL's of blood was collected.  Insufficient quantity may yield false negative results.    Urine Culture - Urine, Urine, Clean Catch [[id number]] Collected: 06/26/24 1032    Lab Status: Final result Specimen: Urine, Clean Catch Updated: 06/28/24 1335     Urine Culture >100,000 CFU/mL Mixed Viky Isolated    Narrative:      Specimen contains mixed organisms of questionable pathogenicity suggestive of contamination. If symptoms persist, suggest recollection.  Colonization of the urinary tract without infection is common. Treatment is discouraged unless the patient is symptomatic, pregnant, or undergoing an invasive urologic procedure.    Eosinophil Smear - Urine, Urine, Clean Catch [[id number]]  (Normal) Collected: 06/26/24 1032    Lab Status: Final result Specimen: Urine, Clean Catch Updated: 06/26/24 1952     Eosinophil Smear 0 % EOS/100 Cells     Respiratory Panel PCR w/COVID-19(SARS-CoV-2) [facility]/ROSEMARIE/MARY/PAD/COR/ANGEL In-House, NP Swab in UTM/VTM, 2 HR TAT - Swab, Nasopharynx [[id number]]  (Abnormal) Collected: 06/26/24 0901    Lab Status: Final result Specimen: Swab from Nasopharynx Updated:  06/26/24 1002     ADENOVIRUS, PCR Not Detected     Coronavirus 229E Not Detected     Coronavirus HKU1 Not Detected     Coronavirus NL63 Not Detected     Coronavirus OC43 Not Detected     COVID19 Not Detected     Human Metapneumovirus Not Detected     Human Rhinovirus/Enterovirus Detected     Influenza A PCR Not Detected     Influenza B PCR Not Detected     Parainfluenza Virus 1 Not Detected     Parainfluenza Virus 2 Not Detected     Parainfluenza Virus 3 Not Detected     Parainfluenza Virus 4 Not Detected     RSV, PCR Not Detected     Bordetella pertussis pcr Not Detected     Bordetella parapertussis PCR Not Detected     Chlamydophila pneumoniae PCR Not Detected     Mycoplasma pneumo by PCR Not Detected    Narrative:      In the setting of a positive respiratory panel with a viral infection PLUS a negative procalcitonin without other underlying concern for bacterial infection, consider observing off antibiotics or discontinuation of antibiotics and continue supportive care. If the respiratory panel is positive for atypical bacterial infection (Bordetella pertussis, Chlamydophila pneumoniae, or Mycoplasma pneumoniae), consider antibiotic de-escalation to target atypical bacterial infection.            CT Abdomen Pelvis Without Contrast    Result Date: 6/26/2024  Impression: Impression: Severe fatty liver, stable. Findings suggest noncalcified stones or sludge throughout the gallbladder although no evidence of acute cholecystitis. Nonobstructing renal stones. No acute process. Electronically Signed: Gabriela Agrawal MD  6/26/2024 11:10 AM EDT  Workstation ID: [id number]                 Labs Pending at Discharge:  Pending Labs       Order Current Status    Methanol In process    Paraldehyde In process    Volatile Compounds In process    Blood Culture - Blood, Arm, Left Preliminary result    Blood Culture - Blood, Arm, Right Preliminary result            Procedures Performed           Consults:   Consults       Date and  Time Order Name Status Description    6/26/2024  1:10 PM Inpatient Nephrology Consult Completed     6/26/2024 11:48 AM Inpatient Gastroenterology Consult Completed     6/26/2024 11:18 AM Hospitalist (on-call MD unless specified)                Discharge Details        Discharge Medications        New Medications        Instructions Start Date   allopurinol 100 MG tablet  Commonly known as: ZYLOPRIM   100 mg, Oral, Daily      cefuroxime 250 MG tablet  Commonly known as: CEFTIN   250 mg, Oral, 2 Times Daily      metoprolol succinate XL 50 MG 24 hr tablet  Commonly known as: Toprol XL   50 mg, Oral, Daily      pantoprazole 40 MG EC tablet  Commonly known as: Protonix   40 mg, Oral, Daily             Continue These Medications        Instructions Start Date   mirtazapine 45 MG tablet  Commonly known as: REMERON   45 mg, Oral, Nightly PRN      ondansetron 4 MG tablet  Commonly known as: ZOFRAN   4 mg, Oral, Every 6 Hours PRN               Allergies   Allergen Reactions    Latex Unknown - Low Severity    Penicillins Unknown - High Severity         Discharge Disposition:   Home or Self Care    Diet:  Hospital:  Diet Order   Procedures    Diet: Gastrointestinal; Fat-Restricted; Fluid Consistency: Thin (IDDSI 0)         Discharge Activity:         CODE STATUS:  Code Status and Medical Interventions:   Ordered at: 06/26/24 1148     Level Of Support Discussed With:    Patient     Code Status (Patient has no pulse and is not breathing):    CPR (Attempt to Resuscitate)     Medical Interventions (Patient has pulse or is breathing):    Full Support         No future appointments.    Additional Instructions for the Follow-ups that You Need to Schedule       Discharge Follow-up with PCP   As directed       Currently Documented PCP:    Provider, No Known    PCP Phone Number:    None     Follow Up Details: In 2 weeks and repeat CBC BMP        Discharge Follow-up with Specified Provider: GI in next 4 weeks   As directed      To: GI in  next 4 weeks        Discharge Follow-up with Specified Provider: Nephrology in next 2 to 3 weeks   As directed      To: Nephrology in next 2 to 3 weeks                Time spent on Discharge including face to face service:  >30 minutes    Signature: Electronically signed by Malu Florentino MD, 06/29/24, 11:10 EDT.  [place] Hospitalist Team    Electronically signed by Malu Florentino MD at 06/29/24 1113

## 2024-07-02 ENCOUNTER — NURSE TRIAGE (OUTPATIENT)
Dept: CALL CENTER | Facility: HOSPITAL | Age: 34
End: 2024-07-02
Payer: COMMERCIAL

## 2024-07-02 ENCOUNTER — TELEPHONE (OUTPATIENT)
Dept: FAMILY MEDICINE CLINIC | Facility: CLINIC | Age: 34
End: 2024-07-02
Payer: COMMERCIAL

## 2024-07-02 NOTE — TELEPHONE ENCOUNTER
Caller: Shirin Canales    Relationship to patient: Self    Best call back number: [phone]     Chief complaint: HOSPITAL FOLLOW UP / NEW PATIENT     Type of visit: NEW PATIENT    Requested date: SOMETHING SOONER AS THIS IS ALSO HOSPITAL FOLLOW UP    If rescheduling, when is the original appointment: 09/06/24    Additional notes:PATIENT WAS ADMITTED TO [facility] ON 06/25/24 DISCHARGED ON 06/29/24 AND IS NEEDING A NEW PATIENT AS WELL AS HOSPITAL FOLLOW UP. SHE WAS PRESCRIBED MEDICATION THAT WILL RUN OUT BEFORE HER APPOINTMENT AND WAS ADVISED NOT TO JUST STOP THIS MEDICATION.     PLEASE CALL TO RESCHEDULE SOMETHING SOONER IF POSSIBLE. PATIENT IS OK WITH THE APPOINTMENT DATE AND TIME JUST DOES NOT WANT TO GO WITHOUT THE MEDICATION.     PATIENT WAS PRESCRIBED: allopurinol (ZYLOPRIM) 100 MG tablet, cefuroxime (CEFTIN) 250 MG tablet ,   metoprolol succinate XL (Toprol XL) 50 MG 24 hr tablet , pantoprazole (Protonix) 40 MG EC tablet , ondansetron (ZOFRAN) 4 MG tablet AND SOME FORM OF PAIN MEDICATION THAT SHE IS NOT SURE WHAT THE NAME IS.

## 2024-07-02 NOTE — TELEPHONE ENCOUNTER
HUB TO RELAY    CALLED AND LVM STATING I WAS RETURNING HER CALL ABOUT HER NEW PATIENT APPT AND HOSP FOLLOW UP. ADVISED TO CALL US BACK TO DISCUSS.     KATELYNN ADVISED THAT THE PATIENT CAN SCHEDULE A HOSPITAL FOLLOW UP BEFORE HER NEW PATIENT APPOINTMENT (FIRST AVAILABLE WITH [facility]). SHE WILL NEED TO KEEP HER NEW PATIENT APPOINTMENT AS WELL.

## 2024-07-02 NOTE — TELEPHONE ENCOUNTER
"I wa [facility] last week at [place] d/c 06/29, I need to find a PCP , for follow ups and meds. Can I get appt. soft transferred her to [facility], Martín KEITH. will make her appt. in [place].   Reason for Disposition   [1] Follow-up call to recent contact AND [2] information only call, no triage required    Additional Information   Negative: [1] Caller is not with the adult (patient) AND [2] reporting urgent symptoms   Negative: Lab result questions   Negative: Medication questions   Negative: Caller can't be reached by phone   Negative: Caller has already spoken to PCP or another triager   Negative: RN needs further essential information from caller in order to complete triage   Negative: Requesting regular office appointment   Negative: [1] Caller requesting NON-URGENT health information AND [2] PCP's office is the best resource   Negative: Health Information question, no triage required and triager able to answer question   Negative: General information question, no triage required and triager able to answer question   Negative: Question about upcoming scheduled test, no triage required and triager able to answer question   Negative: [1] Caller is not with the adult (patient) AND [2] probable NON-URGENT symptoms    Answer Assessment - Initial Assessment Questions  1. REASON FOR CALL or QUESTION: \"What is your reason for calling today?\" or \"How can I best help you?\" or \"What question do you have that I can help answer?\"      I need a new PCP    Protocols used: Information Only Call - No Triage-ADULT-    "

## 2024-07-03 NOTE — TELEPHONE ENCOUNTER
Name: Shirin Canales    Relationship: Self    Best Callback Number: [phone]     HUB PROVIDED THE RELAY MESSAGE FROM THE OFFICE   PATIENT HAS FURTHER QUESTIONS AND WOULD LIKE A CALL BACK AT THE FOLLOWING PHONE NUMBER [phone]    ADDITIONAL INFORMATION: PATIENT NEEDS A CALL BACK TO SCHEDULE APPOINTMENT.

## 2024-07-05 LAB
ACETALDEHYDE [MASS/VOLUME] IN SERUM OR PLASMA: NORMAL MG/DL
PARALDEHYDE [MASS/VOLUME] IN SERUM OR PLASMA: NORMAL MCG/ML

## 2024-07-10 ENCOUNTER — READMISSION MANAGEMENT (OUTPATIENT)
Dept: CALL CENTER | Facility: HOSPITAL | Age: 34
End: 2024-07-10
Payer: COMMERCIAL

## 2024-07-10 NOTE — OUTREACH NOTE
Medical Week 2 Survey      Flowsheet Row Responses   [facility] facility patient discharged from? [facility]   Does the patient have one of the following disease processes/diagnoses(primary or secondary)? Other   Week 2 attempt successful? No   Unsuccessful attempts Attempt 1            Katya SCANLON - Licensed Nurse

## 2024-07-17 ENCOUNTER — READMISSION MANAGEMENT (OUTPATIENT)
Dept: CALL CENTER | Facility: HOSPITAL | Age: 34
End: 2024-07-17
Payer: COMMERCIAL

## 2024-07-17 NOTE — OUTREACH NOTE
Medical Week 3 Survey      Flowsheet Row Responses   [facility] patient discharged from? [facility]   Does the patient have one of the following disease processes/diagnoses(primary or secondary)? Other   Week 3 attempt successful? Yes   Call start time 1048   Call end time 1057   Discharge diagnosis Vomiting   Meds reviewed with patient/caregiver? Yes   Is the patient having any side effects they believe may be caused by any medication additions or changes? No   Does the patient have all medications ordered at discharge? Yes   Is the patient taking all medications as directed (includes completed medication regime)? Yes   Comments regarding appointments Appt. 7/19/24   Does the patient have a primary care provider?  Yes   Has the patient kept scheduled appointments due by today? N/A   Has home health visited the patient within 72 hours of discharge? N/A   Psychosocial issues? Yes  [Hx. alcohol use, alcohol induced acute pancreatitis. Current medication remeron.]   Did the patient receive a copy of their discharge instructions? Yes   Nursing interventions Reviewed instructions with patient   What is the patient's perception of their health status since discharge? Improving   Is the patient/caregiver able to teach back signs and symptoms related to disease process for when to call PCP? Yes   Is the patient/caregiver able to teach back signs and symptoms related to disease process for when to call 911? Yes   Is the patient/caregiver able to teach back the hierarchy of who to call/visit for symptoms/problems? PCP, Specialist, Home health nurse, Urgent Care, ED, 911 Yes   If the patient is a current smoker, are they able to teach back resources for cessation? Not a smoker   Week 3 Call Completed? Yes   Is the patient interested in additional calls from an ambulatory ? No   Would this patient benefit from a Referral to Amb Social Work? No   Wrap up additional comments Pt. reports doing better. She inquired  about refill for zofran. Advised contact PCP. Patient reported unsure what her d/c medications are for. Medication education provided, v/u. Advised any worsening symptoms go to ED, any questions contact PCP, v/u.   Call end time [phone]            Janene BLAIR - Registered Nurse

## 2025-02-07 ENCOUNTER — APPOINTMENT (OUTPATIENT)
Dept: ULTRASOUND IMAGING | Facility: HOSPITAL | Age: 35
DRG: 439 | End: 2025-02-07
Payer: COMMERCIAL

## 2025-02-07 ENCOUNTER — HOSPITAL ENCOUNTER (INPATIENT)
Facility: HOSPITAL | Age: 35
LOS: 2 days | Discharge: HOME | DRG: 439 | End: 2025-02-09
Attending: EMERGENCY MEDICINE | Admitting: INTERNAL MEDICINE
Payer: COMMERCIAL

## 2025-02-07 ENCOUNTER — APPOINTMENT (OUTPATIENT)
Dept: CT IMAGING | Facility: HOSPITAL | Age: 35
DRG: 439 | End: 2025-02-07
Payer: COMMERCIAL

## 2025-02-07 DIAGNOSIS — K85.90 ACUTE PANCREATITIS, UNSPECIFIED COMPLICATION STATUS, UNSPECIFIED PANCREATITIS TYPE: Primary | ICD-10-CM

## 2025-02-07 PROBLEM — R79.89 LFT ELEVATION: Status: ACTIVE | Noted: 2025-02-07

## 2025-02-07 LAB
ALBUMIN SERPL-MCNC: 5.3 G/DL (ref 3.5–5.2)
ALBUMIN/GLOB SERPL: 1.2 G/DL
ALP SERPL-CCNC: 169 U/L (ref 39–117)
ALT SERPL W P-5'-P-CCNC: 59 U/L (ref 1–33)
AMPHET+METHAMPHET UR QL: NEGATIVE
AMPHETAMINES UR QL: NEGATIVE
ANION GAP SERPL CALCULATED.3IONS-SCNC: 18.9 MMOL/L (ref 5–15)
ANION GAP SERPL CALCULATED.3IONS-SCNC: 22 MMOL/L (ref 10–20)
ANION GAP SERPL CALCULATED.3IONS-SCNC: 25.1 MMOL/L (ref 5–15)
ANION GAP SERPL CALCULATED.3IONS-SCNC: 25.4 MMOL/L (ref 5–15)
AST SERPL-CCNC: 110 U/L (ref 1–32)
BACTERIA UR QL AUTO: ABNORMAL /HPF
BARBITURATES UR QL SCN: NEGATIVE
BASOPHILS # BLD AUTO: 0.04 10*3/MM3 (ref 0–0.2)
BASOPHILS NFR BLD AUTO: 0.2 % (ref 0–1.5)
BENZODIAZ UR QL SCN: NEGATIVE
BILIRUB SERPL-MCNC: 2.4 MG/DL (ref 0–1.2)
BILIRUB UR QL STRIP: ABNORMAL
BUN BLDA-MCNC: 19 MG/DL (ref 8–26)
BUN SERPL-MCNC: 18 MG/DL (ref 6–20)
BUN SERPL-MCNC: 19 MG/DL (ref 6–20)
BUN SERPL-MCNC: 19 MG/DL (ref 6–20)
BUN/CREAT SERPL: 18.4 (ref 7–25)
BUN/CREAT SERPL: 21.1 (ref 7–25)
BUN/CREAT SERPL: 22.8 (ref 7–25)
BUPRENORPHINE SERPL-MCNC: NEGATIVE NG/ML
CA-I BLDA-SCNC: 1.29 MMOL/L (ref 1.12–1.32)
CALCIUM SPEC-SCNC: 10.3 MG/DL (ref 8.6–10.5)
CALCIUM SPEC-SCNC: 10.6 MG/DL (ref 8.6–10.5)
CALCIUM SPEC-SCNC: 9.8 MG/DL (ref 8.6–10.5)
CANNABINOIDS SERPL QL: NEGATIVE
CHLORIDE BLDA-SCNC: 109 MMOL/L (ref 98–109)
CHLORIDE SERPL-SCNC: 103 MMOL/L (ref 98–107)
CHLORIDE SERPL-SCNC: 99 MMOL/L (ref 98–107)
CHLORIDE SERPL-SCNC: 99 MMOL/L (ref 98–107)
CLARITY UR: ABNORMAL
CO2 BLDA-SCNC: 13 MMOL/L (ref 24–29)
CO2 SERPL-SCNC: 10.9 MMOL/L (ref 22–29)
CO2 SERPL-SCNC: 14.1 MMOL/L (ref 22–29)
CO2 SERPL-SCNC: 9.6 MMOL/L (ref 22–29)
COCAINE UR QL: NEGATIVE
COLOR UR: ABNORMAL
CREAT BLDA-MCNC: 0.7 MG/DL (ref 0.6–1.3)
CREAT SERPL-MCNC: 0.79 MG/DL (ref 0.57–1)
CREAT SERPL-MCNC: 0.9 MG/DL (ref 0.57–1)
CREAT SERPL-MCNC: 1.03 MG/DL (ref 0.57–1)
D-LACTATE SERPL-SCNC: 1.6 MMOL/L (ref 0.3–2)
DEPRECATED RDW RBC AUTO: 50 FL (ref 37–54)
EGFRCR SERPLBLD CKD-EPI 2021: 101.4 ML/MIN/1.73
EGFRCR SERPLBLD CKD-EPI 2021: 117.3 ML/MIN/1.73
EGFRCR SERPLBLD CKD-EPI 2021: 73.8 ML/MIN/1.73
EGFRCR SERPLBLD CKD-EPI 2021: 86.7 ML/MIN/1.73
EOSINOPHIL # BLD AUTO: 0.01 10*3/MM3 (ref 0–0.4)
EOSINOPHIL NFR BLD AUTO: 0.1 % (ref 0.3–6.2)
ERYTHROCYTE [DISTWIDTH] IN BLOOD BY AUTOMATED COUNT: 12.2 % (ref 12.3–15.4)
ETHANOL UR QL: <0.01 %
FLUAV SUBTYP SPEC NAA+PROBE: NOT DETECTED
FLUBV RNA ISLT QL NAA+PROBE: NOT DETECTED
GLOBULIN UR ELPH-MCNC: 4.6 GM/DL
GLUCOSE BLDC GLUCOMTR-MCNC: 187 MG/DL (ref 70–105)
GLUCOSE SERPL-MCNC: 183 MG/DL (ref 65–99)
GLUCOSE SERPL-MCNC: 200 MG/DL (ref 65–99)
GLUCOSE SERPL-MCNC: 238 MG/DL (ref 65–99)
GLUCOSE UR STRIP-MCNC: NEGATIVE MG/DL
HCG INTACT+B SERPL-ACNC: <1 MIU/ML
HCT VFR BLD AUTO: 41.6 % (ref 34–46.6)
HCT VFR BLDA CALC: 38 % (ref 38–51)
HGB BLD-MCNC: 13.3 G/DL (ref 12–15.9)
HGB BLDA-MCNC: 12.9 G/DL (ref 12–17)
HGB UR QL STRIP.AUTO: ABNORMAL
HOLD SPECIMEN: NORMAL
HYALINE CASTS UR QL AUTO: ABNORMAL /LPF
IMM GRANULOCYTES # BLD AUTO: 0.14 10*3/MM3 (ref 0–0.05)
IMM GRANULOCYTES NFR BLD AUTO: 0.8 % (ref 0–0.5)
KETONES UR QL STRIP: ABNORMAL
LEUKOCYTE ESTERASE UR QL STRIP.AUTO: ABNORMAL
LIPASE SERPL-CCNC: 1316 U/L (ref 13–60)
LYMPHOCYTES # BLD AUTO: 0.58 10*3/MM3 (ref 0.7–3.1)
LYMPHOCYTES NFR BLD AUTO: 3.4 % (ref 19.6–45.3)
MCH RBC QN AUTO: 35.2 PG (ref 26.6–33)
MCHC RBC AUTO-ENTMCNC: 32 G/DL (ref 31.5–35.7)
MCV RBC AUTO: 110.1 FL (ref 79–97)
METHADONE UR QL SCN: NEGATIVE
MONOCYTES # BLD AUTO: 0.86 10*3/MM3 (ref 0.1–0.9)
MONOCYTES NFR BLD AUTO: 5 % (ref 5–12)
NEUTROPHILS NFR BLD AUTO: 15.46 10*3/MM3 (ref 1.7–7)
NEUTROPHILS NFR BLD AUTO: 90.5 % (ref 42.7–76)
NITRITE UR QL STRIP: POSITIVE
NRBC BLD AUTO-RTO: 0 /100 WBC (ref 0–0.2)
OPIATES UR QL: POSITIVE
OXYCODONE UR QL SCN: NEGATIVE
PCP UR QL SCN: NEGATIVE
PH UR STRIP.AUTO: 6 [PH] (ref 5–8)
PLATELET # BLD AUTO: 208 10*3/MM3 (ref 140–450)
PMV BLD AUTO: 9.7 FL (ref 6–12)
POTASSIUM BLDA-SCNC: 4.6 MMOL/L (ref 3.5–4.9)
POTASSIUM SERPL-SCNC: 4 MMOL/L (ref 3.5–5.2)
POTASSIUM SERPL-SCNC: 4.7 MMOL/L (ref 3.5–5.2)
POTASSIUM SERPL-SCNC: 6.6 MMOL/L (ref 3.5–5.2)
PROT SERPL-MCNC: 9.9 G/DL (ref 6–8.5)
PROT UR QL STRIP: ABNORMAL
RBC # BLD AUTO: 3.78 10*6/MM3 (ref 3.77–5.28)
RBC # UR STRIP: ABNORMAL /HPF
REF LAB TEST METHOD: ABNORMAL
SARS-COV-2 RNA RESP QL NAA+PROBE: NOT DETECTED
SODIUM BLD-SCNC: 138 MMOL/L (ref 138–146)
SODIUM SERPL-SCNC: 134 MMOL/L (ref 136–145)
SODIUM SERPL-SCNC: 135 MMOL/L (ref 136–145)
SODIUM SERPL-SCNC: 136 MMOL/L (ref 136–145)
SP GR UR STRIP: 1.02 (ref 1–1.03)
SQUAMOUS #/AREA URNS HPF: ABNORMAL /HPF
TRICYCLICS UR QL SCN: NEGATIVE
UROBILINOGEN UR QL STRIP: ABNORMAL
WBC # UR STRIP: ABNORMAL /HPF
WBC NRBC COR # BLD AUTO: 17.09 10*3/MM3 (ref 3.4–10.8)

## 2025-02-07 PROCEDURE — 25510000001 IOPAMIDOL PER 1 ML: Performed by: INTERNAL MEDICINE

## 2025-02-07 PROCEDURE — 25010000002 ONDANSETRON PER 1 MG: Performed by: EMERGENCY MEDICINE

## 2025-02-07 PROCEDURE — 87636 SARSCOV2 & INF A&B AMP PRB: CPT | Performed by: EMERGENCY MEDICINE

## 2025-02-07 PROCEDURE — 25010000002 LORAZEPAM PER 2 MG: Performed by: FAMILY MEDICINE

## 2025-02-07 PROCEDURE — 80047 BASIC METABLC PNL IONIZED CA: CPT

## 2025-02-07 PROCEDURE — 81001 URINALYSIS AUTO W/SCOPE: CPT | Performed by: EMERGENCY MEDICINE

## 2025-02-07 PROCEDURE — 74177 CT ABD & PELVIS W/CONTRAST: CPT

## 2025-02-07 PROCEDURE — 85014 HEMATOCRIT: CPT

## 2025-02-07 PROCEDURE — 84702 CHORIONIC GONADOTROPIN TEST: CPT | Performed by: EMERGENCY MEDICINE

## 2025-02-07 PROCEDURE — 25010000002 ONDANSETRON PER 1 MG: Performed by: NURSE PRACTITIONER

## 2025-02-07 PROCEDURE — 85025 COMPLETE CBC W/AUTO DIFF WBC: CPT | Performed by: EMERGENCY MEDICINE

## 2025-02-07 PROCEDURE — 87186 SC STD MICRODIL/AGAR DIL: CPT | Performed by: EMERGENCY MEDICINE

## 2025-02-07 PROCEDURE — 80053 COMPREHEN METABOLIC PANEL: CPT | Performed by: EMERGENCY MEDICINE

## 2025-02-07 PROCEDURE — 25010000003 DEXTROSE 5 % SOLUTION 1,000 ML FLEX CONT: Performed by: INTERNAL MEDICINE

## 2025-02-07 PROCEDURE — 87088 URINE BACTERIA CULTURE: CPT | Performed by: EMERGENCY MEDICINE

## 2025-02-07 PROCEDURE — 87086 URINE CULTURE/COLONY COUNT: CPT | Performed by: EMERGENCY MEDICINE

## 2025-02-07 PROCEDURE — 25010000002 METOCLOPRAMIDE PER 10 MG: Performed by: NURSE PRACTITIONER

## 2025-02-07 PROCEDURE — 80306 DRUG TEST PRSMV INSTRMNT: CPT | Performed by: NURSE PRACTITIONER

## 2025-02-07 PROCEDURE — 25010000002 CEFTRIAXONE PER 250 MG: Performed by: INTERNAL MEDICINE

## 2025-02-07 PROCEDURE — 25010000002 MORPHINE PER 10 MG: Performed by: EMERGENCY MEDICINE

## 2025-02-07 PROCEDURE — 25010000002 HYDROMORPHONE 1 MG/ML SOLUTION: Performed by: INTERNAL MEDICINE

## 2025-02-07 PROCEDURE — 25010000002 ONDANSETRON PER 1 MG: Performed by: INTERNAL MEDICINE

## 2025-02-07 PROCEDURE — 83605 ASSAY OF LACTIC ACID: CPT | Performed by: EMERGENCY MEDICINE

## 2025-02-07 PROCEDURE — 99285 EMERGENCY DEPT VISIT HI MDM: CPT

## 2025-02-07 PROCEDURE — 25010000002 METRONIDAZOLE 500 MG/100ML SOLUTION: Performed by: INTERNAL MEDICINE

## 2025-02-07 PROCEDURE — 76705 ECHO EXAM OF ABDOMEN: CPT

## 2025-02-07 PROCEDURE — 83690 ASSAY OF LIPASE: CPT | Performed by: EMERGENCY MEDICINE

## 2025-02-07 PROCEDURE — 82077 ASSAY SPEC XCP UR&BREATH IA: CPT | Performed by: NURSE PRACTITIONER

## 2025-02-07 PROCEDURE — 25810000003 LACTATED RINGERS SOLUTION: Performed by: EMERGENCY MEDICINE

## 2025-02-07 PROCEDURE — 36415 COLL VENOUS BLD VENIPUNCTURE: CPT

## 2025-02-07 PROCEDURE — 87040 BLOOD CULTURE FOR BACTERIA: CPT | Performed by: EMERGENCY MEDICINE

## 2025-02-07 RX ORDER — IOPAMIDOL 755 MG/ML
75 INJECTION, SOLUTION INTRAVASCULAR
Status: COMPLETED | OUTPATIENT
Start: 2025-02-07 | End: 2025-02-07

## 2025-02-07 RX ORDER — SODIUM CHLORIDE 0.9 % (FLUSH) 0.9 %
10 SYRINGE (ML) INJECTION AS NEEDED
Status: DISCONTINUED | OUTPATIENT
Start: 2025-02-07 | End: 2025-02-09 | Stop reason: HOSPADM

## 2025-02-07 RX ORDER — METRONIDAZOLE 500 MG/100ML
500 INJECTION, SOLUTION INTRAVENOUS EVERY 8 HOURS
Status: DISCONTINUED | OUTPATIENT
Start: 2025-02-07 | End: 2025-02-08

## 2025-02-07 RX ORDER — ACETAMINOPHEN 325 MG/1
650 TABLET ORAL EVERY 4 HOURS PRN
Status: DISCONTINUED | OUTPATIENT
Start: 2025-02-07 | End: 2025-02-09 | Stop reason: HOSPADM

## 2025-02-07 RX ORDER — SODIUM CHLORIDE 0.9 % (FLUSH) 0.9 %
10 SYRINGE (ML) INJECTION EVERY 12 HOURS SCHEDULED
Status: DISCONTINUED | OUTPATIENT
Start: 2025-02-07 | End: 2025-02-09 | Stop reason: HOSPADM

## 2025-02-07 RX ORDER — ONDANSETRON 2 MG/ML
8 INJECTION INTRAMUSCULAR; INTRAVENOUS ONCE
Status: COMPLETED | OUTPATIENT
Start: 2025-02-07 | End: 2025-02-07

## 2025-02-07 RX ORDER — IBUPROFEN 200 MG
200 TABLET ORAL EVERY 6 HOURS PRN
COMMUNITY

## 2025-02-07 RX ORDER — LORAZEPAM 2 MG/ML
0.5 INJECTION INTRAMUSCULAR EVERY 8 HOURS PRN
Status: DISCONTINUED | OUTPATIENT
Start: 2025-02-07 | End: 2025-02-08

## 2025-02-07 RX ORDER — METOCLOPRAMIDE HYDROCHLORIDE 5 MG/ML
10 INJECTION INTRAMUSCULAR; INTRAVENOUS EVERY 6 HOURS
Status: DISCONTINUED | OUTPATIENT
Start: 2025-02-07 | End: 2025-02-09 | Stop reason: HOSPADM

## 2025-02-07 RX ORDER — POLYETHYLENE GLYCOL 3350 17 G/17G
17 POWDER, FOR SOLUTION ORAL DAILY PRN
Status: DISCONTINUED | OUTPATIENT
Start: 2025-02-07 | End: 2025-02-09 | Stop reason: HOSPADM

## 2025-02-07 RX ORDER — ACETAMINOPHEN 650 MG/1
650 SUPPOSITORY RECTAL EVERY 4 HOURS PRN
Status: DISCONTINUED | OUTPATIENT
Start: 2025-02-07 | End: 2025-02-09 | Stop reason: HOSPADM

## 2025-02-07 RX ORDER — METRONIDAZOLE 500 MG/100ML
500 INJECTION, SOLUTION INTRAVENOUS ONCE
Status: DISCONTINUED | OUTPATIENT
Start: 2025-02-07 | End: 2025-02-07

## 2025-02-07 RX ORDER — AMOXICILLIN 250 MG
2 CAPSULE ORAL 2 TIMES DAILY PRN
Status: DISCONTINUED | OUTPATIENT
Start: 2025-02-07 | End: 2025-02-09 | Stop reason: HOSPADM

## 2025-02-07 RX ORDER — METOPROLOL TARTRATE 1 MG/ML
5 INJECTION, SOLUTION INTRAVENOUS EVERY 6 HOURS PRN
Status: DISCONTINUED | OUTPATIENT
Start: 2025-02-07 | End: 2025-02-09 | Stop reason: HOSPADM

## 2025-02-07 RX ORDER — PANTOPRAZOLE SODIUM 40 MG/10ML
40 INJECTION, POWDER, LYOPHILIZED, FOR SOLUTION INTRAVENOUS
Status: DISCONTINUED | OUTPATIENT
Start: 2025-02-07 | End: 2025-02-09 | Stop reason: HOSPADM

## 2025-02-07 RX ORDER — ACETAMINOPHEN 160 MG/5ML
650 SOLUTION ORAL EVERY 4 HOURS PRN
Status: DISCONTINUED | OUTPATIENT
Start: 2025-02-07 | End: 2025-02-07

## 2025-02-07 RX ORDER — ONDANSETRON 4 MG/1
4 TABLET, ORALLY DISINTEGRATING ORAL EVERY 6 HOURS PRN
Status: DISCONTINUED | OUTPATIENT
Start: 2025-02-07 | End: 2025-02-09 | Stop reason: HOSPADM

## 2025-02-07 RX ORDER — ONDANSETRON 2 MG/ML
4 INJECTION INTRAMUSCULAR; INTRAVENOUS EVERY 6 HOURS
Status: DISCONTINUED | OUTPATIENT
Start: 2025-02-07 | End: 2025-02-09 | Stop reason: HOSPADM

## 2025-02-07 RX ORDER — BISACODYL 10 MG
10 SUPPOSITORY, RECTAL RECTAL DAILY PRN
Status: DISCONTINUED | OUTPATIENT
Start: 2025-02-07 | End: 2025-02-09 | Stop reason: HOSPADM

## 2025-02-07 RX ORDER — ONDANSETRON 2 MG/ML
4 INJECTION INTRAMUSCULAR; INTRAVENOUS EVERY 6 HOURS PRN
Status: DISCONTINUED | OUTPATIENT
Start: 2025-02-07 | End: 2025-02-09 | Stop reason: HOSPADM

## 2025-02-07 RX ORDER — NITROGLYCERIN 0.4 MG/1
0.4 TABLET SUBLINGUAL
Status: DISCONTINUED | OUTPATIENT
Start: 2025-02-07 | End: 2025-02-09 | Stop reason: HOSPADM

## 2025-02-07 RX ORDER — HYDROXYZINE HYDROCHLORIDE 25 MG/1
1-2 TABLET, FILM COATED ORAL 3 TIMES DAILY PRN
COMMUNITY

## 2025-02-07 RX ORDER — ESCITALOPRAM OXALATE 10 MG/1
10 TABLET ORAL DAILY
COMMUNITY

## 2025-02-07 RX ORDER — SODIUM CHLORIDE 9 MG/ML
40 INJECTION, SOLUTION INTRAVENOUS AS NEEDED
Status: DISCONTINUED | OUTPATIENT
Start: 2025-02-07 | End: 2025-02-09 | Stop reason: HOSPADM

## 2025-02-07 RX ORDER — BISACODYL 5 MG/1
5 TABLET, DELAYED RELEASE ORAL DAILY PRN
Status: DISCONTINUED | OUTPATIENT
Start: 2025-02-07 | End: 2025-02-09 | Stop reason: HOSPADM

## 2025-02-07 RX ORDER — ONDANSETRON 2 MG/ML
4 INJECTION INTRAMUSCULAR; INTRAVENOUS EVERY 6 HOURS
Status: DISCONTINUED | OUTPATIENT
Start: 2025-02-07 | End: 2025-02-07

## 2025-02-07 RX ORDER — ALUMINA, MAGNESIA, AND SIMETHICONE 2400; 2400; 240 MG/30ML; MG/30ML; MG/30ML
15 SUSPENSION ORAL EVERY 6 HOURS PRN
Status: DISCONTINUED | OUTPATIENT
Start: 2025-02-07 | End: 2025-02-09 | Stop reason: HOSPADM

## 2025-02-07 RX ADMIN — LORAZEPAM 0.5 MG: 2 INJECTION INTRAMUSCULAR; INTRAVENOUS at 20:34

## 2025-02-07 RX ADMIN — ONDANSETRON 4 MG: 2 INJECTION INTRAMUSCULAR; INTRAVENOUS at 16:24

## 2025-02-07 RX ADMIN — METOCLOPRAMIDE 10 MG: 5 INJECTION, SOLUTION INTRAMUSCULAR; INTRAVENOUS at 22:52

## 2025-02-07 RX ADMIN — HYDROMORPHONE HYDROCHLORIDE 1 MG: 1 INJECTION, SOLUTION INTRAMUSCULAR; INTRAVENOUS; SUBCUTANEOUS at 22:52

## 2025-02-07 RX ADMIN — SODIUM CHLORIDE, POTASSIUM CHLORIDE, SODIUM LACTATE AND CALCIUM CHLORIDE 1000 ML: 600; 310; 30; 20 INJECTION, SOLUTION INTRAVENOUS at 09:27

## 2025-02-07 RX ADMIN — Medication 10 ML: at 20:39

## 2025-02-07 RX ADMIN — METRONIDAZOLE 500 MG: 500 INJECTION, SOLUTION INTRAVENOUS at 13:20

## 2025-02-07 RX ADMIN — Medication 10 ML: at 12:06

## 2025-02-07 RX ADMIN — METRONIDAZOLE 500 MG: 500 INJECTION, SOLUTION INTRAVENOUS at 20:33

## 2025-02-07 RX ADMIN — ONDANSETRON 4 MG: 2 INJECTION INTRAMUSCULAR; INTRAVENOUS at 22:52

## 2025-02-07 RX ADMIN — SODIUM BICARBONATE 150 MEQ: 84 INJECTION INTRAVENOUS at 20:33

## 2025-02-07 RX ADMIN — ONDANSETRON 4 MG: 2 INJECTION INTRAMUSCULAR; INTRAVENOUS at 12:05

## 2025-02-07 RX ADMIN — CEFTRIAXONE 2000 MG: 2 INJECTION, POWDER, FOR SOLUTION INTRAMUSCULAR; INTRAVENOUS at 12:05

## 2025-02-07 RX ADMIN — SODIUM BICARBONATE 150 MEQ: 84 INJECTION INTRAVENOUS at 23:35

## 2025-02-07 RX ADMIN — PANTOPRAZOLE SODIUM 40 MG: 40 INJECTION, POWDER, FOR SOLUTION INTRAVENOUS at 12:05

## 2025-02-07 RX ADMIN — HYDROMORPHONE HYDROCHLORIDE 1 MG: 1 INJECTION, SOLUTION INTRAMUSCULAR; INTRAVENOUS; SUBCUTANEOUS at 14:25

## 2025-02-07 RX ADMIN — HYDROMORPHONE HYDROCHLORIDE 1 MG: 1 INJECTION, SOLUTION INTRAMUSCULAR; INTRAVENOUS; SUBCUTANEOUS at 18:32

## 2025-02-07 RX ADMIN — METOCLOPRAMIDE 10 MG: 5 INJECTION, SOLUTION INTRAMUSCULAR; INTRAVENOUS at 16:24

## 2025-02-07 RX ADMIN — ONDANSETRON 8 MG: 2 INJECTION, SOLUTION INTRAMUSCULAR; INTRAVENOUS at 09:28

## 2025-02-07 RX ADMIN — IOPAMIDOL 75 ML: 755 INJECTION, SOLUTION INTRAVENOUS at 11:55

## 2025-02-07 RX ADMIN — MORPHINE SULFATE 4 MG: 4 INJECTION, SOLUTION INTRAMUSCULAR; INTRAVENOUS at 09:37

## 2025-02-07 NOTE — CASE MANAGEMENT/SOCIAL WORK
Discharge Planning Assessment   [facility]     Patient Name: Shirin Canales  MRN: [id number]  Today's Date: 2/7/2025    Admit Date: 2/7/2025    Plan: From home alone.   Discharge Needs Assessment       Row Name 02/07/25 1346       Living Environment    People in Home alone    Current Living Arrangements home    Potentially Unsafe Housing Conditions none    In the past 12 months has the electric, gas, oil, or water company threatened to shut off services in your home? No    Primary Care Provided by self    Provides Primary Care For no one    Family Caregiver if Needed sibling(s)    Family Caregiver Names Leidy Champagne    Quality of Family Relationships helpful;involved;supportive    Able to Return to Prior Arrangements yes       Resource/Environmental Concerns    Resource/Environmental Concerns none    Transportation Concerns none       Transportation Needs    In the past 12 months, has lack of transportation kept you from medical appointments or from getting medications? no    In the past 12 months, has lack of transportation kept you from meetings, work, or from getting things needed for daily living? No       Food Insecurity    Within the past 12 months, you worried that your food would run out before you got the money to buy more. Never true    Within the past 12 months, the food you bought just didn't last and you didn't have money to get more. Never true       Transition Planning    Patient/Family Anticipates Transition to home    Patient/Family Anticipated Services at Transition none    Transportation Anticipated family or friend will provide       Discharge Needs Assessment    Readmission Within the Last 30 Days no previous admission in last 30 days    Equipment Currently Used at Home bp cuff;glucometer    Concerns to be Addressed discharge planning    Anticipated Changes Related to Illness none    Equipment Needed After Discharge none                   Discharge Plan       Row Name 02/07/25 6094       Plan     Plan From home alone.    Patient/Family in Agreement with Plan yes    Plan Comments Patient lives at home alone. Patient does drive and patient states she will drive herself home. Patient does ADL. Patient states that she does have a PCP but does not know there name . Pharmacy ([facility]) confirmed. Patient wishes to be enrolled in the [facility] proganika, ANNA updated this in the chart. Patient denies finanical assistance needs with medications and/or food. Denies PT and/or HH needs. Discharge barriers: GI following, NPO, IV abx, IV flagyl, IV protonix, D5W gtt, blood/urine cx pending, increased WBC.                  Continued Care and Services - Admitted Since 2/7/2025    No active coordination exists for this encounter.          Demographic Summary       Row Name 02/07/25 1349       General Information    Admission Type inpatient    Arrived From emergency department    Referral Source admission list    Reason for Consult discharge planning    Preferred Language English       Contact Information    Permission Granted to Share Info With                    Functional Status       Row Name 02/07/25 1346       Functional Status    Usual Activity Tolerance good    Current Activity Tolerance good       Functional Status, IADL    Medications independent    Meal Preparation independent    Housekeeping independent    Laundry independent    Shopping independent                 Met with patient in room wearing PPE: mask.    Maintained distance greater than six feet and spent less than 15 minutes in the room.       Nany Lopez RN

## 2025-02-07 NOTE — Clinical Note
Level of Care: Progressive Care [20]   Diagnosis: LFT elevation [858940]   Certification: I Certify That Inpatient Hospital Services Are Medically Necessary For Greater Than 2 Midnights

## 2025-02-07 NOTE — PAYOR COMM NOTE
"Shirin Metz (34 y.o. Female)       Date of Birth   1990    Social Security Number       Address   [address] [place], [place] [address] [employer] [place] [facility]    Home Phone   [phone]    MRN   [id number]       Mandaeism   Patient Refused    Marital Status   Single                            Admission Date   25    Admission Type   Emergency    Admitting Provider   Pratik Burns MD    Attending Provider   Pratik Burns MD    Department, Room/Bed   [facility] EMERGENCY DEPARTMENT,        Discharge Date       Discharge Disposition       Discharge Destination                                 Attending Provider: Pratik Burns MD    Allergies: Latex, Penicillins    Isolation: None   Infection: None   Code Status: CPR    Ht: 160 cm (63\")   Wt: 41.9 kg (92 lb 6 oz)    Admission Cmt: None   Principal Problem: LFT elevation [R79.89]                   Active Insurance as of 2025       Primary Coverage       Payor Plan Insurance Group Employer/Plan Group    [employer] BLUE CROSS [employer] BLUE CROSS BLUE [employer]IELD PPO [id number]       Payor Plan Address Payor Plan Phone Number Payor Plan Fax Number Effective Dates    [address] [phone]  2021 - None Entered    [facility] [id number]         Subscriber Name Subscriber Birth Date Member ID       SHIRIN METZ 1990 [id number]                     Emergency Contacts        (Rel.) Home Phone Work Phone Mobile Phone    SHANNAN SAMPSON (Other) -- -- [phone]             Pancreatitis [facility] Inpatient [facility]       Indications Met   Last updated by Jenny Carrington on 2025 1330     Review Status Created By   Primary Completed Jenny Carrington      Criteria Review   Pancreatitis [facility] In[facility]     Overall Determination: Indications Met     Criteria:  [×] Admission is indicated for  1 or more  of the following :      [×] Acute pancreatitis, [E] [F] as indicated by  2 or more  of the following :          [×] Abdominal pain          [×] Findings on imaging " indicative of acute pancreatitis (eg, pancreatic inflammation, pancreatic necrosis, peripancreatic fluid collection)     Notes:  -- 2/7/2025  1:30 PM by Jenny Carrington --      Subject: Admission      CT abd/pelvis: Impression: 1. Findings consistent with the appearance of diffuse acute interstitial pancreatitis. No pseudocyst is seen. Correlate with clinical symptoms and laboratory findings.       2. Mild generalized thickening and stranding surrounding the ascending colon and transverse colon. Correlate for infectious and or inflammatory colitis symptoms. 3. Severe diffuse hepatic steatosis.         -- 2/7/2025  1:30 PM by Jenny Carrington --      Subject: Admission      Patient presented with acute abdominal pain nausea vomiting. Lipase 1316, WBC 17.09, Na 134, K+ 6.6, creatinine 1.03, cefTRIAXone, 2,000 mg, Intravenous, Q24H, metroNIDAZOLE, 500 mg, Intravenous, Q8H      pantoprazole, 40 mg, Intravenous, Q AM, LR 1L bolus, morphine iv x1, protonix iv x1, NPO, GI consult, IVF a@ 150.             Vital Signs (last day)       Date/Time Temp Temp src Pulse Resp BP Patient Position SpO2    02/07/25 1116 -- -- 115 -- 117/94 -- 100    02/07/25 1046 -- -- 101 -- 126/92 -- 100    02/07/25 1004 -- -- 118 -- -- -- 100    02/07/25 1001 -- -- 117 -- 131/97 -- 100    02/07/25 0933 -- -- 122 -- 114/92 -- 100    02/07/25 0843 97.1 (36.2) Axillary -- -- -- -- --    02/07/25 0828 -- -- 130 18 122/91 Sitting 100          Lab Results (last 24 hours)       Procedure Component Value Units Date/Time    Basic Metabolic Panel [[id number]]  (Abnormal) Collected: 02/07/25 1130    Specimen: Blood Updated: 02/07/25 1206     Glucose 183 mg/dL      BUN 19 mg/dL      Creatinine 0.90 mg/dL      Sodium 135 mmol/L      Potassium 4.7 mmol/L      Comment: Result checked          Chloride 99 mmol/L      CO2 10.9 mmol/L      Calcium 10.3 mg/dL      BUN/Creatinine Ratio 21.1     Anion Gap 25.1 mmol/L      eGFR 86.7 mL/min/1.73     Narrative:      GFR  Categories in Chronic Kidney Disease (CKD)      GFR Category          GFR (mL/min/1.73)    Interpretation  G1                     90 or greater         Normal or high (1)  G2                      60-89                Mild decrease (1)  G3a                   45-59                Mild to moderate decrease  G3b                   30-44                Moderate to severe decrease  G4                    15-29                Severe decrease  G5                    14 or less           Kidney failure          (1)In the absence of evidence of kidney disease, neither GFR category G1 or G2 fulfill the criteria for CKD.    eGFR calculation 2021 CKD-EPI creatinine equation, which does not include race as a factor    Extra Tubes [[id number]] Collected: 02/07/25 1130    Specimen: Blood, Venous Line Updated: 02/07/25 1145    Narrative:      The following orders were created for panel order Extra Tubes.  Procedure                               Abnormality         Status                     ---------                               -----------         ------                     Gold Top - SST[[id number]]                                   Final result                 Please view results for these tests on the individual orders.    Gold Top - SST [[id number]] Collected: 02/07/25 1130    Specimen: Blood Updated: 02/07/25 1145     Extra Tube Hold for add-ons.     Comment: Auto resulted.       POC CHEM 8 [[id number]]  (Abnormal) Collected: 02/07/25 1138    Specimen: Venous Blood Updated: 02/07/25 1143     Glucose 187 mg/dL      BUN 19 mg/dL      Creatinine 0.70 mg/dL      Sodium 138 mmol/L      POC Potassium 4.6 mmol/L      Chloride 109 mmol/L      Total CO2 13 mmol/L      Anion Gap 22.0 mmol/L      Comment: Serial Number: [id number]:  [id number]        Hemoglobin 12.9 g/dL      Hematocrit 38 %      Ionized Calcium 1.29 mmol/L      eGFR 117.3 mL/min/1.73     Urinalysis, Microscopic Only - Urine, Clean Catch [[id number]]  (Abnormal) Collected:  02/07/25 1105    Specimen: Urine, Clean Catch Updated: 02/07/25 1135     RBC, UA 6-10 /HPF      WBC, UA 11-20 /HPF      Bacteria, UA 1+ /HPF      Squamous Epithelial Cells, UA 31-50 /HPF      Hyaline Casts, UA 21-30 /LPF      Methodology Manual Light Microscopy    Urine Culture - Urine, Urine, Clean Catch [[id number]] Collected: 02/07/25 1105    Specimen: Urine, Clean Catch Updated: 02/07/25 1135    Urinalysis With Culture If Indicated - Urine, Clean Catch [[id number]]  (Abnormal) Collected: 02/07/25 1105    Specimen: Urine, Clean Catch Updated: 02/07/25 1123     Color, UA [place]     Comment: Any Substance that causes an abnormal urine color can alter the accuracy of the chemical reactions.        Appearance, UA Cloudy     pH, UA 6.0     Specific Gravity, UA 1.020     Glucose, UA Negative     Ketones, UA >=160 mg/dL (4+)     Bilirubin, UA Moderate (2+)     Comment: Confirmation testing is unavailable.  A serum bilirubin is recommended for further assessment.        Blood, UA Large (3+)     Protein, UA >=300 mg/dL (3+)     Leuk Esterase, UA Trace     Nitrite, UA Positive     Urobilinogen, UA 1.0 E.U./dL    Narrative:      In absence of clinical symptoms, the presence of pyuria, bacteria, and/or nitrites on the urinalysis result does not correlate with infection.    Lipase [[id number]]  (Abnormal) Collected: 02/07/25 1016    Specimen: Blood Updated: 02/07/25 1056     Lipase 1,316 U/L     Comprehensive Metabolic Panel [[id number]]  (Abnormal) Collected: 02/07/25 1016    Specimen: Blood Updated: 02/07/25 1047     Glucose 200 mg/dL      BUN 19 mg/dL      Creatinine 1.03 mg/dL      Sodium 134 mmol/L      Potassium 6.6 mmol/L      Chloride 99 mmol/L      CO2 9.6 mmol/L      Calcium 10.6 mg/dL      Total Protein 9.9 g/dL      Albumin 5.3 g/dL      ALT (SGPT) 59 U/L      AST (SGOT) 110 U/L      Alkaline Phosphatase 169 U/L      Total Bilirubin 2.4 mg/dL      Globulin 4.6 gm/dL      A/G Ratio 1.2 g/dL      BUN/Creatinine Ratio  18.4     Anion Gap 25.4 mmol/L      eGFR 73.8 mL/min/1.73     Narrative:      GFR Categories in Chronic Kidney Disease (CKD)      GFR Category          GFR (mL/min/1.73)    Interpretation  G1                     90 or greater         Normal or high (1)  G2                      60-89                Mild decrease (1)  G3a                   45-59                Mild to moderate decrease  G3b                   30-44                Moderate to severe decrease  G4                    15-29                Severe decrease  G5                    14 or less           Kidney failure          (1)In the absence of evidence of kidney disease, neither GFR category G1 or G2 fulfill the criteria for CKD.    eGFR calculation 2021 CKD-EPI creatinine equation, which does not include race as a factor    hCG, Quantitative, Pregnancy [[id number]] Collected: 02/07/25 0903    Specimen: Blood Updated: 02/07/25 0938     HCG Quantitative <1.00 mIU/mL     Narrative:      HCG Ranges by Gestational Age    Females - non-pregnant premenopausal   </= 1mIU/mL HCG  Females - postmenopausal               </= 7mIU/mL HCG    3 Weeks       5.4   -      72 mIU/mL  4 Weeks      10.2   -     708 mIU/mL  5 Weeks       217   -   8,245 mIU/mL  6 Weeks       152   -  32,177 mIU/mL  7 Weeks     4,059   - 153,767 mIU/mL  8 Weeks    31,366   - 149,094 mIU/mL  9 Weeks    59,109   - 135,901 mIU/mL  10 Weeks   44,186   - 170,409 mIU/mL  12 Weeks   27,107   - 201,615 mIU/mL  14 Weeks   24,302   -  93,646 mIU/mL  15 Weeks   12,540   -  69,747 mIU/mL  16 Weeks    8,904   -  55,332 mIU/mL  17 Weeks    8,240   -  51,793 mIU/mL  18 Weeks    9,649   -  55,271 mIU/mL      COVID-19 and FLU A/B PCR, 1 HR TAT - Swab, Nasopharynx [[id number]]  (Normal) Collected: 02/07/25 0905    Specimen: Swab from Nasopharynx Updated: 02/07/25 0933     COVID19 Not Detected     Influenza A PCR Not Detected     Influenza B PCR Not Detected    Narrative:      Fact sheet for providers:  https://www.fda.gov/media/619031/download    Fact sheet for patients: https://www.fda.gov/media/142102/download    Test performed by PCR.    Blood Culture - Blood, Arm, Left [[id number]] Collected: 02/07/25 0926    Specimen: Blood from Arm, Left Updated: 02/07/25 0933    CBC & Differential [[id number]]  (Abnormal) Collected: 02/07/25 0903    Specimen: Blood Updated: 02/07/25 0918    Narrative:      The following orders were created for panel order CBC & Differential.  Procedure                               Abnormality         Status                     ---------                               -----------         ------                     CBC Auto Differential[[id number]]        Abnormal            Final result               Scan Slide[[id number]]                                                                    Please view results for these tests on the individual orders.    CBC Auto Differential [[id number]]  (Abnormal) Collected: 02/07/25 0903    Specimen: Blood Updated: 02/07/25 0918     WBC 17.09 10*3/mm3      RBC 3.78 10*6/mm3      Hemoglobin 13.3 g/dL      Hematocrit 41.6 %      .1 fL      MCH 35.2 pg      MCHC 32.0 g/dL      RDW 12.2 %      RDW-SD 50.0 fl      MPV 9.7 fL      Platelets 208 10*3/mm3      Neutrophil % 90.5 %      Lymphocyte % 3.4 %      Monocyte % 5.0 %      Eosinophil % 0.1 %      Basophil % 0.2 %      Immature Grans % 0.8 %      Neutrophils, Absolute 15.46 10*3/mm3      Lymphocytes, Absolute 0.58 10*3/mm3      Monocytes, Absolute 0.86 10*3/mm3      Eosinophils, Absolute 0.01 10*3/mm3      Basophils, Absolute 0.04 10*3/mm3      Immature Grans, Absolute 0.14 10*3/mm3      nRBC 0.0 /100 WBC     Blood Culture - Blood, Arm, Right [[id number]] Collected: 02/07/25 0903    Specimen: Blood from Arm, Right Updated: 02/07/25 0911    POC Lactate [[id number]]  (Normal) Collected: 02/07/25 0907    Specimen: Blood Updated: 02/07/25 0909     Lactate 1.6 mmol/L      Comment: Serial Number:  [id number]:  [id number]             Imaging Results (Last 24 Hours)       Procedure Component Value Units Date/Time    CT Abdomen Pelvis With Contrast [[id number]] Collected: 02/07/25 1157     Updated: 02/07/25 1204    Narrative:      CT ABDOMEN PELVIS W CONTRAST    Date of Exam: 2/7/2025 11:45 AM EST    Indication: Severe diffuse abdominal pain vomiting white count 17,000.    Comparison: CT abdomen pelvis 6/26/2024    Technique: Axial CT images were obtained of the abdomen and pelvis following the uneventful intravenous administration of iodinated contrast. Sagittal and coronal reconstructions were performed.  Automated exposure control and iterative reconstruction   methods were used.        Findings:  The liver is diffusely and severely steatotic. No focal liver lesion is identified. The liver size is within normal limits, 17.3 cm craniocaudal. The liver does not appear cirrhotic.    There is some layering high density material within the gallbladder which could represent vicariously destroyed contrast, tiny stones or sludge. No evidence of cholecystitis or choledocholithiasis or biliary dilation.    Fluid and stranding is seen surrounding the pancreas, particularly the pancreatic body and tail. The pancreas maintains normal homogeneous enhancement without focal lesion. Splenic vein, SMV, portal vein remain patent.    The spleen and adrenal glands are normal. Bilateral renal cysts are present.    Ascending and transverse colon appear mildly and generally thickened with subtle adjacent stranding. Mild colonic stool burden is present. Small bowel loops are of normal caliber.    Urinary bladder, uterus, and rectum are within the limits.    No acute or suspicious osseous abnormalities are identified.    Lung bases are clear. Heart size is normal.      Impression:      Impression:    1. Findings consistent with the appearance of diffuse acute interstitial pancreatitis. No pseudocyst is seen. Correlate with  clinical symptoms and laboratory findings.  2. Mild generalized thickening and stranding surrounding the ascending colon and transverse colon. Correlate for infectious and or inflammatory colitis symptoms.  3. Severe diffuse hepatic steatosis.              Electronically Signed: Lyly Valdez MD    2/7/2025 12:02 PM EST    Workstation ID: [id number]          Operative/Procedure Notes (last 24 hours)  Notes from 02/06/25 1358 through 02/07/25 1358   No notes of this type exist for this encounter.       Physician Progress Notes (last 24 hours)  Notes from 02/06/25 1358 through 02/07/25 1358   No notes of this type exist for this encounter.       Consult Notes (last 24 hours)  Notes from 02/06/25 1358 through 02/07/25 1358   No notes of this type exist for this encounter.

## 2025-02-07 NOTE — ED NOTES
Nursing report ED to floor  Shirin Canales  34 y.o.  female    HPI:   Chief Complaint   Patient presents with    Vomiting     Vomiting, nausea and weakness started 2 days ago.         Admitting doctor:   Pratik Burns MD    Admitting diagnosis:   The encounter diagnosis was Acute pancreatitis, unspecified complication status, unspecified pancreatitis type.    Code status:   Current Code Status       Date Active Code Status Order ID Comments User Context       2/7/2025 1147 CPR (Attempt to Resuscitate) [id number]  Pratik Burns MD ED        Question Answer    Code Status (Patient has no pulse and is not breathing) CPR (Attempt to Resuscitate)    Medical Interventions (Patient has pulse or is breathing) Full Support                    Allergies:   Latex and Penicillins    Isolation:  No active isolations     Fall Risk:  Fall Risk Assessment was completed, and patient is at moderate risk for falls.   Predictive Model Details         3 (Low) Factor Value    Calculated 2/7/2025 14:29 Active Peripheral IV Present    Risk of Fall Model Imaging order in this encounter Present     Age 34     Number of Distinct Medication Classes administered 8     Respiratory Rate 18     Magnesium not on file     Total Bilirubin 2.4 mg/dL     Buster Scale not on file     Number of administrations of Analgesic Narcotics 2     Chloride 99 mmol/L     ALT 59 U/L     Number of administrations of Ulcer Drugs 1     Diastolic BP 94     Days after Admission 0.25     Creatinine 0.7 mg/dL     Potassium 4.6 mmol/L     Tobacco Use Quit     Calcium 10.3 mg/dL     Albumin 5.3 g/dL         Weight:       02/07/25  0827   Weight: 41.9 kg (92 lb 6 oz)       Intake and Output    Intake/Output Summary (Last 24 hours) at 2/7/2025 1434  Last data filed at 2/7/2025 1427  Gross per 24 hour   Intake 1100 ml   Output --   Net 1100 ml       Diet:   Dietary Orders (From admission, onward)       Start     Ordered    02/07/25 1324  NPO Diet NPO Type: Ice Chips  Diet Effective  Now        Question:  NPO Type  Answer:  Ice Chips    02/07/25 1323                     Most recent vitals:   Vitals:    02/07/25 1046 02/07/25 1116 02/07/25 1328 02/07/25 1432   BP: 126/92 117/94     BP Location:       Patient Position:       Pulse: 101 115 103 (!) 132   Resp:       Temp:       TempSrc:       SpO2: 100% 100% 98% 97%   Weight:       Height:           Active LDAs/IV Access:   Lines, Drains & Airways       Active LDAs       Name Placement date Placement time Site Days    Peripheral IV 02/07/25 0926 Left Antecubital 02/07/25 0926  Antecubital  less than 1                    Skin Condition:   Skin Assessments (last day)       Date/Time Skin WDL    02/07/25 08:44:06 WDL             Labs (abnormal labs have a star):   Labs Reviewed   COMPREHENSIVE METABOLIC PANEL - Abnormal; Notable for the following components:       Result Value    Glucose 200 (*)     Creatinine 1.03 (*)     Sodium 134 (*)     Potassium 6.6 (*)     CO2 9.6 (*)     Calcium 10.6 (*)     Total Protein 9.9 (*)     Albumin 5.3 (*)     ALT (SGPT) 59 (*)     AST (SGOT) 110 (*)     Alkaline Phosphatase 169 (*)     Total Bilirubin 2.4 (*)     Anion Gap 25.4 (*)     All other components within normal limits    Narrative:     GFR Categories in Chronic Kidney Disease (CKD)      GFR Category          GFR (mL/min/1.73)    Interpretation  G1                     90 or greater         Normal or high (1)  G2                      60-89                Mild decrease (1)  G3a                   45-59                Mild to moderate decrease  G3b                   30-44                Moderate to severe decrease  G4                    15-29                Severe decrease  G5                    14 or less           Kidney failure          (1)In the absence of evidence of kidney disease, neither GFR category G1 or G2 fulfill the criteria for CKD.    eGFR calculation 2021 CKD-EPI creatinine equation, which does not include race as a factor   LIPASE - Abnormal;  Notable for the following components:    Lipase 1,316 (*)     All other components within normal limits   URINALYSIS W/ CULTURE IF INDICATED - Abnormal; Notable for the following components:    Color, UA Orange (*)     Appearance, UA Cloudy (*)     Ketones, UA >=160 mg/dL (4+) (*)     Bilirubin, UA Moderate (2+) (*)     Blood, UA Large (3+) (*)     Protein, UA >=300 mg/dL (3+) (*)     Leuk Esterase, UA Trace (*)     Nitrite, UA Positive (*)     All other components within normal limits    Narrative:     In absence of clinical symptoms, the presence of pyuria, bacteria, and/or nitrites on the urinalysis result does not correlate with infection.   CBC WITH AUTO DIFFERENTIAL - Abnormal; Notable for the following components:    WBC 17.09 (*)     .1 (*)     MCH 35.2 (*)     RDW 12.2 (*)     Neutrophil % 90.5 (*)     Lymphocyte % 3.4 (*)     Eosinophil % 0.1 (*)     Immature Grans % 0.8 (*)     Neutrophils, Absolute 15.46 (*)     Lymphocytes, Absolute 0.58 (*)     Immature Grans, Absolute 0.14 (*)     All other components within normal limits   BASIC METABOLIC PANEL - Abnormal; Notable for the following components:    Glucose 183 (*)     Sodium 135 (*)     CO2 10.9 (*)     Anion Gap 25.1 (*)     All other components within normal limits    Narrative:     GFR Categories in Chronic Kidney Disease (CKD)      GFR Category          GFR (mL/min/1.73)    Interpretation  G1                     90 or greater         Normal or high (1)  G2                      60-89                Mild decrease (1)  G3a                   45-59                Mild to moderate decrease  G3b                   30-44                Moderate to severe decrease  G4                    15-29                Severe decrease  G5                    14 or less           Kidney failure          (1)In the absence of evidence of kidney disease, neither GFR category G1 or G2 fulfill the criteria for CKD.    eGFR calculation 2021 CKD-EPI creatinine equation,  which does not include race as a factor   URINALYSIS, MICROSCOPIC ONLY - Abnormal; Notable for the following components:    RBC, UA 6-10 (*)     WBC, UA 11-20 (*)     Bacteria, UA 1+ (*)     Squamous Epithelial Cells, UA 31-50 (*)     All other components within normal limits   POCT CHEM 8 - Abnormal; Notable for the following components:    Glucose 187 (*)     Total CO2 13 (*)     Anion Gap 22.0 (*)     All other components within normal limits   COVID-19 AND FLU A/B, NP SWAB IN TRANSPORT MEDIA 1 HR TAT - Normal    Narrative:     Fact sheet for providers: https://www.fda.gov/media/515547/download    Fact sheet for patients: https://www.fda.gov/media/924558/download    Test performed by PCR.   POC LACTATE - Normal   BLOOD CULTURE   BLOOD CULTURE   URINE CULTURE   HCG, QUANTITATIVE, PREGNANCY    Narrative:     HCG Ranges by Gestational Age    Females - non-pregnant premenopausal   </= 1mIU/mL HCG  Females - postmenopausal               </= 7mIU/mL HCG    3 Weeks       5.4   -      72 mIU/mL  4 Weeks      10.2   -     708 mIU/mL  5 Weeks       217   -   8,245 mIU/mL  6 Weeks       152   -  32,177 mIU/mL  7 Weeks     4,059   - 153,767 mIU/mL  8 Weeks    31,366   - 149,094 mIU/mL  9 Weeks    59,109   - 135,901 mIU/mL  10 Weeks   44,186   - 170,409 mIU/mL  12 Weeks   27,107   - 201,615 mIU/mL  14 Weeks   24,302   -  93,646 mIU/mL  15 Weeks   12,540   -  69,747 mIU/mL  16 Weeks    8,904   -  55,332 mIU/mL  17 Weeks    8,240   -  51,793 mIU/mL  18 Weeks    9,649   -  55,271 mIU/mL     BASIC METABOLIC PANEL   CBC AND DIFFERENTIAL    Narrative:     The following orders were created for panel order CBC & Differential.  Procedure                               Abnormality         Status                     ---------                               -----------         ------                     CBC Auto Differential[[id number]]        Abnormal            Final result               Scan Slide[[id number]]                                                                     Please view results for these tests on the individual orders.   EXTRA TUBES    Narrative:     The following orders were created for panel order Extra Tubes.  Procedure                               Abnormality         Status                     ---------                               -----------         ------                     Gold Top - SST[[id number]]                                   Final result                 Please view results for these tests on the individual orders.   GOLD TOP - SST       LOC: Person, Place, Time, and Situation    Telemetry:  Telemetry    Cardiac Monitoring Ordered: no    EKG:   No orders to display       Medications Given in the ED:   Medications   sodium chloride 0.9 % flush 10 mL (has no administration in time range)   sodium chloride 0.9 % flush 10 mL (10 mL Intravenous Given 2/7/25 1206)   sodium chloride 0.9 % flush 10 mL (has no administration in time range)   sodium chloride 0.9 % infusion 40 mL (has no administration in time range)   nitroglycerin (NITROSTAT) SL tablet 0.4 mg (has no administration in time range)   acetaminophen (TYLENOL) tablet 650 mg (has no administration in time range)     Or   acetaminophen (TYLENOL) suppository 650 mg (has no administration in time range)   sennosides-docusate (PERICOLACE) 8.6-50 MG per tablet 2 tablet (has no administration in time range)     And   polyethylene glycol (MIRALAX) packet 17 g (has no administration in time range)     And   bisacodyl (DULCOLAX) EC tablet 5 mg (has no administration in time range)     And   bisacodyl (DULCOLAX) suppository 10 mg (has no administration in time range)   melatonin tablet 5 mg (has no administration in time range)   ondansetron ODT (ZOFRAN-ODT) disintegrating tablet 4 mg ( Oral Not Given:  See Alt 2/7/25 1205)     Or   ondansetron (ZOFRAN) injection 4 mg (4 mg Intravenous Given 2/7/25 1205)   aluminum-magnesium hydroxide-simethicone (MAALOX MAX) 400-400-40  MG/5ML suspension 15 mL (has no administration in time range)   pantoprazole (PROTONIX) injection 40 mg (40 mg Intravenous Given 2/7/25 1205)   cefTRIAXone (ROCEPHIN) 2,000 mg in sodium chloride 0.9 % 100 mL MBP (0 mg Intravenous Stopped 2/7/25 1319)   metroNIDAZOLE (FLAGYL) IVPB 500 mg (0 mg Intravenous Stopped 2/7/25 1427)   sodium bicarbonate 8.4 % 150 mEq in dextrose (D5W) 5 % 1,000 mL infusion (greater than 100 mEq) (150 mEq Intravenous New Bag 2/7/25 1320)   HYDROmorphone (DILAUDID) injection 1 mg (1 mg Intravenous Given 2/7/25 1425)   lactated ringers bolus 1,000 mL (0 mL Intravenous Stopped 2/7/25 1157)   ondansetron (ZOFRAN) injection 8 mg (8 mg Intravenous Given 2/7/25 0928)   morphine injection 4 mg (4 mg Intravenous Given 2/7/25 0937)   iopamidol (ISOVUE-370) 76 % injection 75 mL (75 mL Intravenous Given 2/7/25 1155)       Imaging results:  CT Abdomen Pelvis With Contrast    Result Date: 2/7/2025  Impression: 1. Findings consistent with the appearance of diffuse acute interstitial pancreatitis. No pseudocyst is seen. Correlate with clinical symptoms and laboratory findings. 2. Mild generalized thickening and stranding surrounding the ascending colon and transverse colon. Correlate for infectious and or inflammatory colitis symptoms. 3. Severe diffuse hepatic steatosis. Electronically Signed: Lyly Valdez MD  2/7/2025 12:02 PM EST  Workstation ID: [id number]     Social issues:   Social History     Socioeconomic History    Marital status: Single   Tobacco Use    Smoking status: Former     Current packs/day: 0.00     Types: Cigarettes     Quit date: 2015     Years since quitting: 10.1    Smokeless tobacco: Never   Vaping Use    Vaping status: Never Used   Substance and Sexual Activity    Alcohol use: Yes     Alcohol/week: 3.0 standard drinks of alcohol     Types: 3 Drinks containing 0.5 oz of alcohol per week     Comment: OCCASIONALLY    Drug use: Not Currently    Sexual activity: Defer       NIH Stroke  Scale:  Interval: (not recorded)  1a. Level of Consciousness: (not recorded)  1b. LOC Questions: (not recorded)  1c. LOC Commands: (not recorded)  2. Best Gaze: (not recorded)  3. Visual: (not recorded)  4. Facial Palsy: (not recorded)  5a. Motor Arm, Left: (not recorded)  5b. Motor Arm, Right: (not recorded)  6a. Motor Leg, Left: (not recorded)  6b. Motor Leg, Right: (not recorded)  7. Limb Ataxia: (not recorded)  8. Sensory: (not recorded)  9. Best Language: (not recorded)  10. Dysarthria: (not recorded)  11. Extinction and Inattention (formerly Neglect): (not recorded)    Total (NIH Stroke Scale): (not recorded)     Additional notable assessment information:     Nursing report ED to floor:  FRANCISCO Valles RN   02/07/25 14:34 EST

## 2025-02-07 NOTE — ED PROVIDER NOTES
Subjective   History of Present Illness  Chief complaint vomiting abdominal pain    History of present illness a 34-year-old female complains of 2-day history of nausea vomiting abdominal pain cramping in nature nonradiating cannot hold anything down and general weakness.  No fever chills no diarrhea bowel movements okay no black or bloody stool no dysuria frequency no pregnancy concerns no discharge or worry of STD.  Nothing seem to trigger this or make it better or worse.  No chest pain neck arm jaw pain.  No foreign travels no ill exposures no antibiotic use.      Review of Systems   Constitutional:  Negative for chills and fever.   Respiratory:  Negative for chest tightness and shortness of breath.    Cardiovascular:  Negative for chest pain and palpitations.   Gastrointestinal:  Positive for abdominal pain and vomiting. Negative for blood in stool.   Genitourinary:  Negative for difficulty urinating, dysuria and vaginal discharge.   Neurological:  Positive for weakness.   Psychiatric/Behavioral:  Negative for confusion.        No past medical history on file.    Allergies   Allergen Reactions    Latex Unknown - Low Severity    Penicillins Unknown - High Severity       No past surgical history on file.    No family history on file.    Social History     Socioeconomic History    Marital status: Single   Tobacco Use    Smoking status: Former     Current packs/day: 0.00     Types: Cigarettes     Quit date: 2015     Years since quitting: 10.1    Smokeless tobacco: Never   Vaping Use    Vaping status: Never Used   Substance and Sexual Activity    Alcohol use: Yes     Alcohol/week: 3.0 standard drinks of alcohol     Types: 3 Drinks containing 0.5 oz of alcohol per week     Comment: OCCASIONALLY    Drug use: Not Currently    Sexual activity: Defer     Prior to Admission medications    Medication Sig Start Date End Date Taking? Authorizing Provider   escitalopram (LEXAPRO) 10 MG tablet Take 1 tablet by mouth Daily.    Yes Libra Torres MD   ondansetron (ZOFRAN) 4 MG tablet Take 1 tablet by mouth Every 8 (Eight) Hours As Needed for Nausea or Vomiting.   Yes Libra Torres MD   pantoprazole (Protonix) 40 MG EC tablet Take 1 tablet by mouth Daily. 6/29/24  Yes Malu Florentino MD   hydrOXYzine (ATARAX) 25 MG tablet Take 1-2 tablets by mouth 3 (Three) Times a Day As Needed for Anxiety.    Libra Torres MD   ibuprofen (ADVIL,MOTRIN) 200 MG tablet Take 1 tablet by mouth Every 6 (Six) Hours As Needed for Mild Pain.    Libra Torres MD   allopurinol (ZYLOPRIM) 100 MG tablet Take 1 tablet by mouth Daily. 6/29/24 2/7/25  Malu Florentino MD   cefuroxime (CEFTIN) 250 MG tablet Take 1 tablet by mouth 2 (Two) Times a Day. 6/29/24 2/7/25  Malu Florentino MD   metoprolol succinate XL (Toprol XL) 50 MG 24 hr tablet Take 1 tablet by mouth Daily. 6/29/24 2/7/25  Malu Florentino MD   mirtazapine (REMERON) 45 MG tablet Take 1 tablet by mouth At Night As Needed.  2/7/25  Libra Torres MD      Patient is on Flexeril Atarax Lexapro      Objective   Physical Exam  Constitutional is a 34-year-old female somewhat ill-appearing but nontoxic-appearing triage vital signs reviewed.  HEENT extraocular muscles are intact pupils equal and reactive mouth is clear but dry neck supple no adenopathy no meningeal signs lungs clear no retraction no use of accessory heart was regular slight tachycardic without murmur and was soft with moderate mid abdominal pain but no rebound no guarding good bowel sounds no peritoneal findings or pulsatile masses extremities pulses equal throughout upper extremities no edema no cords no Homans' sign no evidence of DVT skin is warm and dry without rashes or cellulitic change neurologic awake alert and follows commands motor strength normal without focal  Procedures           ED Course      Results for orders placed or performed during the hospital encounter of 02/07/25   hCG,  Quantitative, Pregnancy    Collection Time: 02/07/25  9:03 AM    Specimen: Blood   Result Value Ref Range    HCG Quantitative <1.00 mIU/mL   CBC Auto Differential    Collection Time: 02/07/25  9:03 AM    Specimen: Blood   Result Value Ref Range    WBC 17.09 (H) 3.40 - 10.80 10*3/mm3    RBC 3.78 3.77 - 5.28 10*6/mm3    Hemoglobin 13.3 12.0 - 15.9 g/dL    Hematocrit 41.6 34.0 - 46.6 %    .1 (H) 79.0 - 97.0 fL    MCH 35.2 (H) 26.6 - 33.0 pg    MCHC 32.0 31.5 - 35.7 g/dL    RDW 12.2 (L) 12.3 - 15.4 %    RDW-SD 50.0 37.0 - 54.0 fl    MPV 9.7 6.0 - 12.0 fL    Platelets 208 140 - 450 10*3/mm3    Neutrophil % 90.5 (H) 42.7 - 76.0 %    Lymphocyte % 3.4 (L) 19.6 - 45.3 %    Monocyte % 5.0 5.0 - 12.0 %    Eosinophil % 0.1 (L) 0.3 - 6.2 %    Basophil % 0.2 0.0 - 1.5 %    Immature Grans % 0.8 (H) 0.0 - 0.5 %    Neutrophils, Absolute 15.46 (H) 1.70 - 7.00 10*3/mm3    Lymphocytes, Absolute 0.58 (L) 0.70 - 3.10 10*3/mm3    Monocytes, Absolute 0.86 0.10 - 0.90 10*3/mm3    Eosinophils, Absolute 0.01 0.00 - 0.40 10*3/mm3    Basophils, Absolute 0.04 0.00 - 0.20 10*3/mm3    Immature Grans, Absolute 0.14 (H) 0.00 - 0.05 10*3/mm3    nRBC 0.0 0.0 - 0.2 /100 WBC   COVID-19 and FLU A/B PCR, 1 HR TAT - Swab, Nasopharynx    Collection Time: 02/07/25  9:05 AM    Specimen: Nasopharynx; Swab   Result Value Ref Range    COVID19 Not Detected Not Detected - Ref. Range    Influenza A PCR Not Detected Not Detected    Influenza B PCR Not Detected Not Detected   POC Lactate    Collection Time: 02/07/25  9:07 AM    Specimen: Blood   Result Value Ref Range    Lactate 1.6 0.3 - 2.0 mmol/L   Comprehensive Metabolic Panel    Collection Time: 02/07/25 10:16 AM    Specimen: Blood   Result Value Ref Range    Glucose 200 (H) 65 - 99 mg/dL    BUN 19 6 - 20 mg/dL    Creatinine 1.03 (H) 0.57 - 1.00 mg/dL    Sodium 134 (L) 136 - 145 mmol/L    Potassium 6.6 (C) 3.5 - 5.2 mmol/L    Chloride 99 98 - 107 mmol/L    CO2 9.6 (C) 22.0 - 29.0 mmol/L    Calcium 10.6  (H) 8.6 - 10.5 mg/dL    Total Protein 9.9 (H) 6.0 - 8.5 g/dL    Albumin 5.3 (H) 3.5 - 5.2 g/dL    ALT (SGPT) 59 (H) 1 - 33 U/L    AST (SGOT) 110 (H) 1 - 32 U/L    Alkaline Phosphatase 169 (H) 39 - 117 U/L    Total Bilirubin 2.4 (H) 0.0 - 1.2 mg/dL    Globulin 4.6 gm/dL    A/G Ratio 1.2 g/dL    BUN/Creatinine Ratio 18.4 7.0 - 25.0    Anion Gap 25.4 (H) 5.0 - 15.0 mmol/L    eGFR 73.8 >60.0 mL/min/1.73   Lipase    Collection Time: 02/07/25 10:16 AM    Specimen: Blood   Result Value Ref Range    Lipase 1,316 (H) 13 - 60 U/L   Urinalysis With Culture If Indicated - Urine, Clean Catch    Collection Time: 02/07/25 11:05 AM    Specimen: Urine, Clean Catch   Result Value Ref Range    Color, UA Orange (A) Yellow, Straw    Appearance, UA Cloudy (A) Clear    pH, UA 6.0 5.0 - 8.0    Specific Gravity, UA 1.020 1.005 - 1.030    Glucose, UA Negative Negative    Ketones, UA >=160 mg/dL (4+) (A) Negative    Bilirubin, UA Moderate (2+) (A) Negative    Blood, UA Large (3+) (A) Negative    Protein, UA >=300 mg/dL (3+) (A) Negative    Leuk Esterase, UA Trace (A) Negative    Nitrite, UA Positive (A) Negative    Urobilinogen, UA 1.0 E.U./dL 0.2 - 1.0 E.U./dL   Urinalysis, Microscopic Only - Urine, Clean Catch    Collection Time: 02/07/25 11:05 AM    Specimen: Urine, Clean Catch   Result Value Ref Range    RBC, UA 6-10 (A) None Seen, 0-2 /HPF    WBC, UA 11-20 (A) None Seen, 0-2 /HPF    Bacteria, UA 1+ (A) None Seen /HPF    Squamous Epithelial Cells, UA 31-50 (A) None Seen, 0-2 /HPF    Hyaline Casts, UA 21-30 None Seen /LPF    Methodology Manual Light Microscopy    Urine Drug Screen - Urine, Clean Catch    Collection Time: 02/07/25 11:05 AM    Specimen: Urine, Clean Catch   Result Value Ref Range    THC, Screen, Urine Negative Negative    Phencyclidine (PCP), Urine Negative Negative    Cocaine Screen, Urine Negative Negative    Methamphetamine, Ur Negative Negative    Opiate Screen Positive (A) Negative    Amphetamine Screen, Urine Negative  Negative    Benzodiazepine Screen, Urine Negative Negative    Tricyclic Antidepressants Screen Negative Negative    Methadone Screen, Urine Negative Negative    Barbiturates Screen, Urine Negative Negative    Oxycodone Screen, Urine Negative Negative    Buprenorphine, Screen, Urine Negative Negative   Basic Metabolic Panel    Collection Time: 02/07/25 11:30 AM    Specimen: Blood   Result Value Ref Range    Glucose 183 (H) 65 - 99 mg/dL    BUN 19 6 - 20 mg/dL    Creatinine 0.90 0.57 - 1.00 mg/dL    Sodium 135 (L) 136 - 145 mmol/L    Potassium 4.7 3.5 - 5.2 mmol/L    Chloride 99 98 - 107 mmol/L    CO2 10.9 (L) 22.0 - 29.0 mmol/L    Calcium 10.3 8.6 - 10.5 mg/dL    BUN/Creatinine Ratio 21.1 7.0 - 25.0    Anion Gap 25.1 (H) 5.0 - 15.0 mmol/L    eGFR 86.7 >60.0 mL/min/1.73   Gold Top - SST    Collection Time: 02/07/25 11:30 AM   Result Value Ref Range    Extra Tube Hold for add-ons.    POC CHEM 8    Collection Time: 02/07/25 11:38 AM    Specimen: Venous Blood   Result Value Ref Range    Glucose 187 (H) 70 - 105 mg/dL    BUN 19 8 - 26 mg/dL    Creatinine 0.70 0.60 - 1.30 mg/dL    Sodium 138 138 - 146 mmol/L    POC Potassium 4.6 3.5 - 4.9 mmol/L    Chloride 109 98 - 109 mmol/L    Total CO2 13 (L) 24 - 29 mmol/L    Anion Gap 22.0 (H) 10.0 - 20.0 mmol/L    Hemoglobin 12.9 12.0 - 17.0 g/dL    Hematocrit 38 38 - 51 %    Ionized Calcium 1.29 1.12 - 1.32 mmol/L    eGFR 117.3 >60.0 mL/min/1.73     CT Abdomen Pelvis With Contrast    Result Date: 2/7/2025  Impression: 1. Findings consistent with the appearance of diffuse acute interstitial pancreatitis. No pseudocyst is seen. Correlate with clinical symptoms and laboratory findings. 2. Mild generalized thickening and stranding surrounding the ascending colon and transverse colon. Correlate for infectious and or inflammatory colitis symptoms. 3. Severe diffuse hepatic steatosis. Electronically Signed: Lyly Valdez MD  2/7/2025 12:02 PM EST  Workstation ID: [id number]   Medications    sodium chloride 0.9 % flush 10 mL (has no administration in time range)   sodium chloride 0.9 % flush 10 mL (10 mL Intravenous Given 2/7/25 1206)   sodium chloride 0.9 % flush 10 mL (has no administration in time range)   sodium chloride 0.9 % infusion 40 mL (has no administration in time range)   nitroglycerin (NITROSTAT) SL tablet 0.4 mg (has no administration in time range)   acetaminophen (TYLENOL) tablet 650 mg (has no administration in time range)     Or   acetaminophen (TYLENOL) suppository 650 mg (has no administration in time range)   sennosides-docusate (PERICOLACE) 8.6-50 MG per tablet 2 tablet (has no administration in time range)     And   polyethylene glycol (MIRALAX) packet 17 g (has no administration in time range)     And   bisacodyl (DULCOLAX) EC tablet 5 mg (has no administration in time range)     And   bisacodyl (DULCOLAX) suppository 10 mg (has no administration in time range)   melatonin tablet 5 mg (has no administration in time range)   ondansetron ODT (ZOFRAN-ODT) disintegrating tablet 4 mg ( Oral Not Given:  See Alt 2/7/25 1205)     Or   ondansetron (ZOFRAN) injection 4 mg (4 mg Intravenous Given 2/7/25 1205)   aluminum-magnesium hydroxide-simethicone (MAALOX MAX) 400-400-40 MG/5ML suspension 15 mL (has no administration in time range)   pantoprazole (PROTONIX) injection 40 mg (40 mg Intravenous Given 2/7/25 1205)   cefTRIAXone (ROCEPHIN) 2,000 mg in sodium chloride 0.9 % 100 mL MBP (0 mg Intravenous Stopped 2/7/25 1319)   metroNIDAZOLE (FLAGYL) IVPB 500 mg (0 mg Intravenous Stopped 2/7/25 1427)   HYDROmorphone (DILAUDID) injection 1 mg (1 mg Intravenous Given 2/7/25 1425)   metoprolol tartrate (LOPRESSOR) injection 5 mg (has no administration in time range)   sodium bicarbonate 8.4 % 150 mEq in dextrose (D5W) 5 % 1,000 mL infusion (greater than 100 mEq) (has no administration in time range)   metoclopramide (REGLAN) injection 10 mg (10 mg Intravenous Given 2/7/25 8882)   ondansetron  (ZOFRAN) injection 4 mg (4 mg Intravenous Given 2/7/25 1624)   lactated ringers bolus 1,000 mL (0 mL Intravenous Stopped 2/7/25 1157)   ondansetron (ZOFRAN) injection 8 mg (8 mg Intravenous Given 2/7/25 0928)   morphine injection 4 mg (4 mg Intravenous Given 2/7/25 0937)   iopamidol (ISOVUE-370) 76 % injection 75 mL (75 mL Intravenous Given 2/7/25 1155)                                                        Medical Decision Making  Medical decision making.  IV established monitor placement review of sinus rhythm.  The patient was given a liter of lactated Ringer's Zofran 8 mg IV morphine 4 IV.  Patient had labs obtained by independent review CBC white count was 17,000 hemoglobin of 13.  The patient's pregnancy test was negative comprehensive metabolic profile remarkable glucose 200 BUN 9 creatinine 1.  The patient had a ALT of 59 AST of 110 alk phos 169 bilirubin 2.4.  Unremarkable greater than 160 ketones lipase was 1350.  The patient has a white count that was elevated and elevated bilirubin was given Rocephin 2 g IV Flagyl 500 mg IV CT scan abdomen pelvis obtained my independent review I do not see any evidence of acute cholecystitis or choledocholithiasis she is Pancreatitis do not see any perforation or free air radiology reviewed finding consistent with acute interstitial pancreatitis no pseudocyst was noted there is an extremely fatty liver and possible colitis.  Patient repeat exam resting comfortably she has had no further vomiting and was soft with some mild to moderate tenderness throughout the mid abdomen with no rebound no guarding good bowel sounds no peritoneal findings or pulsatile masses.  I do not see any evidence that suggest sepsis or acute ischemic bowel or peritonitis acute cholecystitis diverticulitis appendicitis based on my history and physical clinical findings this time although not a complete list of all possibilities.  We talked about the multiple etiologies of gallbladder disease and  pancreatitis.  She voiced understanding I talked to the hospitalist the case discussed with patient be admitted to hospital for further care stable otherwise unremarkable improved ER course.    Problems Addressed:  Acute pancreatitis, unspecified complication status, unspecified pancreatitis type: complicated acute illness or injury    Amount and/or Complexity of Data Reviewed  Labs: ordered. Decision-making details documented in ED Course.  Radiology: ordered and independent interpretation performed. Decision-making details documented in ED Course.    Risk  Prescription drug management.  Decision regarding hospitalization.        Final diagnoses:   Acute pancreatitis, unspecified complication status, unspecified pancreatitis type       ED Disposition  ED Disposition       ED Disposition   Decision to Admit    Condition   --    Comment   Level of Care: Telemetry [5]   Admitting Physician: BARBRA OCHOA [[id number]]   Attending Physician: BARBRA OCHOA [[id number]]   Bed Request Comments: pcu                 No follow-up provider specified.       Medication List      No changes were made to your prescriptions during this visit.            Martín Forman MD  02/07/25 [id number]

## 2025-02-07 NOTE — CONSULTS
GI CONSULT  NOTE:    Referring Provider:     Dr Burns    Chief complaint:    Acute interstitial pancreatitis    Subjective   Abdominal pain, nausea vomiting    History of present illness:     Patient is a 34-year-old female with history of alcohol pancreatitis, elevated LFTs and severe hepatic steatosis who presented to the ER on 2/7/2025 with abdominal pain, nausea vomiting.  Patient was evaluated in the ER found to have elevated LFTs, lipase of 1316.  CT showed acute pancreatitis thus prompting GI consult.  Patient is known from our service from December 2023, June 2024 and now for alcohol pancreatitis.  Patient states her symptoms started on Tuesday, 2/4/2025 with sharp stabbing epigastric pain nausea and vomiting.  States she has been unable to keep down liquids or food for 3 days.  She has had uncontrolled nausea and vomiting.  She denies any marijuana usage.  She has chills but no fever.  Complains of a headache and extreme weakness.  Patient's last drink of alcohol was Sunday, 2/2/2025.  Patient denies any diarrhea states she has not had a bowel movement in 1 week.  Patient states it is not unusual for her to skip days between bowel movements.    Patient is also complaining of rib and back pain.  Patient states she has lost about 50 pounds over an unknown time.  States her weight was 157 at her max.  She is down to 92 pounds.  Patient states she has been grieving the loss of her mother that occurred the end of last year.    LABS: Potassium was originally 6.6 and down to 4.7, creatinine 0.7.  TB 2.4, alk phos 169, , ALT 59, .  Lipase 1316.  WBCs 17.09, hemoglobin 12.9, platelets 208.  Urinalysis 3+ blood and nitrate positive.  Flu/COVID-negative  CT with contrast showed acute interstitial pancreatitis and severe hepatic steatosis.    Endo History:  No endoscopies.    Past Medical History:  No past medical history on file.    Past Surgical History:  No past surgical history on file.    Social  History:  Social History     Tobacco Use    Smoking status: Former     Current packs/day: 0.00     Types: Cigarettes     Quit date: 2015     Years since quitting: 10.1    Smokeless tobacco: Never   Vaping Use    Vaping status: Never Used   Substance Use Topics    Alcohol use: Yes     Alcohol/week: 3.0 standard drinks of alcohol     Types: 3 Drinks containing 0.5 oz of alcohol per week     Comment: OCCASIONALLY    Drug use: Not Currently       Family History:  No family history on file.    Medications:  Medications Prior to Admission   Medication Sig Dispense Refill Last Dose/Taking    escitalopram (LEXAPRO) 10 MG tablet Take 1 tablet by mouth Daily.   2/6/2025    ondansetron (ZOFRAN) 4 MG tablet Take 1 tablet by mouth Every 8 (Eight) Hours As Needed for Nausea or Vomiting.   2/7/2025    pantoprazole (Protonix) 40 MG EC tablet Take 1 tablet by mouth Daily. 30 tablet 0 2/6/2025    hydrOXYzine (ATARAX) 25 MG tablet Take 1-2 tablets by mouth 3 (Three) Times a Day As Needed for Anxiety.       ibuprofen (ADVIL,MOTRIN) 200 MG tablet Take 1 tablet by mouth Every 6 (Six) Hours As Needed for Mild Pain.          Scheduled Meds:cefTRIAXone, 2,000 mg, Intravenous, Q24H  metroNIDAZOLE, 500 mg, Intravenous, Q8H  pantoprazole, 40 mg, Intravenous, Q AM  sodium chloride, 10 mL, Intravenous, Q12H      Continuous Infusions:sodium bicarbonate 8.4 % 150 mEq in dextrose (D5W) 5 % 1,000 mL infusion (greater than 100 mEq), 150 mEq, Last Rate: 150 mEq (02/07/25 1320)      PRN Meds:.  acetaminophen **OR** [DISCONTINUED] acetaminophen **OR** acetaminophen    aluminum-magnesium hydroxide-simethicone    senna-docusate sodium **AND** polyethylene glycol **AND** bisacodyl **AND** bisacodyl    HYDROmorphone    melatonin    nitroglycerin    ondansetron ODT **OR** ondansetron    [COMPLETED] Insert Peripheral IV **AND** sodium chloride    sodium chloride    sodium chloride    ALLERGIES:  Latex and Penicillins    ROS:  Review of Systems    Constitutional:  Positive for chills and unexpected weight change. Negative for fever.   Gastrointestinal:  Positive for abdominal pain, constipation, nausea and vomiting. Negative for anal bleeding, blood in stool and diarrhea.   Neurological:  Positive for weakness and headaches.     The following systems were reviewed and negative;   Constitution:  No fevers, chills, no unintentional weight loss  Skin: no rash, no jaundice  Eyes:  No blurry vision, no eye pain  HENT:  No change in hearing or smell  Resp:  No dyspnea or cough  CV:  No chest pain or palpitations  :  No dysuria, hematuria  Musculoskeletal:  No leg cramps or arthralgias  Neuro:  No tremor, no numbness  Psych:  No depression or confusion    Objective  Rm 252. Sitting up in bed vomiting. No family present.     Vital Signs:   Vitals:    02/07/25 1046 02/07/25 1116 02/07/25 1328 02/07/25 1432   BP: 126/92 117/94     BP Location:       Patient Position:       Pulse: 101 115 103 (!) 132   Resp:       Temp:       TempSrc:       SpO2: 100% 100% 98% 97%   Weight:       Height:           Physical Exam:  Thin  General Appearance:    Awake and alert, in no acute distress   Head:    Normocephalic, without obvious abnormality, atraumatic   Eyes:            Conjunctivae normal, anicteric sclerae, pupils equal   Ears:    Ears appear intact with no abnormalities noted   Throat:   No oral lesions, no thrush, oral mucosa moist   Neck:   Supple, no JVD   Lungs:      respirations regular, even and unlabored        Chest Wall:    No abnormalities observed   Abdomen:      soft, nontender, no rebound or guarding, nondistended, no hepatosplenomegaly   Rectal:     Deferred   Extremities:   Moves all extremities, no edema, no cyanosis   Pulses:   Pulses palpable and equal bilaterally   Skin:   No rash, no jaundice, normal palpation        Neurologic:   Cranial nerves 2 - 12 grossly intact, no asterixis       Results Review:   I reviewed the patient's labs and imaging.  CBC     Results from last 7 days   Lab Units 02/07/25  1138 02/07/25  0903   WBC 10*3/mm3  --  17.09*   HEMOGLOBIN g/dL  --  13.3   HEMOGLOBIN, POC g/dL 12.9  --    PLATELETS 10*3/mm3  --  208     CMP   Results from last 7 days   Lab Units 02/07/25  1138 02/07/25  1130 02/07/25  1016   SODIUM mmol/L  --  135* 134*   POTASSIUM mmol/L  --  4.7 6.6*   CHLORIDE mmol/L  --  99 99   CO2 mmol/L  --  10.9* 9.6*   BUN mg/dL  --  19 19   CREATININE mg/dL 0.70 0.90 1.03*   GLUCOSE mg/dL  --  183* 200*   ALBUMIN g/dL  --   --  5.3*   BILIRUBIN mg/dL  --   --  2.4*   ALK PHOS U/L  --   --  169*   AST (SGOT) U/L  --   --  110*   ALT (SGPT) U/L  --   --  59*   LIPASE U/L  --   --  1,316*     Cr Clearance Estimated Creatinine Clearance: 75.6 mL/min (by C-G formula based on SCr of 0.7 mg/dL).  Coag     HbA1C   Lab Results   Component Value Date    HGBA1C 4.30 (L) 12/28/2023     Blood Glucose   Glucose   Date/Time Value Ref Range Status   02/07/2025 1138 187 (H) 70 - 105 mg/dL Final     Infection     UA    Results from last 7 days   Lab Units 02/07/25  1105   NITRITE UA  Positive*   WBC UA /HPF 11-20*   BACTERIA UA /HPF 1+*   SQUAM EPITHEL UA /HPF 31-50*     Radiology(recent) CT Abdomen Pelvis With Contrast    Result Date: 2/7/2025  Impression: 1. Findings consistent with the appearance of diffuse acute interstitial pancreatitis. No pseudocyst is seen. Correlate with clinical symptoms and laboratory findings. 2. Mild generalized thickening and stranding surrounding the ascending colon and transverse colon. Correlate for infectious and or inflammatory colitis symptoms. 3. Severe diffuse hepatic steatosis. 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  Lipase was 1316, LFTs were elevated and CT showed acute interstitial pancreatitis.  GI was consulted.    Supportive care with IV fluids.  Gut rest with n.p.o. status.  Antiemetic and pain medications as needed.    Patient is on Rocephin and Flagyl for UTI.  Complete alcohol cessation.  Check alcohol level and urine drug screen  Repeat lipase, CRP and CMP in the morning.  Will check right upper quadrant ultrasound/gallbladder.  Last checked in 2023.  Does have Dilaudid and Zofran now she is actively in pain and vomiting.  Nurse notified.      I discussed the patient's findings and my recommendations with the patient.  Unique Calvo, BEVERLY  02/07/25  15:19 EST    Time:

## 2025-02-07 NOTE — ED NOTES
Patient made aware of urine specimen needed. Patient states that she will let staff know when she needs to urinate

## 2025-02-07 NOTE — H&P
History and Physical   Shirin Canales : 1990 MRN:[id number] LOS:0     Reason for admission: LFT elevation     Assessment / Plan     #Acute abdominal pain secondary to acute interstitial pancreatitis/colitis  -Patient presented with acute abdominal pain nausea vomiting  -Patient has leukocytosis elevated liver function test with obstructive pattern.  -High anion gap metabolic acidosis present.  ZACHARIAH present.  Lipase 1316  -CTAP with acute interstitial pancreatitis no evidence of cholecystitis choledocholithiasis or biliary duct dilatation.  Severe diffuse hepatic steatosis.  There is concerning for infection versus inflammation in ascending colon and transverse colon.  -Continue IV Rocephin and Flagyl for now.  -IV fluid which is bicarb drip for now.  -Hypoglycemia protocol.  N.p.o. for now.  Pain management.  -GI team is consulted.    #Acute kidney injury  #High anion gap metabolic acidosis  -Patient is started on bicarb drip.  -BMP to check in evening again.    -Monitor CMP daily.  Avoid nephrotoxic medication.      Code Status (Patient has no pulse and is not breathing): CPR (Attempt to Resuscitate)  Medical Interventions (Patient has pulse or is breathing): Full Support       Nutrition: NPO Diet NPO Type: Strict NPO     DVT Prophylaxis: Active VTE Prophylaxis  Mechanical:        Start        25 1147  Maintain Sequential Compression Device  Continuous                          Select Pharmacologic VTE Prophylaxis if Desired & Appropriate      History of Present illness     A 34 y.o. old female patient with PMH of none presents to the hospital with complaints of nausea vomiting abdominal pain.  In the ED patient labs are significant for leukocytosis of 17 potassium of 6.6 which is hemolyzed and repeated 1 showing potassium of 4.7.  Patient has high anion gap metabolic acidosis.  Patient has elevated liver function test ALT 59  alkaline phos 169 total bilirubin of 2.4 UA with hematuria bacteriuria  pyuria but sample is very poor with the squamous epithelial cells 31-50.  COVID and influenza negative CT abdominal pelvis with contrast showing diffuse acute interstitial pancreatitis no pseudocyst. There is some layering high density material within the gallbladder.  No evidence of cholecystitis choledocholithiasis or biliary duct dilatation.  Severe diffuse hepatic steatosis.  There is mild thickening and stranding surrounding the ascending colon and transverse colon possible infection or inflammation.  GI team is consulted.  Patient is on IV Rocephin and Flagyl.    Patient will be admitted for acute interstitial pancreatitis/colitis need a GI workup.        Subjective / Review of systems     Review of Systems   Abdo pain     Past Medical/Surgical/Social/Family History & Allergies   No past medical history on file.   No past surgical history on file.   Social History     Socioeconomic History    Marital status: Single   Tobacco Use    Smoking status: Former     Current packs/day: 0.00     Types: Cigarettes     Quit date: 2015     Years since quitting: 10.1    Smokeless tobacco: Never   Vaping Use    Vaping status: Never Used   Substance and Sexual Activity    Alcohol use: Yes     Alcohol/week: 3.0 standard drinks of alcohol     Types: 3 Drinks containing 0.5 oz of alcohol per week     Comment: OCCASIONALLY    Drug use: Not Currently    Sexual activity: Defer      No family history on file.   Allergies   Allergen Reactions    Latex Unknown - Low Severity    Penicillins Unknown - High Severity        Home Medications     Prior to Admission medications    Medication Sig Start Date End Date Taking? Authorizing Provider   allopurinol (ZYLOPRIM) 100 MG tablet Take 1 tablet by mouth Daily. 6/29/24   Malu Florentino MD   cefuroxime (CEFTIN) 250 MG tablet Take 1 tablet by mouth 2 (Two) Times a Day. 6/29/24   Malu Florentino MD   metoprolol succinate XL (Toprol XL) 50 MG 24 hr tablet Take 1 tablet by mouth Daily.  6/29/24   Malu Florentino MD   mirtazapine (REMERON) 45 MG tablet Take 1 tablet by mouth At Night As Needed.    Provider, MD Libra   ondansetron (ZOFRAN) 4 MG tablet Take 1 tablet by mouth Every 6 (Six) Hours As Needed for Nausea or Vomiting.    Provider, MD Libra   pantoprazole (Protonix) 40 MG EC tablet Take 1 tablet by mouth Daily. 6/29/24   Malu Florentino MD      Objective / Physical Exam   Vital signs:  Temp: 97.1 °F (36.2 °C)  BP: 117/94  Heart Rate: 115  Resp: 18  SpO2: 100 %  Weight: 41.9 kg (92 lb 6 oz)    Admission Weight: Weight: 41.9 kg (92 lb 6 oz)    Physical Exam   Physical Exam  HENT:      Head: Normocephalic and atraumatic.      Nose: Nose normal.   Eyes:      Extraocular Movements: Extraocular movements intact.      Conjunctivae/sclerae: Conjunctivae normal.      Pupils: Pupils are equal, round, and reactive to light.   Cardiovascular:      Rate and Rhythm: Normal       Pulses: Normal pulses.      Heart sounds: Normal heart sounds.   Pulmonary:      Effort: normal      Breath sounds: normal   Abdominal:      Upper abdomen tenderness  Musculoskeletal:         General: Normal range of motion.      Cervical back: Normal range of motion and neck supple.   Skin:     General: Skin is dry.   Neurological:      General: No focal deficit present.      Mental Status: alert.   Psychiatric:         Mood and Affect: Mood normal.        Labs     Results from last 7 days   Lab Units 02/07/25  1138 02/07/25  0903   WBC 10*3/mm3  --  17.09*   HEMATOCRIT %  --  41.6   HEMATOCRIT POC % 38  --    PLATELETS 10*3/mm3  --  208      Results from last 7 days   Lab Units 02/07/25  1138 02/07/25  1016   SODIUM mmol/L  --  134*   POTASSIUM mmol/L  --  6.6*   CHLORIDE mmol/L  --  99   CO2 mmol/L  --  9.6*   BUN mg/dL  --  19   CREATININE mg/dL 0.70 1.03*        Current Medications   Scheduled Meds:cefTRIAXone, 2,000 mg, Intravenous, Q24H  metroNIDAZOLE, 500 mg, Intravenous, Q8H  pantoprazole, 40 mg,  Intravenous, Q AM  sodium chloride, 10 mL, Intravenous, Q12H         Continuous Infusions:sodium bicarbonate 8.4 % 150 mEq in dextrose (D5W) 5 % 1,000 mL infusion (greater than 100 mEq), 150 mEq         Pratik Burns MD  [facility]   02/07/25   11:50 EST

## 2025-02-07 NOTE — PLAN OF CARE
Goal Outcome Evaluation:     Pt admitted to floor with acute intestinal pancreatitis , nausea, and pain. Pt generalized weakness. Bicarb drip for metabolic acidosis, GI following.   Pt NPO

## 2025-02-08 ENCOUNTER — INPATIENT HOSPITAL (OUTPATIENT)
Dept: URBAN - METROPOLITAN AREA HOSPITAL 84 | Facility: HOSPITAL | Age: 35
End: 2025-02-08

## 2025-02-08 DIAGNOSIS — R63.4 ABNORMAL WEIGHT LOSS: ICD-10-CM

## 2025-02-08 DIAGNOSIS — R94.5 ABNORMAL RESULTS OF LIVER FUNCTION STUDIES: ICD-10-CM

## 2025-02-08 DIAGNOSIS — K86.1 OTHER CHRONIC PANCREATITIS: ICD-10-CM

## 2025-02-08 DIAGNOSIS — F10.10 ALCOHOL ABUSE, UNCOMPLICATED: ICD-10-CM

## 2025-02-08 DIAGNOSIS — K85.20 ALCOHOL INDUCED ACUTE PANCREATITIS WITHOUT NECROSIS OR INFEC: ICD-10-CM

## 2025-02-08 DIAGNOSIS — K76.0 FATTY (CHANGE OF) LIVER, NOT ELSEWHERE CLASSIFIED: ICD-10-CM

## 2025-02-08 LAB
ALBUMIN SERPL-MCNC: 4.4 G/DL (ref 3.5–5.2)
ALBUMIN/GLOB SERPL: 1.4 G/DL
ALP SERPL-CCNC: 123 U/L (ref 39–117)
ALT SERPL W P-5'-P-CCNC: 37 U/L (ref 1–33)
ANION GAP SERPL CALCULATED.3IONS-SCNC: 12.6 MMOL/L (ref 5–15)
AST SERPL-CCNC: 71 U/L (ref 1–32)
BASOPHILS # BLD AUTO: 0.02 10*3/MM3 (ref 0–0.2)
BASOPHILS NFR BLD AUTO: 0.2 % (ref 0–1.5)
BILIRUB SERPL-MCNC: 1.4 MG/DL (ref 0–1.2)
BUN SERPL-MCNC: 17 MG/DL (ref 6–20)
BUN/CREAT SERPL: 26.2 (ref 7–25)
CALCIUM SPEC-SCNC: 9.6 MG/DL (ref 8.6–10.5)
CHLORIDE SERPL-SCNC: 98 MMOL/L (ref 98–107)
CO2 SERPL-SCNC: 27.4 MMOL/L (ref 22–29)
CREAT SERPL-MCNC: 0.65 MG/DL (ref 0.57–1)
CRP SERPL-MCNC: 0.84 MG/DL (ref 0–0.5)
DEPRECATED RDW RBC AUTO: 42.5 FL (ref 37–54)
EGFRCR SERPLBLD CKD-EPI 2021: 119.4 ML/MIN/1.73
EOSINOPHIL # BLD AUTO: 0.02 10*3/MM3 (ref 0–0.4)
EOSINOPHIL NFR BLD AUTO: 0.2 % (ref 0.3–6.2)
ERYTHROCYTE [DISTWIDTH] IN BLOOD BY AUTOMATED COUNT: 11.6 % (ref 12.3–15.4)
GLOBULIN UR ELPH-MCNC: 3.2 GM/DL
GLUCOSE SERPL-MCNC: 153 MG/DL (ref 65–99)
HCT VFR BLD AUTO: 28.6 % (ref 34–46.6)
HGB BLD-MCNC: 10 G/DL (ref 12–15.9)
IMM GRANULOCYTES # BLD AUTO: 0.04 10*3/MM3 (ref 0–0.05)
IMM GRANULOCYTES NFR BLD AUTO: 0.5 % (ref 0–0.5)
LIPASE SERPL-CCNC: 462 U/L (ref 13–60)
LYMPHOCYTES # BLD AUTO: 0.75 10*3/MM3 (ref 0.7–3.1)
LYMPHOCYTES NFR BLD AUTO: 8.7 % (ref 19.6–45.3)
MAGNESIUM SERPL-MCNC: 1.8 MG/DL (ref 1.6–2.6)
MCH RBC QN AUTO: 35.1 PG (ref 26.6–33)
MCHC RBC AUTO-ENTMCNC: 35 G/DL (ref 31.5–35.7)
MCV RBC AUTO: 100.4 FL (ref 79–97)
MONOCYTES # BLD AUTO: 0.73 10*3/MM3 (ref 0.1–0.9)
MONOCYTES NFR BLD AUTO: 8.5 % (ref 5–12)
NEUTROPHILS NFR BLD AUTO: 7.06 10*3/MM3 (ref 1.7–7)
NEUTROPHILS NFR BLD AUTO: 81.9 % (ref 42.7–76)
NRBC BLD AUTO-RTO: 0 /100 WBC (ref 0–0.2)
PHOSPHATE SERPL-MCNC: 0.4 MG/DL (ref 2.5–4.5)
PLATELET # BLD AUTO: 117 10*3/MM3 (ref 140–450)
PMV BLD AUTO: 9.4 FL (ref 6–12)
POTASSIUM SERPL-SCNC: 3.1 MMOL/L (ref 3.5–5.2)
PROCALCITONIN SERPL-MCNC: 0.16 NG/ML (ref 0–0.25)
PROT SERPL-MCNC: 7.6 G/DL (ref 6–8.5)
RBC # BLD AUTO: 2.85 10*6/MM3 (ref 3.77–5.28)
SODIUM SERPL-SCNC: 138 MMOL/L (ref 136–145)
WBC NRBC COR # BLD AUTO: 8.62 10*3/MM3 (ref 3.4–10.8)

## 2025-02-08 PROCEDURE — 83735 ASSAY OF MAGNESIUM: CPT | Performed by: INTERNAL MEDICINE

## 2025-02-08 PROCEDURE — 25010000002 METOCLOPRAMIDE PER 10 MG: Performed by: NURSE PRACTITIONER

## 2025-02-08 PROCEDURE — 80053 COMPREHEN METABOLIC PANEL: CPT | Performed by: INTERNAL MEDICINE

## 2025-02-08 PROCEDURE — 86140 C-REACTIVE PROTEIN: CPT | Performed by: NURSE PRACTITIONER

## 2025-02-08 PROCEDURE — 84145 PROCALCITONIN (PCT): CPT | Performed by: INTERNAL MEDICINE

## 2025-02-08 PROCEDURE — 99232 SBSQ HOSP IP/OBS MODERATE 35: CPT | Performed by: NURSE PRACTITIONER

## 2025-02-08 PROCEDURE — 25010000002 METRONIDAZOLE 500 MG/100ML SOLUTION: Performed by: INTERNAL MEDICINE

## 2025-02-08 PROCEDURE — 84100 ASSAY OF PHOSPHORUS: CPT | Performed by: INTERNAL MEDICINE

## 2025-02-08 PROCEDURE — 85025 COMPLETE CBC W/AUTO DIFF WBC: CPT | Performed by: INTERNAL MEDICINE

## 2025-02-08 PROCEDURE — 25010000002 HYDROMORPHONE 1 MG/ML SOLUTION: Performed by: INTERNAL MEDICINE

## 2025-02-08 PROCEDURE — 25810000003 SODIUM CHLORIDE 0.9 % SOLUTION: Performed by: FAMILY MEDICINE

## 2025-02-08 PROCEDURE — 83690 ASSAY OF LIPASE: CPT | Performed by: NURSE PRACTITIONER

## 2025-02-08 PROCEDURE — 25010000002 ONDANSETRON PER 1 MG: Performed by: NURSE PRACTITIONER

## 2025-02-08 PROCEDURE — 25810000003 LACTATED RINGERS PER 1000 ML: Performed by: INTERNAL MEDICINE

## 2025-02-08 RX ORDER — HYDROXYZINE HYDROCHLORIDE 25 MG/1
25 TABLET, FILM COATED ORAL 3 TIMES DAILY PRN
Status: DISCONTINUED | OUTPATIENT
Start: 2025-02-08 | End: 2025-02-09 | Stop reason: HOSPADM

## 2025-02-08 RX ORDER — OXYCODONE HYDROCHLORIDE 5 MG/1
5 TABLET ORAL EVERY 4 HOURS PRN
Status: DISCONTINUED | OUTPATIENT
Start: 2025-02-08 | End: 2025-02-09 | Stop reason: HOSPADM

## 2025-02-08 RX ORDER — POTASSIUM CHLORIDE 1500 MG/1
40 TABLET, EXTENDED RELEASE ORAL EVERY 4 HOURS
Status: DISCONTINUED | OUTPATIENT
Start: 2025-02-08 | End: 2025-02-08 | Stop reason: SDUPTHER

## 2025-02-08 RX ORDER — SODIUM CHLORIDE, SODIUM LACTATE, POTASSIUM CHLORIDE, CALCIUM CHLORIDE 600; 310; 30; 20 MG/100ML; MG/100ML; MG/100ML; MG/100ML
150 INJECTION, SOLUTION INTRAVENOUS CONTINUOUS
Status: DISCONTINUED | OUTPATIENT
Start: 2025-02-08 | End: 2025-02-09 | Stop reason: HOSPADM

## 2025-02-08 RX ORDER — ESCITALOPRAM OXALATE 10 MG/1
10 TABLET ORAL DAILY
Status: DISCONTINUED | OUTPATIENT
Start: 2025-02-08 | End: 2025-02-09 | Stop reason: HOSPADM

## 2025-02-08 RX ORDER — FENTANYL/ROPIVACAINE/NS/PF 2-625MCG/1
15 PLASTIC BAG, INJECTION (ML) EPIDURAL
Status: COMPLETED | OUTPATIENT
Start: 2025-02-08 | End: 2025-02-08

## 2025-02-08 RX ORDER — AMOXICILLIN 250 MG
1 CAPSULE ORAL NIGHTLY
Status: DISCONTINUED | OUTPATIENT
Start: 2025-02-08 | End: 2025-02-09 | Stop reason: HOSPADM

## 2025-02-08 RX ADMIN — OXYCODONE 5 MG: 5 TABLET ORAL at 19:58

## 2025-02-08 RX ADMIN — ONDANSETRON 4 MG: 2 INJECTION INTRAMUSCULAR; INTRAVENOUS at 09:09

## 2025-02-08 RX ADMIN — ONDANSETRON 4 MG: 2 INJECTION INTRAMUSCULAR; INTRAVENOUS at 22:18

## 2025-02-08 RX ADMIN — ONDANSETRON 4 MG: 2 INJECTION INTRAMUSCULAR; INTRAVENOUS at 16:05

## 2025-02-08 RX ADMIN — METOCLOPRAMIDE 10 MG: 5 INJECTION, SOLUTION INTRAMUSCULAR; INTRAVENOUS at 16:05

## 2025-02-08 RX ADMIN — SODIUM CHLORIDE, POTASSIUM CHLORIDE, SODIUM LACTATE AND CALCIUM CHLORIDE 150 ML/HR: 600; 310; 30; 20 INJECTION, SOLUTION INTRAVENOUS at 15:31

## 2025-02-08 RX ADMIN — METOCLOPRAMIDE 10 MG: 5 INJECTION, SOLUTION INTRAMUSCULAR; INTRAVENOUS at 22:18

## 2025-02-08 RX ADMIN — SODIUM CHLORIDE 15 MMOL: 9 INJECTION, SOLUTION INTRAVENOUS at 11:10

## 2025-02-08 RX ADMIN — METOCLOPRAMIDE 10 MG: 5 INJECTION, SOLUTION INTRAMUSCULAR; INTRAVENOUS at 11:12

## 2025-02-08 RX ADMIN — SODIUM CHLORIDE 15 MMOL: 9 INJECTION, SOLUTION INTRAVENOUS at 06:31

## 2025-02-08 RX ADMIN — ONDANSETRON 4 MG: 2 INJECTION INTRAMUSCULAR; INTRAVENOUS at 04:34

## 2025-02-08 RX ADMIN — ESCITALOPRAM 10 MG: 10 TABLET, FILM COATED ORAL at 08:51

## 2025-02-08 RX ADMIN — Medication 10 ML: at 22:22

## 2025-02-08 RX ADMIN — Medication 10 ML: at 08:53

## 2025-02-08 RX ADMIN — SODIUM CHLORIDE 15 MMOL: 9 INJECTION, SOLUTION INTRAVENOUS at 15:26

## 2025-02-08 RX ADMIN — METOCLOPRAMIDE 10 MG: 5 INJECTION, SOLUTION INTRAMUSCULAR; INTRAVENOUS at 04:34

## 2025-02-08 RX ADMIN — HYDROMORPHONE HYDROCHLORIDE 1 MG: 1 INJECTION, SOLUTION INTRAMUSCULAR; INTRAVENOUS; SUBCUTANEOUS at 04:35

## 2025-02-08 RX ADMIN — SODIUM CHLORIDE, POTASSIUM CHLORIDE, SODIUM LACTATE AND CALCIUM CHLORIDE 150 ML/HR: 600; 310; 30; 20 INJECTION, SOLUTION INTRAVENOUS at 22:46

## 2025-02-08 RX ADMIN — SODIUM CHLORIDE, POTASSIUM CHLORIDE, SODIUM LACTATE AND CALCIUM CHLORIDE 150 ML/HR: 600; 310; 30; 20 INJECTION, SOLUTION INTRAVENOUS at 08:53

## 2025-02-08 RX ADMIN — HYDROXYZINE HYDROCHLORIDE 25 MG: 25 TABLET, FILM COATED ORAL at 22:36

## 2025-02-08 RX ADMIN — PANTOPRAZOLE SODIUM 40 MG: 40 INJECTION, POWDER, FOR SOLUTION INTRAVENOUS at 05:14

## 2025-02-08 RX ADMIN — SENNOSIDES AND DOCUSATE SODIUM 1 TABLET: 50; 8.6 TABLET ORAL at 20:00

## 2025-02-08 RX ADMIN — METRONIDAZOLE 500 MG: 500 INJECTION, SOLUTION INTRAVENOUS at 04:30

## 2025-02-08 RX ADMIN — HYDROMORPHONE HYDROCHLORIDE 1 MG: 1 INJECTION, SOLUTION INTRAMUSCULAR; INTRAVENOUS; SUBCUTANEOUS at 09:09

## 2025-02-08 NOTE — PROGRESS NOTES
" LOS: 1 day   Patient Care Team:  Provider, No Known as PCP - Yoli Arzola, RN as Registered Nurse  David Morrow MD as Consulting Physician (Nephrology)      Subjective   \"No vomiting since yesterday\"    Interval History:   LABS:  Sodium 138, potassium 3.1, creatinine 0.65.  Phosphorus 0.4, TB 1.4(2.4), alk phos 123 (169), AST 71 (110), ALT 37 (59).  CRP 0.84, lipase 462 down from 1316.  WBCs 8.62, hemoglobin 10 (12.9), platelets 117.  Ethanol level less than 0.01.  Urine drug screen positive for opiates.  Ultrasound of the gallbladder hepatic steatosis.  Distended gallbladder without evidence of wall thickening or cholecystitis.   She is getting dilaudid about every 4 hours, ordered PRN q3.     ROS:   No chest pain, shortness of breath, or cough.         Medication Review:     Current Facility-Administered Medications:     acetaminophen (TYLENOL) tablet 650 mg, 650 mg, Oral, Q4H PRN **OR** [DISCONTINUED] acetaminophen (TYLENOL) 160 MG/5ML oral solution 650 mg, 650 mg, Oral, Q4H PRN **OR** acetaminophen (TYLENOL) suppository 650 mg, 650 mg, Rectal, Q4H PRN, Mindy Satnacruz, DO    aluminum-magnesium hydroxide-simethicone (MAALOX MAX) 400-400-40 MG/5ML suspension 15 mL, 15 mL, Oral, Q6H PRN, Mindy Santacruz, DO    sennosides-docusate (PERICOLACE) 8.6-50 MG per tablet 2 tablet, 2 tablet, Oral, BID PRN **AND** polyethylene glycol (MIRALAX) packet 17 g, 17 g, Oral, Daily PRN **AND** bisacodyl (DULCOLAX) EC tablet 5 mg, 5 mg, Oral, Daily PRN **AND** bisacodyl (DULCOLAX) suppository 10 mg, 10 mg, Rectal, Daily PRN, Mindy Santacruz, DO    Calcium Replacement - Follow Nurse / BPA Driven Protocol, , Not Applicable, PRN, Lele Lawson DO    cefTRIAXone (ROCEPHIN) 2,000 mg in sodium chloride 0.9 % 100 mL MBP, 2,000 mg, Intravenous, Q24H, Mindy Santacruz DO, Stopped at 02/07/25 1319    escitalopram (LEXAPRO) tablet 10 mg, 10 mg, Oral, Daily, Mindy Santacruz DO, 10 mg at 02/08/25 0851    HYDROmorphone " (DILAUDID) injection 1 mg, 1 mg, Intravenous, Q3H PRN, Mindy Santacruz, DO, 1 mg at 02/08/25 0909    hydrOXYzine (ATARAX) tablet 25 mg, 25 mg, Oral, TID PRN, Aura Santacruzram, DO    lactated ringers infusion, 150 mL/hr, Intravenous, Continuous, Mindy Santacruz, DO, Last Rate: 150 mL/hr at 02/08/25 0853, 150 mL/hr at 02/08/25 0853    LORazepam (ATIVAN) injection 0.5 mg, 0.5 mg, Intravenous, Q8H PRN, Lele Lawson DO, 0.5 mg at 02/07/25 2034    Magnesium Standard Dose Replacement - Follow Nurse / BPA Driven Protocol, , Not Applicable, PRN, Lele Lawson DO    melatonin tablet 5 mg, 5 mg, Oral, Nightly PRN, Aura Santacruzram, DO    metoclopramide (REGLAN) injection 10 mg, 10 mg, Intravenous, Q6H, Unique Calvo APRN, 10 mg at 02/08/25 0434    metoprolol tartrate (LOPRESSOR) injection 5 mg, 5 mg, Intravenous, Q6H PRN, Pratik Burns MD    nitroglycerin (NITROSTAT) SL tablet 0.4 mg, 0.4 mg, Sublingual, Q5 Min PRN, Mindy Santacruz DO    ondansetron ODT (ZOFRAN-ODT) disintegrating tablet 4 mg, 4 mg, Oral, Q6H PRN **OR** ondansetron (ZOFRAN) injection 4 mg, 4 mg, Intravenous, Q6H PRN, Mindy Santacruz, DO, 4 mg at 02/07/25 1205    ondansetron (ZOFRAN) injection 4 mg, 4 mg, Intravenous, Q6H, Unique Calvo APRN, 4 mg at 02/08/25 0909    pantoprazole (PROTONIX) injection 40 mg, 40 mg, Intravenous, Q AM, Mindy Santacruz, DO, 40 mg at 02/08/25 0514    Phosphorus Replacement - Follow Nurse / BPA Driven Protocol, , Not Applicable, PRN, Lawson, Lele A, DO    potassium phosphate 15 mmol in 0.9% normal saline 250 mL IVPB, 15 mmol, Intravenous, Q3H, Lele Lawson, , 15 mmol at 02/08/25 0631    Potassium Replacement - Follow Nurse / BPA Driven Protocol, , Not Applicable, PRN, Lele Lawson DO    [COMPLETED] Insert Peripheral IV, , , Once **AND** sodium chloride 0.9 % flush 10 mL, 10 mL, Intravenous, PRN, Mindy Santacruz,     sodium chloride 0.9 % flush 10 mL, 10 mL, Intravenous, Q12H, Mindy Santacruz, , 10 mL at 02/08/25  0853    sodium chloride 0.9 % flush 10 mL, 10 mL, Intravenous, PRN, Mindy Santacruz,     sodium chloride 0.9 % infusion 40 mL, 40 mL, Intravenous, PRN, Mindy Santacruz,       Objective  Rm 252. Helped to bathroom. Drowsy. Still taking IV dilaudid.     Vital Signs  Temp:  [97.2 °F (36.2 °C)-98 °F (36.7 °C)] 98 °F (36.7 °C)  Heart Rate:  [101-132] 104  Resp:  [9-16] 14  BP: (101-131)/(61-97) 107/68  Physical Exam:    General Appearance:    Awake and alert, in no acute distress   Head:    Normocephalic, without obvious abnormality   Eyes:          Conjunctivae normal, anicteric sclerae   Ears:    Hearing intact   Throat:   No oral lesions, no thrush, oral mucosa moist   Neck:   No adenopathy, supple, no JVD   Lungs:     respirations regular, even and unlabored        Abdomen:      soft, upper abdomen tender, no rebound or guarding, non-distended, no hepatosplenomegaly   Rectal:     Deferred   Extremities:   No edema, no cyanosis, no redness   Skin:   No bleeding, bruising or rash, no jaundice   Neurologic:   Cranial nerves 2 - 12 grossly intact, no asterixis, sensation   intact        Results Review:    CBC    Results from last 7 days   Lab Units 02/08/25  0313 02/07/25  1138 02/07/25  0903   WBC 10*3/mm3 8.62  --  17.09*   HEMOGLOBIN g/dL 10.0*  --  13.3   HEMOGLOBIN, POC g/dL  --  12.9  --    PLATELETS 10*3/mm3 117*  --  208     CMP   Results from last 7 days   Lab Units 02/08/25  0313 02/07/25  1623 02/07/25  1138 02/07/25  1130 02/07/25  1016   SODIUM mmol/L 138 136  --  135* 134*   POTASSIUM mmol/L 3.1* 4.0  --  4.7 6.6*   CHLORIDE mmol/L 98 103  --  99 99   CO2 mmol/L 27.4 14.1*  --  10.9* 9.6*   BUN mg/dL 17 18  --  19 19   CREATININE mg/dL 0.65 0.79 0.70 0.90 1.03*   GLUCOSE mg/dL 153* 238*  --  183* 200*   ALBUMIN g/dL 4.4  --   --   --  5.3*   BILIRUBIN mg/dL 1.4*  --   --   --  2.4*   ALK PHOS U/L 123*  --   --   --  169*   AST (SGOT) U/L 71*  --   --   --  110*   ALT (SGPT) U/L 37*  --   --   --  59*    MAGNESIUM mg/dL 1.8  --   --   --   --    PHOSPHORUS mg/dL 0.4*  --   --   --   --    LIPASE U/L 462*  --   --   --  1,316*     Cr Clearance Estimated Creatinine Clearance: 81.4 mL/min (by C-G formula based on SCr of 0.65 mg/dL).  Coag     HbA1C   Lab Results   Component Value Date    HGBA1C 4.30 (L) 12/28/2023     Blood Glucose   Glucose   Date/Time Value Ref Range Status   02/07/2025 1138 187 (H) 70 - 105 mg/dL Final     Infection   Results from last 7 days   Lab Units 02/08/25  0313 02/07/25  0903   BLOODCX   --  No growth at 24 hours   PROCALCITONIN ng/mL 0.16  --      UA    Results from last 7 days   Lab Units 02/07/25  1105   NITRITE UA  Positive*   WBC UA /HPF 11-20*   BACTERIA UA /HPF 1+*   SQUAM EPITHEL UA /HPF 31-50*     Radiology(recent) US Gallbladder    Result Date: 2/7/2025  Impression: Findings compatible with hepatic steatosis. Distended gallbladder without evidence for wall thickening or evidence for acute cholecystitis. If there is concern for biliary dyskinesia further evaluation with nuclear medicine HIDA study could be considered. Electronically Signed: Linnea Moser MD  2/7/2025 5:47 PM EST  Workstation ID: [id number]    CT Abdomen Pelvis With Contrast    Result Date: 2/7/2025  Impression: 1. Findings consistent with the appearance of diffuse acute interstitial pancreatitis. No pseudocyst is seen. Correlate with clinical symptoms and laboratory findings. 2. Mild generalized thickening and stranding surrounding the ascending colon and transverse colon. Correlate for infectious and or inflammatory colitis symptoms. 3. Severe diffuse hepatic steatosis. 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  Lipase was 1316, LFTs were elevated and CT showed acute interstitial pancreatitis.  GI was consulted.     LFTs are coming down nicely.  Lipase is significantly improved down from 1300->462.  Alcohol level negative, urine drug screen positive for opiates questionable collected after Dilaudid in the ER.  Supportive care with IV fluids.    CRP mildly elevated at 0.84.  Gut rest, Ok for sips with meds. Getting dilaudid every 4 hours, try Oxy as it will last longer. She is pretty sedated on dilaudid.   Antiemetic and pain medications as needed.    Patient is on Rocephin and Flagyl for UTI.  Complete alcohol cessation.  RUQ US-hepatic steatosis, Gallbladder unremarkable.         Unique Calvo, APRN  02/08/25  09:32 EST

## 2025-02-08 NOTE — PLAN OF CARE
Goal Outcome Evaluation:                  Added one more Pain med Oxycodone . IVF running with LR @150 ml  Pot and phosp replaced with 3 iv bags , will be drawn for follow up @2200. Rocephin  for UTI dcd  and ativan discontinued. Diet started with Clear liquid diet

## 2025-02-08 NOTE — PLAN OF CARE
Goal Outcome Evaluation:  Plan of Care Reviewed With: patient        Progress: no change                                 There is no context in this message and I have no idea what is being asked of me. Please provide more information

## 2025-02-09 VITALS
BODY MASS INDEX: 16.37 KG/M2 | SYSTOLIC BLOOD PRESSURE: 109 MMHG | HEART RATE: 92 BPM | WEIGHT: 92.37 LBS | TEMPERATURE: 98 F | DIASTOLIC BLOOD PRESSURE: 79 MMHG | RESPIRATION RATE: 14 BRPM | HEIGHT: 63 IN | OXYGEN SATURATION: 97 %

## 2025-02-09 LAB
ALBUMIN SERPL-MCNC: 3.5 G/DL (ref 3.5–5.2)
ALBUMIN/GLOB SERPL: 1.4 G/DL
ALP SERPL-CCNC: 100 U/L (ref 39–117)
ALT SERPL W P-5'-P-CCNC: 38 U/L (ref 1–33)
ANION GAP SERPL CALCULATED.3IONS-SCNC: 7.3 MMOL/L (ref 5–15)
AST SERPL-CCNC: 84 U/L (ref 1–32)
BACTERIA SPEC AEROBE CULT: ABNORMAL
BASOPHILS # BLD AUTO: 0.02 10*3/MM3 (ref 0–0.2)
BASOPHILS NFR BLD AUTO: 0.5 % (ref 0–1.5)
BILIRUB SERPL-MCNC: 1.1 MG/DL (ref 0–1.2)
BUN SERPL-MCNC: 10 MG/DL (ref 6–20)
BUN/CREAT SERPL: 24.4 (ref 7–25)
CALCIUM SPEC-SCNC: 8.6 MG/DL (ref 8.6–10.5)
CHLORIDE SERPL-SCNC: 97 MMOL/L (ref 98–107)
CO2 SERPL-SCNC: 30.7 MMOL/L (ref 22–29)
CREAT SERPL-MCNC: 0.41 MG/DL (ref 0.57–1)
CRP SERPL-MCNC: 0.63 MG/DL (ref 0–0.5)
DEPRECATED RDW RBC AUTO: 43.1 FL (ref 37–54)
EGFRCR SERPLBLD CKD-EPI 2021: 133.4 ML/MIN/1.73
EOSINOPHIL # BLD AUTO: 0.03 10*3/MM3 (ref 0–0.4)
EOSINOPHIL NFR BLD AUTO: 0.8 % (ref 0.3–6.2)
ERYTHROCYTE [DISTWIDTH] IN BLOOD BY AUTOMATED COUNT: 11.8 % (ref 12.3–15.4)
GLOBULIN UR ELPH-MCNC: 2.5 GM/DL
GLUCOSE SERPL-MCNC: 88 MG/DL (ref 65–99)
HCT VFR BLD AUTO: 23.2 % (ref 34–46.6)
HGB BLD-MCNC: 8 G/DL (ref 12–15.9)
IMM GRANULOCYTES # BLD AUTO: 0.02 10*3/MM3 (ref 0–0.05)
IMM GRANULOCYTES NFR BLD AUTO: 0.5 % (ref 0–0.5)
LIPASE SERPL-CCNC: 225 U/L (ref 13–60)
LYMPHOCYTES # BLD AUTO: 1.28 10*3/MM3 (ref 0.7–3.1)
LYMPHOCYTES NFR BLD AUTO: 34 % (ref 19.6–45.3)
MAGNESIUM SERPL-MCNC: 1.3 MG/DL (ref 1.6–2.6)
MAGNESIUM SERPL-MCNC: 2.9 MG/DL (ref 1.6–2.6)
MCH RBC QN AUTO: 34.9 PG (ref 26.6–33)
MCHC RBC AUTO-ENTMCNC: 34.5 G/DL (ref 31.5–35.7)
MCV RBC AUTO: 101.3 FL (ref 79–97)
MONOCYTES # BLD AUTO: 0.26 10*3/MM3 (ref 0.1–0.9)
MONOCYTES NFR BLD AUTO: 6.9 % (ref 5–12)
NEUTROPHILS NFR BLD AUTO: 2.16 10*3/MM3 (ref 1.7–7)
NEUTROPHILS NFR BLD AUTO: 57.3 % (ref 42.7–76)
NRBC BLD AUTO-RTO: 0 /100 WBC (ref 0–0.2)
PHOSPHATE SERPL-MCNC: 1.6 MG/DL (ref 2.5–4.5)
PLATELET # BLD AUTO: 70 10*3/MM3 (ref 140–450)
PMV BLD AUTO: 9.3 FL (ref 6–12)
POTASSIUM SERPL-SCNC: 2.8 MMOL/L (ref 3.5–5.2)
POTASSIUM SERPL-SCNC: 3.7 MMOL/L (ref 3.5–5.2)
PROT SERPL-MCNC: 6 G/DL (ref 6–8.5)
RBC # BLD AUTO: 2.29 10*6/MM3 (ref 3.77–5.28)
SODIUM SERPL-SCNC: 135 MMOL/L (ref 136–145)
WBC NRBC COR # BLD AUTO: 3.77 10*3/MM3 (ref 3.4–10.8)

## 2025-02-09 PROCEDURE — 83690 ASSAY OF LIPASE: CPT | Performed by: NURSE PRACTITIONER

## 2025-02-09 PROCEDURE — 85025 COMPLETE CBC W/AUTO DIFF WBC: CPT | Performed by: INTERNAL MEDICINE

## 2025-02-09 PROCEDURE — 83735 ASSAY OF MAGNESIUM: CPT | Performed by: INTERNAL MEDICINE

## 2025-02-09 PROCEDURE — 25010000002 MAGNESIUM SULFATE 2 GM/50ML SOLUTION: Performed by: INTERNAL MEDICINE

## 2025-02-09 PROCEDURE — 86140 C-REACTIVE PROTEIN: CPT | Performed by: NURSE PRACTITIONER

## 2025-02-09 PROCEDURE — 25810000003 SODIUM CHLORIDE 0.9 % SOLUTION: Performed by: INTERNAL MEDICINE

## 2025-02-09 PROCEDURE — 25010000002 METOCLOPRAMIDE PER 10 MG: Performed by: NURSE PRACTITIONER

## 2025-02-09 PROCEDURE — 80053 COMPREHEN METABOLIC PANEL: CPT | Performed by: INTERNAL MEDICINE

## 2025-02-09 PROCEDURE — 25010000002 ONDANSETRON PER 1 MG: Performed by: NURSE PRACTITIONER

## 2025-02-09 PROCEDURE — 84132 ASSAY OF SERUM POTASSIUM: CPT | Performed by: INTERNAL MEDICINE

## 2025-02-09 PROCEDURE — 84100 ASSAY OF PHOSPHORUS: CPT | Performed by: INTERNAL MEDICINE

## 2025-02-09 RX ORDER — FENTANYL/ROPIVACAINE/NS/PF 2-625MCG/1
15 PLASTIC BAG, INJECTION (ML) EPIDURAL
Status: COMPLETED | OUTPATIENT
Start: 2025-02-09 | End: 2025-02-09

## 2025-02-09 RX ORDER — POTASSIUM CHLORIDE 1500 MG/1
40 TABLET, EXTENDED RELEASE ORAL ONCE
Status: DISCONTINUED | OUTPATIENT
Start: 2025-02-09 | End: 2025-02-09 | Stop reason: HOSPADM

## 2025-02-09 RX ORDER — MAGNESIUM SULFATE HEPTAHYDRATE 40 MG/ML
2 INJECTION, SOLUTION INTRAVENOUS
Status: COMPLETED | OUTPATIENT
Start: 2025-02-09 | End: 2025-02-09

## 2025-02-09 RX ORDER — POTASSIUM CHLORIDE 1500 MG/1
40 TABLET, EXTENDED RELEASE ORAL EVERY 4 HOURS
Status: COMPLETED | OUTPATIENT
Start: 2025-02-09 | End: 2025-02-09

## 2025-02-09 RX ORDER — OXYCODONE HYDROCHLORIDE 5 MG/1
5 TABLET ORAL EVERY 4 HOURS PRN
Qty: 10 TABLET | Refills: 0 | Status: SHIPPED | OUTPATIENT
Start: 2025-02-09 | End: 2025-02-12

## 2025-02-09 RX ORDER — ONDANSETRON 4 MG/1
4 TABLET, FILM COATED ORAL EVERY 8 HOURS PRN
Qty: 20 TABLET | Refills: 0 | Status: SHIPPED | OUTPATIENT
Start: 2025-02-09

## 2025-02-09 RX ADMIN — SODIUM CHLORIDE 15 MMOL: 9 INJECTION, SOLUTION INTRAVENOUS at 02:41

## 2025-02-09 RX ADMIN — PANTOPRAZOLE SODIUM 40 MG: 40 INJECTION, POWDER, FOR SOLUTION INTRAVENOUS at 05:48

## 2025-02-09 RX ADMIN — Medication 10 ML: at 09:06

## 2025-02-09 RX ADMIN — METOCLOPRAMIDE 10 MG: 5 INJECTION, SOLUTION INTRAMUSCULAR; INTRAVENOUS at 04:23

## 2025-02-09 RX ADMIN — ESCITALOPRAM 10 MG: 10 TABLET, FILM COATED ORAL at 09:06

## 2025-02-09 RX ADMIN — POTASSIUM CHLORIDE 40 MEQ: 1500 TABLET, EXTENDED RELEASE ORAL at 05:48

## 2025-02-09 RX ADMIN — SODIUM CHLORIDE 15 MMOL: 9 INJECTION, SOLUTION INTRAVENOUS at 05:57

## 2025-02-09 RX ADMIN — MAGNESIUM SULFATE HEPTAHYDRATE 2 G: 40 INJECTION, SOLUTION INTRAVENOUS at 04:23

## 2025-02-09 RX ADMIN — MAGNESIUM SULFATE HEPTAHYDRATE 2 G: 40 INJECTION, SOLUTION INTRAVENOUS at 06:20

## 2025-02-09 RX ADMIN — METOCLOPRAMIDE 10 MG: 5 INJECTION, SOLUTION INTRAMUSCULAR; INTRAVENOUS at 10:08

## 2025-02-09 RX ADMIN — MAGNESIUM SULFATE HEPTAHYDRATE 2 G: 40 INJECTION, SOLUTION INTRAVENOUS at 02:40

## 2025-02-09 RX ADMIN — ONDANSETRON 4 MG: 2 INJECTION INTRAMUSCULAR; INTRAVENOUS at 10:08

## 2025-02-09 RX ADMIN — ONDANSETRON 4 MG: 2 INJECTION INTRAMUSCULAR; INTRAVENOUS at 04:23

## 2025-02-09 RX ADMIN — POTASSIUM CHLORIDE 40 MEQ: 1500 TABLET, EXTENDED RELEASE ORAL at 02:43

## 2025-02-09 RX ADMIN — OXYCODONE 5 MG: 5 TABLET ORAL at 03:14

## 2025-02-09 NOTE — PLAN OF CARE
Goal Outcome Evaluation:            Pt is being discharged . Removed IV access. Dc instruction was given.

## 2025-02-09 NOTE — PLAN OF CARE
Goal Outcome Evaluation:              Outcome Evaluation: Pt resting comfortably throughout the night. Some complaints of lower back pain due straining when vomiting. PRN pain meds given. Electrolyte replacement started per protocol. No other complaints a this time.

## 2025-02-09 NOTE — DISCHARGE SUMMARY
Hospitalist Service   DISCHARGE SUMMARY    Patient Name: Shirin Canales  : 1990  MRN: [id number]    Date of Admission: 2025  Date of Discharge:  25    Primary Care Physician: Provider, No Known      Presenting Problem:   LFT elevation [R79.89]  Acute pancreatitis, unspecified complication status, unspecified pancreatitis type [K85.90]    Active and Resolved Hospital Problems:  Active Hospital Problems    Diagnosis POA    **LFT elevation [R79.89] Yes      Resolved Hospital Problems   No resolved problems to display.         Hospital Course     Hospital Course:  Shirin Canales is a 34 y.o. female with history of alcohol pancreatitis, elevated LFTs and severe hepatic steatosis who presented to the ER on 2025 with abdominal pain, nausea vomiting. Patient was evaluated in the ER found to have elevated LFTs, lipase of 1316. CT showed acute pancreatitis thus prompting GI consult. Patient states her symptoms started on Tuesday, 2025 with sharp stabbing epigastric pain nausea and vomiting. States she has been unable to keep down liquids or food for 3 days. She has had uncontrolled nausea and vomiting. She denies any marijuana usage. She has chills but no fever. Complains of a headache and extreme weakness. Patient's last drink of alcohol was , 2025. Potassium was originally 6.6 and down to 4.7, creatinine 0.7. TB 2.4, alk phos 169, , ALT 59, . Lipase 1316.  Patient was started on IV fluids, antiemetics.  Had electrolyte imbalance which were replaced including potassium and phosphorus.  Overall patient feels better, tolerating liquid diet.  Wants to go home today as she has issues with childcare.  Okay to discharge per GI with outpatient follow-up.  Repeat potassium prior to discharge.  Patient is medically stable to discharge home today with follow-up with PCP and GI as an outpatient.        DISCHARGE Follow Up Recommendations for labs and diagnostics:   Follow-up with  PCP and GI in next 1 to 2 weeks    Day of Discharge     Vital Signs:  Temp:  [97.4 °F (36.3 °C)-99 °F (37.2 °C)] 98 °F (36.7 °C)  Heart Rate:  [] 92  Resp:  [10-14] 14  BP: (104-113)/(68-81) 109/79    Physical Exam:  General: Awake, alert, NAD  Eyes: PERRL, EOMI, conjunctivae are clear  Cardiovascular: Regular rate and rhythm, no murmurs  Respiratory: Clear to auscultation bilaterally, no wheezing or rales, unlabored breathing  Abdomen: Soft, mild epigastric tenderness, positive bowel sounds, no guarding  Neurologic: A&O, CN grossly intact, moves all extremities spontaneously  Musculoskeletal: Normal range of motion, no other gross deformities  Skin: Warm, dry        Pertinent  and/or Most Recent Results     LAB RESULTS:      Lab 02/09/25  0607 02/09/25  0045 02/08/25  0313 02/07/25  1138 02/07/25  0907 02/07/25  0903   WBC 3.77  --  8.62  --   --  17.09*   HEMOGLOBIN 8.0*  --  10.0*  --   --  13.3   HEMOGLOBIN, POC  --   --   --  12.9  --   --    HEMATOCRIT 23.2*  --  28.6*  --   --  41.6   HEMATOCRIT POC  --   --   --  38  --   --    PLATELETS 70*  --  117*  --   --  208   NEUTROS ABS 2.16  --  7.06*  --   --  15.46*   IMMATURE GRANS (ABS) 0.02  --  0.04  --   --  0.14*   LYMPHS ABS 1.28  --  0.75  --   --  0.58*   MONOS ABS 0.26  --  0.73  --   --  0.86   EOS ABS 0.03  --  0.02  --   --  0.01   .3*  --  100.4*  --   --  110.1*   CRP  --  0.63* 0.84*  --   --   --    PROCALCITONIN  --   --  0.16  --   --   --    LACTATE  --   --   --   --  1.6  --          Lab 02/09/25  0607 02/09/25  0045 02/08/25  0313 02/07/25  1623 02/07/25  1138 02/07/25  1130 02/07/25  1016   SODIUM  --  135* 138 136  --  135* 134*   POTASSIUM  --  2.8* 3.1* 4.0  --  4.7 6.6*   CHLORIDE  --  97* 98 103  --  99 99   CO2  --  30.7* 27.4 14.1*  --  10.9* 9.6*   POC ANION GAP ISTAT  --   --   --   --  22.0*  --   --    ANION GAP  --  7.3 12.6 18.9*  --  25.1* 25.4*   BUN  --  10 17 18  --  19 19   CREATININE  --  0.41* 0.65 0.79  0.70 0.90 1.03*   EGFR  --  133.4 119.4 101.4 117.3 86.7 73.8   GLUCOSE  --  88 153* 238*  --  183* 200*   CALCIUM  --  8.6 9.6 9.8  --  10.3 10.6*   MAGNESIUM 2.9* 1.3* 1.8  --   --   --   --    PHOSPHORUS  --  1.6* 0.4*  --   --   --   --          Lab 02/09/25  0045 02/08/25  0313 02/07/25  1016   TOTAL PROTEIN 6.0 7.6 9.9*   ALBUMIN 3.5 4.4 5.3*   GLOBULIN 2.5 3.2 4.6   ALT (SGPT) 38* 37* 59*   AST (SGOT) 84* 71* 110*   BILIRUBIN 1.1 1.4* 2.4*   ALK PHOS 100 123* 169*   LIPASE 225* 462* 1,316*                     Brief Urine Lab Results  (Last result in the past 365 days)        Color   Clarity   Blood   Leuk Est   Nitrite   Protein   CREAT   Urine HCG        02/07/25 1105 Orange  Comment: Any Substance that causes an abnormal urine color can alter the accuracy of the chemical reactions.   Cloudy   Large (3+)   Trace   Positive   >=300 mg/dL (3+)                 Microbiology Results (last 10 days)       Procedure Component Value - Date/Time    Urine Culture - Urine, Urine, Clean Catch [[id number]]  (Abnormal) Collected: 02/07/25 1105    Lab Status: Preliminary result Specimen: Urine, Clean Catch Updated: 02/08/25 1322     Urine Culture 50,000 CFU/mL Escherichia coli    Narrative:      Colonization of the urinary tract without infection is common. Treatment is discouraged unless the patient is symptomatic, pregnant, or undergoing an invasive urologic procedure.    Blood Culture - Blood, Arm, Left [[id number]]  (Normal) Collected: 02/07/25 0926    Lab Status: Preliminary result Specimen: Blood from Arm, Left Updated: 02/09/25 0945     Blood Culture No growth at 2 days    Narrative:      Less than seven (7) mL's of blood was collected.  Insufficient quantity may yield false negative results.    COVID-19 and FLU A/B PCR, 1 HR TAT - Swab, Nasopharynx [[id number]]  (Normal) Collected: 02/07/25 0905    Lab Status: Final result Specimen: Swab from Nasopharynx Updated: 02/07/25 0933     COVID19 Not Detected     Influenza A  PCR Not Detected     Influenza B PCR Not Detected    Narrative:      Fact sheet for providers: https://www.fda.gov/media/569255/download    Fact sheet for patients: https://www.fda.gov/media/313073/download    Test performed by PCR.    Blood Culture - Blood, Arm, Right [[id number]]  (Normal) Collected: 02/07/25 0903    Lab Status: Preliminary result Specimen: Blood from Arm, Right Updated: 02/09/25 0915     Blood Culture No growth at 2 days    Narrative:      Less than seven (7) mL's of blood was collected.  Insufficient quantity may yield false negative results.            US Gallbladder    Result Date: 2/7/2025  Impression: Impression: Findings compatible with hepatic steatosis. Distended gallbladder without evidence for wall thickening or evidence for acute cholecystitis. If there is concern for biliary dyskinesia further evaluation with nuclear medicine HIDA study could be considered. Electronically Signed: Linnea Moser MD  2/7/2025 5:47 PM EST  Workstation ID: [id number]    CT Abdomen Pelvis With Contrast    Result Date: 2/7/2025  Impression: Impression: 1. Findings consistent with the appearance of diffuse acute interstitial pancreatitis. No pseudocyst is seen. Correlate with clinical symptoms and laboratory findings. 2. Mild generalized thickening and stranding surrounding the ascending colon and transverse colon. Correlate for infectious and or inflammatory colitis symptoms. 3. Severe diffuse hepatic steatosis. Electronically Signed: Lyly Valdez MD  2/7/2025 12:02 PM EST  Workstation ID: [id number]                 Labs Pending at Discharge:  Pending Labs       Order Current Status    Blood Culture - Blood, Arm, Left Preliminary result    Blood Culture - Blood, Arm, Right Preliminary result    Urine Culture - Urine, Urine, Clean Catch Preliminary result            Procedures Performed           Consults:   Consults       Date and Time Order Name Status Description    2/7/2025  1:06 PM Inpatient  Gastroenterology Consult                Discharge Details        Discharge Medications        New Medications        Instructions Start Date   oxyCODONE 5 MG immediate release tablet  Commonly known as: ROXICODONE   5 mg, Oral, Every 4 Hours PRN             Continue These Medications        Instructions Start Date   escitalopram 10 MG tablet  Commonly known as: LEXAPRO   10 mg, Daily      hydrOXYzine 25 MG tablet  Commonly known as: ATARAX   1-2 tablets, Oral, 3 Times Daily PRN      ibuprofen 200 MG tablet  Commonly known as: ADVIL,MOTRIN   200 mg, Oral, Every 6 Hours PRN      ondansetron 4 MG tablet  Commonly known as: ZOFRAN   4 mg, Oral, Every 8 Hours PRN      pantoprazole 40 MG EC tablet  Commonly known as: Protonix   40 mg, Oral, Daily               Allergies   Allergen Reactions    Latex Unknown - Low Severity    Penicillins Unknown - High Severity         Discharge Disposition:  Home or Self Care    Diet:  Hospital:  Diet Order   Procedures    Diet: Liquid; Clear Liquid; Fluid Consistency: Thin (IDDSI 0)         Discharge Activity:         CODE STATUS:  Code Status and Medical Interventions: CPR (Attempt to Resuscitate); Full Support   Ordered at: 02/07/25 1147     Code Status (Patient has no pulse and is not breathing):    CPR (Attempt to Resuscitate)     Medical Interventions (Patient has pulse or is breathing):    Full Support         No future appointments.        Time spent on Discharge including face to face service:  40 minutes    Signature:    Electronically signed by Mindy Santacruz DO, 02/09/25, 10:05 AM EST.      Part of this note may be an electronic transcription/translation of spoken language to printed text using the Dragon Dictation System.

## 2025-02-10 ENCOUNTER — READMISSION MANAGEMENT (OUTPATIENT)
Dept: CALL CENTER | Facility: HOSPITAL | Age: 35
End: 2025-02-10
Payer: COMMERCIAL

## 2025-02-10 NOTE — CASE MANAGEMENT/SOCIAL WORK
Case Management Discharge Note      Final Note: Routine Home.         Selected Continued Care - Discharged on 2/9/2025 Admission date: 2/7/2025 - Discharge disposition: Home or Self Care       Transportation Services  Private: Car    Final Discharge Disposition Code: 01 - home or self-care

## 2025-02-10 NOTE — OUTREACH NOTE
Prep Survey      Flowsheet Row Responses   [facility] facility patient discharged from? [facility]   Is LACE score < 7 ? No   Eligibility Readm Mgmt   Discharge diagnosis LFT elevation,Acute pancreatitis   Does the patient have one of the following disease processes/diagnoses(primary or secondary)? Other   Does the patient have Home health ordered? No   Is there a DME ordered? No   Comments regarding appointments Follow-up with PCP and GI in next 1 to 2 weeks   Medication alerts for this patient see avs   Prep survey completed? Yes            Rossy MURDOCK - Registered Nurse

## 2025-02-12 ENCOUNTER — READMISSION MANAGEMENT (OUTPATIENT)
Dept: CALL CENTER | Facility: HOSPITAL | Age: 35
End: 2025-02-12
Payer: COMMERCIAL

## 2025-02-12 LAB
BACTERIA SPEC AEROBE CULT: NORMAL
BACTERIA SPEC AEROBE CULT: NORMAL

## 2025-02-12 NOTE — OUTREACH NOTE
Medical Week 1 Survey      Flowsheet Row Responses   [facility] patient discharged from? [facility]   Does the patient have one of the following disease processes/diagnoses(primary or secondary)? Other   Week 1 attempt successful? Yes   Call start time 0854   Call end time 0900   Meds reviewed with patient/caregiver? Yes   Is the patient having any side effects they believe may be caused by any medication additions or changes? No   Does the patient have all medications ordered at discharge? Yes   Is the patient taking all medications as directed (includes completed medication regime)? Yes   Comments regarding appointments Encouraged to f/u with PCP and GI within next 1-2 weeks.   Does the patient have a primary care provider?  Yes   Does the patient have an appointment with their PCP within 7 days of discharge? No   What is preventing the patient from scheduling follow up appointments within 7 days of discharge? Haven't had time   Nursing Interventions Educated patient on importance of making appointment, Advised patient to make appointment   Has the patient kept scheduled appointments due by today? N/A   Has home health visited the patient within 72 hours of discharge? N/A   Psychosocial issues? Yes   Psychosocial comments Hx alcohol use-states has not used alcohol since discharge.   Did the patient receive a copy of their discharge instructions? Yes   Nursing interventions Reviewed instructions with patient   What is the patient's perception of their health status since discharge? Improving   Is the patient/caregiver able to teach back signs and symptoms related to disease process for when to call PCP? Yes   Is the patient/caregiver able to teach back signs and symptoms related to disease process for when to call 911? Yes   Is the patient/caregiver able to teach back the hierarchy of who to call/visit for symptoms/problems? PCP, Specialist, Home health nurse, Urgent Care, ED, 911 Yes   If the patient is a current  smoker, are they able to teach back resources for cessation? Not a smoker   Week 1 call completed? Yes   Would this patient benefit from a Referral to [facility] Social Work? No   Is the patient interested in additional calls from an ambulatory ? No   Wrap up additional comments Patient states is improving, but still has some right sided abdominal pain. States has good bowel/urinary function with no s/s of UTI. States is following low fat diet, and has not consumed any alcohol since discharge. Encouraged to f/u with PCP and GI within 1-2 weeks. Patient voiced understanding.   Call end time 0900            Sharlene CASTRO - Registered Nurse

## 2025-02-21 ENCOUNTER — READMISSION MANAGEMENT (OUTPATIENT)
Dept: CALL CENTER | Facility: HOSPITAL | Age: 35
End: 2025-02-21
Payer: COMMERCIAL

## 2025-02-21 NOTE — OUTREACH NOTE
Medical Week 2 Survey      Flowsheet Row Responses   [facility] patient discharged from? [facility]   Does the patient have one of the following disease processes/diagnoses(primary or secondary)? Other   Week 2 attempt successful? Yes   Call start time 1246   Discharge diagnosis LFT elevation,Acute pancreatitis   Call end time 1247   Is the patient taking all medications as directed (includes completed medication regime)? Yes   Does the patient have a primary care provider?  Yes   Does the patient have an appointment with their PCP within 7 days of discharge? Yes   Has the patient kept scheduled appointments due by today? N/A   Has home health visited the patient within 72 hours of discharge? N/A   Psychosocial issues? No   Did the patient receive a copy of their discharge instructions? Yes   Nursing interventions Reviewed instructions with patient   What is the patient's perception of their health status since discharge? Improving   Is the patient/caregiver able to teach back signs and symptoms related to disease process for when to call PCP? Yes   Is the patient/caregiver able to teach back signs and symptoms related to disease process for when to call 911? Yes   Is the patient/caregiver able to teach back the hierarchy of who to call/visit for symptoms/problems? PCP, Specialist, Home health nurse, Urgent Care, ED, 911 Yes   Week 2 Call Completed? Yes   Graduated Yes   Graduated/Revoked comments pt reports she is doing well. Has appt with PCP coming up.   Call end time 1247            LUCAS MURDOCK - Registered Nurse

## 2025-04-02 ENCOUNTER — HOSPITAL ENCOUNTER (INPATIENT)
Facility: HOSPITAL | Age: 35
LOS: 3 days | Discharge: HOME | End: 2025-04-05
Attending: EMERGENCY MEDICINE | Admitting: FAMILY MEDICINE
Payer: COMMERCIAL

## 2025-04-02 ENCOUNTER — APPOINTMENT (OUTPATIENT)
Dept: CT IMAGING | Facility: HOSPITAL | Age: 35
End: 2025-04-02
Payer: COMMERCIAL

## 2025-04-02 DIAGNOSIS — K85.90 ACUTE PANCREATITIS WITHOUT INFECTION OR NECROSIS, UNSPECIFIED PANCREATITIS TYPE: Primary | ICD-10-CM

## 2025-04-02 DIAGNOSIS — K81.0 ACUTE CHOLECYSTITIS: ICD-10-CM

## 2025-04-02 DIAGNOSIS — K85.10 GALLSTONE PANCREATITIS: ICD-10-CM

## 2025-04-02 LAB
ALBUMIN SERPL-MCNC: 5 G/DL (ref 3.5–5.2)
ALBUMIN/GLOB SERPL: 1.4 G/DL
ALP SERPL-CCNC: 159 U/L (ref 39–117)
ALT SERPL W P-5'-P-CCNC: 73 U/L (ref 1–33)
ANION GAP SERPL CALCULATED.3IONS-SCNC: 17.4 MMOL/L (ref 5–15)
AST SERPL-CCNC: 99 U/L (ref 1–32)
B-HCG UR QL: NEGATIVE
BACTERIA UR QL AUTO: ABNORMAL /HPF
BASOPHILS # BLD AUTO: 0.02 10*3/MM3 (ref 0–0.2)
BASOPHILS NFR BLD AUTO: 0.2 % (ref 0–1.5)
BILIRUB SERPL-MCNC: 1.5 MG/DL (ref 0–1.2)
BILIRUB UR QL STRIP: NEGATIVE
BUN SERPL-MCNC: 29 MG/DL (ref 6–20)
BUN/CREAT SERPL: 46 (ref 7–25)
CALCIUM SPEC-SCNC: 10.4 MG/DL (ref 8.6–10.5)
CHLORIDE SERPL-SCNC: 99 MMOL/L (ref 98–107)
CLARITY UR: CLEAR
CO2 SERPL-SCNC: 20.6 MMOL/L (ref 22–29)
COLOR UR: YELLOW
CREAT SERPL-MCNC: 0.63 MG/DL (ref 0.57–1)
DEPRECATED RDW RBC AUTO: 48.2 FL (ref 37–54)
EGFRCR SERPLBLD CKD-EPI 2021: 120.3 ML/MIN/1.73
EOSINOPHIL # BLD AUTO: 0.01 10*3/MM3 (ref 0–0.4)
EOSINOPHIL NFR BLD AUTO: 0.1 % (ref 0.3–6.2)
ERYTHROCYTE [DISTWIDTH] IN BLOOD BY AUTOMATED COUNT: 12.8 % (ref 12.3–15.4)
ETHANOL UR QL: <0.01 %
FLUAV SUBTYP SPEC NAA+PROBE: NOT DETECTED
FLUBV RNA ISLT QL NAA+PROBE: NOT DETECTED
GLOBULIN UR ELPH-MCNC: 3.7 GM/DL
GLUCOSE SERPL-MCNC: 134 MG/DL (ref 65–99)
GLUCOSE UR STRIP-MCNC: NEGATIVE MG/DL
HCG INTACT+B SERPL-ACNC: <1 MIU/ML
HCT VFR BLD AUTO: 36 % (ref 34–46.6)
HGB BLD-MCNC: 12.1 G/DL (ref 12–15.9)
HGB UR QL STRIP.AUTO: ABNORMAL
HOLD SPECIMEN: NORMAL
HYALINE CASTS UR QL AUTO: ABNORMAL /LPF
IMM GRANULOCYTES # BLD AUTO: 0.06 10*3/MM3 (ref 0–0.05)
IMM GRANULOCYTES NFR BLD AUTO: 0.7 % (ref 0–0.5)
KETONES UR QL STRIP: ABNORMAL
LEUKOCYTE ESTERASE UR QL STRIP.AUTO: NEGATIVE
LIPASE SERPL-CCNC: 824 U/L (ref 13–60)
LYMPHOCYTES # BLD AUTO: 0.59 10*3/MM3 (ref 0.7–3.1)
LYMPHOCYTES NFR BLD AUTO: 6.7 % (ref 19.6–45.3)
MCH RBC QN AUTO: 34.4 PG (ref 26.6–33)
MCHC RBC AUTO-ENTMCNC: 33.6 G/DL (ref 31.5–35.7)
MCV RBC AUTO: 102.3 FL (ref 79–97)
MONOCYTES # BLD AUTO: 0.64 10*3/MM3 (ref 0.1–0.9)
MONOCYTES NFR BLD AUTO: 7.2 % (ref 5–12)
NEUTROPHILS NFR BLD AUTO: 7.53 10*3/MM3 (ref 1.7–7)
NEUTROPHILS NFR BLD AUTO: 85.1 % (ref 42.7–76)
NITRITE UR QL STRIP: NEGATIVE
NRBC BLD AUTO-RTO: 0 /100 WBC (ref 0–0.2)
PH UR STRIP.AUTO: 6.5 [PH] (ref 5–8)
PLATELET # BLD AUTO: 119 10*3/MM3 (ref 140–450)
PMV BLD AUTO: 8.7 FL (ref 6–12)
POTASSIUM SERPL-SCNC: 3.9 MMOL/L (ref 3.5–5.2)
PROT SERPL-MCNC: 8.7 G/DL (ref 6–8.5)
PROT UR QL STRIP: ABNORMAL
RBC # BLD AUTO: 3.52 10*6/MM3 (ref 3.77–5.28)
RBC # UR STRIP: ABNORMAL /HPF
REF LAB TEST METHOD: ABNORMAL
RSV RNA RESP QL NAA+PROBE: NOT DETECTED
SARS-COV-2 RNA RESP QL NAA+PROBE: NOT DETECTED
SODIUM SERPL-SCNC: 137 MMOL/L (ref 136–145)
SP GR UR STRIP: 1.08 (ref 1–1.03)
SQUAMOUS #/AREA URNS HPF: ABNORMAL /HPF
UROBILINOGEN UR QL STRIP: ABNORMAL
WBC # UR STRIP: ABNORMAL /HPF
WBC NRBC COR # BLD AUTO: 8.85 10*3/MM3 (ref 3.4–10.8)

## 2025-04-02 PROCEDURE — 25010000002 HYDROMORPHONE 1 MG/ML SOLUTION: Performed by: FAMILY MEDICINE

## 2025-04-02 PROCEDURE — 99285 EMERGENCY DEPT VISIT HI MDM: CPT

## 2025-04-02 PROCEDURE — 84702 CHORIONIC GONADOTROPIN TEST: CPT | Performed by: EMERGENCY MEDICINE

## 2025-04-02 PROCEDURE — 80053 COMPREHEN METABOLIC PANEL: CPT | Performed by: EMERGENCY MEDICINE

## 2025-04-02 PROCEDURE — 74177 CT ABD & PELVIS W/CONTRAST: CPT

## 2025-04-02 PROCEDURE — 81001 URINALYSIS AUTO W/SCOPE: CPT | Performed by: EMERGENCY MEDICINE

## 2025-04-02 PROCEDURE — 81025 URINE PREGNANCY TEST: CPT | Performed by: EMERGENCY MEDICINE

## 2025-04-02 PROCEDURE — 85025 COMPLETE CBC W/AUTO DIFF WBC: CPT | Performed by: EMERGENCY MEDICINE

## 2025-04-02 PROCEDURE — 25010000002 MORPHINE PER 10 MG: Performed by: EMERGENCY MEDICINE

## 2025-04-02 PROCEDURE — 25010000002 THIAMINE PER 100 MG: Performed by: EMERGENCY MEDICINE

## 2025-04-02 PROCEDURE — 25010000003 DEXTROSE 5 % SOLUTION 1,000 ML FLEX CONT: Performed by: FAMILY MEDICINE

## 2025-04-02 PROCEDURE — 25510000001 IOPAMIDOL PER 1 ML: Performed by: EMERGENCY MEDICINE

## 2025-04-02 PROCEDURE — 25010000002 ONDANSETRON PER 1 MG: Performed by: FAMILY MEDICINE

## 2025-04-02 PROCEDURE — 83690 ASSAY OF LIPASE: CPT | Performed by: EMERGENCY MEDICINE

## 2025-04-02 PROCEDURE — 25010000002 METOCLOPRAMIDE PER 10 MG: Performed by: EMERGENCY MEDICINE

## 2025-04-02 PROCEDURE — 25010000002 ONDANSETRON PER 1 MG: Performed by: EMERGENCY MEDICINE

## 2025-04-02 PROCEDURE — 82077 ASSAY SPEC XCP UR&BREATH IA: CPT | Performed by: EMERGENCY MEDICINE

## 2025-04-02 PROCEDURE — 87637 SARSCOV2&INF A&B&RSV AMP PRB: CPT | Performed by: EMERGENCY MEDICINE

## 2025-04-02 PROCEDURE — 93005 ELECTROCARDIOGRAM TRACING: CPT | Performed by: STUDENT IN AN ORGANIZED HEALTH CARE EDUCATION/TRAINING PROGRAM

## 2025-04-02 PROCEDURE — 25810000003 LACTATED RINGERS SOLUTION: Performed by: EMERGENCY MEDICINE

## 2025-04-02 RX ORDER — ONDANSETRON 2 MG/ML
4 INJECTION INTRAMUSCULAR; INTRAVENOUS EVERY 6 HOURS PRN
Status: DISCONTINUED | OUTPATIENT
Start: 2025-04-02 | End: 2025-04-05 | Stop reason: HOSPADM

## 2025-04-02 RX ORDER — ONDANSETRON 2 MG/ML
8 INJECTION INTRAMUSCULAR; INTRAVENOUS ONCE
Status: COMPLETED | OUTPATIENT
Start: 2025-04-02 | End: 2025-04-02

## 2025-04-02 RX ORDER — SODIUM CHLORIDE 0.9 % (FLUSH) 0.9 %
10 SYRINGE (ML) INJECTION AS NEEDED
Status: DISCONTINUED | OUTPATIENT
Start: 2025-04-02 | End: 2025-04-05 | Stop reason: HOSPADM

## 2025-04-02 RX ORDER — ACETAMINOPHEN 160 MG/5ML
650 SOLUTION ORAL EVERY 4 HOURS PRN
Status: DISCONTINUED | OUTPATIENT
Start: 2025-04-02 | End: 2025-04-05 | Stop reason: HOSPADM

## 2025-04-02 RX ORDER — METOCLOPRAMIDE HYDROCHLORIDE 5 MG/ML
10 INJECTION INTRAMUSCULAR; INTRAVENOUS ONCE
Status: COMPLETED | OUTPATIENT
Start: 2025-04-02 | End: 2025-04-02

## 2025-04-02 RX ORDER — TRAMADOL HYDROCHLORIDE 50 MG/1
50 TABLET ORAL EVERY 6 HOURS PRN
Status: DISCONTINUED | OUTPATIENT
Start: 2025-04-02 | End: 2025-04-03

## 2025-04-02 RX ORDER — SODIUM CHLORIDE 9 MG/ML
40 INJECTION, SOLUTION INTRAVENOUS AS NEEDED
Status: DISCONTINUED | OUTPATIENT
Start: 2025-04-02 | End: 2025-04-05 | Stop reason: HOSPADM

## 2025-04-02 RX ORDER — SCOPOLAMINE 1 MG/3D
1 PATCH, EXTENDED RELEASE TRANSDERMAL
Status: DISCONTINUED | OUTPATIENT
Start: 2025-04-03 | End: 2025-04-05 | Stop reason: HOSPADM

## 2025-04-02 RX ORDER — THIAMINE HYDROCHLORIDE 100 MG/ML
100 INJECTION, SOLUTION INTRAMUSCULAR; INTRAVENOUS ONCE
Status: COMPLETED | OUTPATIENT
Start: 2025-04-02 | End: 2025-04-02

## 2025-04-02 RX ORDER — HYDRALAZINE HYDROCHLORIDE 20 MG/ML
10 INJECTION INTRAMUSCULAR; INTRAVENOUS EVERY 4 HOURS PRN
Status: ACTIVE | OUTPATIENT
Start: 2025-04-02 | End: 2025-04-05

## 2025-04-02 RX ORDER — CALCIUM CARBONATE 500 MG/1
2 TABLET, CHEWABLE ORAL 2 TIMES DAILY PRN
Status: DISCONTINUED | OUTPATIENT
Start: 2025-04-02 | End: 2025-04-05 | Stop reason: HOSPADM

## 2025-04-02 RX ORDER — ENOXAPARIN SODIUM 100 MG/ML
30 INJECTION SUBCUTANEOUS EVERY 24 HOURS
Status: DISCONTINUED | OUTPATIENT
Start: 2025-04-02 | End: 2025-04-05 | Stop reason: HOSPADM

## 2025-04-02 RX ORDER — HYDROXYZINE HYDROCHLORIDE 50 MG/ML
25 INJECTION, SOLUTION INTRAMUSCULAR EVERY 6 HOURS PRN
Status: DISCONTINUED | OUTPATIENT
Start: 2025-04-02 | End: 2025-04-03

## 2025-04-02 RX ORDER — NALOXONE HCL 0.4 MG/ML
0.4 VIAL (ML) INJECTION
Status: DISCONTINUED | OUTPATIENT
Start: 2025-04-02 | End: 2025-04-05

## 2025-04-02 RX ORDER — NITROGLYCERIN 0.4 MG/1
0.4 TABLET SUBLINGUAL
Status: DISCONTINUED | OUTPATIENT
Start: 2025-04-02 | End: 2025-04-05 | Stop reason: HOSPADM

## 2025-04-02 RX ORDER — ESCITALOPRAM OXALATE 10 MG/1
10 TABLET ORAL DAILY
Status: DISCONTINUED | OUTPATIENT
Start: 2025-04-02 | End: 2025-04-05 | Stop reason: HOSPADM

## 2025-04-02 RX ORDER — SODIUM CHLORIDE 0.9 % (FLUSH) 0.9 %
10 SYRINGE (ML) INJECTION EVERY 12 HOURS SCHEDULED
Status: DISCONTINUED | OUTPATIENT
Start: 2025-04-02 | End: 2025-04-05 | Stop reason: HOSPADM

## 2025-04-02 RX ORDER — ACETAMINOPHEN 650 MG/1
650 SUPPOSITORY RECTAL EVERY 4 HOURS PRN
Status: DISCONTINUED | OUTPATIENT
Start: 2025-04-02 | End: 2025-04-05 | Stop reason: HOSPADM

## 2025-04-02 RX ORDER — IOPAMIDOL 755 MG/ML
100 INJECTION, SOLUTION INTRAVASCULAR
Status: COMPLETED | OUTPATIENT
Start: 2025-04-02 | End: 2025-04-02

## 2025-04-02 RX ORDER — ACETAMINOPHEN 325 MG/1
650 TABLET ORAL EVERY 4 HOURS PRN
Status: DISCONTINUED | OUTPATIENT
Start: 2025-04-02 | End: 2025-04-05 | Stop reason: HOSPADM

## 2025-04-02 RX ADMIN — Medication 10 ML: at 21:49

## 2025-04-02 RX ADMIN — DEXTROSE MONOHYDRATE 100 MEQ: 50 INJECTION, SOLUTION INTRAVENOUS at 14:07

## 2025-04-02 RX ADMIN — MORPHINE SULFATE 4 MG: 4 INJECTION, SOLUTION INTRAMUSCULAR; INTRAVENOUS at 11:16

## 2025-04-02 RX ADMIN — ESCITALOPRAM 10 MG: 10 TABLET, FILM COATED ORAL at 14:08

## 2025-04-02 RX ADMIN — MORPHINE SULFATE 4 MG: 4 INJECTION, SOLUTION INTRAMUSCULAR; INTRAVENOUS at 13:43

## 2025-04-02 RX ADMIN — THIAMINE HYDROCHLORIDE 100 MG: 100 INJECTION, SOLUTION INTRAMUSCULAR; INTRAVENOUS at 12:53

## 2025-04-02 RX ADMIN — SODIUM CHLORIDE, SODIUM LACTATE, POTASSIUM CHLORIDE, CALCIUM CHLORIDE 1000 ML: 20; 30; 600; 310 INJECTION, SOLUTION INTRAVENOUS at 11:13

## 2025-04-02 RX ADMIN — ONDANSETRON 4 MG: 2 INJECTION, SOLUTION INTRAMUSCULAR; INTRAVENOUS at 16:30

## 2025-04-02 RX ADMIN — ONDANSETRON 8 MG: 2 INJECTION, SOLUTION INTRAMUSCULAR; INTRAVENOUS at 11:08

## 2025-04-02 RX ADMIN — HYDROMORPHONE HYDROCHLORIDE 0.5 MG: 1 INJECTION, SOLUTION INTRAMUSCULAR; INTRAVENOUS; SUBCUTANEOUS at 16:30

## 2025-04-02 RX ADMIN — IOPAMIDOL 100 ML: 755 INJECTION, SOLUTION INTRAVENOUS at 12:34

## 2025-04-02 RX ADMIN — HYDROMORPHONE HYDROCHLORIDE 0.5 MG: 1 INJECTION, SOLUTION INTRAMUSCULAR; INTRAVENOUS; SUBCUTANEOUS at 19:51

## 2025-04-02 RX ADMIN — METOCLOPRAMIDE 10 MG: 5 INJECTION, SOLUTION INTRAMUSCULAR; INTRAVENOUS at 12:53

## 2025-04-02 RX ADMIN — ONDANSETRON 4 MG: 2 INJECTION, SOLUTION INTRAMUSCULAR; INTRAVENOUS at 19:51

## 2025-04-02 RX ADMIN — HYDROMORPHONE HYDROCHLORIDE 0.5 MG: 1 INJECTION, SOLUTION INTRAMUSCULAR; INTRAVENOUS; SUBCUTANEOUS at 21:49

## 2025-04-02 NOTE — ED NOTES
Pt. Requesting pain and nausea medication for severe back and lower abd. Pain. Tolerating PO fluids/ clear liquids at this time.

## 2025-04-02 NOTE — Clinical Note
Level of Care: Med/Surg [1]   Admitting Physician: MELLISA VAN [[id number]]   Attending Physician: MELLISA VAN [[id number]]

## 2025-04-02 NOTE — ED PROVIDER NOTES
Subjective   History of Present Illness  Chief complaint abdominal pain vomiting    History of present illness this is a 34-year-old female who presents to the ER today complaining of abdominal pain vomiting Canoy came down since Monday.  Her last drink alcohol was Sunday.  She denies any fever chills sweats no diarrhea pain radiates into her back nothing makes it better or worse.  Severe in nature.  No foreign travels antibiotic use no ill exposures.  No urinary complaints or pregnancy concerns was recently hospitalized overnight for acute pancreatitis.      Review of Systems   Constitutional:  Negative for chills and fever.   HENT:  Negative for congestion.    Respiratory:  Negative for chest tightness and shortness of breath.    Cardiovascular:  Negative for chest pain.   Gastrointestinal:  Positive for abdominal pain and vomiting. Negative for blood in stool.   Genitourinary:  Negative for difficulty urinating, dysuria and vaginal discharge.   Musculoskeletal:  Positive for back pain. Negative for neck pain.   Skin:  Negative for rash.   Neurological:  Negative for dizziness and light-headedness.       No past medical history on file.  Recent alcohol induced pancreatitis  Allergies   Allergen Reactions    Latex Unknown - Low Severity    Penicillins Unknown - High Severity       No past surgical history on file.    No family history on file.    Social History     Socioeconomic History    Marital status: Single   Tobacco Use    Smoking status: Every Day     Current packs/day: 0.25     Average packs/day: 0.3 packs/day for 0.2 years     Types: Cigarettes     Start date: 2/6/2025     Last attempt to quit: 2015    Smokeless tobacco: Never   Vaping Use    Vaping status: Never Used   Substance and Sexual Activity    Alcohol use: Yes     Alcohol/week: 3.0 standard drinks of alcohol     Types: 3 Drinks containing 0.5 oz of alcohol per week     Comment: OCCASIONALLY    Drug use: Not Currently    Sexual activity: Defer      Prior to Admission medications    Medication Sig Start Date End Date Taking? Authorizing Provider   escitalopram (LEXAPRO) 10 MG tablet Take 1 tablet by mouth Daily.    ProviderLibra MD   hydrOXYzine (ATARAX) 25 MG tablet Take 1-2 tablets by mouth 3 (Three) Times a Day As Needed for Anxiety.    Libra Torres MD   ibuprofen (ADVIL,MOTRIN) 200 MG tablet Take 1 tablet by mouth Every 6 (Six) Hours As Needed for Mild Pain.    Libra Torres MD   ondansetron (ZOFRAN) 4 MG tablet Take 1 tablet by mouth Every 8 (Eight) Hours As Needed for Nausea or Vomiting. 2/9/25   Mindy Santacruz,    pantoprazole (Protonix) 40 MG EC tablet Take 1 tablet by mouth Daily. 6/29/24   Malu Florentino MD          Objective   Physical Exam  Constitutional this is a 34-year-old awake alert no acute distress triage vital signs reviewed.  HEENT extraocular muscles are intact pupils equal round reactive sclera clear the mouth is clear neck supple no adenopathy no JVD no meningeal signs lungs clear no retraction back no CVA tenderness heart regular without murmur rub abdomen soft with some moderate mid abdominal pain but no rebound no guarding good bowel sounds no peritoneal findings or pulsatile masses extremities no edema cords or Homans' sign no evidence of DVT skin warm dry without rashes neurologic awake alert and follows commands motor strength normal without focal weakness  Procedures           ED Course      Results for orders placed or performed during the hospital encounter of 04/02/25   Comprehensive Metabolic Panel    Collection Time: 04/02/25 11:06 AM    Specimen: Blood   Result Value Ref Range    Glucose 134 (H) 65 - 99 mg/dL    BUN 29 (H) 6 - 20 mg/dL    Creatinine 0.63 0.57 - 1.00 mg/dL    Sodium 137 136 - 145 mmol/L    Potassium 3.9 3.5 - 5.2 mmol/L    Chloride 99 98 - 107 mmol/L    CO2 20.6 (L) 22.0 - 29.0 mmol/L    Calcium 10.4 8.6 - 10.5 mg/dL    Total Protein 8.7 (H) 6.0 - 8.5 g/dL    Albumin 5.0  3.5 - 5.2 g/dL    ALT (SGPT) 73 (H) 1 - 33 U/L    AST (SGOT) 99 (H) 1 - 32 U/L    Alkaline Phosphatase 159 (H) 39 - 117 U/L    Total Bilirubin 1.5 (H) 0.0 - 1.2 mg/dL    Globulin 3.7 gm/dL    A/G Ratio 1.4 g/dL    BUN/Creatinine Ratio 46.0 (H) 7.0 - 25.0    Anion Gap 17.4 (H) 5.0 - 15.0 mmol/L    eGFR 120.3 >60.0 mL/min/1.73   Lipase    Collection Time: 04/02/25 11:06 AM    Specimen: Blood   Result Value Ref Range    Lipase 824 (H) 13 - 60 U/L   CBC Auto Differential    Collection Time: 04/02/25 11:06 AM    Specimen: Blood   Result Value Ref Range    WBC 8.85 3.40 - 10.80 10*3/mm3    RBC 3.52 (L) 3.77 - 5.28 10*6/mm3    Hemoglobin 12.1 12.0 - 15.9 g/dL    Hematocrit 36.0 34.0 - 46.6 %    .3 (H) 79.0 - 97.0 fL    MCH 34.4 (H) 26.6 - 33.0 pg    MCHC 33.6 31.5 - 35.7 g/dL    RDW 12.8 12.3 - 15.4 %    RDW-SD 48.2 37.0 - 54.0 fl    MPV 8.7 6.0 - 12.0 fL    Platelets 119 (L) 140 - 450 10*3/mm3    Neutrophil % 85.1 (H) 42.7 - 76.0 %    Lymphocyte % 6.7 (L) 19.6 - 45.3 %    Monocyte % 7.2 5.0 - 12.0 %    Eosinophil % 0.1 (L) 0.3 - 6.2 %    Basophil % 0.2 0.0 - 1.5 %    Immature Grans % 0.7 (H) 0.0 - 0.5 %    Neutrophils, Absolute 7.53 (H) 1.70 - 7.00 10*3/mm3    Lymphocytes, Absolute 0.59 (L) 0.70 - 3.10 10*3/mm3    Monocytes, Absolute 0.64 0.10 - 0.90 10*3/mm3    Eosinophils, Absolute 0.01 0.00 - 0.40 10*3/mm3    Basophils, Absolute 0.02 0.00 - 0.20 10*3/mm3    Immature Grans, Absolute 0.06 (H) 0.00 - 0.05 10*3/mm3    nRBC 0.0 0.0 - 0.2 /100 WBC   hCG, Quantitative, Pregnancy    Collection Time: 04/02/25 11:06 AM    Specimen: Blood   Result Value Ref Range    HCG Quantitative <1.00 mIU/mL   Gold Top - SST    Collection Time: 04/02/25 11:06 AM   Result Value Ref Range    Extra Tube Hold for add-ons.    COVID-19 and FLU A/B PCR, 1 HR TAT - Swab, Nasopharynx    Collection Time: 04/02/25 11:07 AM    Specimen: Nasopharynx; Swab   Result Value Ref Range    COVID19 Not Detected Not Detected - Ref. Range    Influenza A PCR  Not Detected Not Detected    Influenza B PCR Not Detected Not Detected     CT Abdomen Pelvis With Contrast  Result Date: 4/2/2025  1.Small amount of fluid noted along the pancreas more prominent in the left upper quadrant. Findings compatible with acute pancreatitis. No evidence of pseudocyst or abscess noted. Overall findings appearing improved from comparison. 2.Severe diffuse hepatic steatosis. Please correlate with liver function test. 3.Mild stranding of the transverse and descending colon. Underlying colitis not excluded. 4.Cholelithiasis. 5.Other findings as above. Electronically Signed: Barber Matson MD  4/2/2025 12:48 PM EDT  Workstation ID: [id number]    Medications   sodium chloride 0.9 % flush 10 mL (has no administration in time range)   morphine injection 4 mg (has no administration in time range)   lactated ringers bolus 1,000 mL (1,000 mL Intravenous New Bag 4/2/25 1113)   morphine injection 4 mg (4 mg Intravenous Given 4/2/25 1116)   ondansetron (ZOFRAN) injection 8 mg (8 mg Intravenous Given 4/2/25 1108)   iopamidol (ISOVUE-370) 76 % injection 100 mL (100 mL Intravenous Given 4/2/25 1234)   metoclopramide (REGLAN) injection 10 mg (10 mg Intravenous Given 4/2/25 1253)   thiamine (B-1) injection 100 mg (100 mg Intravenous Given 4/2/25 1253)                                                        Medical Decision Making  Medical Decision Making IV established monitor placement review of sinus rhythm patient given a liter lactated Ringer's 100 mg of thiamine IV Zofran 8 mg IV and morphine 4 mg IV.  Labs obtained my independent review comprehensive metabolic profile remarkable for a blood sugar 134 and a ALT of 73 AST 99 alk phos 159 and bilirubin of 1.5.  Lipase was 824 CBC unremarkable.  Platelets 119,000.  COVID-19 flu negative.  Patient still has some diffuse abdominal pain complaining of pain and nausea.  The patient was given Reglan 10 mg IV and morphine 4 IV.  The CT scan and pelvis stain my  independent reveals any perforation or free air there is findings of acute pancreatitis but do not see any evidence of a pseudocyst or an aneurysm.  Radiology review small amount of fluid noted along the pancreas more prominent left upper quadrant is compatible with acute pancreatitis.  No pseudocyst or abscess.  Patient has severe fatty liver noted.  And cholelithiasis.  Patient exam is feeling better.  She had some mild to moderate mid abdominal pain without rebound or guarding.  I do not see any evidence of sepsis or bacteremia perforation aneurysm dissection acute cholecystitis.  Diverticulitis appendicitis based on history and physical clinical findings although not a complete list of all possibilities.  Patient was made aware the findings I talked to the hospitalist case discussed patient will be admitted for further care.  Patient was admitted in February here at that time she had acute pancreatitis secondary to alcohol as well.    Problems Addressed:  Acute pancreatitis without infection or necrosis, unspecified pancreatitis type: complicated acute illness or injury    Amount and/or Complexity of Data Reviewed  Labs: ordered. Decision-making details documented in ED Course.  Radiology: ordered and independent interpretation performed. Decision-making details documented in ED Course.    Risk  Parenteral controlled substances.  Decision regarding hospitalization.        Final diagnoses:   Acute pancreatitis without infection or necrosis, unspecified pancreatitis type       ED Disposition  ED Disposition       ED Disposition   Intended Admit    Condition   --    Comment   --               No follow-up provider specified.       Medication List      No changes were made to your prescriptions during this visit.            Martín Forman MD  04/02/25 [id number]

## 2025-04-02 NOTE — H&P
[facility] Medicine Services  History & Physical    Patient Name: Shirin Canales  : 1990  MRN: [id number]  Primary Care Physician:  Melissa, No Known  Date of admission: 2025  Date and Time of Service: 2025 at 1:32 PM    Subjective      Chief Complaint: Abdominal pain    History of Present Illness: Shirin Canales is a 34 y.o. female with a CMH of alcoholism who presented to [facility] on 2025 with abdominal pain.    She is seen in the ED room 10 at time of exam.  She states that since  she has had increasing abdominal pain which was midepigastric in nature.  She is also having dry heaving.  She is asking for some clear liquids at this time.  She claims she has not had anything to drink since Saturday evening.  She states these episodes have been becoming more frequent but this episode is not as bad as the previous ones.  We discussed that she does have acute on chronic pancreatitis and had some bowel rest with clear liquids and high-volume IV fluids will likely benefit her      Review of Systems  Constitutional: No fevers, chills, sweats  Eye: No recent visual problems, eye discharge, eye pain, redness  HEENT: No ear pain, nasal congestion, sore throat, voice changes  Respiratory: No shortness of breath, cough, pain on breathing, sputum production  Cardiovascular: No Chest pain, palpitations, syncope, orthopnea  Gastrointestinal: Positive nausea, positive vomiting, diarrhea, constipation  Genitourinary: No hematuria, dysuria, incontinence  Hema/Lymph: Negative for bruising tendency, swollen lymph glands, nosebleeds  Endocrine: Negative for excessive thirst, excessive hunger, excessive urination  Musculoskeletal: No back pain, neck pain, joint pain, muscle pain, decreased range of motion  Integumentary: No rash, pruritus, abrasions, lesions  Neurologic: No weakness, numbness, frequent headaches, tremors  Psychiatric: No anxiety, depression, mood changes,  hallucinations      Personal History     No past medical history on file.  Major depressive disorder    No past surgical history on file.    Family History: family history is not on file. Otherwise pertinent FHx was reviewed and not pertinent to current issue.    Social History:  reports that she has been smoking cigarettes. She started smoking about 7 weeks ago. She has been smoking an average 0.3 packs per day for the past 0.2 years. She has never used smokeless tobacco. She reports current alcohol use of about 3.0 standard drinks of alcohol per week. She reports that she does not currently use drugs.    Home Medications:  Prior to Admission Medications       Prescriptions Last Dose Informant Patient Reported? Taking?    escitalopram (LEXAPRO) 10 MG tablet   Yes No    Take 1 tablet by mouth Daily.    hydrOXYzine (ATARAX) 25 MG tablet   Yes No    Take 1-2 tablets by mouth 3 (Three) Times a Day As Needed for Anxiety.    ibuprofen (ADVIL,MOTRIN) 200 MG tablet   Yes No    Take 1 tablet by mouth Every 6 (Six) Hours As Needed for Mild Pain.    ondansetron (ZOFRAN) 4 MG tablet   No No    Take 1 tablet by mouth Every 8 (Eight) Hours As Needed for Nausea or Vomiting.    pantoprazole (Protonix) 40 MG EC tablet   No No    Take 1 tablet by mouth Daily.              Allergies:  Allergies   Allergen Reactions    Latex Unknown - Low Severity    Penicillins Unknown - High Severity       Objective      Vitals:   Temp:  [97.5 °F (36.4 °C)] 97.5 °F (36.4 °C)  Heart Rate:  [] 81  Resp:  [18-20] 18  BP: (137-149)/(100-107) 147/106  Body mass index is 16.36 kg/m².  Physical Exam  Constitutional:       Appearance: She is normal weight. She is ill-appearing.   HENT:      Head: Normocephalic and atraumatic.      Nose: Nose normal.      Mouth/Throat:      Mouth: Mucous membranes are dry.      Pharynx: Oropharynx is clear.   Eyes:      Extraocular Movements: Extraocular movements intact.      Conjunctiva/sclera: Conjunctivae normal.    Cardiovascular:      Rate and Rhythm: Regular rhythm. Tachycardia present.      Pulses: Normal pulses.      Heart sounds: Normal heart sounds.   Pulmonary:      Effort: Pulmonary effort is normal.      Breath sounds: Normal breath sounds.   Abdominal:      General: Abdomen is flat. Bowel sounds are normal.      Palpations: Abdomen is soft.   Skin:     General: Skin is warm and dry.   Neurological:      General: No focal deficit present.      Mental Status: She is alert and oriented to person, place, and time. Mental status is at baseline.   Psychiatric:         Mood and Affect: Mood normal.         Behavior: Behavior normal.         Thought Content: Thought content normal.         Judgment: Judgment normal.         Diagnostic Data:  Lab Results (last 24 hours)       Procedure Component Value Units Date/Time    Ethanol [[id number]] Collected: 04/02/25 1253    Specimen: Blood Updated: 04/02/25 1318     Ethanol % <0.010 %     Narrative:      Plasma Ethanol Clinical Symptoms:    ETOH (%)               Clinical Symptom  .01-.05              No apparent influence  .03-.12              Euphoria, Diminished judgment and attention   .09-.25              Impaired comprehension, Muscle incoordination  .18-.30              Confusion, Staggered gait, Slurred speech  .25-.40              Markedly decreased response to stimuli, unable to stand or                        walk, vomitting, sleep or stupor  .35-.50              Comatose, Anesthesia, Subnormal body temperature        RSV PCR - Swab, Nasopharynx [[id number]] Collected: 04/02/25 1107    Specimen: Swab from Nasopharynx Updated: 04/02/25 1316    hCG, Quantitative, Pregnancy [[id number]] Collected: 04/02/25 1106    Specimen: Blood Updated: 04/02/25 1155     HCG Quantitative <1.00 mIU/mL     Narrative:      HCG Ranges by Gestational Age    Females - non-pregnant premenopausal   </= 1mIU/mL HCG  Females - postmenopausal               </= 7mIU/mL HCG    3 Weeks       5.4   -       72 mIU/mL  4 Weeks      10.2   -     708 mIU/mL  5 Weeks       217   -   8,245 mIU/mL  6 Weeks       152   -  32,177 mIU/mL  7 Weeks     4,059   - 153,767 mIU/mL  8 Weeks    31,366   - 149,094 mIU/mL  9 Weeks    59,109   - 135,901 mIU/mL  10 Weeks   44,186   - 170,409 mIU/mL  12 Weeks   27,107   - 201,615 mIU/mL  14 Weeks   24,302   -  93,646 mIU/mL  15 Weeks   12,540   -  69,747 mIU/mL  16 Weeks    8,904   -  55,332 mIU/mL  17 Weeks    8,240   -  51,793 mIU/mL  18 Weeks    9,649   -  55,271 mIU/mL      Lipase [[id number]]  (Abnormal) Collected: 04/02/25 1106    Specimen: Blood Updated: 04/02/25 1153     Lipase 824 U/L     COVID-19 and FLU A/B PCR, 1 HR TAT - Swab, Nasopharynx [[id number]]  (Normal) Collected: 04/02/25 1107    Specimen: Swab from Nasopharynx Updated: 04/02/25 1136     COVID19 Not Detected     Influenza A PCR Not Detected     Influenza B PCR Not Detected    Narrative:      Fact sheet for providers: https://www.fda.gov/media/352923/download    Fact sheet for patients: https://www.fda.gov/media/166072/download    Test performed by PCR.    Comprehensive Metabolic Panel [[id number]]  (Abnormal) Collected: 04/02/25 1106    Specimen: Blood Updated: 04/02/25 1135     Glucose 134 mg/dL      BUN 29 mg/dL      Creatinine 0.63 mg/dL      Sodium 137 mmol/L      Potassium 3.9 mmol/L      Chloride 99 mmol/L      CO2 20.6 mmol/L      Calcium 10.4 mg/dL      Total Protein 8.7 g/dL      Albumin 5.0 g/dL      ALT (SGPT) 73 U/L      AST (SGOT) 99 U/L      Alkaline Phosphatase 159 U/L      Total Bilirubin 1.5 mg/dL      Globulin 3.7 gm/dL      A/G Ratio 1.4 g/dL      BUN/Creatinine Ratio 46.0     Anion Gap 17.4 mmol/L      eGFR 120.3 mL/min/1.73     Narrative:      GFR Categories in Chronic Kidney Disease (CKD)      GFR Category          GFR (mL/min/1.73)    Interpretation  G1                     90 or greater         Normal or high (1)  G2                      60-89                Mild decrease (1)  G3a                    45-59                Mild to moderate decrease  G3b                   30-44                Moderate to severe decrease  G4                    15-29                Severe decrease  G5                    14 or less           Kidney failure          (1)In the absence of evidence of kidney disease, neither GFR category G1 or G2 fulfill the criteria for CKD.    eGFR calculation 2021 CKD-EPI creatinine equation, which does not include race as a factor    Extra Tubes [[id number]] Collected: 04/02/25 1106    Specimen: Blood Updated: 04/02/25 1115    Narrative:      The following orders were created for panel order Extra Tubes.  Procedure                               Abnormality         Status                     ---------                               -----------         ------                     Gold Top - SST[[id number]]                                   Final result                 Please view results for these tests on the individual orders.    Gold Top - SST [[id number]] Collected: 04/02/25 1106    Specimen: Blood Updated: 04/02/25 1115     Extra Tube Hold for add-ons.     Comment: Auto resulted.       CBC & Differential [[id number]]  (Abnormal) Collected: 04/02/25 1106    Specimen: Blood Updated: 04/02/25 1113    Narrative:      The following orders were created for panel order CBC & Differential.  Procedure                               Abnormality         Status                     ---------                               -----------         ------                     CBC Auto Differential[[id number]]        Abnormal            Final result                 Please view results for these tests on the individual orders.    CBC Auto Differential [[id number]]  (Abnormal) Collected: 04/02/25 1106    Specimen: Blood Updated: 04/02/25 1113     WBC 8.85 10*3/mm3      RBC 3.52 10*6/mm3      Hemoglobin 12.1 g/dL      Hematocrit 36.0 %      .3 fL      MCH 34.4 pg      MCHC 33.6 g/dL      RDW 12.8 %      RDW-SD 48.2 fl       MPV 8.7 fL      Platelets 119 10*3/mm3      Neutrophil % 85.1 %      Lymphocyte % 6.7 %      Monocyte % 7.2 %      Eosinophil % 0.1 %      Basophil % 0.2 %      Immature Grans % 0.7 %      Neutrophils, Absolute 7.53 10*3/mm3      Lymphocytes, Absolute 0.59 10*3/mm3      Monocytes, Absolute 0.64 10*3/mm3      Eosinophils, Absolute 0.01 10*3/mm3      Basophils, Absolute 0.02 10*3/mm3      Immature Grans, Absolute 0.06 10*3/mm3      nRBC 0.0 /100 WBC              Imaging Results (Last 24 Hours)       Procedure Component Value Units Date/Time    CT Abdomen Pelvis With Contrast [[id number]] Collected: 04/02/25 1237     Updated: 04/02/25 1250    Narrative:      CT ABDOMEN PELVIS W CONTRAST    Date of Exam: 4/2/2025 12:25 PM EDT    Indication: Diffuse abdominal pain vomiting.    Comparison: 2/7/2025 and prior    Technique: Axial CT images were obtained of the abdomen and pelvis following the uneventful intravenous administration of iodinated contrast. Sagittal and coronal reconstructions were performed.  Automated exposure control and iterative reconstruction   methods were used.          FINDINGS:    Abdomen/Pelvis:    Lower Chest: Limited imaging of the lung bases is grossly clear.    There is no free air identified below the diaphragm.    Organs: Dependent densities layering within the gallbladder suggesting stones or sludge. Gallbladder is otherwise grossly unremarkable on limited imaging. The liver is mildly enlarged and diffusely decreased in attenuation compatible hepatic steatosis.   The spleen appears grossly unremarkable in appearance. Small amount of fluid noted along the pancreas more prominent in the left upper quadrant. This is improved from comparison. There is no well-defined fluid collection to suggest abscess or pseudocyst.   Small lymph nodes within the upper abdomen are likely reactive. Adrenal glands appear grossly unremarkable in appearance. The kidneys enhance symmetrically. Low-attenuation lesions  compatible with cyst or too small to characterize are again noted   bilaterally. There is no evidence of obstructive uropathy.    GI/Bowel: The stomach is decompressed and otherwise grossly unremarkable in appearance. The small bowel demonstrates no evidence of obstruction. Ileocecal valve demonstrates no acute abnormality. The appendix appears grossly unremarkable on limited   imaging. Colon is relatively decompressed. Some mild stranding noted of the transverse and descending colon. Underlying colitis not excluded    Pelvis: Urinary bladder is grossly unremarkable. The uterus and ovaries demonstrate no acute abnormality. Trace amount of fluid noted within the pelvis    Peritoneum/Retroperitoneum: The aorta is normal in caliber. There is small lymph nodes appreciated within the retroperitoneum likely reactive.    Bones/Soft Tissues: No acute osseous abnormality.      Impression:      1.Small amount of fluid noted along the pancreas more prominent in the left upper quadrant. Findings compatible with acute pancreatitis. No evidence of pseudocyst or abscess noted. Overall findings appearing improved from comparison.  2.Severe diffuse hepatic steatosis. Please correlate with liver function test.  3.Mild stranding of the transverse and descending colon. Underlying colitis not excluded.  4.Cholelithiasis.  5.Other findings as above.        Electronically Signed: Barber Matson MD    4/2/2025 12:48 PM EDT    Workstation ID: [id number]              Assessment & Plan        This is a 34 y.o. female with: Pancreatitis    # Acute on chronic pancreatitis and metabolic acidosis with mild colitis  -Secondary to chronic alcoholism  -No cholecystitis on CT  -Start clear liquid diet  -Nausea management  -Pain management  -Start sodium bicarb at 150 cc an hour x 12 hours  -Repeat BMP in a.m.    # GERD  -Continue Protonix at home dose of frequency    # Major depressive disorder  -Resume home Lexapro at home dose and  frequency      VTE Prophylaxis:  Pharmacologic VTE prophylaxis orders are present.        The patient desires to be as follows:    CODE STATUS:    Code Status (Patient has no pulse and is not breathing): CPR (Attempt to Resuscitate)  Medical Interventions (Patient has pulse or is breathing): Full Support          Admission Status:  I believe this patient meets inpatient status.    Expected Length of Stay: 2 midnights or more    PDMP and Medication Dispenses via Sidebar reviewed and consistent with patient reported medications.    I discussed the patient's findings and my recommendations with patient.      Signature:     This document has been electronically signed by Celestino Castellano DO on April 2, 2025 13:31 EDT   [facility] Hospitalist Team

## 2025-04-03 LAB
ANION GAP SERPL CALCULATED.3IONS-SCNC: 11.4 MMOL/L (ref 5–15)
BUN SERPL-MCNC: 16 MG/DL (ref 6–20)
BUN/CREAT SERPL: 30.8 (ref 7–25)
CALCIUM SPEC-SCNC: 9.8 MG/DL (ref 8.6–10.5)
CHLORIDE SERPL-SCNC: 96 MMOL/L (ref 98–107)
CO2 SERPL-SCNC: 27.6 MMOL/L (ref 22–29)
CREAT SERPL-MCNC: 0.52 MG/DL (ref 0.57–1)
DEPRECATED RDW RBC AUTO: 47.1 FL (ref 37–54)
EGFRCR SERPLBLD CKD-EPI 2021: 126 ML/MIN/1.73
ERYTHROCYTE [DISTWIDTH] IN BLOOD BY AUTOMATED COUNT: 12.7 % (ref 12.3–15.4)
GLUCOSE BLDC GLUCOMTR-MCNC: 108 MG/DL (ref 70–105)
GLUCOSE SERPL-MCNC: 131 MG/DL (ref 65–99)
HCT VFR BLD AUTO: 31 % (ref 34–46.6)
HGB BLD-MCNC: 10.3 G/DL (ref 12–15.9)
MCH RBC QN AUTO: 33.6 PG (ref 26.6–33)
MCHC RBC AUTO-ENTMCNC: 33.2 G/DL (ref 31.5–35.7)
MCV RBC AUTO: 101 FL (ref 79–97)
PLATELET # BLD AUTO: 100 10*3/MM3 (ref 140–450)
PMV BLD AUTO: 8.8 FL (ref 6–12)
POTASSIUM SERPL-SCNC: 3.5 MMOL/L (ref 3.5–5.2)
POTASSIUM SERPL-SCNC: 4 MMOL/L (ref 3.5–5.2)
RBC # BLD AUTO: 3.07 10*6/MM3 (ref 3.77–5.28)
SODIUM SERPL-SCNC: 135 MMOL/L (ref 136–145)
WBC NRBC COR # BLD AUTO: 8.32 10*3/MM3 (ref 3.4–10.8)

## 2025-04-03 PROCEDURE — 25010000002 HYDROMORPHONE 1 MG/ML SOLUTION: Performed by: FAMILY MEDICINE

## 2025-04-03 PROCEDURE — 84132 ASSAY OF SERUM POTASSIUM: CPT | Performed by: HOSPITALIST

## 2025-04-03 PROCEDURE — 25010000002 PROCHLORPERAZINE 10 MG/2ML SOLUTION: Performed by: STUDENT IN AN ORGANIZED HEALTH CARE EDUCATION/TRAINING PROGRAM

## 2025-04-03 PROCEDURE — 85027 COMPLETE CBC AUTOMATED: CPT | Performed by: FAMILY MEDICINE

## 2025-04-03 PROCEDURE — 25010000002 THIAMINE PER 100 MG: Performed by: HOSPITALIST

## 2025-04-03 PROCEDURE — 36415 COLL VENOUS BLD VENIPUNCTURE: CPT

## 2025-04-03 PROCEDURE — 93010 ELECTROCARDIOGRAM REPORT: CPT | Performed by: STUDENT IN AN ORGANIZED HEALTH CARE EDUCATION/TRAINING PROGRAM

## 2025-04-03 PROCEDURE — 25810000003 LACTATED RINGERS PER 1000 ML: Performed by: HOSPITALIST

## 2025-04-03 PROCEDURE — 82948 REAGENT STRIP/BLOOD GLUCOSE: CPT

## 2025-04-03 PROCEDURE — 80048 BASIC METABOLIC PNL TOTAL CA: CPT | Performed by: FAMILY MEDICINE

## 2025-04-03 RX ORDER — HYDROXYZINE HYDROCHLORIDE 25 MG/1
25 TABLET, FILM COATED ORAL 3 TIMES DAILY PRN
Status: DISCONTINUED | OUTPATIENT
Start: 2025-04-03 | End: 2025-04-05 | Stop reason: HOSPADM

## 2025-04-03 RX ORDER — DIAZEPAM 10 MG/2ML
10 INJECTION, SOLUTION INTRAMUSCULAR; INTRAVENOUS
Status: DISCONTINUED | OUTPATIENT
Start: 2025-04-03 | End: 2025-04-05 | Stop reason: HOSPADM

## 2025-04-03 RX ORDER — DIAZEPAM 5 MG/1
10 TABLET ORAL
Status: DISCONTINUED | OUTPATIENT
Start: 2025-04-03 | End: 2025-04-05 | Stop reason: HOSPADM

## 2025-04-03 RX ORDER — SODIUM CHLORIDE, SODIUM LACTATE, POTASSIUM CHLORIDE, CALCIUM CHLORIDE 600; 310; 30; 20 MG/100ML; MG/100ML; MG/100ML; MG/100ML
125 INJECTION, SOLUTION INTRAVENOUS CONTINUOUS
Status: DISPENSED | OUTPATIENT
Start: 2025-04-03 | End: 2025-04-04

## 2025-04-03 RX ORDER — DIAZEPAM 10 MG/2ML
15 INJECTION, SOLUTION INTRAMUSCULAR; INTRAVENOUS
Status: DISCONTINUED | OUTPATIENT
Start: 2025-04-03 | End: 2025-04-05 | Stop reason: HOSPADM

## 2025-04-03 RX ORDER — NICOTINE 21 MG/24HR
1 PATCH, TRANSDERMAL 24 HOURS TRANSDERMAL
Status: DISCONTINUED | OUTPATIENT
Start: 2025-04-03 | End: 2025-04-05 | Stop reason: HOSPADM

## 2025-04-03 RX ORDER — FOLIC ACID 1 MG/1
1 TABLET ORAL DAILY
Status: DISCONTINUED | OUTPATIENT
Start: 2025-04-03 | End: 2025-04-05 | Stop reason: HOSPADM

## 2025-04-03 RX ORDER — POTASSIUM CHLORIDE 1500 MG/1
40 TABLET, EXTENDED RELEASE ORAL EVERY 4 HOURS
Status: COMPLETED | OUTPATIENT
Start: 2025-04-03 | End: 2025-04-03

## 2025-04-03 RX ORDER — DIAZEPAM 10 MG/2ML
20 INJECTION, SOLUTION INTRAMUSCULAR; INTRAVENOUS
Status: DISCONTINUED | OUTPATIENT
Start: 2025-04-03 | End: 2025-04-05 | Stop reason: HOSPADM

## 2025-04-03 RX ORDER — DIAZEPAM 5 MG/1
15 TABLET ORAL
Status: DISCONTINUED | OUTPATIENT
Start: 2025-04-03 | End: 2025-04-05 | Stop reason: HOSPADM

## 2025-04-03 RX ORDER — PROCHLORPERAZINE EDISYLATE 5 MG/ML
2.5 INJECTION INTRAMUSCULAR; INTRAVENOUS ONCE
Status: COMPLETED | OUTPATIENT
Start: 2025-04-03 | End: 2025-04-03

## 2025-04-03 RX ORDER — OXYCODONE HYDROCHLORIDE 5 MG/1
5 TABLET ORAL EVERY 4 HOURS PRN
Refills: 0 | Status: DISCONTINUED | OUTPATIENT
Start: 2025-04-03 | End: 2025-04-05

## 2025-04-03 RX ORDER — DIAZEPAM 5 MG/1
20 TABLET ORAL
Status: DISCONTINUED | OUTPATIENT
Start: 2025-04-03 | End: 2025-04-05 | Stop reason: HOSPADM

## 2025-04-03 RX ORDER — THIAMINE HYDROCHLORIDE 100 MG/ML
200 INJECTION, SOLUTION INTRAMUSCULAR; INTRAVENOUS EVERY 8 HOURS SCHEDULED
Status: DISCONTINUED | OUTPATIENT
Start: 2025-04-03 | End: 2025-04-05 | Stop reason: HOSPADM

## 2025-04-03 RX ADMIN — OXYCODONE 5 MG: 5 TABLET ORAL at 13:58

## 2025-04-03 RX ADMIN — HYDROMORPHONE HYDROCHLORIDE 0.5 MG: 1 INJECTION, SOLUTION INTRAMUSCULAR; INTRAVENOUS; SUBCUTANEOUS at 05:42

## 2025-04-03 RX ADMIN — HYDROXYZINE HYDROCHLORIDE 25 MG: 25 TABLET, FILM COATED ORAL at 20:06

## 2025-04-03 RX ADMIN — Medication 10 ML: at 08:30

## 2025-04-03 RX ADMIN — OXYCODONE 5 MG: 5 TABLET ORAL at 20:06

## 2025-04-03 RX ADMIN — HYDROMORPHONE HYDROCHLORIDE 0.5 MG: 1 INJECTION, SOLUTION INTRAMUSCULAR; INTRAVENOUS; SUBCUTANEOUS at 12:11

## 2025-04-03 RX ADMIN — THIAMINE HYDROCHLORIDE 200 MG: 100 INJECTION, SOLUTION INTRAMUSCULAR; INTRAVENOUS at 13:58

## 2025-04-03 RX ADMIN — SCOPOLAMINE 1 PATCH: 1.5 PATCH, EXTENDED RELEASE TRANSDERMAL at 01:05

## 2025-04-03 RX ADMIN — POTASSIUM CHLORIDE 40 MEQ: 1500 TABLET, EXTENDED RELEASE ORAL at 05:42

## 2025-04-03 RX ADMIN — THIAMINE HYDROCHLORIDE 200 MG: 100 INJECTION, SOLUTION INTRAMUSCULAR; INTRAVENOUS at 20:02

## 2025-04-03 RX ADMIN — POTASSIUM CHLORIDE 40 MEQ: 1500 TABLET, EXTENDED RELEASE ORAL at 09:15

## 2025-04-03 RX ADMIN — HYDROMORPHONE HYDROCHLORIDE 0.5 MG: 1 INJECTION, SOLUTION INTRAMUSCULAR; INTRAVENOUS; SUBCUTANEOUS at 01:05

## 2025-04-03 RX ADMIN — SODIUM CHLORIDE, SODIUM LACTATE, POTASSIUM CHLORIDE, CALCIUM CHLORIDE 125 ML/HR: 20; 30; 600; 310 INJECTION, SOLUTION INTRAVENOUS at 10:02

## 2025-04-03 RX ADMIN — PROCHLORPERAZINE EDISYLATE 2.5 MG: 5 INJECTION, SOLUTION INTRAMUSCULAR; INTRAVENOUS at 05:42

## 2025-04-03 RX ADMIN — Medication 10 ML: at 20:02

## 2025-04-03 RX ADMIN — FOLIC ACID 1 MG: 1 TABLET ORAL at 10:01

## 2025-04-03 RX ADMIN — ESCITALOPRAM 10 MG: 10 TABLET, FILM COATED ORAL at 09:13

## 2025-04-03 RX ADMIN — NICOTINE 1 PATCH: 21 PATCH TRANSDERMAL at 20:02

## 2025-04-03 RX ADMIN — HYDROMORPHONE HYDROCHLORIDE 0.5 MG: 1 INJECTION, SOLUTION INTRAMUSCULAR; INTRAVENOUS; SUBCUTANEOUS at 08:29

## 2025-04-03 RX ADMIN — Medication 2.5 MG: at 02:23

## 2025-04-03 RX ADMIN — HYDROXYZINE HYDROCHLORIDE 25 MG: 25 TABLET, FILM COATED ORAL at 10:01

## 2025-04-03 NOTE — CASE MANAGEMENT/SOCIAL WORK
Discharge Planning Assessment   [facility]     Patient Name: Shirin Canales  MRN: [id number]  Today's Date: 4/3/2025    Admit Date: 4/2/2025    Plan: Routine home.   Discharge Needs Assessment       Row Name 04/03/25 1354       Living Environment    People in Home alone    Current Living Arrangements home    Potentially Unsafe Housing Conditions none    In the past 12 months has the electric, gas, oil, or water company threatened to shut off services in your home? No    Primary Care Provided by self    Provides Primary Care For child(stacey)    Family Caregiver if Needed none    Able to Return to Prior Arrangements yes       Resource/Environmental Concerns    Resource/Environmental Concerns none    Transportation Concerns none       Transportation Needs    In the past 12 months, has lack of transportation kept you from medical appointments or from getting medications? no    In the past 12 months, has lack of transportation kept you from meetings, work, or from getting things needed for daily living? No       Food Insecurity    Within the past 12 months, you worried that your food would run out before you got the money to buy more. Never true    Within the past 12 months, the food you bought just didn't last and you didn't have money to get more. Never true       Transition Planning    Patient/Family Anticipates Transition to home    Patient/Family Anticipated Services at Transition none    Transportation Anticipated car, drives self       Discharge Needs Assessment    Readmission Within the Last 30 Days no previous admission in last 30 days    Equipment Currently Used at Home none    Concerns to be Addressed denies needs/concerns at this time;no discharge needs identified    Do you want help finding or keeping work or a job? Patient declined    Do you want help with school or training? For example, starting or completing job training or getting a high school diploma, GED or equivalent No    Anticipated Changes Related to  Illness none    Equipment Needed After Discharge none    Provided Post Acute Provider List? N/A    Provided Post Acute Provider Quality & Resource List? N/A                   Discharge Plan       Row Name 04/03/25 1307       Plan    Plan Routine home.    Patient/Family in Agreement with Plan yes    Plan Comments CM met with patient at bedside. Pt lives at home with her child; she drives and is independent with ADL's. PCP confirmed and updated and pharmacy confirmed- agreeable to use [facility]. Pt does not use DME at home and no current HH/therapy services. Pt reports she will need a doctor's note for her work as she is working on short term disability. Pt drove herself here and plans to drive at dc.              Demographic Summary       Row Name 04/03/25 2833       General Information    Admission Type inpatient    Arrived From emergency department    Referral Source admission list    Reason for Consult care coordination/care conference;discharge planning    Preferred Language English       Contact Information    Permission Granted to Share Info With                    Functional Status       Row Name 04/03/25 2022       Functional Status    Usual Activity Tolerance good    Current Activity Tolerance good       Functional Status, IADL    Medications independent    Meal Preparation independent    Housekeeping independent    Laundry independent    Shopping independent    If for any reason you need help with day-to-day activities such as bathing, preparing meals, shopping, managing finances, etc., do you get the help you need? I don't need any help       Employment/    Employment Status employed full-time             Megan Naegele, RN     Office Phone: [phone]  Office Cell: [phone]

## 2025-04-03 NOTE — PROGRESS NOTES
[facility] MEDICINE SERVICE  DAILY PROGRESS NOTE    NAME: Shirin Canales  : 1990  MRN: [id number]      LOS: 1 day     PROVIDER OF SERVICE: Dayne Toribio MD    Chief Complaint: Pancreatitis    Subjective:     Interval History:  History taken from: patient chart RN    Abdominal pain improved with IV pain medications        Review of Systems:   Review of Systems  All negative except as above  Objective:     Vital Signs  Temp:  [98.1 °F (36.7 °C)-98.8 °F (37.1 °C)] 98.6 °F (37 °C)  Heart Rate:  [] 84  Resp:  [14-18] 18  BP: (105-148)/() 105/79   Body mass index is 16.36 kg/m².    Physical Exam  Physical Exam  AOx3 NAD  RRR S1-S2 audible  Lungs with fair air entry  Abdomen with epigastric tenderness positive guarding no rigidity bowel sounds positive     Diagnostic Data    Results from last 7 days   Lab Units 25  0119 25  1106   WBC 10*3/mm3 8.32 8.85   HEMOGLOBIN g/dL 10.3* 12.1   HEMATOCRIT % 31.0* 36.0   PLATELETS 10*3/mm3 100* 119*   GLUCOSE mg/dL 131* 134*   CREATININE mg/dL 0.52* 0.63   BUN mg/dL 16 29*   SODIUM mmol/L 135* 137   POTASSIUM mmol/L 3.5 3.9   AST (SGOT) U/L  --  99*   ALT (SGPT) U/L  --  73*   ALK PHOS U/L  --  159*   BILIRUBIN mg/dL  --  1.5*   ANION GAP mmol/L 11.4 17.4*       CT Abdomen Pelvis With Contrast  Result Date: 2025  1.Small amount of fluid noted along the pancreas more prominent in the left upper quadrant. Findings compatible with acute pancreatitis. No evidence of pseudocyst or abscess noted. Overall findings appearing improved from comparison. 2.Severe diffuse hepatic steatosis. Please correlate with liver function test. 3.Mild stranding of the transverse and descending colon. Underlying colitis not excluded. 4.Cholelithiasis. 5.Other findings as above. Electronically Signed: Barber Matson MD  2025 12:48 PM EDT  Workstation ID: [id number]        I reviewed the patient's new clinical results.  I reviewed the patient's new imaging  results and agree with the interpretation.    Assessment/Plan:     Active and Resolved Problems  Active Hospital Problems    Diagnosis  POA    **Pancreatitis [K85.90]  Yes      Resolved Hospital Problems   No resolved problems to display.       #Acute pancreatitis  #Metabolic acidosis-resolved  #EtOH abuse high risk for withdrawal  Change IV fluids to LR  Start p.o. Norco for pain control.  IV Dilaudid only for breakthrough  If abdominal pain does not improve we will make n.p.o. for bowel rest  CIWA monitoring per protocol  Start thiamine and folic acid  Antiemetics as needed  Extensively counseled on EtOH use cessation    #GERD  Continue PPI    #MDD  Continue home dose of Lexapro    VTE Prophylaxis:  Pharmacologic VTE prophylaxis orders are present.             Disposition Planning:     Barriers to Discharge: Medical workup  Anticipated Date of Discharge: 4/5  Place of Discharge: home      Time: 45 minutes     Code Status and Medical Interventions: CPR (Attempt to Resuscitate); Full Support   Ordered at: 04/02/25 1314     Code Status (Patient has no pulse and is not breathing):    CPR (Attempt to Resuscitate)     Medical Interventions (Patient has pulse or is breathing):    Full Support       Signature: Electronically signed by Dayne Toribio MD, 04/03/25, 13:57 EDT.  [facility] Hospitalist Team

## 2025-04-03 NOTE — PAYOR COMM NOTE
"CLINICALS FOR IP ADMISSION ON 25  -------------------------------------------------------------------------------  AUTHORIZATION PENDING:   PLEASE FAX OR CALL DETERMINATION TO CONTACT BELOW:     THANK YOU,  Felicia Vazquez RN  Utilization Review  [facility]  Phone: [phone]  Fax: [phone]  --------------------------------  NPI: [id number]  Tax ID: [id number]  --------------------------------  Shirin Metz (34 y.o. Female)       Date of Birth   1990    Social Security Number       Address   [address] [place] [address] [place] [address]    Home Phone   [phone]    MRN   [id number]       Scientologist   Patient Refused    Marital Status   Single                            Admission Date   2025    Admission Type   Emergency    Admitting Provider   Celestino Castellano DO    Attending Provider   Dayne Toribio MD    Department, Room/Bed   [facility] MEDICAL INPATIENT, 377/B       Discharge Date       Discharge Disposition       Discharge Destination                                 Attending Provider: Dayne Toribio MD    Allergies: Latex, Penicillins    Isolation: None   Infection: None   Code Status: CPR    Ht: 160 cm (63\")   Wt: 41.9 kg (92 lb 6 oz)    Admission Cmt: None   Principal Problem: Pancreatitis [K85.90]                   Active Insurance as of 2025       Primary Coverage       Payor Plan Insurance Group Employer/Plan Group    [employer] BLUE CROSS BLUE SHIELD PPO [id number]       Payor Plan Address Payor Plan Phone Number Payor Plan Fax Number Effective Dates    [address] [phone]  2021 - None Entered    [facility] [id number]         Subscriber Name Subscriber Birth Date Member ID       SHIRIN METZ 1990 [id number]                     Emergency Contacts        (Rel.) Home Phone Work Phone Mobile Phone    SHANNAN SAMPSON (Other) -- -- [phone]                 History & Physical        Celestino Castellano, " DO at 25 1331              [facility]  History & Physical    Patient Name: Shirin Canales  : 1990  MRN: [id number]  Primary Care Physician:  Provider, No Known  Date of admission: 2025  Date and Time of Service: 2025 at 1:32 PM    Subjective      Chief Complaint: Abdominal pain    History of Present Illness: Shirin Canales is a 34 y.o. female with a CMH of alcoholism who presented to [facility] on 2025 with abdominal pain.    She is seen in the ED room 10 at time of exam.  She states that since  she has had increasing abdominal pain which was midepigastric in nature.  She is also having dry heaving.  She is asking for some clear liquids at this time.  She claims she has not had anything to drink since Saturday evening.  She states these episodes have been becoming more frequent but this episode is not as bad as the previous ones.  We discussed that she does have acute on chronic pancreatitis and had some bowel rest with clear liquids and high-volume IV fluids will likely benefit her      Review of Systems  Constitutional: No fevers, chills, sweats  Eye: No recent visual problems, eye discharge, eye pain, redness  HEENT: No ear pain, nasal congestion, sore throat, voice changes  Respiratory: No shortness of breath, cough, pain on breathing, sputum production  Cardiovascular: No Chest pain, palpitations, syncope, orthopnea  Gastrointestinal: Positive nausea, positive vomiting, diarrhea, constipation  Genitourinary: No hematuria, dysuria, incontinence  Hema/Lymph: Negative for bruising tendency, swollen lymph glands, nosebleeds  Endocrine: Negative for excessive thirst, excessive hunger, excessive urination  Musculoskeletal: No back pain, neck pain, joint pain, muscle pain, decreased range of motion  Integumentary: No rash, pruritus, abrasions, lesions  Neurologic: No weakness, numbness, frequent headaches, tremors  Psychiatric: No anxiety, depression,  mood changes, hallucinations      Personal History     No past medical history on file.  Major depressive disorder    No past surgical history on file.    Family History: family history is not on file. Otherwise pertinent FHx was reviewed and not pertinent to current issue.    Social History:  reports that she has been smoking cigarettes. She started smoking about 7 weeks ago. She has been smoking an average 0.3 packs per day for the past 0.2 years. She has never used smokeless tobacco. She reports current alcohol use of about 3.0 standard drinks of alcohol per week. She reports that she does not currently use drugs.    Home Medications:  Prior to Admission Medications       Prescriptions Last Dose Informant Patient Reported? Taking?    escitalopram (LEXAPRO) 10 MG tablet   Yes No    Take 1 tablet by mouth Daily.    hydrOXYzine (ATARAX) 25 MG tablet   Yes No    Take 1-2 tablets by mouth 3 (Three) Times a Day As Needed for Anxiety.    ibuprofen (ADVIL,MOTRIN) 200 MG tablet   Yes No    Take 1 tablet by mouth Every 6 (Six) Hours As Needed for Mild Pain.    ondansetron (ZOFRAN) 4 MG tablet   No No    Take 1 tablet by mouth Every 8 (Eight) Hours As Needed for Nausea or Vomiting.    pantoprazole (Protonix) 40 MG EC tablet   No No    Take 1 tablet by mouth Daily.              Allergies:  Allergies   Allergen Reactions    Latex Unknown - Low Severity    Penicillins Unknown - High Severity       Objective      Vitals:   Temp:  [97.5 °F (36.4 °C)] 97.5 °F (36.4 °C)  Heart Rate:  [] 81  Resp:  [18-20] 18  BP: (137-149)/(100-107) 147/106  Body mass index is 16.36 kg/m².  Physical Exam  Constitutional:       Appearance: She is normal weight. She is ill-appearing.   HENT:      Head: Normocephalic and atraumatic.      Nose: Nose normal.      Mouth/Throat:      Mouth: Mucous membranes are dry.      Pharynx: Oropharynx is clear.   Eyes:      Extraocular Movements: Extraocular movements intact.      Conjunctiva/sclera:  Conjunctivae normal.   Cardiovascular:      Rate and Rhythm: Regular rhythm. Tachycardia present.      Pulses: Normal pulses.      Heart sounds: Normal heart sounds.   Pulmonary:      Effort: Pulmonary effort is normal.      Breath sounds: Normal breath sounds.   Abdominal:      General: Abdomen is flat. Bowel sounds are normal.      Palpations: Abdomen is soft.   Skin:     General: Skin is warm and dry.   Neurological:      General: No focal deficit present.      Mental Status: She is alert and oriented to person, place, and time. Mental status is at baseline.   Psychiatric:         Mood and Affect: Mood normal.         Behavior: Behavior normal.         Thought Content: Thought content normal.         Judgment: Judgment normal.         Diagnostic Data:  Lab Results (last 24 hours)       Procedure Component Value Units Date/Time    Ethanol [[id number]] Collected: 04/02/25 1253    Specimen: Blood Updated: 04/02/25 1318     Ethanol % <0.010 %     Narrative:      Plasma Ethanol Clinical Symptoms:    ETOH (%)               Clinical Symptom  .01-.05              No apparent influence  .03-.12              Euphoria, Diminished judgment and attention   .09-.25              Impaired comprehension, Muscle incoordination  .18-.30              Confusion, Staggered gait, Slurred speech  .25-.40              Markedly decreased response to stimuli, unable to stand or                        walk, vomitting, sleep or stupor  .35-.50              Comatose, Anesthesia, Subnormal body temperature        RSV PCR - Swab, Nasopharynx [[id number]] Collected: 04/02/25 1107    Specimen: Swab from Nasopharynx Updated: 04/02/25 1316    hCG, Quantitative, Pregnancy [[id number]] Collected: 04/02/25 1106    Specimen: Blood Updated: 04/02/25 1155     HCG Quantitative <1.00 mIU/mL     Narrative:      HCG Ranges by Gestational Age    Females - non-pregnant premenopausal   </= 1mIU/mL HCG  Females - postmenopausal               </= 7mIU/mL HCG    3  Weeks       5.4   -      72 mIU/mL  4 Weeks      10.2   -     708 mIU/mL  5 Weeks       217   -   8,245 mIU/mL  6 Weeks       152   -  32,177 mIU/mL  7 Weeks     4,059   - 153,767 mIU/mL  8 Weeks    31,366   - 149,094 mIU/mL  9 Weeks    59,109   - 135,901 mIU/mL  10 Weeks   44,186   - 170,409 mIU/mL  12 Weeks   27,107   - 201,615 mIU/mL  14 Weeks   24,302   -  93,646 mIU/mL  15 Weeks   12,540   -  69,747 mIU/mL  16 Weeks    8,904   -  55,332 mIU/mL  17 Weeks    8,240   -  51,793 mIU/mL  18 Weeks    9,649   -  55,271 mIU/mL      Lipase [[id number]]  (Abnormal) Collected: 04/02/25 1106    Specimen: Blood Updated: 04/02/25 1153     Lipase 824 U/L     COVID-19 and FLU A/B PCR, 1 HR TAT - Swab, Nasopharynx [[id number]]  (Normal) Collected: 04/02/25 1107    Specimen: Swab from Nasopharynx Updated: 04/02/25 1136     COVID19 Not Detected     Influenza A PCR Not Detected     Influenza B PCR Not Detected    Narrative:      Fact sheet for providers: https://www.fda.gov/media/247084/download    Fact sheet for patients: https://www.fda.gov/media/335441/download    Test performed by PCR.    Comprehensive Metabolic Panel [[id number]]  (Abnormal) Collected: 04/02/25 1106    Specimen: Blood Updated: 04/02/25 1135     Glucose 134 mg/dL      BUN 29 mg/dL      Creatinine 0.63 mg/dL      Sodium 137 mmol/L      Potassium 3.9 mmol/L      Chloride 99 mmol/L      CO2 20.6 mmol/L      Calcium 10.4 mg/dL      Total Protein 8.7 g/dL      Albumin 5.0 g/dL      ALT (SGPT) 73 U/L      AST (SGOT) 99 U/L      Alkaline Phosphatase 159 U/L      Total Bilirubin 1.5 mg/dL      Globulin 3.7 gm/dL      A/G Ratio 1.4 g/dL      BUN/Creatinine Ratio 46.0     Anion Gap 17.4 mmol/L      eGFR 120.3 mL/min/1.73     Narrative:      GFR Categories in Chronic Kidney Disease (CKD)      GFR Category          GFR (mL/min/1.73)    Interpretation  G1                     90 or greater         Normal or high (1)  G2                      60-89                Mild decrease  (1)  G3a                   45-59                Mild to moderate decrease  G3b                   30-44                Moderate to severe decrease  G4                    15-29                Severe decrease  G5                    14 or less           Kidney failure          (1)In the absence of evidence of kidney disease, neither GFR category G1 or G2 fulfill the criteria for CKD.    eGFR calculation 2021 CKD-EPI creatinine equation, which does not include race as a factor    Extra Tubes [[id number]] Collected: 04/02/25 1106    Specimen: Blood Updated: 04/02/25 1115    Narrative:      The following orders were created for panel order Extra Tubes.  Procedure                               Abnormality         Status                     ---------                               -----------         ------                     Gold Top - SST[[id number]]                                   Final result                 Please view results for these tests on the individual orders.    Gold Top - SST [[id number]] Collected: 04/02/25 1106    Specimen: Blood Updated: 04/02/25 1115     Extra Tube Hold for add-ons.     Comment: Auto resulted.       CBC & Differential [[id number]]  (Abnormal) Collected: 04/02/25 1106    Specimen: Blood Updated: 04/02/25 1113    Narrative:      The following orders were created for panel order CBC & Differential.  Procedure                               Abnormality         Status                     ---------                               -----------         ------                     CBC Auto Differential[[id number]]        Abnormal            Final result                 Please view results for these tests on the individual orders.    CBC Auto Differential [[id number]]  (Abnormal) Collected: 04/02/25 1106    Specimen: Blood Updated: 04/02/25 1113     WBC 8.85 10*3/mm3      RBC 3.52 10*6/mm3      Hemoglobin 12.1 g/dL      Hematocrit 36.0 %      .3 fL      MCH 34.4 pg      MCHC 33.6 g/dL      RDW 12.8 %       RDW-SD 48.2 fl      MPV 8.7 fL      Platelets 119 10*3/mm3      Neutrophil % 85.1 %      Lymphocyte % 6.7 %      Monocyte % 7.2 %      Eosinophil % 0.1 %      Basophil % 0.2 %      Immature Grans % 0.7 %      Neutrophils, Absolute 7.53 10*3/mm3      Lymphocytes, Absolute 0.59 10*3/mm3      Monocytes, Absolute 0.64 10*3/mm3      Eosinophils, Absolute 0.01 10*3/mm3      Basophils, Absolute 0.02 10*3/mm3      Immature Grans, Absolute 0.06 10*3/mm3      nRBC 0.0 /100 WBC              Imaging Results (Last 24 Hours)       Procedure Component Value Units Date/Time    CT Abdomen Pelvis With Contrast [[id number]] Collected: 04/02/25 1237     Updated: 04/02/25 1250    Narrative:      CT ABDOMEN PELVIS W CONTRAST    Date of Exam: 4/2/2025 12:25 PM EDT    Indication: Diffuse abdominal pain vomiting.    Comparison: 2/7/2025 and prior    Technique: Axial CT images were obtained of the abdomen and pelvis following the uneventful intravenous administration of iodinated contrast. Sagittal and coronal reconstructions were performed.  Automated exposure control and iterative reconstruction   methods were used.          FINDINGS:    Abdomen/Pelvis:    Lower Chest: Limited imaging of the lung bases is grossly clear.    There is no free air identified below the diaphragm.    Organs: Dependent densities layering within the gallbladder suggesting stones or sludge. Gallbladder is otherwise grossly unremarkable on limited imaging. The liver is mildly enlarged and diffusely decreased in attenuation compatible hepatic steatosis.   The spleen appears grossly unremarkable in appearance. Small amount of fluid noted along the pancreas more prominent in the left upper quadrant. This is improved from comparison. There is no well-defined fluid collection to suggest abscess or pseudocyst.   Small lymph nodes within the upper abdomen are likely reactive. Adrenal glands appear grossly unremarkable in appearance. The kidneys enhance symmetrically.  Low-attenuation lesions compatible with cyst or too small to characterize are again noted   bilaterally. There is no evidence of obstructive uropathy.    GI/Bowel: The stomach is decompressed and otherwise grossly unremarkable in appearance. The small bowel demonstrates no evidence of obstruction. Ileocecal valve demonstrates no acute abnormality. The appendix appears grossly unremarkable on limited   imaging. Colon is relatively decompressed. Some mild stranding noted of the transverse and descending colon. Underlying colitis not excluded    Pelvis: Urinary bladder is grossly unremarkable. The uterus and ovaries demonstrate no acute abnormality. Trace amount of fluid noted within the pelvis    Peritoneum/Retroperitoneum: The aorta is normal in caliber. There is small lymph nodes appreciated within the retroperitoneum likely reactive.    Bones/Soft Tissues: No acute osseous abnormality.      Impression:      1.Small amount of fluid noted along the pancreas more prominent in the left upper quadrant. Findings compatible with acute pancreatitis. No evidence of pseudocyst or abscess noted. Overall findings appearing improved from comparison.  2.Severe diffuse hepatic steatosis. Please correlate with liver function test.  3.Mild stranding of the transverse and descending colon. Underlying colitis not excluded.  4.Cholelithiasis.  5.Other findings as above.        Electronically Signed: Barber Matson MD    4/2/2025 12:48 PM EDT    Workstation ID: [id number]              Assessment & Plan        This is a 34 y.o. female with: Pancreatitis    # Acute on chronic pancreatitis and metabolic acidosis with mild colitis  -Secondary to chronic alcoholism  -No cholecystitis on CT  -Start clear liquid diet  -Nausea management  -Pain management  -Start sodium bicarb at 150 cc an hour x 12 hours  -Repeat BMP in a.m.    # GERD  -Continue Protonix at home dose of frequency    # Major depressive disorder  -Resume home Lexapro at home  dose and frequency      VTE Prophylaxis:  Pharmacologic VTE prophylaxis orders are present.        The patient desires to be as follows:    CODE STATUS:    Code Status (Patient has no pulse and is not breathing): CPR (Attempt to Resuscitate)  Medical Interventions (Patient has pulse or is breathing): Full Support          Admission Status:  I believe this patient meets inpatient status.    Expected Length of Stay: 2 midnights or more    PDMP and Medication Dispenses via Sidebar reviewed and consistent with patient reported medications.    I discussed the patient's findings and my recommendations with patient.      Signature:     This document has been electronically signed by Celestino Castellano DO on April 2, 2025 13:31 EDT   [facility]     Electronically signed by Celestino Castellano DO at 04/02/25 1339       Lab Results (last 24 hours)       Procedure Component Value Units Date/Time    Basic Metabolic Panel [[id number]]  (Abnormal) Collected: 04/03/25 0119    Specimen: Blood from Arm, Right Updated: 04/03/25 0142     Glucose 131 mg/dL      BUN 16 mg/dL      Creatinine 0.52 mg/dL      Sodium 135 mmol/L      Potassium 3.5 mmol/L      Comment: Specimen hemolyzed.  Result may be falsely elevated.        Chloride 96 mmol/L      CO2 27.6 mmol/L      Calcium 9.8 mg/dL      BUN/Creatinine Ratio 30.8     Anion Gap 11.4 mmol/L      eGFR 126.0 mL/min/1.73     Narrative:      GFR Categories in Chronic Kidney Disease (CKD)      GFR Category          GFR (mL/min/1.73)    Interpretation  G1                     90 or greater         Normal or high (1)  G2                      60-89                Mild decrease (1)  G3a                   45-59                Mild to moderate decrease  G3b                   30-44                Moderate to severe decrease  G4                    15-29                Severe decrease  G5                    14 or less           Kidney failure          (1)In the absence of  evidence of kidney disease, neither GFR category G1 or G2 fulfill the criteria for CKD.    eGFR calculation 2021 CKD-EPI creatinine equation, which does not include race as a factor    CBC (No Diff) [[id number]]  (Abnormal) Collected: 04/03/25 0119    Specimen: Blood from Arm, Right Updated: 04/03/25 0126     WBC 8.32 10*3/mm3      RBC 3.07 10*6/mm3      Hemoglobin 10.3 g/dL      Hematocrit 31.0 %      .0 fL      MCH 33.6 pg      MCHC 33.2 g/dL      RDW 12.7 %      RDW-SD 47.1 fl      MPV 8.8 fL      Platelets 100 10*3/mm3     Urinalysis, Microscopic Only - Urine, Clean Catch [[id number]]  (Abnormal) Collected: 04/02/25 1547    Specimen: Urine, Clean Catch Updated: 04/02/25 1636     RBC, UA None Seen /HPF      WBC, UA 0-2 /HPF      Comment: Urine culture not indicated.        Bacteria, UA 1+ /HPF      Squamous Epithelial Cells, UA 7-12 /HPF      Hyaline Casts, UA None Seen /LPF      Methodology Manual Light Microscopy    Urinalysis With Culture If Indicated - Urine, Clean Catch [[id number]]  (Abnormal) Collected: 04/02/25 1547    Specimen: Urine, Clean Catch Updated: 04/02/25 1622     Color, UA Yellow     Appearance, UA Clear     pH, UA 6.5     Specific Gravity, UA 1.083     Glucose, UA Negative     Ketones, UA 40 mg/dL (2+)     Bilirubin, UA Negative     Blood, UA Large (3+)     Protein, UA 30 mg/dL (1+)     Leuk Esterase, UA Negative     Nitrite, UA Negative     Urobilinogen, UA 1.0 E.U./dL    Narrative:      In absence of clinical symptoms, the presence of pyuria, bacteria, and/or nitrites on the urinalysis result does not correlate with infection.    Pregnancy, Urine - Urine, Clean Catch [[id number]]  (Normal) Collected: 04/02/25 1547    Specimen: Urine, Clean Catch Updated: 04/02/25 1558     HCG, Urine QL Negative    RSV PCR - Swab, Nasopharynx [[id number]]  (Normal) Collected: 04/02/25 1107    Specimen: Swab from Nasopharynx Updated: 04/02/25 1403     RSV, PCR Not Detected    Ethanol [[id number]] Collected:  04/02/25 1253    Specimen: Blood Updated: 04/02/25 1318     Ethanol % <0.010 %     Narrative:      Plasma Ethanol Clinical Symptoms:    ETOH (%)               Clinical Symptom  .01-.05              No apparent influence  .03-.12              Euphoria, Diminished judgment and attention   .09-.25              Impaired comprehension, Muscle incoordination  .18-.30              Confusion, Staggered gait, Slurred speech  .25-.40              Markedly decreased response to stimuli, unable to stand or                        walk, vomitting, sleep or stupor  .35-.50              Comatose, Anesthesia, Subnormal body temperature        hCG, Quantitative, Pregnancy [[id number]] Collected: 04/02/25 1106    Specimen: Blood Updated: 04/02/25 1155     HCG Quantitative <1.00 mIU/mL     Narrative:      HCG Ranges by Gestational Age    Females - non-pregnant premenopausal   </= 1mIU/mL HCG  Females - postmenopausal               </= 7mIU/mL HCG    3 Weeks       5.4   -      72 mIU/mL  4 Weeks      10.2   -     708 mIU/mL  5 Weeks       217   -   8,245 mIU/mL  6 Weeks       152   -  32,177 mIU/mL  7 Weeks     4,059   - 153,767 mIU/mL  8 Weeks    31,366   - 149,094 mIU/mL  9 Weeks    59,109   - 135,901 mIU/mL  10 Weeks   44,186   - 170,409 mIU/mL  12 Weeks   27,107   - 201,615 mIU/mL  14 Weeks   24,302   -  93,646 mIU/mL  15 Weeks   12,540   -  69,747 mIU/mL  16 Weeks    8,904   -  55,332 mIU/mL  17 Weeks    8,240   -  51,793 mIU/mL  18 Weeks    9,649   -  55,271 mIU/mL      Lipase [[id number]]  (Abnormal) Collected: 04/02/25 1106    Specimen: Blood Updated: 04/02/25 1153     Lipase 824 U/L     COVID-19 and FLU A/B PCR, 1 HR TAT - Swab, Nasopharynx [[id number]]  (Normal) Collected: 04/02/25 1107    Specimen: Swab from Nasopharynx Updated: 04/02/25 1136     COVID19 Not Detected     Influenza A PCR Not Detected     Influenza B PCR Not Detected    Narrative:      Fact sheet for providers: https://www.fda.gov/media/878564/download    Fact  sheet for patients: https://www.MOG.gov/media/403156/download    Test performed by PCR.    Comprehensive Metabolic Panel [[id number]]  (Abnormal) Collected: 04/02/25 1106    Specimen: Blood Updated: 04/02/25 1135     Glucose 134 mg/dL      BUN 29 mg/dL      Creatinine 0.63 mg/dL      Sodium 137 mmol/L      Potassium 3.9 mmol/L      Chloride 99 mmol/L      CO2 20.6 mmol/L      Calcium 10.4 mg/dL      Total Protein 8.7 g/dL      Albumin 5.0 g/dL      ALT (SGPT) 73 U/L      AST (SGOT) 99 U/L      Alkaline Phosphatase 159 U/L      Total Bilirubin 1.5 mg/dL      Globulin 3.7 gm/dL      A/G Ratio 1.4 g/dL      BUN/Creatinine Ratio 46.0     Anion Gap 17.4 mmol/L      eGFR 120.3 mL/min/1.73     Narrative:      GFR Categories in Chronic Kidney Disease (CKD)      GFR Category          GFR (mL/min/1.73)    Interpretation  G1                     90 or greater         Normal or high (1)  G2                      60-89                Mild decrease (1)  G3a                   45-59                Mild to moderate decrease  G3b                   30-44                Moderate to severe decrease  G4                    15-29                Severe decrease  G5                    14 or less           Kidney failure          (1)In the absence of evidence of kidney disease, neither GFR category G1 or G2 fulfill the criteria for CKD.    eGFR calculation 2021 CKD-EPI creatinine equation, which does not include race as a factor    Extra Tubes [[id number]] Collected: 04/02/25 1106    Specimen: Blood Updated: 04/02/25 1115    Narrative:      The following orders were created for panel order Extra Tubes.  Procedure                               Abnormality         Status                     ---------                               -----------         ------                     Gold Top - SST[[id number]]                                   Final result                 Please view results for these tests on the individual orders.    Gold Top - SST  [[id number]] Collected: 04/02/25 1106    Specimen: Blood Updated: 04/02/25 1115     Extra Tube Hold for add-ons.     Comment: Auto resulted.       CBC & Differential [[id number]]  (Abnormal) Collected: 04/02/25 1106    Specimen: Blood Updated: 04/02/25 1113    Narrative:      The following orders were created for panel order CBC & Differential.  Procedure                               Abnormality         Status                     ---------                               -----------         ------                     CBC Auto Differential[[id number]]        Abnormal            Final result                 Please view results for these tests on the individual orders.    CBC Auto Differential [[id number]]  (Abnormal) Collected: 04/02/25 1106    Specimen: Blood Updated: 04/02/25 1113     WBC 8.85 10*3/mm3      RBC 3.52 10*6/mm3      Hemoglobin 12.1 g/dL      Hematocrit 36.0 %      .3 fL      MCH 34.4 pg      MCHC 33.6 g/dL      RDW 12.8 %      RDW-SD 48.2 fl      MPV 8.7 fL      Platelets 119 10*3/mm3      Neutrophil % 85.1 %      Lymphocyte % 6.7 %      Monocyte % 7.2 %      Eosinophil % 0.1 %      Basophil % 0.2 %      Immature Grans % 0.7 %      Neutrophils, Absolute 7.53 10*3/mm3      Lymphocytes, Absolute 0.59 10*3/mm3      Monocytes, Absolute 0.64 10*3/mm3      Eosinophils, Absolute 0.01 10*3/mm3      Basophils, Absolute 0.02 10*3/mm3      Immature Grans, Absolute 0.06 10*3/mm3      nRBC 0.0 /100 WBC           Imaging Results (Last 24 Hours)       Procedure Component Value Units Date/Time    CT Abdomen Pelvis With Contrast [[id number]] Collected: 04/02/25 1237     Updated: 04/02/25 1250    Narrative:      CT ABDOMEN PELVIS W CONTRAST    Date of Exam: 4/2/2025 12:25 PM EDT    Indication: Diffuse abdominal pain vomiting.    Comparison: 2/7/2025 and prior    Technique: Axial CT images were obtained of the abdomen and pelvis following the uneventful intravenous administration of iodinated contrast. Sagittal and  coronal reconstructions were performed.  Automated exposure control and iterative reconstruction   methods were used.          FINDINGS:    Abdomen/Pelvis:    Lower Chest: Limited imaging of the lung bases is grossly clear.    There is no free air identified below the diaphragm.    Organs: Dependent densities layering within the gallbladder suggesting stones or sludge. Gallbladder is otherwise grossly unremarkable on limited imaging. The liver is mildly enlarged and diffusely decreased in attenuation compatible hepatic steatosis.   The spleen appears grossly unremarkable in appearance. Small amount of fluid noted along the pancreas more prominent in the left upper quadrant. This is improved from comparison. There is no well-defined fluid collection to suggest abscess or pseudocyst.   Small lymph nodes within the upper abdomen are likely reactive. Adrenal glands appear grossly unremarkable in appearance. The kidneys enhance symmetrically. Low-attenuation lesions compatible with cyst or too small to characterize are again noted   bilaterally. There is no evidence of obstructive uropathy.    GI/Bowel: The stomach is decompressed and otherwise grossly unremarkable in appearance. The small bowel demonstrates no evidence of obstruction. Ileocecal valve demonstrates no acute abnormality. The appendix appears grossly unremarkable on limited   imaging. Colon is relatively decompressed. Some mild stranding noted of the transverse and descending colon. Underlying colitis not excluded    Pelvis: Urinary bladder is grossly unremarkable. The uterus and ovaries demonstrate no acute abnormality. Trace amount of fluid noted within the pelvis    Peritoneum/Retroperitoneum: The aorta is normal in caliber. There is small lymph nodes appreciated within the retroperitoneum likely reactive.    Bones/Soft Tissues: No acute osseous abnormality.      Impression:      1.Small amount of fluid noted along the pancreas more prominent in the left  upper quadrant. Findings compatible with acute pancreatitis. No evidence of pseudocyst or abscess noted. Overall findings appearing improved from comparison.  2.Severe diffuse hepatic steatosis. Please correlate with liver function test.  3.Mild stranding of the transverse and descending colon. Underlying colitis not excluded.  4.Cholelithiasis.  5.Other findings as above.        Electronically Signed: Barber Matson MD    4/2/2025 12:48 PM EDT    Workstation ID: [id number]

## 2025-04-04 ENCOUNTER — INPATIENT HOSPITAL (OUTPATIENT)
Dept: URBAN - METROPOLITAN AREA HOSPITAL 84 | Facility: HOSPITAL | Age: 35
End: 2025-04-04

## 2025-04-04 ENCOUNTER — ANESTHESIA EVENT (OUTPATIENT)
Dept: PERIOP | Facility: HOSPITAL | Age: 35
End: 2025-04-04
Payer: COMMERCIAL

## 2025-04-04 ENCOUNTER — APPOINTMENT (OUTPATIENT)
Dept: MRI IMAGING | Facility: HOSPITAL | Age: 35
End: 2025-04-04
Payer: COMMERCIAL

## 2025-04-04 DIAGNOSIS — F10.10 ALCOHOL ABUSE, UNCOMPLICATED: ICD-10-CM

## 2025-04-04 DIAGNOSIS — R94.5 ABNORMAL RESULTS OF LIVER FUNCTION STUDIES: ICD-10-CM

## 2025-04-04 DIAGNOSIS — K85.20 ALCOHOL INDUCED ACUTE PANCREATITIS WITHOUT NECROSIS OR INFEC: ICD-10-CM

## 2025-04-04 DIAGNOSIS — K86.0 ALCOHOL-INDUCED CHRONIC PANCREATITIS: ICD-10-CM

## 2025-04-04 DIAGNOSIS — K76.0 FATTY (CHANGE OF) LIVER, NOT ELSEWHERE CLASSIFIED: ICD-10-CM

## 2025-04-04 DIAGNOSIS — K80.80 OTHER CHOLELITHIASIS WITHOUT OBSTRUCTION: ICD-10-CM

## 2025-04-04 LAB
ABO GROUP BLD: NORMAL
ALBUMIN SERPL-MCNC: 3.4 G/DL (ref 3.5–5.2)
ALBUMIN SERPL-MCNC: 3.9 G/DL (ref 3.5–5.2)
ALBUMIN SERPL-MCNC: 4 G/DL (ref 3.5–5.2)
ALBUMIN/GLOB SERPL: 1.1 G/DL
ALBUMIN/GLOB SERPL: 1.3 G/DL
ALBUMIN/GLOB SERPL: 1.4 G/DL
ALP SERPL-CCNC: 138 U/L (ref 39–117)
ALP SERPL-CCNC: 140 U/L (ref 39–117)
ALP SERPL-CCNC: 145 U/L (ref 39–117)
ALT SERPL W P-5'-P-CCNC: 67 U/L (ref 1–33)
ALT SERPL W P-5'-P-CCNC: 72 U/L (ref 1–33)
ALT SERPL W P-5'-P-CCNC: 83 U/L (ref 1–33)
AMYLASE SERPL-CCNC: 175 U/L (ref 28–100)
ANION GAP SERPL CALCULATED.3IONS-SCNC: 10.1 MMOL/L (ref 5–15)
ANION GAP SERPL CALCULATED.3IONS-SCNC: 11.7 MMOL/L (ref 5–15)
ANION GAP SERPL CALCULATED.3IONS-SCNC: 9.2 MMOL/L (ref 5–15)
AST SERPL-CCNC: 149 U/L (ref 1–32)
AST SERPL-CCNC: 156 U/L (ref 1–32)
AST SERPL-CCNC: 178 U/L (ref 1–32)
BASOPHILS # BLD AUTO: 0.02 10*3/MM3 (ref 0–0.2)
BASOPHILS # BLD AUTO: 0.02 10*3/MM3 (ref 0–0.2)
BASOPHILS NFR BLD AUTO: 0.4 % (ref 0–1.5)
BASOPHILS NFR BLD AUTO: 0.5 % (ref 0–1.5)
BILIRUB SERPL-MCNC: 1.3 MG/DL (ref 0–1.2)
BILIRUB SERPL-MCNC: 1.5 MG/DL (ref 0–1.2)
BILIRUB SERPL-MCNC: 1.8 MG/DL (ref 0–1.2)
BLD GP AB SCN SERPL QL: NEGATIVE
BUN SERPL-MCNC: 5 MG/DL (ref 6–20)
BUN SERPL-MCNC: 6 MG/DL (ref 6–20)
BUN SERPL-MCNC: 6 MG/DL (ref 6–20)
BUN/CREAT SERPL: 12.2 (ref 7–25)
BUN/CREAT SERPL: 12.8 (ref 7–25)
BUN/CREAT SERPL: 15 (ref 7–25)
CALCIUM SPEC-SCNC: 8.9 MG/DL (ref 8.6–10.5)
CALCIUM SPEC-SCNC: 9.3 MG/DL (ref 8.6–10.5)
CALCIUM SPEC-SCNC: 9.4 MG/DL (ref 8.6–10.5)
CHLORIDE SERPL-SCNC: 100 MMOL/L (ref 98–107)
CHLORIDE SERPL-SCNC: 96 MMOL/L (ref 98–107)
CHLORIDE SERPL-SCNC: 99 MMOL/L (ref 98–107)
CO2 SERPL-SCNC: 27.3 MMOL/L (ref 22–29)
CO2 SERPL-SCNC: 29.8 MMOL/L (ref 22–29)
CO2 SERPL-SCNC: 29.9 MMOL/L (ref 22–29)
CREAT SERPL-MCNC: 0.4 MG/DL (ref 0.57–1)
CREAT SERPL-MCNC: 0.41 MG/DL (ref 0.57–1)
CREAT SERPL-MCNC: 0.47 MG/DL (ref 0.57–1)
CRP SERPL-MCNC: 4.09 MG/DL (ref 0–0.5)
CRP SERPL-MCNC: 5.44 MG/DL (ref 0–0.5)
CRP SERPL-MCNC: 6.48 MG/DL (ref 0–0.5)
DEPRECATED RDW RBC AUTO: 46.5 FL (ref 37–54)
DEPRECATED RDW RBC AUTO: 47.3 FL (ref 37–54)
EGFRCR SERPLBLD CKD-EPI 2021: 129.1 ML/MIN/1.73
EGFRCR SERPLBLD CKD-EPI 2021: 133.4 ML/MIN/1.73
EGFRCR SERPLBLD CKD-EPI 2021: 134.2 ML/MIN/1.73
EOSINOPHIL # BLD AUTO: 0.02 10*3/MM3 (ref 0–0.4)
EOSINOPHIL # BLD AUTO: 0.07 10*3/MM3 (ref 0–0.4)
EOSINOPHIL NFR BLD AUTO: 0.5 % (ref 0.3–6.2)
EOSINOPHIL NFR BLD AUTO: 1.6 % (ref 0.3–6.2)
ERYTHROCYTE [DISTWIDTH] IN BLOOD BY AUTOMATED COUNT: 12.2 % (ref 12.3–15.4)
ERYTHROCYTE [DISTWIDTH] IN BLOOD BY AUTOMATED COUNT: 12.3 % (ref 12.3–15.4)
GLOBULIN UR ELPH-MCNC: 2.8 GM/DL
GLOBULIN UR ELPH-MCNC: 3 GM/DL
GLOBULIN UR ELPH-MCNC: 3.2 GM/DL
GLUCOSE SERPL-MCNC: 115 MG/DL (ref 65–99)
GLUCOSE SERPL-MCNC: 79 MG/DL (ref 65–99)
GLUCOSE SERPL-MCNC: 83 MG/DL (ref 65–99)
HCT VFR BLD AUTO: 27.1 % (ref 34–46.6)
HCT VFR BLD AUTO: 27.6 % (ref 34–46.6)
HGB BLD-MCNC: 8.9 G/DL (ref 12–15.9)
HGB BLD-MCNC: 9 G/DL (ref 12–15.9)
IMM GRANULOCYTES # BLD AUTO: 0.01 10*3/MM3 (ref 0–0.05)
IMM GRANULOCYTES # BLD AUTO: 0.02 10*3/MM3 (ref 0–0.05)
IMM GRANULOCYTES NFR BLD AUTO: 0.3 % (ref 0–0.5)
IMM GRANULOCYTES NFR BLD AUTO: 0.4 % (ref 0–0.5)
LIPASE SERPL-CCNC: 135 U/L (ref 13–60)
LIPASE SERPL-CCNC: 197 U/L (ref 13–60)
LIPASE SERPL-CCNC: 225 U/L (ref 13–60)
LYMPHOCYTES # BLD AUTO: 1.11 10*3/MM3 (ref 0.7–3.1)
LYMPHOCYTES # BLD AUTO: 1.11 10*3/MM3 (ref 0.7–3.1)
LYMPHOCYTES NFR BLD AUTO: 24.8 % (ref 19.6–45.3)
LYMPHOCYTES NFR BLD AUTO: 28.2 % (ref 19.6–45.3)
MAGNESIUM SERPL-MCNC: 1.4 MG/DL (ref 1.6–2.6)
MAGNESIUM SERPL-MCNC: 1.6 MG/DL (ref 1.6–2.6)
MCH RBC QN AUTO: 33.8 PG (ref 26.6–33)
MCH RBC QN AUTO: 34 PG (ref 26.6–33)
MCHC RBC AUTO-ENTMCNC: 32.6 G/DL (ref 31.5–35.7)
MCHC RBC AUTO-ENTMCNC: 32.8 G/DL (ref 31.5–35.7)
MCV RBC AUTO: 103 FL (ref 79–97)
MCV RBC AUTO: 104.2 FL (ref 79–97)
MONOCYTES # BLD AUTO: 0.42 10*3/MM3 (ref 0.1–0.9)
MONOCYTES # BLD AUTO: 0.43 10*3/MM3 (ref 0.1–0.9)
MONOCYTES NFR BLD AUTO: 10.9 % (ref 5–12)
MONOCYTES NFR BLD AUTO: 9.4 % (ref 5–12)
NEUTROPHILS NFR BLD AUTO: 2.34 10*3/MM3 (ref 1.7–7)
NEUTROPHILS NFR BLD AUTO: 2.84 10*3/MM3 (ref 1.7–7)
NEUTROPHILS NFR BLD AUTO: 59.6 % (ref 42.7–76)
NEUTROPHILS NFR BLD AUTO: 63.4 % (ref 42.7–76)
NRBC BLD AUTO-RTO: 0 /100 WBC (ref 0–0.2)
NRBC BLD AUTO-RTO: 0 /100 WBC (ref 0–0.2)
PHOSPHATE SERPL-MCNC: 0.4 MG/DL (ref 2.5–4.5)
PHOSPHATE SERPL-MCNC: 3.5 MG/DL (ref 2.5–4.5)
PHOSPHATE SERPL-MCNC: 3.6 MG/DL (ref 2.5–4.5)
PLATELET # BLD AUTO: 80 10*3/MM3 (ref 140–450)
PLATELET # BLD AUTO: 87 10*3/MM3 (ref 140–450)
PMV BLD AUTO: 9 FL (ref 6–12)
PMV BLD AUTO: 9.3 FL (ref 6–12)
POTASSIUM SERPL-SCNC: 3.4 MMOL/L (ref 3.5–5.2)
POTASSIUM SERPL-SCNC: 3.8 MMOL/L (ref 3.5–5.2)
POTASSIUM SERPL-SCNC: 4.5 MMOL/L (ref 3.5–5.2)
PROT SERPL-MCNC: 6.6 G/DL (ref 6–8.5)
PROT SERPL-MCNC: 6.7 G/DL (ref 6–8.5)
PROT SERPL-MCNC: 7 G/DL (ref 6–8.5)
QT INTERVAL: 358 MS
QTC INTERVAL: 446 MS
RBC # BLD AUTO: 2.63 10*6/MM3 (ref 3.77–5.28)
RBC # BLD AUTO: 2.65 10*6/MM3 (ref 3.77–5.28)
RH BLD: NEGATIVE
SODIUM SERPL-SCNC: 136 MMOL/L (ref 136–145)
SODIUM SERPL-SCNC: 138 MMOL/L (ref 136–145)
SODIUM SERPL-SCNC: 139 MMOL/L (ref 136–145)
T&S EXPIRATION DATE: NORMAL
WBC NRBC COR # BLD AUTO: 3.93 10*3/MM3 (ref 3.4–10.8)
WBC NRBC COR # BLD AUTO: 4.48 10*3/MM3 (ref 3.4–10.8)

## 2025-04-04 PROCEDURE — 83735 ASSAY OF MAGNESIUM: CPT | Performed by: HOSPITALIST

## 2025-04-04 PROCEDURE — 86140 C-REACTIVE PROTEIN: CPT | Performed by: NURSE PRACTITIONER

## 2025-04-04 PROCEDURE — 86900 BLOOD TYPING SEROLOGIC ABO: CPT

## 2025-04-04 PROCEDURE — 25810000003 SODIUM CHLORIDE 0.9 % SOLUTION: Performed by: HOSPITALIST

## 2025-04-04 PROCEDURE — 86901 BLOOD TYPING SEROLOGIC RH(D): CPT

## 2025-04-04 PROCEDURE — 74181 MRI ABDOMEN W/O CONTRAST: CPT

## 2025-04-04 PROCEDURE — 86900 BLOOD TYPING SEROLOGIC ABO: CPT | Performed by: ANESTHESIOLOGY

## 2025-04-04 PROCEDURE — 83690 ASSAY OF LIPASE: CPT | Performed by: INTERNAL MEDICINE

## 2025-04-04 PROCEDURE — 86140 C-REACTIVE PROTEIN: CPT | Performed by: INTERNAL MEDICINE

## 2025-04-04 PROCEDURE — 80053 COMPREHEN METABOLIC PANEL: CPT | Performed by: HOSPITALIST

## 2025-04-04 PROCEDURE — 99223 1ST HOSP IP/OBS HIGH 75: CPT | Performed by: NURSE PRACTITIONER

## 2025-04-04 PROCEDURE — 84100 ASSAY OF PHOSPHORUS: CPT | Performed by: HOSPITALIST

## 2025-04-04 PROCEDURE — 82150 ASSAY OF AMYLASE: CPT | Performed by: INTERNAL MEDICINE

## 2025-04-04 PROCEDURE — 85025 COMPLETE CBC W/AUTO DIFF WBC: CPT | Performed by: HOSPITALIST

## 2025-04-04 PROCEDURE — 99222 1ST HOSP IP/OBS MODERATE 55: CPT | Performed by: STUDENT IN AN ORGANIZED HEALTH CARE EDUCATION/TRAINING PROGRAM

## 2025-04-04 PROCEDURE — 25010000002 HYDROMORPHONE 1 MG/ML SOLUTION: Performed by: FAMILY MEDICINE

## 2025-04-04 PROCEDURE — 86901 BLOOD TYPING SEROLOGIC RH(D): CPT | Performed by: ANESTHESIOLOGY

## 2025-04-04 PROCEDURE — 83690 ASSAY OF LIPASE: CPT | Performed by: NURSE PRACTITIONER

## 2025-04-04 PROCEDURE — 86850 RBC ANTIBODY SCREEN: CPT | Performed by: ANESTHESIOLOGY

## 2025-04-04 PROCEDURE — 80053 COMPREHEN METABOLIC PANEL: CPT | Performed by: INTERNAL MEDICINE

## 2025-04-04 PROCEDURE — 25010000002 THIAMINE PER 100 MG: Performed by: HOSPITALIST

## 2025-04-04 RX ORDER — HYDROXYZINE HYDROCHLORIDE 25 MG/1
25 TABLET, FILM COATED ORAL ONCE
Status: COMPLETED | OUTPATIENT
Start: 2025-04-04 | End: 2025-04-04

## 2025-04-04 RX ORDER — INDOCYANINE GREEN AND WATER 25 MG
2.5 KIT INJECTION ONCE
Status: COMPLETED | OUTPATIENT
Start: 2025-04-05 | End: 2025-04-05

## 2025-04-04 RX ORDER — FENTANYL/ROPIVACAINE/NS/PF 2-625MCG/1
15 PLASTIC BAG, INJECTION (ML) EPIDURAL
Status: COMPLETED | OUTPATIENT
Start: 2025-04-04 | End: 2025-04-04

## 2025-04-04 RX ORDER — POTASSIUM CHLORIDE 1500 MG/1
40 TABLET, EXTENDED RELEASE ORAL EVERY 4 HOURS
Status: DISCONTINUED | OUTPATIENT
Start: 2025-04-04 | End: 2025-04-04 | Stop reason: SDUPTHER

## 2025-04-04 RX ADMIN — FOLIC ACID 1 MG: 1 TABLET ORAL at 09:47

## 2025-04-04 RX ADMIN — NICOTINE 1 PATCH: 21 PATCH TRANSDERMAL at 09:48

## 2025-04-04 RX ADMIN — THIAMINE HYDROCHLORIDE 200 MG: 100 INJECTION, SOLUTION INTRAMUSCULAR; INTRAVENOUS at 06:17

## 2025-04-04 RX ADMIN — Medication 10 ML: at 19:57

## 2025-04-04 RX ADMIN — HYDROXYZINE HYDROCHLORIDE 25 MG: 25 TABLET, FILM COATED ORAL at 09:47

## 2025-04-04 RX ADMIN — OXYCODONE 5 MG: 5 TABLET ORAL at 04:49

## 2025-04-04 RX ADMIN — SODIUM CHLORIDE 15 MMOL: 9 INJECTION, SOLUTION INTRAVENOUS at 06:17

## 2025-04-04 RX ADMIN — OXYCODONE 5 MG: 5 TABLET ORAL at 14:55

## 2025-04-04 RX ADMIN — HYDROXYZINE HYDROCHLORIDE 25 MG: 25 TABLET, FILM COATED ORAL at 17:53

## 2025-04-04 RX ADMIN — OXYCODONE 5 MG: 5 TABLET ORAL at 00:43

## 2025-04-04 RX ADMIN — NICOTINE 1 PATCH: 21 PATCH TRANSDERMAL at 21:41

## 2025-04-04 RX ADMIN — HYDROMORPHONE HYDROCHLORIDE 0.5 MG: 1 INJECTION, SOLUTION INTRAMUSCULAR; INTRAVENOUS; SUBCUTANEOUS at 19:57

## 2025-04-04 RX ADMIN — SODIUM CHLORIDE 15 MMOL: 9 INJECTION, SOLUTION INTRAVENOUS at 09:52

## 2025-04-04 RX ADMIN — THIAMINE HYDROCHLORIDE 200 MG: 100 INJECTION, SOLUTION INTRAMUSCULAR; INTRAVENOUS at 14:55

## 2025-04-04 RX ADMIN — HYDROMORPHONE HYDROCHLORIDE 0.5 MG: 1 INJECTION, SOLUTION INTRAMUSCULAR; INTRAVENOUS; SUBCUTANEOUS at 17:02

## 2025-04-04 RX ADMIN — ESCITALOPRAM 10 MG: 10 TABLET, FILM COATED ORAL at 09:47

## 2025-04-04 RX ADMIN — OXYCODONE 5 MG: 5 TABLET ORAL at 09:47

## 2025-04-04 RX ADMIN — Medication 10 ML: at 09:47

## 2025-04-04 RX ADMIN — HYDROXYZINE HYDROCHLORIDE 25 MG: 25 TABLET, FILM COATED ORAL at 22:17

## 2025-04-04 RX ADMIN — THIAMINE HYDROCHLORIDE 200 MG: 100 INJECTION, SOLUTION INTRAMUSCULAR; INTRAVENOUS at 21:35

## 2025-04-04 RX ADMIN — SODIUM CHLORIDE 15 MMOL: 9 INJECTION, SOLUTION INTRAVENOUS at 03:10

## 2025-04-04 NOTE — ANESTHESIA PREPROCEDURE EVALUATION
Anesthesia Evaluation     NPO Solid Status: > 8 hours  NPO Liquid Status: > 8 hours           Airway   Mallampati: I  TM distance: >3 FB  Neck ROM: full  No difficulty expected  Dental - normal exam     Pulmonary - normal exam   Cardiovascular - normal exam        Neuro/Psych  (+) dizziness/light headedness  GI/Hepatic/Renal/Endo    (+) liver disease, renal disease-    ROS Comment: 34-year-old female with a history of alcohol dependence/alcohol use disorder presenting with recurrent acute pancreatitis with elevated liver tests.  She was admitted 2 days ago and is feeling better.  She is tolerating a liquid diet and asking for solid food and to be discharged.  Her phosphorus was 0.4 this morning and has been replaced.  Bilirubin 1.5, , amylase 175, lipase 197, CRP 5.4.  CT reviewed by me shows uncomplicated peripancreatic inflammation with diffusely fatty liver.  Gallbladder shows gallstones with no bile duct dilation.  On exam she is alert in no acute distress.  She has hepatomegaly with minimal epigastric tenderness without rebound or guarding.  Impression:  Acute recurrent alcohol induced pancreatitis uncomplicated and getting better  Elevated liver enzymes secondary to alcohol induced fatty liver disease  Gallstones  Underweight  Plan:  Recommend MRCP to rule out common bile duct stone as a potential cause for her recurrent pancreatitis  Recommend general surgery evaluation for empiric cholecystectomy for gallstones and pancreatitis  Counseled on complete alcohol abstinence      Musculoskeletal     Abdominal  - normal exam    Bowel sounds: normal.   Substance History   (+) alcohol use     OB/GYN          Other        ROS/Med Hx Other: T&S ORDERED  HCG NEG  ETOH              Anesthesia Plan    ASA 3     general     intravenous induction     Anesthetic plan, risks, benefits, and alternatives have been provided, discussed and informed consent has been obtained with: patient.  Pre-procedure education  provided  Plan discussed with CRNA.    CODE STATUS:    Code Status (Patient has no pulse and is not breathing): CPR (Attempt to Resuscitate)  Medical Interventions (Patient has pulse or is breathing): Full Support

## 2025-04-04 NOTE — CASE MANAGEMENT/SOCIAL WORK
Continued Stay Note  CHELSIE Pollard     Patient Name: Shirin Canales  MRN: [id number]  Today's Date: 4/4/2025    Admit Date: 4/2/2025    Plan: Routine home.   Discharge Plan       Row Name 04/04/25 1621       Plan    Plan Comments DC barriers: GI and GenSx consulted. MRCP completed.             Megan Naegele, RN     Office Phone: [phone]  Office Cell: [phone]

## 2025-04-04 NOTE — PROGRESS NOTES
[facility] MEDICINE SERVICE  DAILY PROGRESS NOTE    NAME: Shirin Canales  : 1990  MRN: [id number]      LOS: 2 days     PROVIDER OF SERVICE: Timothy Kim MD    Chief Complaint: Pancreatitis    Subjective:     History of Present Illness: Shirin Canales is a 34 y.o. female with a CMH of alcoholism who presented to [facility] on 2025 with abdominal pain.     She is seen in the ED room 10 at time of exam.  She states that since  she has had increasing abdominal pain which was midepigastric in nature.  She is also having dry heaving.  She is asking for some clear liquids at this time.  She claims she has not had anything to drink since Saturday evening.  She states these episodes have been becoming more frequent but this episode is not as bad as the previous ones.  We discussed that she does have acute on chronic pancreatitis and had some bowel rest with clear liquids and high-volume IV fluids will likely benefit her        Interval History:  History taken from: patient chart RN  4/3-Abdominal pain improved with IV pain medications   seen in bed NAD, feeling better today, anxious to go home, DASIA RN, consulted GI MRI ordered     Review of Systems  All negative except as above  Constitutional: No fevers, chills, sweats  Eye: No recent visual problems, eye discharge, eye pain, redness  HEENT: No ear pain, nasal congestion, sore throat, voice changes  Respiratory: No shortness of breath, cough, pain on breathing, sputum production  Cardiovascular: No Chest pain, palpitations, syncope, orthopnea  Gastrointestinal: Positive nausea, positive vomiting, diarrhea, constipation  Genitourinary: No hematuria, dysuria, incontinence  Hema/Lymph: Negative for bruising tendency, swollen lymph glands, nosebleeds  Endocrine: Negative for excessive thirst, excessive hunger, excessive urination  Musculoskeletal: No back pain, neck pain, joint pain, muscle pain, decreased range of  motion  Integumentary: No rash, pruritus, abrasions, lesions  Neurologic: No weakness, numbness, frequent headaches, tremors  Psychiatric: No anxiety, depression, mood changes, hallucinations  Objective:     Vital Signs  Temp:  [98.1 °F (36.7 °C)-99.1 °F (37.3 °C)] 98.1 °F (36.7 °C)  Heart Rate:  [] 89  Resp:  [9-17] 11  BP: (113-129)/(86-93) 129/89   Body mass index is 16.36 kg/m².    Physical Exam  AOx3 NAD  RRR S1-S2 audible  Lungs with fair air entry  Abdomen with epigastric tenderness positive guarding no rigidity bowel sounds positive     Diagnostic Data    Results from last 7 days   Lab Units 04/04/25  0902 04/04/25  0052   WBC 10*3/mm3  --  3.93   HEMOGLOBIN g/dL  --  9.0*   HEMATOCRIT %  --  27.6*   PLATELETS 10*3/mm3  --  80*   GLUCOSE mg/dL 83 79   CREATININE mg/dL 0.41* 0.40*   BUN mg/dL 5* 6   SODIUM mmol/L 138 136   POTASSIUM mmol/L 3.8 3.4*   AST (SGOT) U/L 149* 178*   ALT (SGPT) U/L 72* 67*   ALK PHOS U/L 138* 140*   BILIRUBIN mg/dL 1.5* 1.8*   ANION GAP mmol/L 11.7 10.1       MRI abdomen wo contrast mrcp  Result Date: 4/4/2025  Impression: 1. Mild acute interstitial pancreatitis. Trace ascites without drainable fluid collection. Incidental pancreatic divisum morphology. 2. Atrophic pancreas with borderline/mild dilation of the main duct for age, suggesting sequela of chronic pancreatitis. 3. Normal cholangiogram. Debris/sludge in the gallbladder with probably reactive gallbladder wall thickening and edema along the hepatic margin. 4. Moderate hepatomegaly with severe parenchymal steatosis. Tiny nodular areas of benign fatty sparing versus possible focal nodular hyperplasia or hepatic adenomas considered almost certainly benign. Recommend 6-month follow-up abdominal MRI with IV contrast to an sure stability (preferably bili with Eovist if available). This does not require inpatient/urgent follow-up. 5. Minimal body wall edema. 6. Multiple simple and mildly complex renal cystic lesions.  Recommend attention on follow-up MRI. Electronically Signed: Mayo Thomas MD  4/4/2025 2:28 PM EDT  Workstation ID: [id number]        I reviewed the patient's new clinical results.  I reviewed the patient's new imaging results and agree with the interpretation.    Assessment/Plan:     Active and Resolved Problems  Active Hospital Problems    Diagnosis  POA    **Pancreatitis [K85.90]  Yes      Resolved Hospital Problems   No resolved problems to display.       #Acute pancreatitis  #Metabolic acidosis-resolved  #EtOH abuse high risk for withdrawal  Change IV fluids to LR  Start p.o. Norco for pain control.  IV Dilaudid only for breakthrough  If abdominal pain does not improve we will make n.p.o. for bowel rest  CIWA monitoring per protocol  Start thiamine and folic acid  Antiemetics as needed  Extensively counseled on EtOH use cessation  Per GI>  Recommend MRCP to rule out common bile duct stone as a potential cause for her recurrent pancreatitis  Recommend general surgery evaluation for empiric cholecystectomy for gallstones and pancreatitis  Counseled on complete alcohol abstinence  Would recommend staying in the hospital 1 more day to ensure electrolytes have been completely replaced    #GERD  Continue PPI    #MDD  Continue home dose of Lexapro    VTE Prophylaxis:  Pharmacologic VTE prophylaxis orders are present.    Disposition Planning:     Barriers to Discharge: Medical workup  Anticipated Date of Discharge: 4/5  Place of Discharge: home      Time: 45 minutes     Code Status and Medical Interventions: CPR (Attempt to Resuscitate); Full Support   Ordered at: 04/02/25 1314     Code Status (Patient has no pulse and is not breathing):    CPR (Attempt to Resuscitate)     Medical Interventions (Patient has pulse or is breathing):    Full Support       Signature: Electronically signed by Timothy Kim MD, 04/04/25, 16:24 EDT.  [facility] Hospitalist Team

## 2025-04-04 NOTE — CONSULTS
GI CONSULT  NOTE:    Referring Provider:   Julio       Chief complaint:    Pancreatitis     Subjective   Abdominal pain vomiting    History of present illness:     Patient is a 34-year-old female with a history of alcohol pancreatitis, severe hepatic steatosis who presented to the ER on 4/2/25 with a complaint of abdominal pain and vomiting.  Symptoms started on Monday, 3/31/2025.  Last drink of alcohol was the day previous 3/30/2025. Labs & CT were done. CT showed acute pancreatitis & Lipase was noted to be elevated at 824.  GI was consulted today for pancreatitis.  Has been tolerating liquid diet. Is feeling better. Last emesis Tuesday, 4/1/25.   Admits she continues to drink ETOH.     LABS: Sodium and potassium are normal.  Creatinine 0.41.  T. bili 1.5, alk phos 138, , ALT 72.  Amylase 175, CRP down to 5.44, lipase down to 197.  WBCs 3.93, hemoglobin 9 (10.3), .2, platelets 80.  CT of the abdomen and pelvis with contrast on 4/2/2025 shows small amount of fluidAround the pancreas more prominent in the left upper quadrant compatible with acute pancreatitis.  No pseudocyst or abscess formation.  Severe diffuse hepatic steatosis.  Mild stranding of the transverse and descending colon.  Cholelithiasis.    Endo History:  No endoscopies     Past Medical History:  History reviewed. No pertinent past medical history.    Past Surgical History:  History reviewed. No pertinent surgical history.    Social History:  Social History     Tobacco Use    Smoking status: Every Day     Current packs/day: 0.25     Average packs/day: 0.3 packs/day for 0.2 years     Types: Cigarettes     Start date: 2/6/2025     Last attempt to quit: 2015    Smokeless tobacco: Never   Vaping Use    Vaping status: Never Used   Substance Use Topics    Alcohol use: Yes     Alcohol/week: 3.0 standard drinks of alcohol     Types: 3 Drinks containing 0.5 oz of alcohol per week     Comment: OCCASIONALLY    Drug use: Not Currently       Family  History:  History reviewed. No pertinent family history.    Medications:  Medications Prior to Admission   Medication Sig Dispense Refill Last Dose/Taking    escitalopram (LEXAPRO) 10 MG tablet Take 1 tablet by mouth Daily.       hydrOXYzine (ATARAX) 25 MG tablet Take 1-2 tablets by mouth 3 (Three) Times a Day As Needed for Anxiety.       ibuprofen (ADVIL,MOTRIN) 200 MG tablet Take 1 tablet by mouth Every 6 (Six) Hours As Needed for Mild Pain.       ondansetron (ZOFRAN) 4 MG tablet Take 1 tablet by mouth Every 8 (Eight) Hours As Needed for Nausea or Vomiting. 20 tablet 0     pantoprazole (Protonix) 40 MG EC tablet Take 1 tablet by mouth Daily. 30 tablet 0        Scheduled Meds:enoxaparin sodium, 30 mg, Subcutaneous, Q24H  escitalopram, 10 mg, Oral, Daily  folic acid, 1 mg, Oral, Daily  nicotine, 1 patch, Transdermal, Q24H  potassium phosphate, 15 mmol, Intravenous, Q3H  Scopolamine, 1 patch, Transdermal, Q72H  sodium chloride, 10 mL, Intravenous, Q12H  thiamine (B-1) IV, 200 mg, Intravenous, Q8H   Followed by  [START ON 4/8/2025] thiamine, 100 mg, Oral, Daily      Continuous Infusions:   PRN Meds:.  acetaminophen **OR** acetaminophen **OR** acetaminophen    calcium carbonate    Calcium Replacement - Follow Nurse / BPA Driven Protocol    diazePAM **OR** diazePAM **OR** diazePAM **OR** diazePAM **OR** diazePAM **OR** diazePAM    hydrALAZINE    HYDROmorphone **AND** naloxone    hydrOXYzine    Magnesium Standard Dose Replacement - Follow Nurse / BPA Driven Protocol    nitroglycerin    ondansetron    oxyCODONE    Phosphorus Replacement - Follow Nurse / BPA Driven Protocol    Potassium Replacement - Follow Nurse / BPA Driven Protocol    [COMPLETED] Insert Peripheral IV **AND** sodium chloride    sodium chloride    sodium chloride    ALLERGIES:  Latex and Penicillins    ROS:  Review of Systems   Gastrointestinal:  Positive for abdominal pain, nausea and vomiting.     The following systems were reviewed and negative;      Constitution:  No fevers, chills, no unintentional weight loss  Skin: no rash, no jaundice  Eyes:  No blurry vision, no eye pain  HENT:  No change in hearing or smell  Resp:  No dyspnea or cough  CV:  No chest pain or palpitations  :  No dysuria, hematuria  Musculoskeletal:  No leg cramps or arthralgias  Neuro:  No tremor, no numbness  Psych:  No depression or confusion    Objective   Rm 377. Up ambulatory in room. No family present.     Vital Signs:   Vitals:    04/03/25 1710 04/03/25 2024 04/04/25 0355 04/04/25 0811   BP: 113/90 125/87 123/93 127/93   BP Location: Right arm Right arm Right arm Right arm   Patient Position: Lying Lying Lying Lying   Pulse: 87 101 96 72   Resp: 10 11 12 17   Temp: 99.1 °F (37.3 °C) 98.4 °F (36.9 °C) 98.6 °F (37 °C) 98.2 °F (36.8 °C)   TempSrc: Oral  Oral Oral   SpO2: 90% 99% 98% 100%   Weight:       Height:           Physical Exam:    General Appearance:    Awake and alert, in no acute distress. Thin.    Head:    Normocephalic, without obvious abnormality, atraumatic   Eyes:            Conjunctivae normal, anicteric sclerae, pupils equal   Ears:    Ears appear intact with no abnormalities noted   Throat:   No oral lesions, no thrush, oral mucosa moist   Neck:   Supple, no JVD   Lungs:      respirations regular, even and unlabored        Chest Wall:    No abnormalities observed   Abdomen:      soft, nontender, no rebound or guarding, nondistended, hepatomegaly palpated   Rectal:     Deferred   Extremities:   Moves all extremities, no edema, no cyanosis   Pulses:   Pulses palpable and equal bilaterally   Skin:   No rash, no jaundice, normal palpation        Neurologic:   Cranial nerves 2 - 12 grossly intact, no asterixis       Results Review:   I reviewed the patient's labs and imaging.  CBC    Results from last 7 days   Lab Units 04/04/25  0052 04/03/25  0119 04/02/25  1106   WBC 10*3/mm3 3.93 8.32 8.85   HEMOGLOBIN g/dL 9.0* 10.3* 12.1   PLATELETS 10*3/mm3 80* 100* 119*     CMP    Results from last 7 days   Lab Units 04/04/25  0902 04/04/25  0052 04/03/25  1410 04/03/25  0119 04/02/25  1106   SODIUM mmol/L 138 136  --  135* 137   POTASSIUM mmol/L 3.8 3.4* 4.0 3.5 3.9   CHLORIDE mmol/L 99 96*  --  96* 99   CO2 mmol/L 27.3 29.9*  --  27.6 20.6*   BUN mg/dL 5* 6  --  16 29*   CREATININE mg/dL 0.41* 0.40*  --  0.52* 0.63   GLUCOSE mg/dL 83 79  --  131* 134*   ALBUMIN g/dL 3.4* 3.9  --   --  5.0   BILIRUBIN mg/dL 1.5* 1.8*  --   --  1.5*   ALK PHOS U/L 138* 140*  --   --  159*   AST (SGOT) U/L 149* 178*  --   --  99*   ALT (SGPT) U/L 72* 67*  --   --  73*   MAGNESIUM mg/dL  --  1.6  --   --   --    PHOSPHORUS mg/dL  --  0.4*  --   --   --    AMYLASE U/L 175*  --   --   --   --    LIPASE U/L 197* 225*  --   --  824*     Cr Clearance Estimated Creatinine Clearance: 129.1 mL/min (A) (by C-G formula based on SCr of 0.41 mg/dL (L)).  Coag     HbA1C   Lab Results   Component Value Date    HGBA1C 4.30 (L) 12/28/2023     Blood Glucose   Glucose   Date/Time Value Ref Range Status   04/03/2025 2028 108 (H) 70 - 105 mg/dL Final     Comment:     Serial Number: [id number]:  [id number]     Infection     UA    Results from last 7 days   Lab Units 04/02/25  1547   NITRITE UA  Negative   WBC UA /HPF 0-2   BACTERIA UA /HPF 1+*   SQUAM EPITHEL UA /HPF 7-12*     Radiology(recent) CT Abdomen Pelvis With Contrast  Result Date: 4/2/2025  1.Small amount of fluid noted along the pancreas more prominent in the left upper quadrant. Findings compatible with acute pancreatitis. No evidence of pseudocyst or abscess noted. Overall findings appearing improved from comparison. 2.Severe diffuse hepatic steatosis. Please correlate with liver function test. 3.Mild stranding of the transverse and descending colon. Underlying colitis not excluded. 4.Cholelithiasis. 5.Other findings as above. Electronically Signed: Barber Matson MD  4/2/2025 12:48 PM EDT  Workstation ID: [id number]        ASSESSMENT:  Acute on chronic alcohol  pancreatitis  Elevated LFTs due to above as well as alcohol abuse as well as severe hepatic steatosis  Hepatic steatosis  Alcohol abuse  Cholelithiasis      PLAN:  Patient is a 35 y/o female with history of ETOH abuse as well as ETOH pancreatitis who presented 4/2/25 for abdominal pain & vomiting. CT showed acute pancreatitis & lipase was 824. GI was consulted today for pancreatitis.    Plan MRCP to evaluate for CBD stone or any other cause of her pancreatitis besides ETOH.   Continue electrolyte replacement as well as folic acid and thiamine due to ETOH abuse.   Stressed importance of tobacco & alcohol cessation  WA protocol   LR for hydration.   General surgery consult for eventual cholecystectomy.  Would like her to stay until tomorrow, would like to get MRCP, would like to make sure she can tolerate full liquids to low fat diet and make sure phosphorus is improved.   She is trying to arrange childcare.       I discussed the patient's findings and my recommendations with the patient.  Unique Calvo, BEVERLY  04/04/25  11:39 EDT    Time:

## 2025-04-04 NOTE — PLAN OF CARE
Goal Outcome Evaluation:  Plan of Care Reviewed With: patient        Progress: improving  Outcome Evaluation: Patient is being treated for pancreatitis. LR infusing at 125 cc/hr. Dayshift nurse advanced her diet from clear liquid to full liquid at dinner time yesterday. She was able to tolerate with no nausea or vomiting. She is requesting for her diet to be advanced today. Morning labs showed a phosphorus of 0.4 and a potassium of 3.4. Per pharmacy, only give phos IV replacement (since there is potssium in it) and not PO Potassium also. Pain treated with PRN Oxycodone. Patient was up and down most of the night.

## 2025-04-04 NOTE — CONSULTS
General Surgery Consult Note  Requesting Provider:  Dr. Olson (Gastroenterology)    Reason for Consult:  Gallstone pancreatitis    Chief Complaint:  abdominal pain     History of Present Illness:  Shirin Canales is a 34 y.o. female, history of alcoholism, who was admitted to [facility] on 2025 for acute pancreatitis.  Patient has had numerous admissions in the last 2 years for alcoholic ketoacidosis and pancreatitis.  Workup demonstrated evidence of gallstones on ultrasound for which gastroenterology was consulted given patient's hyperbilirubinemia.  Patient did undergo MRCP, which demonstrated debris/sludge in the gallbladder.  Given the possibility that patient's pancreatitis may be related to gallstone disease, general surgery was consulted for consideration of cholecystectomy.    Patient seen at bedside on floor.  Reports that she does not drink every day, which is an contrary to her social history on chart review.  In fact, patient reports that she drinks only socially and sometimes on the weekends, but she was forthright in admitting to increased alcohol use since the death of her mother in September.  She reports history of prior emergency  section.    History reviewed. No pertinent past medical history.  History reviewed. No pertinent surgical history.  History reviewed. No pertinent family history.  Social History     Socioeconomic History    Marital status: Single   Tobacco Use    Smoking status: Every Day     Current packs/day: 0.25     Average packs/day: 0.3 packs/day for 0.2 years     Types: Cigarettes     Start date: 2025     Last attempt to quit:     Smokeless tobacco: Never   Vaping Use    Vaping status: Never Used   Substance and Sexual Activity    Alcohol use: Yes     Alcohol/week: 3.0 standard drinks of alcohol     Types: 3 Drinks containing 0.5 oz of alcohol per week     Comment: OCCASIONALLY    Drug use: Not Currently    Sexual activity: Defer        Medications:  Medications Prior to Admission   Medication Sig Dispense Refill Last Dose/Taking    escitalopram (LEXAPRO) 10 MG tablet Take 1 tablet by mouth Daily.       hydrOXYzine (ATARAX) 25 MG tablet Take 1-2 tablets by mouth 3 (Three) Times a Day As Needed for Anxiety.       ibuprofen (ADVIL,MOTRIN) 200 MG tablet Take 1 tablet by mouth Every 6 (Six) Hours As Needed for Mild Pain.       ondansetron (ZOFRAN) 4 MG tablet Take 1 tablet by mouth Every 8 (Eight) Hours As Needed for Nausea or Vomiting. 20 tablet 0     pantoprazole (Protonix) 40 MG EC tablet Take 1 tablet by mouth Daily. 30 tablet 0       Allergies   Allergen Reactions    Latex Unknown - Low Severity    Penicillins Unknown - High Severity       Review of Systems:  Negative in a 12 point ROS except for as above described.    Physical Examination:  Temp:  [98.1 °F (36.7 °C)-98.6 °F (37 °C)] 98.3 °F (36.8 °C)  Heart Rate:  [72-96] 91  Resp:  [9-17] 11  BP: (117-129)/(86-93) 122/92  I/O last 3 completed shifts:  In: 100 [P.O.:100]  Out: -     General: No acute distress, conversant; chronically ill-appearing  Eyes: Anicteric sclerae  Lungs: normal respiratory effort, bilateral chest rise with inspiration  Abdomen: soft, NT, ND, dull to percussion; no guarding or rigidity  Skin: No jaundice.    Labs:  WBC   Date Value Ref Range Status   04/04/2025 3.93 3.40 - 10.80 10*3/mm3 Final     Hemoglobin   Date Value Ref Range Status   04/04/2025 9.0 (L) 12.0 - 15.9 g/dL Final     Hematocrit   Date Value Ref Range Status   04/04/2025 27.6 (L) 34.0 - 46.6 % Final     MCV   Date Value Ref Range Status   04/04/2025 104.2 (H) 79.0 - 97.0 fL Final     Platelets   Date Value Ref Range Status   04/04/2025 80 (L) 140 - 450 10*3/mm3 Final     Neutrophil %   Date Value Ref Range Status   04/04/2025 59.6 42.7 - 76.0 % Final     Lab Results   Component Value Date    GLUCOSE 83 04/04/2025    BUN 5 (L) 04/04/2025    CREATININE 0.41 (L) 04/04/2025     04/04/2025    K 3.8  "04/04/2025    CL 99 04/04/2025    CALCIUM 8.9 04/04/2025    PROTEINTOT 6.6 04/04/2025    ALBUMIN 3.4 (L) 04/04/2025    ALT 72 (H) 04/04/2025     (H) 04/04/2025    ALKPHOS 138 (H) 04/04/2025    BILITOT 1.5 (H) 04/04/2025    GLOB 3.2 04/04/2025    AGRATIO 1.1 04/04/2025    BCR 12.2 04/04/2025    ANIONGAP 11.7 04/04/2025    EGFR 133.4 04/04/2025     No results found for: \"INR\", \"PROTIME\"      No results found for: \"BLOODCX\"    Images:  MRI abdomen wo contrast mrcp  Narrative: MRI ABDOMEN WO CONTRAST MRCP    Date of Exam: 4/4/2025 1:39 PM EDT    Indication: gallstones, elevated LFT's.     Comparison: CT abdomen pelvis 4/2/2025    Technique:  Routine multiplanar/multisequence images of the abdomen were obtained with MRCP sequences without contrast administration.    Findings:  Heart size normal. No pericardial effusion.    Liver is enlarged measuring 18 cm CC dimension. Marked signal loss on out of phase imaging consistent with hepatic steatosis. No suspicious liver lesion on this limited noncontrast exam. Few tiny rounded areas of relative T2 hypointensity within the   right hepatic lobe image 11 series 12 for example best visualized on out of phase imaging and could reflect benign areas of focal fatty sparing. Differential may include tiny focal nodular hyperplasias or adenomas.    Debris and/or sludge in the gallbladder. No gallbladder distention. There is gallbladder wall edema and thickening along the hepatic margin. No significant pericholecystic fluid. Normal appearance of the biliary tree without filling defect. Pancreatic   divisum morphology image 24 series 13. Mild interstitial and peripancreatic edema consistent with pancreatitis. Borderline/mild dilation of the main pancreatic duct measuring up to 4 mm. No apparent stricture, solid mass or drainable collection. Pancreas   is moderately atrophic for age.    No suspicious adrenal nodule. Symmetric renal size and contour. Few simple Bosniak 1 renal cyst " noted. There is a thinly septated mildly complex 1.9 cm right midpole lesion image 28 series 12 favored Bosniak 2 renal cyst. Other markedly T1 hyperintense   left renal lesions noted suggesting Bosniak 2 hemorrhagic/proteinaceous cysts. No hydronephrosis.    Expected configuration stomach and duodenum. No evidence of bowel obstruction. Trace ascites without organized collection. Normal caliber abdominal aorta. No suspicious adenopathy. Minimal reticular body wall edema. No infiltrative marrow process.  Impression: Impression:  1. Mild acute interstitial pancreatitis. Trace ascites without drainable fluid collection. Incidental pancreatic divisum morphology.  2. Atrophic pancreas with borderline/mild dilation of the main duct for age, suggesting sequela of chronic pancreatitis.  3. Normal cholangiogram. Debris/sludge in the gallbladder with probably reactive gallbladder wall thickening and edema along the hepatic margin.  4. Moderate hepatomegaly with severe parenchymal steatosis. Tiny nodular areas of benign fatty sparing versus possible focal nodular hyperplasia or hepatic adenomas considered almost certainly benign. Recommend 6-month follow-up abdominal MRI with IV   contrast to an sure stability (preferably bili with Eovist if available). This does not require inpatient/urgent follow-up.  5. Minimal body wall edema.  6. Multiple simple and mildly complex renal cystic lesions. Recommend attention on follow-up MRI.    Electronically Signed: Mayo Thomas MD    4/4/2025 2:28 PM EDT    Workstation ID: [id number]       I personally reviewed the available laboratory values and radiographic studies.      Assessment:  34-year-old female, history of alcoholism with emergent admissions for alcoholic ketoacidosis and pancreatitis, with recurrent acute pancreatitis.  Workup does demonstrate evidence of gallstones.  As such, patient would benefit from cholecystectomy.    The risks (postoperative pain, nausea/vomiting,  bleeding, hematoma/seroma, incisional hernia, injury to any underlying viscera, bile leak, biliary stricture, need for conversion to open surgery, and complications associated with general anesthesia including stroke, heart attack, and death), benefits, and alternative therapies of robotic cholecystectomy with intraoperative cholangiogram were discussed in great detail with the patient. The patient voiced understanding and wishes to proceed with surgery.     Plan:  OR tomorrow; 2.5 mg IV ICG 90 minutes before surgery  NPO for now  Hold lovenox  Rest of care per primary team     Please do not hesitate to call me with any questions or concerns.  Thank you for involving me in the care of your patient, and I look forward to taking care of the patient with you.     Faibo Nelson MD   General Surgery  04/04/25   21:17 EDT     Much of this encounter note is an electronic transcription/translation of spoken language to printed text.  The electronic translation of spoken language may permit erroneous, or at times, nonsensical words or phrases to be inadvertently transcribed.  Although I have reviewed the note for such errors, some may still exist.

## 2025-04-05 ENCOUNTER — ANESTHESIA (OUTPATIENT)
Dept: PERIOP | Facility: HOSPITAL | Age: 35
End: 2025-04-05
Payer: COMMERCIAL

## 2025-04-05 ENCOUNTER — INPATIENT HOSPITAL (OUTPATIENT)
Dept: URBAN - METROPOLITAN AREA HOSPITAL 84 | Facility: HOSPITAL | Age: 35
End: 2025-04-05

## 2025-04-05 ENCOUNTER — READMISSION MANAGEMENT (OUTPATIENT)
Dept: CALL CENTER | Facility: HOSPITAL | Age: 35
End: 2025-04-05
Payer: COMMERCIAL

## 2025-04-05 ENCOUNTER — APPOINTMENT (OUTPATIENT)
Dept: GENERAL RADIOLOGY | Facility: HOSPITAL | Age: 35
End: 2025-04-05
Payer: COMMERCIAL

## 2025-04-05 VITALS
WEIGHT: 95.6 LBS | OXYGEN SATURATION: 98 % | HEART RATE: 92 BPM | SYSTOLIC BLOOD PRESSURE: 110 MMHG | RESPIRATION RATE: 16 BRPM | HEIGHT: 63 IN | DIASTOLIC BLOOD PRESSURE: 73 MMHG | TEMPERATURE: 97.8 F | BODY MASS INDEX: 16.94 KG/M2

## 2025-04-05 DIAGNOSIS — F10.10 ALCOHOL ABUSE, UNCOMPLICATED: ICD-10-CM

## 2025-04-05 DIAGNOSIS — K86.0 ALCOHOL-INDUCED CHRONIC PANCREATITIS: ICD-10-CM

## 2025-04-05 DIAGNOSIS — K80.80 OTHER CHOLELITHIASIS WITHOUT OBSTRUCTION: ICD-10-CM

## 2025-04-05 DIAGNOSIS — F17.210 NICOTINE DEPENDENCE, CIGARETTES, UNCOMPLICATED: ICD-10-CM

## 2025-04-05 DIAGNOSIS — K76.0 FATTY (CHANGE OF) LIVER, NOT ELSEWHERE CLASSIFIED: ICD-10-CM

## 2025-04-05 DIAGNOSIS — K85.20 ALCOHOL INDUCED ACUTE PANCREATITIS WITHOUT NECROSIS OR INFEC: ICD-10-CM

## 2025-04-05 PROCEDURE — 25010000002 KETOROLAC TROMETHAMINE PER 15 MG: Performed by: NURSE ANESTHETIST, CERTIFIED REGISTERED

## 2025-04-05 PROCEDURE — 47563 LAPARO CHOLECYSTECTOMY/GRAPH: CPT | Performed by: STUDENT IN AN ORGANIZED HEALTH CARE EDUCATION/TRAINING PROGRAM

## 2025-04-05 PROCEDURE — 25010000002 THIAMINE PER 100 MG: Performed by: HOSPITALIST

## 2025-04-05 PROCEDURE — 25010000002 INDOCYANINE GREEN 25 MG RECONSTITUTED SOLUTION: Performed by: STUDENT IN AN ORGANIZED HEALTH CARE EDUCATION/TRAINING PROGRAM

## 2025-04-05 PROCEDURE — BF131ZZ FLUOROSCOPY OF GALLBLADDER AND BILE DUCTS USING LOW OSMOLAR CONTRAST: ICD-10-PCS | Performed by: STUDENT IN AN ORGANIZED HEALTH CARE EDUCATION/TRAINING PROGRAM

## 2025-04-05 PROCEDURE — 25010000002 MAGNESIUM SULFATE 2 GM/50ML SOLUTION: Performed by: INTERNAL MEDICINE

## 2025-04-05 PROCEDURE — 25810000003 LACTATED RINGERS PER 1000 ML: Performed by: NURSE ANESTHETIST, CERTIFIED REGISTERED

## 2025-04-05 PROCEDURE — 25010000002 THIAMINE PER 100 MG: Performed by: STUDENT IN AN ORGANIZED HEALTH CARE EDUCATION/TRAINING PROGRAM

## 2025-04-05 PROCEDURE — 25010000002 BUPIVACAINE LIPOSOME 1.3 % SUSPENSION: Performed by: ANESTHESIOLOGY

## 2025-04-05 PROCEDURE — 25010000002 ONDANSETRON PER 1 MG: Performed by: NURSE ANESTHETIST, CERTIFIED REGISTERED

## 2025-04-05 PROCEDURE — 25510000002 IOHEXOL 300 MG/ML SOLUTION: Performed by: STUDENT IN AN ORGANIZED HEALTH CARE EDUCATION/TRAINING PROGRAM

## 2025-04-05 PROCEDURE — 25010000002 PROPOFOL 10 MG/ML EMULSION: Performed by: NURSE ANESTHETIST, CERTIFIED REGISTERED

## 2025-04-05 PROCEDURE — 25010000002 HYDROMORPHONE 1 MG/ML SOLUTION: Performed by: NURSE ANESTHETIST, CERTIFIED REGISTERED

## 2025-04-05 PROCEDURE — 25010000002 CLINDAMYCIN PER 300 MG: Performed by: NURSE ANESTHETIST, CERTIFIED REGISTERED

## 2025-04-05 PROCEDURE — 25010000002 SUGAMMADEX 200 MG/2ML SOLUTION: Performed by: NURSE ANESTHETIST, CERTIFIED REGISTERED

## 2025-04-05 PROCEDURE — 74300 X-RAY BILE DUCTS/PANCREAS: CPT

## 2025-04-05 PROCEDURE — 0FT44ZZ RESECTION OF GALLBLADDER, PERCUTANEOUS ENDOSCOPIC APPROACH: ICD-10-PCS | Performed by: STUDENT IN AN ORGANIZED HEALTH CARE EDUCATION/TRAINING PROGRAM

## 2025-04-05 PROCEDURE — 25010000002 LIDOCAINE PF 2% 2 % SOLUTION: Performed by: NURSE ANESTHETIST, CERTIFIED REGISTERED

## 2025-04-05 PROCEDURE — 25010000002 BUPIVACAINE (PF) 0.25 % SOLUTION: Performed by: ANESTHESIOLOGY

## 2025-04-05 PROCEDURE — 25010000002 FENTANYL CITRATE (PF) 100 MCG/2ML SOLUTION: Performed by: NURSE ANESTHETIST, CERTIFIED REGISTERED

## 2025-04-05 PROCEDURE — 25010000002 HYDROMORPHONE 1 MG/ML SOLUTION: Performed by: FAMILY MEDICINE

## 2025-04-05 PROCEDURE — 8E0W4CZ ROBOTIC ASSISTED PROCEDURE OF TRUNK REGION, PERCUTANEOUS ENDOSCOPIC APPROACH: ICD-10-PCS | Performed by: STUDENT IN AN ORGANIZED HEALTH CARE EDUCATION/TRAINING PROGRAM

## 2025-04-05 PROCEDURE — 99231 SBSQ HOSP IP/OBS SF/LOW 25: CPT | Performed by: NURSE PRACTITIONER

## 2025-04-05 PROCEDURE — 47563 LAPARO CHOLECYSTECTOMY/GRAPH: CPT

## 2025-04-05 PROCEDURE — 25010000002 DEXAMETHASONE PER 1 MG: Performed by: NURSE ANESTHETIST, CERTIFIED REGISTERED

## 2025-04-05 PROCEDURE — 88304 TISSUE EXAM BY PATHOLOGIST: CPT | Performed by: STUDENT IN AN ORGANIZED HEALTH CARE EDUCATION/TRAINING PROGRAM

## 2025-04-05 PROCEDURE — 25010000002 PHENYLEPHRINE 10 MG/ML SOLUTION: Performed by: NURSE ANESTHETIST, CERTIFIED REGISTERED

## 2025-04-05 DEVICE — LARGE LIGATION CLIPS 6 CLIPS/CART
Type: IMPLANTABLE DEVICE | Site: ABDOMEN | Status: FUNCTIONAL
Brand: VAS-Q-CLIP

## 2025-04-05 RX ORDER — OXYCODONE HYDROCHLORIDE 5 MG/1
5 TABLET ORAL ONCE AS NEEDED
Status: DISCONTINUED | OUTPATIENT
Start: 2025-04-05 | End: 2025-04-05 | Stop reason: HOSPADM

## 2025-04-05 RX ORDER — BUPIVACAINE HYDROCHLORIDE 2.5 MG/ML
INJECTION, SOLUTION EPIDURAL; INFILTRATION; INTRACAUDAL; PERINEURAL
Status: COMPLETED | OUTPATIENT
Start: 2025-04-05 | End: 2025-04-05

## 2025-04-05 RX ORDER — DIPHENHYDRAMINE HYDROCHLORIDE 50 MG/ML
12.5 INJECTION, SOLUTION INTRAMUSCULAR; INTRAVENOUS ONCE AS NEEDED
Status: DISCONTINUED | OUTPATIENT
Start: 2025-04-05 | End: 2025-04-05 | Stop reason: HOSPADM

## 2025-04-05 RX ORDER — PHENYLEPHRINE HYDROCHLORIDE 10 MG/ML
INJECTION INTRAVENOUS AS NEEDED
Status: DISCONTINUED | OUTPATIENT
Start: 2025-04-05 | End: 2025-04-05 | Stop reason: SURG

## 2025-04-05 RX ORDER — DEXAMETHASONE SODIUM PHOSPHATE 4 MG/ML
INJECTION, SOLUTION INTRA-ARTICULAR; INTRALESIONAL; INTRAMUSCULAR; INTRAVENOUS; SOFT TISSUE AS NEEDED
Status: DISCONTINUED | OUTPATIENT
Start: 2025-04-05 | End: 2025-04-05 | Stop reason: SURG

## 2025-04-05 RX ORDER — LANOLIN ALCOHOL/MO/W.PET/CERES
100 CREAM (GRAM) TOPICAL DAILY
Qty: 30 TABLET | Refills: 0 | Status: SHIPPED | OUTPATIENT
Start: 2025-04-08

## 2025-04-05 RX ORDER — LABETALOL HYDROCHLORIDE 5 MG/ML
5 INJECTION, SOLUTION INTRAVENOUS
Status: DISCONTINUED | OUTPATIENT
Start: 2025-04-05 | End: 2025-04-05 | Stop reason: HOSPADM

## 2025-04-05 RX ORDER — IPRATROPIUM BROMIDE AND ALBUTEROL SULFATE 2.5; .5 MG/3ML; MG/3ML
3 SOLUTION RESPIRATORY (INHALATION) ONCE AS NEEDED
Status: DISCONTINUED | OUTPATIENT
Start: 2025-04-05 | End: 2025-04-05 | Stop reason: HOSPADM

## 2025-04-05 RX ORDER — CLINDAMYCIN PHOSPHATE 900 MG/50ML
INJECTION, SOLUTION INTRAVENOUS AS NEEDED
Status: DISCONTINUED | OUTPATIENT
Start: 2025-04-05 | End: 2025-04-05 | Stop reason: SURG

## 2025-04-05 RX ORDER — FAMOTIDINE 20 MG/1
20 TABLET, FILM COATED ORAL EVERY 12 HOURS SCHEDULED
Status: DISCONTINUED | OUTPATIENT
Start: 2025-04-05 | End: 2025-04-05 | Stop reason: HOSPADM

## 2025-04-05 RX ORDER — HYDRALAZINE HYDROCHLORIDE 20 MG/ML
5 INJECTION INTRAMUSCULAR; INTRAVENOUS
Status: DISCONTINUED | OUTPATIENT
Start: 2025-04-05 | End: 2025-04-05 | Stop reason: HOSPADM

## 2025-04-05 RX ORDER — ONDANSETRON 2 MG/ML
4 INJECTION INTRAMUSCULAR; INTRAVENOUS ONCE AS NEEDED
Status: DISCONTINUED | OUTPATIENT
Start: 2025-04-05 | End: 2025-04-05 | Stop reason: HOSPADM

## 2025-04-05 RX ORDER — ONDANSETRON 2 MG/ML
INJECTION INTRAMUSCULAR; INTRAVENOUS AS NEEDED
Status: DISCONTINUED | OUTPATIENT
Start: 2025-04-05 | End: 2025-04-05 | Stop reason: SURG

## 2025-04-05 RX ORDER — LIDOCAINE HYDROCHLORIDE 20 MG/ML
INJECTION, SOLUTION EPIDURAL; INFILTRATION; INTRACAUDAL; PERINEURAL AS NEEDED
Status: DISCONTINUED | OUTPATIENT
Start: 2025-04-05 | End: 2025-04-05 | Stop reason: SURG

## 2025-04-05 RX ORDER — CLINDAMYCIN PHOSPHATE 900 MG/50ML
900 INJECTION, SOLUTION INTRAVENOUS ONCE
Status: DISCONTINUED | OUTPATIENT
Start: 2025-04-05 | End: 2025-04-05 | Stop reason: HOSPADM

## 2025-04-05 RX ORDER — SODIUM CHLORIDE 0.9 % (FLUSH) 0.9 %
10 SYRINGE (ML) INJECTION AS NEEDED
Status: DISCONTINUED | OUTPATIENT
Start: 2025-04-05 | End: 2025-04-05 | Stop reason: HOSPADM

## 2025-04-05 RX ORDER — VITAMIN K2 90 MCG
1 CAPSULE ORAL DAILY
Qty: 30 CAPSULE | Refills: 0 | Status: SHIPPED | OUTPATIENT
Start: 2025-04-05

## 2025-04-05 RX ORDER — OXYCODONE HYDROCHLORIDE 5 MG/1
10 TABLET ORAL EVERY 4 HOURS PRN
Status: DISCONTINUED | OUTPATIENT
Start: 2025-04-05 | End: 2025-04-05 | Stop reason: HOSPADM

## 2025-04-05 RX ORDER — OXYCODONE HYDROCHLORIDE 5 MG/1
5 TABLET ORAL EVERY 4 HOURS PRN
Status: DISCONTINUED | OUTPATIENT
Start: 2025-04-05 | End: 2025-04-05 | Stop reason: HOSPADM

## 2025-04-05 RX ORDER — SODIUM CHLORIDE, SODIUM LACTATE, POTASSIUM CHLORIDE, CALCIUM CHLORIDE 600; 310; 30; 20 MG/100ML; MG/100ML; MG/100ML; MG/100ML
INJECTION, SOLUTION INTRAVENOUS CONTINUOUS PRN
Status: DISCONTINUED | OUTPATIENT
Start: 2025-04-05 | End: 2025-04-05 | Stop reason: SURG

## 2025-04-05 RX ORDER — FLUMAZENIL 0.1 MG/ML
0.2 INJECTION INTRAVENOUS AS NEEDED
Status: DISCONTINUED | OUTPATIENT
Start: 2025-04-05 | End: 2025-04-05 | Stop reason: HOSPADM

## 2025-04-05 RX ORDER — EPHEDRINE SULFATE 5 MG/ML
5 INJECTION INTRAVENOUS ONCE AS NEEDED
Status: DISCONTINUED | OUTPATIENT
Start: 2025-04-05 | End: 2025-04-05 | Stop reason: HOSPADM

## 2025-04-05 RX ORDER — PROPOFOL 10 MG/ML
VIAL (ML) INTRAVENOUS AS NEEDED
Status: DISCONTINUED | OUTPATIENT
Start: 2025-04-05 | End: 2025-04-05 | Stop reason: SURG

## 2025-04-05 RX ORDER — OXYCODONE HYDROCHLORIDE 10 MG/1
10 TABLET ORAL EVERY 4 HOURS PRN
Qty: 12 EACH | Refills: 0 | Status: SHIPPED | OUTPATIENT
Start: 2025-04-05 | End: 2025-04-10

## 2025-04-05 RX ORDER — MAGNESIUM SULFATE HEPTAHYDRATE 40 MG/ML
2 INJECTION, SOLUTION INTRAVENOUS
Status: COMPLETED | OUTPATIENT
Start: 2025-04-05 | End: 2025-04-05

## 2025-04-05 RX ORDER — DEXMEDETOMIDINE HYDROCHLORIDE 100 UG/ML
INJECTION, SOLUTION INTRAVENOUS AS NEEDED
Status: DISCONTINUED | OUTPATIENT
Start: 2025-04-05 | End: 2025-04-05 | Stop reason: SURG

## 2025-04-05 RX ORDER — KETOROLAC TROMETHAMINE 30 MG/ML
INJECTION, SOLUTION INTRAMUSCULAR; INTRAVENOUS AS NEEDED
Status: DISCONTINUED | OUTPATIENT
Start: 2025-04-05 | End: 2025-04-05 | Stop reason: SURG

## 2025-04-05 RX ORDER — SODIUM CHLORIDE 0.9 % (FLUSH) 0.9 %
SYRINGE (ML) INJECTION AS NEEDED
Status: DISCONTINUED | OUTPATIENT
Start: 2025-04-05 | End: 2025-04-05 | Stop reason: HOSPADM

## 2025-04-05 RX ORDER — NALOXONE HCL 0.4 MG/ML
0.4 VIAL (ML) INJECTION AS NEEDED
Status: DISCONTINUED | OUTPATIENT
Start: 2025-04-05 | End: 2025-04-05 | Stop reason: HOSPADM

## 2025-04-05 RX ORDER — ROCURONIUM BROMIDE 10 MG/ML
INJECTION, SOLUTION INTRAVENOUS AS NEEDED
Status: DISCONTINUED | OUTPATIENT
Start: 2025-04-05 | End: 2025-04-05 | Stop reason: SURG

## 2025-04-05 RX ORDER — DIPHENHYDRAMINE HYDROCHLORIDE 50 MG/ML
12.5 INJECTION, SOLUTION INTRAMUSCULAR; INTRAVENOUS
Status: DISCONTINUED | OUTPATIENT
Start: 2025-04-05 | End: 2025-04-05 | Stop reason: HOSPADM

## 2025-04-05 RX ORDER — NALOXONE HCL 0.4 MG/ML
0.4 VIAL (ML) INJECTION
Status: DISCONTINUED | OUTPATIENT
Start: 2025-04-05 | End: 2025-04-05 | Stop reason: HOSPADM

## 2025-04-05 RX ORDER — FENTANYL CITRATE 50 UG/ML
INJECTION, SOLUTION INTRAMUSCULAR; INTRAVENOUS AS NEEDED
Status: DISCONTINUED | OUTPATIENT
Start: 2025-04-05 | End: 2025-04-05 | Stop reason: SURG

## 2025-04-05 RX ORDER — KETAMINE HCL IN NACL, ISO-OSM 100MG/10ML
SYRINGE (ML) INJECTION AS NEEDED
Status: DISCONTINUED | OUTPATIENT
Start: 2025-04-05 | End: 2025-04-05 | Stop reason: SURG

## 2025-04-05 RX ORDER — SODIUM CHLORIDE, SODIUM LACTATE, POTASSIUM CHLORIDE, CALCIUM CHLORIDE 600; 310; 30; 20 MG/100ML; MG/100ML; MG/100ML; MG/100ML
20 INJECTION, SOLUTION INTRAVENOUS CONTINUOUS
Status: DISCONTINUED | OUTPATIENT
Start: 2025-04-05 | End: 2025-04-05 | Stop reason: HOSPADM

## 2025-04-05 RX ORDER — LIDOCAINE HYDROCHLORIDE 10 MG/ML
0.5 INJECTION, SOLUTION EPIDURAL; INFILTRATION; INTRACAUDAL; PERINEURAL ONCE AS NEEDED
Status: DISCONTINUED | OUTPATIENT
Start: 2025-04-05 | End: 2025-04-05 | Stop reason: HOSPADM

## 2025-04-05 RX ADMIN — MAGNESIUM SULFATE HEPTAHYDRATE 2 G: 40 INJECTION, SOLUTION INTRAVENOUS at 05:19

## 2025-04-05 RX ADMIN — THIAMINE HYDROCHLORIDE 200 MG: 100 INJECTION, SOLUTION INTRAMUSCULAR; INTRAVENOUS at 05:23

## 2025-04-05 RX ADMIN — KETOROLAC TROMETHAMINE 30 MG: 30 INJECTION, SOLUTION INTRAMUSCULAR at 12:32

## 2025-04-05 RX ADMIN — MAGNESIUM SULFATE HEPTAHYDRATE 2 G: 40 INJECTION, SOLUTION INTRAVENOUS at 02:33

## 2025-04-05 RX ADMIN — BUPIVACAINE HYDROCHLORIDE 10 ML: 2.5 INJECTION, SOLUTION EPIDURAL; INFILTRATION; INTRACAUDAL; PERINEURAL at 10:52

## 2025-04-05 RX ADMIN — MAGNESIUM SULFATE HEPTAHYDRATE 2 G: 40 INJECTION, SOLUTION INTRAVENOUS at 01:12

## 2025-04-05 RX ADMIN — HYDROMORPHONE HYDROCHLORIDE 0.5 MG: 1 INJECTION, SOLUTION INTRAMUSCULAR; INTRAVENOUS; SUBCUTANEOUS at 12:55

## 2025-04-05 RX ADMIN — HYDROXYZINE HYDROCHLORIDE 25 MG: 25 TABLET, FILM COATED ORAL at 16:27

## 2025-04-05 RX ADMIN — NICOTINE 1 PATCH: 21 PATCH TRANSDERMAL at 08:16

## 2025-04-05 RX ADMIN — PHENYLEPHRINE HYDROCHLORIDE 100 MCG: 10 INJECTION INTRAVENOUS at 12:28

## 2025-04-05 RX ADMIN — HYDROMORPHONE HYDROCHLORIDE 0.5 MG: 1 INJECTION, SOLUTION INTRAMUSCULAR; INTRAVENOUS; SUBCUTANEOUS at 12:58

## 2025-04-05 RX ADMIN — PROPOFOL 200 MG: 10 INJECTION, EMULSION INTRAVENOUS at 10:43

## 2025-04-05 RX ADMIN — HYDROXYZINE HYDROCHLORIDE 25 MG: 25 TABLET, FILM COATED ORAL at 04:39

## 2025-04-05 RX ADMIN — PHENYLEPHRINE HYDROCHLORIDE 100 MCG: 10 INJECTION INTRAVENOUS at 11:55

## 2025-04-05 RX ADMIN — PHENYLEPHRINE HYDROCHLORIDE 100 MCG: 10 INJECTION INTRAVENOUS at 12:05

## 2025-04-05 RX ADMIN — Medication 20 MG: at 11:09

## 2025-04-05 RX ADMIN — DEXAMETHASONE SODIUM PHOSPHATE 8 MG: 4 INJECTION, SOLUTION INTRAMUSCULAR; INTRAVENOUS at 11:11

## 2025-04-05 RX ADMIN — FENTANYL CITRATE 100 MCG: 50 INJECTION, SOLUTION INTRAMUSCULAR; INTRAVENOUS at 10:43

## 2025-04-05 RX ADMIN — PHENYLEPHRINE HYDROCHLORIDE 100 MCG: 10 INJECTION INTRAVENOUS at 10:53

## 2025-04-05 RX ADMIN — HYDROMORPHONE HYDROCHLORIDE 0.5 MG: 1 INJECTION, SOLUTION INTRAMUSCULAR; INTRAVENOUS; SUBCUTANEOUS at 04:39

## 2025-04-05 RX ADMIN — BUPIVACAINE 10 ML: 13.3 INJECTION, SUSPENSION, LIPOSOMAL INFILTRATION at 10:55

## 2025-04-05 RX ADMIN — THIAMINE HYDROCHLORIDE 200 MG: 100 INJECTION, SOLUTION INTRAMUSCULAR; INTRAVENOUS at 15:06

## 2025-04-05 RX ADMIN — FAMOTIDINE 20 MG: 20 TABLET, FILM COATED ORAL at 15:06

## 2025-04-05 RX ADMIN — LIDOCAINE HYDROCHLORIDE 40 MG: 20 INJECTION, SOLUTION EPIDURAL; INFILTRATION; INTRACAUDAL; PERINEURAL at 10:43

## 2025-04-05 RX ADMIN — SUGAMMADEX 200 MG: 100 INJECTION, SOLUTION INTRAVENOUS at 12:40

## 2025-04-05 RX ADMIN — PHENYLEPHRINE HYDROCHLORIDE 100 MCG: 10 INJECTION INTRAVENOUS at 12:18

## 2025-04-05 RX ADMIN — PHENYLEPHRINE HYDROCHLORIDE 100 MCG: 10 INJECTION INTRAVENOUS at 11:33

## 2025-04-05 RX ADMIN — PHENYLEPHRINE HYDROCHLORIDE 100 MCG: 10 INJECTION INTRAVENOUS at 12:13

## 2025-04-05 RX ADMIN — ONDANSETRON 4 MG: 2 INJECTION, SOLUTION INTRAMUSCULAR; INTRAVENOUS at 11:11

## 2025-04-05 RX ADMIN — HYDROMORPHONE HYDROCHLORIDE 0.5 MG: 1 INJECTION, SOLUTION INTRAMUSCULAR; INTRAVENOUS; SUBCUTANEOUS at 08:24

## 2025-04-05 RX ADMIN — PHENYLEPHRINE HYDROCHLORIDE 100 MCG: 10 INJECTION INTRAVENOUS at 11:45

## 2025-04-05 RX ADMIN — SODIUM CHLORIDE, SODIUM LACTATE, POTASSIUM CHLORIDE, AND CALCIUM CHLORIDE: .6; .31; .03; .02 INJECTION, SOLUTION INTRAVENOUS at 10:35

## 2025-04-05 RX ADMIN — BUPIVACAINE 10 ML: 13.3 INJECTION, SUSPENSION, LIPOSOMAL INFILTRATION at 10:52

## 2025-04-05 RX ADMIN — INDOCYANINE GREEN AND WATER 2.5 MG: KIT at 09:00

## 2025-04-05 RX ADMIN — ROCURONIUM BROMIDE 50 MG: 50 INJECTION INTRAVENOUS at 10:43

## 2025-04-05 RX ADMIN — Medication 10 ML: at 08:15

## 2025-04-05 RX ADMIN — ROCURONIUM BROMIDE 10 MG: 50 INJECTION INTRAVENOUS at 11:49

## 2025-04-05 RX ADMIN — OXYCODONE 10 MG: 5 TABLET ORAL at 16:31

## 2025-04-05 RX ADMIN — DEXMEDETOMIDINE HYDROCHLORIDE 10 MCG: 100 INJECTION, SOLUTION INTRAVENOUS at 12:33

## 2025-04-05 RX ADMIN — CLINDAMYCIN PHOSPHATE 900 MG: 900 INJECTION, SOLUTION INTRAVENOUS at 11:10

## 2025-04-05 RX ADMIN — BUPIVACAINE HYDROCHLORIDE 10 ML: 2.5 INJECTION, SOLUTION EPIDURAL; INFILTRATION; INTRACAUDAL; PERINEURAL at 10:55

## 2025-04-05 NOTE — ANESTHESIA POSTPROCEDURE EVALUATION
Patient: Shirin Canales    Procedure Summary       Date: 04/05/25 Room / Location: [facility] OR 08 / [facility] MAIN OR    Anesthesia Start: 1035 Anesthesia Stop: 1305    Procedure: ROBOTIC CHOLECYSTECTOMY WITH INTRAOPERATIVE CHOLANGIOGRAM, GASTRORRHAPHY (Abdomen) Diagnosis:     Surgeons: Fabio Nelson MD Provider: Ish Milligan MD    Anesthesia Type: general ASA Status: 3            Anesthesia Type: general    Vitals  Vitals Value Taken Time   /77 04/05/25 13:47   Temp 97.7 °F (36.5 °C) 04/05/25 13:47   Pulse 89 04/05/25 13:47   Resp 15 04/05/25 13:47   SpO2 97 % 04/05/25 13:47           Post Anesthesia Care and Evaluation    Patient location during evaluation: PACU  Patient participation: complete - patient participated  Level of consciousness: awake  Pain scale: See nurse's notes for pain score.  Pain management: adequate    Airway patency: patent  Anesthetic complications: No anesthetic complications  PONV Status: none  Cardiovascular status: acceptable  Respiratory status: acceptable and spontaneous ventilation  Hydration status: acceptable    Comments: Patient seen and examined postoperatively; vital signs stable; SpO2 greater than or equal to 90%; cardiopulmonary status stable; nausea/vomiting adequately controlled; pain adequately controlled; no apparent anesthesia complications; patient discharged from anesthesia care when discharge criteria were met

## 2025-04-05 NOTE — ANESTHESIA PROCEDURE NOTES
Airway  Reason: elective    Date/Time: 4/5/2025 10:45 AM    General Information and Staff    Patient location during procedure: OR  CRNA/CAA: Nighat Mosquera CRNA    Indications and Patient Condition  Indications for airway management: airway protection    Preoxygenated: yes    Mask difficulty assessment: 1 - vent by mask    Final Airway Details    Final airway type: endotracheal airway      Successful airway: ETT  Cuffed: yes   Successful intubation technique: video laryngoscopy  Adjuncts used in placement: intubating stylet  Endotracheal tube insertion site: oral  Blade: Carreon  Blade size: 3  ETT size (mm): 7.0  Cormack-Lehane Classification: grade I - full view of glottis  Placement verified by: chest auscultation and capnometry   Measured from: lips  ETT/EBT  to lips (cm): 21  Number of attempts at approach: 1  Assessment: lips, teeth, and gum same as pre-op and atraumatic intubation

## 2025-04-05 NOTE — PLAN OF CARE
Continue to monitor and assess pain. Patient wants to leave to smoke and is anxious but is NPO for procedure and will d/c home after.   Problem: Pain Acute  Goal: Optimal Pain Control and Function  Outcome: Progressing     Problem: Adult Inpatient Plan of Care  Goal: Plan of Care Review  Outcome: Progressing  Flowsheets  Taken 4/5/2025 0737 by Mary Napier RN  Outcome Evaluation: Pt is NPO for a lap ashley today and is impatient and wanting to leave the floor to smoke.  Taken 4/4/2025 0349 by Vandana Carrera RN  Progress: improving  Plan of Care Reviewed With: patient  Goal: Patient-Specific Goal (Individualized)  Outcome: Progressing  Goal: Absence of Hospital-Acquired Illness or Injury  Outcome: Progressing  Intervention: Identify and Manage Fall Risk  Flowsheets (Taken 4/5/2025 0737)  Safety Promotion/Fall Prevention:   elopement precautions   assistive device/personal items within reach   clutter free environment maintained   safety round/check completed  Intervention: Prevent Skin Injury  Flowsheets (Taken 4/4/2025 2317 by Ivett Natarajan, PCT)  Body Position: position changed independently  Intervention: Prevent and Manage VTE (Venous Thromboembolism) Risk  Flowsheets (Taken 4/5/2025 0737)  VTE Prevention/Management: (see MAR)   other (see comments)   patient refused intervention  Intervention: Prevent Infection  Flowsheets (Taken 4/4/2025 1957 by Nai Thomas, RN)  Infection Prevention:   rest/sleep promoted   personal protective equipment utilized   hand hygiene promoted  Goal: Optimal Comfort and Wellbeing  Outcome: Progressing  Intervention: Monitor Pain and Promote Comfort  Flowsheets (Taken 4/4/2025 1017 by Linnea Barnhart, RN)  Pain Management Interventions: pain medication given  Intervention: Provide Person-Centered Care  Flowsheets (Taken 4/4/2025 1957 by Nai Thomas, RN)  Trust Relationship/Rapport:   care explained   choices provided  Goal: Readiness for Transition of Care  Outcome:  Progressing  Intervention: Mutually Develop Transition Plan  Flowsheets (Taken 4/3/2025 7265 by Naegele, Megan, RN)  Equipment Needed After Discharge: none  Equipment Currently Used at Home: none  Anticipated Changes Related to Illness: none  Transportation Anticipated: car, drives self  Transportation Concerns: none  Concerns to be Addressed:   denies needs/concerns at this time   no discharge needs identified  Readmission Within the Last 30 Days: no previous admission in last 30 days  Patient/Family Anticipated Services at Transition: none  Patient/Family Anticipates Transition to: home   Goal Outcome Evaluation:              Outcome Evaluation: Pt is NPO for a lap ashley today and is impatient and wanting to leave the floor to smoke.

## 2025-04-05 NOTE — PLAN OF CARE
Goal Outcome Evaluation:               Patient has remained NPO since midnight. Pain treated per MAR. Magnesium replacing.

## 2025-04-05 NOTE — OP NOTE
General Surgery Operative Report  Date: 04/05/25   Patient: Shriin Canales  Surgeon: Fabio Nelson MD   Assistant: Darlin Augustin PA-C; This surgery was assisted and facilitated by a certified physician assistant, who directly resulted in decreased operative time, anesthetic time, exposure of surgical field and possibly of an operative wound infection thereby decreasing patient morbidity and ultimately total expenditures.    Pre-operative Diagnosis: Gallstone pancreatitis  Post-operative Diagnosis: Same  Procedure:   Robotic cholecystectomy with intraoperative cholangiogram  Gastrorrhaphy  Findings:   Marked laxity of the anterior abdominal wall precluding Optiview abdominal entry for which the abdomen was entered using a Veress needle resulting in a subcentimeter iatrogenic injury to the greater curvature of the stomach that was repaired using x2 2-0 Vicryl sutures  Dilated gallbladder containing normal-appearing bile and sludge, as well as microlithiasis  Dilated cystic duct, but otherwise normal cholangiogram  Wound Class: III  Estimated blood loss: minimal  Specimen: gallbladder  Implants: none   Complications: none    Indications:   Shirin Canales is a 34 y.o. female, history of alcoholism, who was admitted to [facility] on 4/2/2025 for pancreatitis.  Patient had previous admissions for alcoholic pancreatitis, but workup during this admission was remarkable for gallstones.  Given these findings, patient was advised undergo cholecystectomy.    Consent:   The risks (postoperative pain, nausea/vomiting, bleeding, hematoma/seroma, incisional hernia, injury to any underlying viscera, bile leak, biliary stricture, need for conversion to open surgery, and complications associated with general anesthesia including stroke, heart attack, and death), benefits, and alternative therapies of robotic cholecystectomy with intraoperative cholangiogram were discussed in great detail with the patient. The patient voiced  understanding and wishes to proceed with surgery.     Operative details:   The patient was brought to the operating room and placed in supine position. General anesthesia was induced and the patient was successfully intubated by Anesthesiology.  The patient underwent a TAP block, which was performed by Anesthesiology. The abdomen was prepped and draped in the usual sterile fashion, after which a brief time-out was held in which the patient, the procedure, preoperative antibiotics, and fire risk were identified and agreed upon by all available members of the operating room staff.    Using a #11 blade, a 8 mm transverse incision was made over the left mid abdominal area along the midclavicular line.  Utilized not to be technique, an attempt was made to access the peritoneal cavity.  Due to the patient's marked abdominal wall laxity, the abdomen cannot be entered with this technique.  Therefore, a incision was made at Cerrato's point through which a Veress needle was placed into the abdomen.  Pneumoperitoneum was established to a pressure of 15 mmHg.  The abdomen was then again accessed through the previous left mid abdominal incision utilizing Optiview technique.  It was at this point, that the Veress needle was noted to be within the greater curvature of the stomach.  There did not appear to be a transmural injury, as the stomach did not appear to be insufflated.  The patient was placed in reverse Trendelenburg position with table tilted towards the patient's left side.  Under direct visualization, 8 mm robotic ports were placed in the right mid abdominal area along the midclavicular line in the right periumbilical area.  The Optiview port was exchanged for a 12 mm robotic port, after which a 5 mm laparoscopic port was placed at Cerrato's point.  The robot was then brought to the patient's right side and appropriately docked.    The gallbladder was dilated, was otherwise normal in appearance.  The fundus was bluntly  retracted cephalad over the inferior edge of the right lobe of the liver.  The infundibulum was then retracted patient's right side until the triangle close was exposed.  Electrocautery was used to divide the peritoneal reflections of the gallbladder, after which a mixture of blunt dissection and electrocautery were used to circumferentially dissect both the cystic duct and artery until a critical view of safety was obtained.  This portion of the case was assisted with the use of ICG fluorescent imaging.    Given the preoperative diagnosis of gallstone pancreatitis, decision was made to proceed with an intraoperative cholangiogram.  The cystic duct was ligated near the neck of the gallbladder with a Hem-o-valentin clip, after which electrocautery was used to fashion a cystic duct ductotomy.  A Dunn cholangiogram catheter was then introduced into the abdomen through a 14-gauge Angiocath.  This was then directed into the cystic duct and secured in place.    Using fluoroscopy, a cholangiogram was performed using full-strength Isovue contrast.  There was appropriate antegrade filling of the cystic duct, which was dilated near the gallbladder, but rather attenuated near the common bile duct.  Further antegrade filling of the common hepatic duct and duodenum was appreciated.  There was some retrograde filling of the common hepatic duct, but the left and right hepatic ducts could not be appreciated.  There is no appreciable filling defects or contrast extravasation consistent with a negative cholangiogram.    The catheter was removed from the abdomen and the cystic duct was ligated distal to the ductotomy using x2 hemoclips.  The ductotomy was completed using electrocautery.  The cystic artery was then ligated and divided in a similar fashion.  Electrocautery was used to divide the gallbladder from the cystic plate.  During this maneuver, an inadvertent cholecystotomy was fashioned, which led to spillage of normal-appearing  bile and microlithiasis.  The gallbladder was then placed into an Endo Catch bag and passed off the field as a specimen.  The abdomen was then thoroughly irrigated until a clear effluent was appreciated.  The cystic plate was reexplored and found to be hemostatic with no evidence of bile leakage.    Attention was then turned towards the gastric injury.  Once again, this did not appear to be transmural for which this area was imbricated in a Lembert fashion using x2 interrupted 2-0 Vicryl sutures.    At this point, the robot was undocked.  The left mid abdominal fascial defect was closed laparoscopically using a 0-Vicryl suture.  Pneumoperitoneum was released and all instruments removed from the abdomen.  Each incision was closed using buried interrupted 4-0 Monocryl sutures.  Skin glue was then applied to each incision.    All instruments, sponge, and needle counts were correct at the end of the case.  The patient tolerated the procedure without any immediate complications. The patient was extubated in the operating room and was transported to the PACU in stable condition.     Disposition:   Return to floor  Okay for diet  Discharge home tomorrow if patient is doing well    Fabio Nelson MD   General Surgery  04/05/25   12:30 EDT     Much of this encounter note is an electronic transcription/translation of spoken language to printed text.  The electronic translation of spoken language may permit erroneous, or at times, nonsensical words or phrases to be inadvertently transcribed.  Although I have reviewed the note for such errors, some may still exist.

## 2025-04-05 NOTE — ANESTHESIA PROCEDURE NOTES
Peripheral Block      Patient reassessed immediately prior to procedure    Patient location during procedure: post-op  Start time: 4/5/2025 10:50 AM  Reason for block: at surgeon's request and post-op pain management  Performed by  Anesthesiologist: Ish Milligan MD  CRNA/CAA: Nighat Mosquera CRNA  Preanesthetic Checklist  Completed: patient identified, IV checked, site marked, risks and benefits discussed, surgical consent, monitors and equipment checked, pre-op evaluation and timeout performed  Prep:  Pt Position: supine  Sterile barriers:partial drape, cap, washed/disinfected hands, gloves and mask  Prep: ChloraPrep  Patient monitoring: blood pressure monitoring, continuous pulse oximetry and EKG  Procedure    Guidance:ultrasound guided    ULTRASOUND INTERPRETATION.  Using ultrasound guidance a 20 G gauge needle was placed in close proximity to the nerve, at which point, under ultrasound guidance anesthetic was injected in the area of the nerve and spread of the anesthesia was seen on ultrasound in close proximity thereto.  There were no abnormalities seen on ultrasound; a digital image was taken; and the patient tolerated the procedure with no complications. Images:still images obtained, printed/placed on chart    Laterality:Bilateral  Block Type:TAP  Injection Technique:single-shot  Needle Type:echogenic  Needle Gauge:20 G (4in)  Resistance on Injection: none    Medications Used: bupivacaine liposome (EXPAREL) injection 1.3% - Perineural   10 mL - 4/5/2025 10:52:00 AM   10 mL - 4/5/2025 10:55:00 AM  bupivacaine PF (MARCAINE) 0.25 % injection - Perineural   10 mL - 4/5/2025 10:52:00 AM   10 mL - 4/5/2025 10:55:00 AM      Medications  Comment:Injected Bilaterally    Post Assessment  Injection Assessment: no paresthesia on injection, incremental injection and negative aspiration for heme  Patient Tolerance:comfortable throughout block  Complications:no  Additional Notes  The block or continuous infusion  is requested by the referring physician for management of postoperative pain, or pain related to a procedure. Ultrasound guidance (deemed medically necessary). Needle and surrounding structures visualized throughout procedure. No adverse reactions or complications seen during this period. Post-procedure image showed no signs of complication, and anatomy was consistent with an uncomplicated nerve blockade.  Performed by: Nighat Mosquera CRNA

## 2025-04-05 NOTE — PROGRESS NOTES
" LOS: 3 days   Patient Care Team:  Renee Zuniga APRN as PCP - General (Nurse Practitioner)  David Morrow MD as Consulting Physician (Nephrology)      Subjective   \"Am I going to get some anxiety medicine? [place] for me. Is someone going to talk to me about after care.\"    Interval History:   Having lap pankaj today.   LABS: Potassium are normal.  Creatinine 0.47.  TB 1.3 (1.5), alk phos 145 (138),  (149), ALT 83 (72).  CRP down to 4.09 from 5.44 yesterday.  Lipase 135 (197).  WBCs 4.48, hemoglobin 8.9, platelets 87.      ROS:   No chest pain, shortness of breath, or cough.         Medication Review:     Current Facility-Administered Medications:     acetaminophen (TYLENOL) tablet 650 mg, 650 mg, Oral, Q4H PRN **OR** acetaminophen (TYLENOL) 160 MG/5ML oral solution 650 mg, 650 mg, Oral, Q4H PRN **OR** acetaminophen (TYLENOL) suppository 650 mg, 650 mg, Rectal, Q4H PRN, Celestino Castellano, DO    calcium carbonate (TUMS) chewable tablet 500 mg (200 mg elemental), 2 tablet, Oral, BID PRN, Celestino Castellano, DO    Calcium Replacement - Follow Nurse / BPA Driven Protocol, , Not Applicable, PRN, Celestino Castellano, DO    diazePAM (VALIUM) tablet 10 mg, 10 mg, Oral, Q2H PRN **OR** diazePAM (VALIUM) injection 10 mg, 10 mg, Intravenous, Q2H PRN **OR** diazePAM (VALIUM) tablet 15 mg, 15 mg, Oral, Q2H PRN **OR** diazePAM (VALIUM) injection 15 mg, 15 mg, Intravenous, Q2H PRN **OR** diazePAM (VALIUM) tablet 20 mg, 20 mg, Oral, Q1H PRN **OR** diazePAM (VALIUM) injection 20 mg, 20 mg, Intravenous, Q1H PRN, Dayne Toribio MD    [Held by provider] enoxaparin sodium (LOVENOX) syringe 30 mg, 30 mg, Subcutaneous, Q24H, Celestino Castellano, DO    escitalopram (LEXAPRO) tablet 10 mg, 10 mg, Oral, Daily, Celestino Castellano DO, 10 mg at 04/04/25 0947    folic acid (FOLVITE) tablet 1 mg, 1 mg, Oral, Daily, Dayne Toribio MD, 1 mg at 04/04/25 0947    hydrALAZINE (APRESOLINE) injection 10 mg, 10 mg, " Intravenous, Q4H PRN, Celestino Castellano DO    HYDROmorphone (DILAUDID) injection 0.5 mg, 0.5 mg, Intravenous, Q2H PRN, 0.5 mg at 04/05/25 0439 **AND** naloxone (NARCAN) injection 0.4 mg, 0.4 mg, Intravenous, Q5 Min PRN, Celestino Castellano DO    hydrOXYzine (ATARAX) tablet 25 mg, 25 mg, Oral, TID PRN, Dayne Toribio MD, 25 mg at 04/05/25 0439    indocyanine green (IC-GREEN) injection 2.5 mg, 2.5 mg, Intravenous, Once, Fabio Nelson MD    Magnesium Standard Dose Replacement - Follow Nurse / BPA Driven Protocol, , Not Applicable, PRNGray Jeffrey M, DO    nicotine (NICODERM CQ) 21 MG/24HR patch 1 patch, 1 patch, Transdermal, Q24H, Deondre Adler MD, 1 patch at 04/04/25 2141    nitroglycerin (NITROSTAT) SL tablet 0.4 mg, 0.4 mg, Sublingual, Q5 Min PRN, Celestino Castellano DO    ondansetron (ZOFRAN) injection 4 mg, 4 mg, Intravenous, Q6H PRN, Celestino Castellano DO, 4 mg at 04/02/25 1951    oxyCODONE (ROXICODONE) immediate release tablet 5 mg, 5 mg, Oral, Q4H PRN, Dayne Toribio MD, 5 mg at 04/04/25 1455    Phosphorus Replacement - Follow Nurse / BPA Driven Protocol, , Not Applicable, PRNGray Jeffrey M, DO    Potassium Replacement - Follow Nurse / BPA Driven Protocol, , Not Applicable, PRNGray Jeffrey M, DO    scopolamine patch 1 mg/72 hr, 1 patch, Transdermal, Q72H, Deondre Adler MD, 1 patch at 04/03/25 0105    [COMPLETED] Insert Peripheral IV, , , Once **AND** sodium chloride 0.9 % flush 10 mL, 10 mL, Intravenous, PRN, Martín Foramn MD    sodium chloride 0.9 % flush 10 mL, 10 mL, Intravenous, Q12H, Celestino Castellano, , 10 mL at 04/04/25 1957    sodium chloride 0.9 % flush 10 mL, 10 mL, Intravenous, PRN, Celestino Castellano,     sodium chloride 0.9 % infusion 40 mL, 40 mL, Intravenous, PRN, Celestino Castellano,     thiamine (B-1) injection 200 mg, 200 mg, Intravenous, Q8H, 200 mg at 04/05/25 0523 **FOLLOWED BY** [START ON 4/8/2025] thiamine  (VITAMIN B-1) tablet 100 mg, 100 mg, Oral, Daily, Dayne Toribio MD    Objective  Shaking noted. Anxious. No family present.     Vital Signs  Temp:  [98.1 °F (36.7 °C)-98.3 °F (36.8 °C)] 98.2 °F (36.8 °C)  Heart Rate:  [72-96] 96  Resp:  [9-20] 20  BP: (114-129)/(86-93) 114/88  Physical Exam:    General Appearance:    Awake and alert, in no acute distress   Head:    Normocephalic, without obvious abnormality   Eyes:          Conjunctivae normal, anicteric sclerae   Ears:    Hearing intact   Throat:   No oral lesions, no thrush, oral mucosa moist   Neck:   No adenopathy, supple, no JVD   Lungs:    respirations regular, even and unlabored       Abdomen:    soft, non-tender, no rebound or guarding, non-distended, no hepatosplenomegaly   Rectal:     Deferred   Extremities:   No edema, no cyanosis, no redness   Skin:   No bleeding, bruising or rash, no jaundice   Neurologic:   Cranial nerves 2 - 12 grossly intact, no asterixis, sensation   intact        Results Review:    CBC    Results from last 7 days   Lab Units 04/04/25 2235 04/04/25  0052 04/03/25  0119 04/02/25  1106   WBC 10*3/mm3 4.48 3.93 8.32 8.85   HEMOGLOBIN g/dL 8.9* 9.0* 10.3* 12.1   PLATELETS 10*3/mm3 87* 80* 100* 119*     CMP   Results from last 7 days   Lab Units 04/04/25 2235 04/04/25  1652 04/04/25  0902 04/04/25  0052 04/03/25  1410 04/03/25  0119 04/02/25  1106   SODIUM mmol/L 139  --  138 136  --  135* 137   POTASSIUM mmol/L 4.5  --  3.8 3.4* 4.0 3.5 3.9   CHLORIDE mmol/L 100  --  99 96*  --  96* 99   CO2 mmol/L 29.8*  --  27.3 29.9*  --  27.6 20.6*   BUN mg/dL 6  --  5* 6  --  16 29*   CREATININE mg/dL 0.47*  --  0.41* 0.40*  --  0.52* 0.63   GLUCOSE mg/dL 115*  --  83 79  --  131* 134*   ALBUMIN g/dL 4.0  --  3.4* 3.9  --   --  5.0   BILIRUBIN mg/dL 1.3*  --  1.5* 1.8*  --   --  1.5*   ALK PHOS U/L 145*  --  138* 140*  --   --  159*   AST (SGOT) U/L 156*  --  149* 178*  --   --  99*   ALT (SGPT) U/L 83*  --  72* 67*  --   --  73*    MAGNESIUM mg/dL 1.4*  --   --  1.6  --   --   --    PHOSPHORUS mg/dL 3.6 3.5  --  0.4*  --   --   --    AMYLASE U/L  --   --  175*  --   --   --   --    LIPASE U/L 135*  --  197* 225*  --   --  824*     Cr Clearance Estimated Creatinine Clearance: 112.6 mL/min (A) (by C-G formula based on SCr of 0.47 mg/dL (L)).  Coag     HbA1C   Lab Results   Component Value Date    HGBA1C 4.30 (L) 12/28/2023     Blood Glucose   Glucose   Date/Time Value Ref Range Status   04/03/2025 2028 108 (H) 70 - 105 mg/dL Final     Comment:     Serial Number: [id number]:  [id number]     Infection     UA    Results from last 7 days   Lab Units 04/02/25  1547   NITRITE UA  Negative   WBC UA /HPF 0-2   BACTERIA UA /HPF 1+*   SQUAM EPITHEL UA /HPF 7-12*     Radiology(recent) MRI abdomen wo contrast mrcp  Result Date: 4/4/2025  Impression: 1. Mild acute interstitial pancreatitis. Trace ascites without drainable fluid collection. Incidental pancreatic divisum morphology. 2. Atrophic pancreas with borderline/mild dilation of the main duct for age, suggesting sequela of chronic pancreatitis. 3. Normal cholangiogram. Debris/sludge in the gallbladder with probably reactive gallbladder wall thickening and edema along the hepatic margin. 4. Moderate hepatomegaly with severe parenchymal steatosis. Tiny nodular areas of benign fatty sparing versus possible focal nodular hyperplasia or hepatic adenomas considered almost certainly benign. Recommend 6-month follow-up abdominal MRI with IV contrast to an sure stability (preferably bili with Eovist if available). This does not require inpatient/urgent follow-up. 5. Minimal body wall edema. 6. Multiple simple and mildly complex renal cystic lesions. Recommend attention on follow-up MRI. Electronically Signed: Mayo Thomas MD  4/4/2025 2:28 PM EDT  Workstation ID: [id number]            Assessment & Plan   Acute on chronic alcohol pancreatitis  Elevated LFTs due to above as well as alcohol abuse as well  as severe hepatic steatosis  Hepatic steatosis  Alcohol & tobacco abuse  Cholelithiasis      PLAN:  Patient is a 33 y/o female with history of ETOH abuse as well as ETOH pancreatitis who presented 4/2/25 for abdominal pain & vomiting. CT showed acute pancreatitis & lipase was 824. GI was consulted today for pancreatitis.       Plan for lap pankaj today.   LFT's slightly worse.   Complete ETOH & tobacco.   Diet per general surgery. Eventually low fat diet due to pancreatitis.        Unique Calvo, BEVERLY  04/05/25  07:59 EDT

## 2025-04-05 NOTE — CONSULTS
"Nutrition Services    Patient Name: Shirin Canales  YOB: 1990  MRN: [id number]  Admission date: 4/2/2025    Comment:  -- Diet per MD.  RD to monitor ability to add ONS    -- Obtain scale weight      CLINICAL NUTRITION ASSESSMENT      Reason for Assessment 4/5: NPO/liq x 3 days, BMI less than 19, MST of 2, history of malnutrition      H&P      History reviewed. No pertinent past medical history.    History reviewed. No pertinent surgical history.     Current Problems   Acute on chronic pancreatitis and metabolic acidosis with mild colitis  - Secondary to chronic alcoholism (on CIWA)  - plans for gallbladder removal 4/5  - surgery following  - GI following    GERD     Major depressive disorder       Encounter Information        Trending Narrative     4/5: Admitted for abdominal pain and diagnosed with pancreatitis.  Noted with plans for cholecystectomy today.  Patient not in her room at time of RD visit 4/4.  RD to follow up as able.         Anthropometrics        Current Height, Weight Height: 160 cm (63\")  Weight: 41.9 kg (92 lb 6 oz) (04/02/25 1019)       Usual Body Weight (UBW) Unable to obtain from patient        Trending Weight Hx     This admission: 4/5: 92# (not a scale weight, RD ordered one to be obtained              PTA: To wait on scale weight     Wt Readings from Last 30 Encounters:   04/02/25 1019 41.9 kg (92 lb 6 oz)   02/07/25 0827 41.9 kg (92 lb 6 oz)   06/26/24 0824 63 kg (139 lb)   01/01/24 0543 63.5 kg (139 lb 15.9 oz)   12/30/23 1657 62.2 kg (137 lb 2 oz)   12/28/23 0800 53.2 kg (117 lb 4.6 oz)   11/21/23 0801 59 kg (130 lb)   11/21/23 0448 52.2 kg (115 lb)   11/21/23 0315 52.2 kg (115 lb)   11/20/23 2252 52.2 kg (115 lb 1.3 oz)   04/26/23 0508 67.3 kg (148 lb 5.9 oz)   04/25/23 0316 62.8 kg (138 lb 6.4 oz)   04/24/23 0141 61.4 kg (135 lb 5.8 oz)   04/23/23 2247 59 kg (130 lb)      BMI kg/m2 Body mass index is 16.36 kg/m².       Labs        Pertinent Labs    Results from last 7 " days   Lab Units 04/04/25 2235 04/04/25  0902 04/04/25  0052   SODIUM mmol/L 139 138 136   POTASSIUM mmol/L 4.5 3.8 3.4*   CHLORIDE mmol/L 100 99 96*   CO2 mmol/L 29.8* 27.3 29.9*   BUN mg/dL 6 5* 6   CREATININE mg/dL 0.47* 0.41* 0.40*   CALCIUM mg/dL 9.4 8.9 9.3   BILIRUBIN mg/dL 1.3* 1.5* 1.8*   ALK PHOS U/L 145* 138* 140*   ALT (SGPT) U/L 83* 72* 67*   AST (SGOT) U/L 156* 149* 178*   GLUCOSE mg/dL 115* 83 79     Results from last 7 days   Lab Units 04/04/25 2235 04/04/25  1652 04/04/25  0052   MAGNESIUM mg/dL 1.4*  --  1.6   PHOSPHORUS mg/dL 3.6   < > 0.4*   HEMOGLOBIN g/dL 8.9*  --  9.0*   HEMATOCRIT % 27.1*  --  27.6*    < > = values in this interval not displayed.     Lab Results   Component Value Date    HGBA1C 4.30 (L) 12/28/2023        Medications    Scheduled Medications [Held by provider] enoxaparin sodium, 30 mg, Subcutaneous, Q24H  escitalopram, 10 mg, Oral, Daily  folic acid, 1 mg, Oral, Daily  indocyanine green, 2.5 mg, Intravenous, Once  nicotine, 1 patch, Transdermal, Q24H  Scopolamine, 1 patch, Transdermal, Q72H  sodium chloride, 10 mL, Intravenous, Q12H  thiamine (B-1) IV, 200 mg, Intravenous, Q8H   Followed by  [START ON 4/8/2025] thiamine, 100 mg, Oral, Daily        Infusions      PRN Medications   acetaminophen **OR** acetaminophen **OR** acetaminophen    calcium carbonate    Calcium Replacement - Follow Nurse / BPA Driven Protocol    diazePAM **OR** diazePAM **OR** diazePAM **OR** diazePAM **OR** diazePAM **OR** diazePAM    hydrALAZINE    HYDROmorphone **AND** naloxone    hydrOXYzine    Magnesium Standard Dose Replacement - Follow Nurse / BPA Driven Protocol    nitroglycerin    ondansetron    oxyCODONE    Phosphorus Replacement - Follow Nurse / BPA Driven Protocol    Potassium Replacement - Follow Nurse / BPA Driven Protocol    [COMPLETED] Insert Peripheral IV **AND** sodium chloride    sodium chloride    sodium chloride     Physical Findings        Trending Physical   Appearance, NFPE 4/5:  AQUILINO   --  Edema  No edema documented     Bowel Function No documented BM since admission (x 3 days ago)     Tubes No feeding tube      Chewing/Swallowing Unknown baseline      Skin Intact      --  Current Nutrition Orders & Evaluation of Intake       Oral Nutrition     Food Allergies NKFA   Current PO Diet NPO Diet NPO Type: Strict NPO   Supplement None ordered   PO Evaluation     Trending % PO Intake 4/5: NPO and liquids prior    --  Nutritional Risk Screening        NRS-2002 Score          Nutrition Diagnosis         Nutrition Dx Problem 1 Underweight related to intake less than needs as evidenced by BMI less than 18.5.        Nutrition Dx Problem 2 Inadequate energy intake related to clinical course as evidenced by NPO.         Intervention Goal         Intervention Goal(s) Diet advancement per MD  Obtain scale weight     Nutrition Intervention        RD Action Monitor PO intakes      Nutrition Prescription          Diet Prescription NPO   Supplement Prescription Will add when able    --  Monitor/Evaluation        Monitor Per protocol, I&O, PO intake, Supplement intake, Pertinent labs, Weight, GI status       Electronically signed by:  Chacha Juarez RD  04/05/25 07:51 EDT

## 2025-04-05 NOTE — PROGRESS NOTES
"General Surgery Progress Note    SUBJECTIVE:   Patient seen at bedside in pre-op. No acute events o/n.     VITALS:   Temp:  [98 °F (36.7 °C)-98.3 °F (36.8 °C)] 98 °F (36.7 °C)  Heart Rate:  [79-96] 86  Resp:  [9-20] 16  BP: (114-129)/(86-92) 119/90    I & O:  I/O last 3 completed shifts:  In: 100 [P.O.:100]  Out: -   No intake/output data recorded.    PHYSICAL EXAM:  General: NAD, AAOx3  Respiratory: no respiratory distress  Abdomen: soft, NT, ND, dull to percussion     LABS:  No results found for: \"CBCDIF\"  Lab Results   Component Value Date    GLUCOSE 115 (H) 04/04/2025    BUN 6 04/04/2025    CREATININE 0.47 (L) 04/04/2025     04/04/2025    K 4.5 04/04/2025     04/04/2025    CALCIUM 9.4 04/04/2025    PROTEINTOT 7.0 04/04/2025    ALBUMIN 4.0 04/04/2025    ALT 83 (H) 04/04/2025     (H) 04/04/2025    ALKPHOS 145 (H) 04/04/2025    BILITOT 1.3 (H) 04/04/2025    GLOB 3.0 04/04/2025    AGRATIO 1.3 04/04/2025    BCR 12.8 04/04/2025    ANIONGAP 9.2 04/04/2025    EGFR 129.1 04/04/2025     No results found for: \"INR\", \"PROTIME\"    RADIOLOGY:  MRI abdomen wo contrast mrcp  Result Date: 4/4/2025  Impression: 1. Mild acute interstitial pancreatitis. Trace ascites without drainable fluid collection. Incidental pancreatic divisum morphology. 2. Atrophic pancreas with borderline/mild dilation of the main duct for age, suggesting sequela of chronic pancreatitis. 3. Normal cholangiogram. Debris/sludge in the gallbladder with probably reactive gallbladder wall thickening and edema along the hepatic margin. 4. Moderate hepatomegaly with severe parenchymal steatosis. Tiny nodular areas of benign fatty sparing versus possible focal nodular hyperplasia or hepatic adenomas considered almost certainly benign. Recommend 6-month follow-up abdominal MRI with IV contrast to an sure stability (preferably bili with Eovist if available). This does not require inpatient/urgent follow-up. 5. Minimal body wall edema. 6. Multiple " simple and mildly complex renal cystic lesions. Recommend attention on follow-up MRI. Electronically Signed: Mayo Thomas MD  4/4/2025 2:28 PM EDT  Workstation ID: [id number]    CT Abdomen Pelvis With Contrast  Result Date: 4/2/2025  1.Small amount of fluid noted along the pancreas more prominent in the left upper quadrant. Findings compatible with acute pancreatitis. No evidence of pseudocyst or abscess noted. Overall findings appearing improved from comparison. 2.Severe diffuse hepatic steatosis. Please correlate with liver function test. 3.Mild stranding of the transverse and descending colon. Underlying colitis not excluded. 4.Cholelithiasis. 5.Other findings as above. Electronically Signed: Barber Matson MD  4/2/2025 12:48 PM EDT  Workstation ID: [id number]        ASSESSMENT:     34-year-old female, history of alcoholism with emergent admissions for alcoholic ketoacidosis and pancreatitis, with recurrent acute pancreatitis.  Workup does demonstrate evidence of gallstones.  As such, patient would benefit from cholecystectomy.     The risks (postoperative pain, nausea/vomiting, bleeding, hematoma/seroma, incisional hernia, injury to any underlying viscera, bile leak, biliary stricture, need for conversion to open surgery, and complications associated with general anesthesia including stroke, heart attack, and death), benefits, and alternative therapies of robotic cholecystectomy with intraoperative cholangiogram were discussed in great detail with the patient. The patient voiced understanding and wishes to proceed with surgery.     PLAN:   OR today  NPO for now  Rest of care per primary team     Fabio Nelson MD   General Surgery  04/05/25   09:19 EDT

## 2025-04-05 NOTE — DISCHARGE SUMMARY
[facility] Medicine [facility]  Discharge Summary    Date of Service: 2025  Patient Name: Shirin Canales  : 1990  MRN: [id number]    Date of Admission: 2025  Discharge Diagnosis: Pancreatitis  Date of Discharge: 2025  Primary Care Physician: Renee Zuniga APRN      Presenting Problem:   Pancreatitis [K85.90]  Acute pancreatitis without infection or necrosis, unspecified pancreatitis type [K85.90]    Active and Resolved Hospital Problems:  Active Hospital Problems    Diagnosis POA    **Pancreatitis [K85.90] Yes      Resolved Hospital Problems   No resolved problems to display.     #Acute pancreatitis  #Metabolic acidosis-resolved  #EtOH abuse high risk for withdrawal  Change IV fluids to LR  Start p.o. Norco for pain control.  IV Dilaudid only for breakthrough  If abdominal pain does not improve we will make n.p.o. for bowel rest  CIWA monitoring per protocol  Start thiamine and folic acid  Antiemetics as needed  Extensively counseled on EtOH use cessation  Per GI>Recommend MRCP to rule out common bile duct stone as a potential cause for her recurrent pancreatitis  Recommend general surgery evaluation for empiric cholecystectomy for gallstones and pancreatitis  Counseled on complete alcohol abstinence     #GERD  Continue PPI     #MDD  Continue home dose of Lexapro       Hospital Course     History of Present Illness: Shirin Canales is a 34 y.o. female with a CMH of alcoholism who presented to [facility] on 2025 with abdominal pain.     She is seen in the ED room 10 at time of exam.  She states that since  she has had increasing abdominal pain which was midepigastric in nature.  She is also having dry heaving.  She is asking for some clear liquids at this time.  She claims she has not had anything to drink since Saturday evening.  She states these episodes have been becoming more frequent but this episode is not as bad as the previous ones.  We discussed that  "she does have acute on chronic pancreatitis and had some bowel rest with clear liquids and high-volume IV fluids will likely benefit her      Interval History:  History taken from: patient chart RN  4/3-Abdominal pain improved with IV pain medications  4/4 seen in bed NAD, feeling better today, anxious to go home, DASIA RN, consulted GI MRI ordered\"  4/5 underwent cholecystectomy, will dc home, condition on dc stable.    DISCHARGE Follow Up Recommendations for labs and diagnostics: follow with pcp in one week  Day of Discharge     Vital Signs:  Temp:  [97.5 °F (36.4 °C)-98.5 °F (36.9 °C)] 97.8 °F (36.6 °C)  Heart Rate:  [84-96] 92  Resp:  [10-20] 16  BP: ()/(40-92) 110/73  Flow (L/min) (Oxygen Therapy):  [6] 6    Physical Exam AOx3 NAD  RRR S1-S2 audible  Lungs with fair air entry  Abdomen with epigastric tenderness, no rigidity bowel sounds positive         Pertinent  and/or Most Recent Results     LAB RESULTS:      Lab 04/04/25 2235 04/04/25  0902 04/04/25 0052 04/03/25  0119 04/02/25  1106   WBC 4.48  --  3.93 8.32 8.85   HEMOGLOBIN 8.9*  --  9.0* 10.3* 12.1   HEMATOCRIT 27.1*  --  27.6* 31.0* 36.0   PLATELETS 87*  --  80* 100* 119*   NEUTROS ABS 2.84  --  2.34  --  7.53*   IMMATURE GRANS (ABS) 0.02  --  0.01  --  0.06*   LYMPHS ABS 1.11  --  1.11  --  0.59*   MONOS ABS 0.42  --  0.43  --  0.64   EOS ABS 0.07  --  0.02  --  0.01   .0*  --  104.2* 101.0* 102.3*   CRP 4.09* 5.44* 6.48*  --   --          Lab 04/04/25 2235 04/04/25  1652 04/04/25  0902 04/04/25  0052 04/03/25  1410 04/03/25  0119 04/02/25  1106   SODIUM 139  --  138 136  --  135* 137   POTASSIUM 4.5  --  3.8 3.4* 4.0 3.5 3.9   CHLORIDE 100  --  99 96*  --  96* 99   CO2 29.8*  --  27.3 29.9*  --  27.6 20.6*   ANION GAP 9.2  --  11.7 10.1  --  11.4 17.4*   BUN 6  --  5* 6  --  16 29*   CREATININE 0.47*  --  0.41* 0.40*  --  0.52* 0.63   EGFR 129.1  --  133.4 134.2  --  126.0 120.3   GLUCOSE 115*  --  83 79  --  131* 134*   CALCIUM 9.4  --  " 8.9 9.3  --  9.8 10.4   MAGNESIUM 1.4*  --   --  1.6  --   --   --    PHOSPHORUS 3.6 3.5  --  0.4*  --   --   --          Lab 04/04/25  2235 04/04/25  0902 04/04/25  0052 04/02/25  1106   TOTAL PROTEIN 7.0 6.6 6.7 8.7*   ALBUMIN 4.0 3.4* 3.9 5.0   GLOBULIN 3.0 3.2 2.8 3.7   ALT (SGPT) 83* 72* 67* 73*   AST (SGOT) 156* 149* 178* 99*   BILIRUBIN 1.3* 1.5* 1.8* 1.5*   ALK PHOS 145* 138* 140* 159*   AMYLASE  --  175*  --   --    LIPASE 135* 197* 225* 824*                 Lab 04/04/25 2235   ABO TYPING O   RH TYPING Negative   ANTIBODY SCREEN Negative         Brief Urine Lab Results  (Last result in the past 365 days)        Color   Clarity   Blood   Leuk Est   Nitrite   Protein   CREAT   Urine HCG        04/02/25 1547 Yellow   Clear   Large (3+)   Negative   Negative   30 mg/dL (1+)           04/02/25 1547               Negative             Microbiology Results (last 10 days)       Procedure Component Value - Date/Time    COVID-19 and FLU A/B PCR, 1 HR TAT - Swab, Nasopharynx [[id number]]  (Normal) Collected: 04/02/25 1107    Lab Status: Final result Specimen: Swab from Nasopharynx Updated: 04/02/25 1136     COVID19 Not Detected     Influenza A PCR Not Detected     Influenza B PCR Not Detected    Narrative:      Fact sheet for providers: https://www.fda.gov/media/245387/download    Fact sheet for patients: https://www.fda.gov/media/939608/download    Test performed by PCR.    RSV PCR - Swab, Nasopharynx [[id number]]  (Normal) Collected: 04/02/25 1107    Lab Status: Final result Specimen: Swab from Nasopharynx Updated: 04/02/25 1403     RSV, PCR Not Detected            FL Cholangiogram Operative  Result Date: 4/5/2025  Impression: Impression: Fluoroscopy was utilized during intraoperative cholangiogram. There is contrast opacification of the cystic duct, common bile duct, and duodenum. No intraductal filling defect is identified. There appears to be some contrast extravasation which may be related to the injection.  Electronically Signed: Sridhar Saldana MD  4/5/2025 2:09 PM EDT  Workstation ID: [id number]    MRI abdomen wo contrast mrcp  Result Date: 4/4/2025  Impression: Impression: 1. Mild acute interstitial pancreatitis. Trace ascites without drainable fluid collection. Incidental pancreatic divisum morphology. 2. Atrophic pancreas with borderline/mild dilation of the main duct for age, suggesting sequela of chronic pancreatitis. 3. Normal cholangiogram. Debris/sludge in the gallbladder with probably reactive gallbladder wall thickening and edema along the hepatic margin. 4. Moderate hepatomegaly with severe parenchymal steatosis. Tiny nodular areas of benign fatty sparing versus possible focal nodular hyperplasia or hepatic adenomas considered almost certainly benign. Recommend 6-month follow-up abdominal MRI with IV contrast to an sure stability (preferably bili with Eovist if available). This does not require inpatient/urgent follow-up. 5. Minimal body wall edema. 6. Multiple simple and mildly complex renal cystic lesions. Recommend attention on follow-up MRI. Electronically Signed: Mayo Thomas MD  4/4/2025 2:28 PM EDT  Workstation ID: [id number]    CT Abdomen Pelvis With Contrast  Result Date: 4/2/2025  Impression: 1.Small amount of fluid noted along the pancreas more prominent in the left upper quadrant. Findings compatible with acute pancreatitis. No evidence of pseudocyst or abscess noted. Overall findings appearing improved from comparison. 2.Severe diffuse hepatic steatosis. Please correlate with liver function test. 3.Mild stranding of the transverse and descending colon. Underlying colitis not excluded. 4.Cholelithiasis. 5.Other findings as above. Electronically Signed: Barber Matson MD  4/2/2025 12:48 PM EDT  Workstation ID: [id number]                  Labs Pending at Discharge:  Pending Results       Procedure [Order ID] Specimen - Date/Time    Tissue Pathology Exam [[id number]] Collected: 04/05/25 1241     Specimen: Tissue from Gallbladder Updated: 04/05/25 1414            Procedures Performed  Procedure(s):  ROBOTIC CHOLECYSTECTOMY WITH INTRAOPERATIVE CHOLANGIOGRAM, GASTRORRHAPHY         Consults:   Consults       Date and Time Order Name Status Description    4/4/2025  1:17 PM Inpatient General Surgery Consult Completed     4/4/2025 11:16 AM Inpatient Gastroenterology Consult      4/2/2025 12:53 PM Hospitalist (on-call MD unless specified)            Attestation signed by Sven Guardado MD at 04/05/25 9352     I have reviewed this documentation and agree.  I have personally seen the patient with advanced practitioner and agree with plan.  I have reviewed labs and imaging.  I also performed an appropriate and complete medical decision making component to the history and physical.  Abdominal pain is improved.  She had a small amount of leaking from incision site after cholecystectomy earlier today.  Her abdomen is soft with mild tenderness around incision site.  Continue supportive care.  Her diet has been advanced.  Will follow.    Discharge Details        Discharge Medications        New Medications        Instructions Start Date   Methyl-Folate 1000 MCG capsule  Generic drug: Levomefolate Glucosamine   1 tablet, Oral, Daily      oxyCODONE 10 MG tablet  Commonly known as: ROXICODONE   10 mg, Oral, Every 4 Hours PRN      thiamine 100 MG tablet  Commonly known as: VITAMIN B1   100 mg, Oral, Daily   Start Date: April 8, 2025            Continue These Medications        Instructions Start Date   escitalopram 10 MG tablet  Commonly known as: LEXAPRO   10 mg, Daily      hydrOXYzine 25 MG tablet  Commonly known as: ATARAX   1-2 tablets, Oral, 3 Times Daily PRN      ondansetron 4 MG tablet  Commonly known as: ZOFRAN   4 mg, Oral, Every 8 Hours PRN      pantoprazole 40 MG EC tablet  Commonly known as: Protonix   40 mg, Oral, Daily             Stop These Medications      ibuprofen 200 MG tablet  Commonly known as:  ADVIL,MOTRIN              Allergies   Allergen Reactions    Latex Unknown - Low Severity    Penicillins Unknown - High Severity    Monistat [Miconazole] Rash         Discharge Disposition:   Home or Self Care    Diet:  Hospital:  Diet Order   Procedures    Diet: Gastrointestinal; Fat-Restricted; Fluid Consistency: Thin (IDDSI 0)         Discharge Activity:   Activity Instructions       Gradually Increase Activity Until at Pre-Hospitalization Level                CODE STATUS:  Code Status and Medical Interventions: CPR (Attempt to Resuscitate); Full Support   Ordered at: 04/02/25 1314     Code Status (Patient has no pulse and is not breathing):    CPR (Attempt to Resuscitate)     Medical Interventions (Patient has pulse or is breathing):    Full Support         No future appointments.    Additional Instructions for the Follow-ups that You Need to Schedule       Discharge Follow-up with PCP   As directed       Currently Documented PCP:    Renee Zuniga APRN    PCP Phone Number:    [phone]     Follow Up Details: 1 week        Discharge Follow-up with Specified Provider: surgery; 1 Week   As directed      To: surgery   Follow Up: 1 Week                Time spent on Discharge including face to face service:  34  minutes    Signature: Electronically signed by Timothy Kim MD, 04/05/25, 17:19 EDT.  [facility] Hospitalist Team

## 2025-04-06 NOTE — OUTREACH NOTE
Prep Survey      Flowsheet Row Responses   [facility] facility patient discharged from? [facility]   Is LACE score < 7 ? No   Eligibility Readm Mgmt   Discharge diagnosis Robotic cholecystectomy   Does the patient have one of the following disease processes/diagnoses(primary or secondary)? General Surgery   Does the patient have Home health ordered? No   Is there a DME ordered? No   Prep survey completed? Yes            DENISSE ANDREA - Registered Nurse

## 2025-04-07 ENCOUNTER — TELEPHONE (OUTPATIENT)
Dept: SURGERY | Facility: CLINIC | Age: 35
End: 2025-04-07
Payer: COMMERCIAL

## 2025-04-07 NOTE — TELEPHONE ENCOUNTER
I called Shirin Canales to check on them post operatively. We discussed  instructions and post op office visit.we discussed [facility] paperwork.  Encouraged to call the office with any other questions.

## 2025-04-07 NOTE — CASE MANAGEMENT/SOCIAL WORK
Case Management Discharge Note      Final Note: Home.      Selected Continued Care - Discharged on 4/5/2025 Admission date: 4/2/2025 - Discharge disposition: Home or Self Care       Transportation Services  Private: Car    Final Discharge Disposition Code: 01 - home or self-care

## 2025-04-08 LAB
LAB AP CASE REPORT: NORMAL
PATH REPORT.FINAL DX SPEC: NORMAL
PATH REPORT.GROSS SPEC: NORMAL

## 2025-04-11 ENCOUNTER — OFFICE VISIT (OUTPATIENT)
Dept: SURGERY | Facility: CLINIC | Age: 35
End: 2025-04-11
Payer: COMMERCIAL

## 2025-04-11 ENCOUNTER — READMISSION MANAGEMENT (OUTPATIENT)
Dept: CALL CENTER | Facility: HOSPITAL | Age: 35
End: 2025-04-11
Payer: COMMERCIAL

## 2025-04-11 VITALS
OXYGEN SATURATION: 99 % | DIASTOLIC BLOOD PRESSURE: 83 MMHG | TEMPERATURE: 97.8 F | BODY MASS INDEX: 17.72 KG/M2 | HEART RATE: 83 BPM | SYSTOLIC BLOOD PRESSURE: 116 MMHG | WEIGHT: 100 LBS | HEIGHT: 63 IN

## 2025-04-11 DIAGNOSIS — Z90.49 S/P LAPAROSCOPIC CHOLECYSTECTOMY: Primary | ICD-10-CM

## 2025-04-11 DIAGNOSIS — K85.10 GALLSTONE PANCREATITIS: ICD-10-CM

## 2025-04-11 PROCEDURE — 99024 POSTOP FOLLOW-UP VISIT: CPT | Performed by: STUDENT IN AN ORGANIZED HEALTH CARE EDUCATION/TRAINING PROGRAM

## 2025-04-11 RX ORDER — HYDROCODONE BITARTRATE AND ACETAMINOPHEN 10; 325 MG/1; MG/1
1 TABLET ORAL
COMMUNITY
Start: 2025-04-09 | End: 2025-04-12

## 2025-04-11 NOTE — OUTREACH NOTE
General Surgery Week 1 Survey      Flowsheet Row Responses   [facility] facility patient discharged from? [facility]   Does the patient have one of the following disease processes/diagnoses(primary or secondary)? General Surgery   Week 1 attempt successful? No   Unsuccessful attempts Attempt 1            ELIZABETH JACKSON - Registered Nurse

## 2025-04-11 NOTE — PROGRESS NOTES
General Surgery Post-Operative Clinic Note    Subjective:  Shirin Canales is a 34 y.o. female who presents today for post-op visit. S/p robotic cholecystectomy with IOC performed on 2025  for gallstone pancreatitis. Pt did undergo a gastrorrhaphy for entry trauma, but was discharged post-op w/o any issues.     Today, pt reports some abdominal pain that is tolerable. Denies N/V. Reports improvement in her pain since having bowel movements yesterday for which she has been taking miralx.     Past Surgical History:   Procedure Laterality Date    CHOLECYSTECTOMY WITH INTRAOPERATIVE CHOLANGIOGRAM N/A 2025    Procedure: ROBOTIC CHOLECYSTECTOMY WITH INTRAOPERATIVE CHOLANGIOGRAM, GASTRORRHAPHY;  Surgeon: Fabio Nelson MD;  Location: [facility] MAIN OR;  Service: Robotics - [facility];  Laterality: N/A;       Patient Active Problem List   Diagnosis    Metabolic acidosis    Alcoholic ketoacidosis    Elevated blood pressure reading    Alcohol induced acute pancreatitis without necrosis or infection    Kidney stone    Hepatic steatosis    Dizziness    Alcohol use    Vomiting    LFT elevation    Pancreatitis     No past medical history on file.  Outpatient Encounter Medications as of 2025   Medication Sig Dispense Refill    escitalopram (LEXAPRO) 10 MG tablet Take 1 tablet by mouth Daily.      hydrOXYzine (ATARAX) 25 MG tablet Take 1-2 tablets by mouth 3 (Three) Times a Day As Needed for Anxiety.      Levomefolate Glucosamine (Methyl-Folate) 1700 MCG capsule Take 1 tablet by mouth Daily. 30 capsule 0    ondansetron (ZOFRAN) 4 MG tablet Take 1 tablet by mouth Every 8 (Eight) Hours As Needed for Nausea or Vomiting. 20 tablet 0    [] oxyCODONE (ROXICODONE) 10 MG tablet Take 1 tablet by mouth Every 4 (Four) Hours As Needed for Severe Pain for up to 5 days. 12 each 0    pantoprazole (Protonix) 40 MG EC tablet Take 1 tablet by mouth Daily. 30 tablet 0    thiamine (VITAMIN B1) 100 MG tablet Take 1 tablet by mouth Daily.  "30 tablet 0     No facility-administered encounter medications on file as of 4/11/2025.     Allergies   Allergen Reactions    Latex Unknown - Low Severity    Penicillins Unknown - High Severity    Monistat [Miconazole] Rash       Objective:  Vitals:    04/11/25 1032   Height: 160 cm (63\")     Body mass index is 16.93 kg/m².   Physical Exam   General: NAD, AAOx3  Respiratory: no respiratory distress  Abdomen: soft, appropriate marilee-incisional tenderness, ND, dull to percussion; incisions - CDI, mild marilee-incisional ecchymosis      Pathology (4/5/25):  Chronic calculus cholecystitis     Assessment:  Diagnoses and all orders for this visit:    1. S/P laparoscopic cholecystectomy (Primary)    2. Gallstone pancreatitis       34 y.o. female, h/o EtOH, with recent admission for gallstone pancreatitis s/p robotic pankaj with IOC. Did require gastrorrhaphy for entry trauma. No issues post-op, progressing as expected.     Plan:  No further lifting or dietary restrictions  RTC PRN    Fabio Nelson MD  General Surgery  04/11/25   10:36 EDT    Much of this encounter note is an electronic transcription/translation of spoken language to printed text.  The electronic translation of spoken language may permit erroneous, or at times, nonsensical words or phrases to be inadvertently transcribed.  Although I have reviewed the note for such errors, some may still exist.    "

## 2025-04-15 ENCOUNTER — READMISSION MANAGEMENT (OUTPATIENT)
Dept: CALL CENTER | Facility: HOSPITAL | Age: 35
End: 2025-04-15
Payer: COMMERCIAL

## 2025-04-15 NOTE — OUTREACH NOTE
General Surgery Week 1 Survey      Flowsheet Row Responses   [facility] patient discharged from? [facility]   Does the patient have one of the following disease processes/diagnoses(primary or secondary)? General Surgery   Week 1 attempt successful? Yes   Call start time 1723   Call end time 1729   Discharge diagnosis Robotic cholecystectomy   Meds reviewed with patient/caregiver? Yes   Is the patient having any side effects they believe may be caused by any medication additions or changes? No   Does the patient have all medications related to this admission filled (includes all antibiotics, pain medications, etc.) Yes   Is the patient taking all medications as directed (includes completed medication regime)? Yes   Does the patient have a follow up appointment scheduled with their surgeon? Yes   Has the patient kept scheduled appointments due by today? Yes   Has home health visited the patient within 72 hours of discharge? N/A   Psychosocial issues? No   Did the patient receive a copy of their discharge instructions? Yes   Nursing interventions Reviewed instructions with patient   What is the patient's perception of their health status since discharge? Improving   Nursing interventions Nurse provided patient education   Is the patient /caregiver able to teach back basic post-op care? Continue use of incentive spirometry at least 1 week post discharge, Practice 'cough and deep breath', Drive as instructed by MD in discharge instructions, Take showers only when approved by MD-sponge bathe until then, No tub bath, swimming, or hot tub until instructed by MD, Keep incision areas clean,dry and protected, Do not remove steri-strips, Lifting as instructed by MD in discharge instructions   Is the patient/caregiver able to teach back signs and symptoms of incisional infection? Increased redness, swelling or pain at the incisonal site, Increased drainage or bleeding, Incisional warmth, Pus or odor from incision, Fever   Is  the patient/caregiver able to teach back steps to recovery at home? Set small, achievable goals for return to baseline health, Rest and rebuild strength, gradually increase activity, Eat a well-balance diet   Is the patient/caregiver able to teach back the hierarchy of who to call/visit for symptoms/problems? PCP, Specialist, Home health nurse, Urgent Care, ED, 911 Yes   Additional teach back comments uses ice packs for pain control   Week 1 call completed? Yes   Call end time [id number]            Amelia MARTNIEZ - Registered Nurse

## 2025-04-24 ENCOUNTER — READMISSION MANAGEMENT (OUTPATIENT)
Dept: CALL CENTER | Facility: HOSPITAL | Age: 35
End: 2025-04-24
Payer: COMMERCIAL

## 2025-04-24 NOTE — OUTREACH NOTE
General Surgery Week 2 Survey      Flowsheet Row Responses   [facility] patient discharged from? [facility]   Does the patient have one of the following disease processes/diagnoses(primary or secondary)? General Surgery   Week 2 attempt successful? Yes   Call start time 0945   Call end time 0946   Discharge diagnosis Robotic cholecystectomy   Is the patient taking all medications as directed (includes completed medication regime)? Yes   Does the patient have a follow up appointment scheduled with their surgeon? Yes   Has the patient kept scheduled appointments due by today? Yes   Has home health visited the patient within 72 hours of discharge? N/A   Psychosocial issues? No   What is the patient's perception of their health status since discharge? Improving   Is the patient/caregiver able to teach back steps to recovery at home? Eat a well-balance diet   Is the patient/caregiver able to teach back the hierarchy of who to call/visit for symptoms/problems? PCP, Specialist, Home health nurse, Urgent Care, ED, 911 Yes   Week 2 call completed? Yes   Graduated Yes   Is the patient interested in additional calls from an ambulatory ? No   Would this patient benefit from a Referral to Amb Social Work? No   Wrap up additional comments Doing well, has gone back to work, denies any questions or concerns, no further calls needed.   Call end time 0946            Rashmi MCLEOD - Registered Nurse

## (undated) DEVICE — ANTIBACTERIAL UNDYED BRAIDED (POLYGLACTIN 910), SYNTHETIC ABSORBABLE SUTURE: Brand: COATED VICRYL

## (undated) DEVICE — BLANKT WARM UPPR/BDY ARM/OUT 57X196CM

## (undated) DEVICE — KT SURG TURNOVER 050

## (undated) DEVICE — ST TBG AIRSEAL BIF FLTR W/ACT/CHARCOAL/FLTR

## (undated) DEVICE — THE STERILE CAMERA HANDLE COVER IS FOR USE WITH THE STERIS SURGICAL LIGHTING AND VISUALIZATION SYSTEMS.

## (undated) DEVICE — TROC BLADLES AIRSEAL/OPTI THRD 8X120MM 1P/U

## (undated) DEVICE — BG RETRV TISS SUPERBAG INTRO RIP/STOP NLY 10MM 240ML MD

## (undated) DEVICE — GLV SURG BIOGEL LTX PF 7

## (undated) DEVICE — CFF SCD HEMFRCE SEQ CLF STD XL

## (undated) DEVICE — LAPAROVUE VISIBILITY SYSTEM LAPAROSCOPIC SOLUTIONS: Brand: LAPAROVUE

## (undated) DEVICE — SOLUTION,WATER,IRRIGATION,1000ML,STERILE: Brand: MEDLINE

## (undated) DEVICE — GENERAL LAPAROSCOPY CDS: Brand: MEDLINE INDUSTRIES, INC.

## (undated) DEVICE — ARM DRAPE

## (undated) DEVICE — SEAL

## (undated) DEVICE — ENDOPATH PNEUMONEEDLE INSUFFLATION NEEDLES WITH LUER LOCK CONNECTORS 120MM: Brand: ENDOPATH

## (undated) DEVICE — PASS SUT PRO BARIATRIC XL W/TROC SWABS

## (undated) DEVICE — SOL IRR NACL 0.9PCT BO 1000ML

## (undated) DEVICE — SUT VIC 0 UR6 27IN VCP603H

## (undated) DEVICE — REDUCER: Brand: ENDOWRIST

## (undated) DEVICE — BLADELESS OBTURATOR: Brand: WECK VISTA

## (undated) DEVICE — UNDERGLV SURG BIOGEL INDICATOR LF PF 7.5

## (undated) DEVICE — SYR LUERLOK 30CC

## (undated) DEVICE — THE STERILE LIGHT HANDLE COVER IS USED WITH STERIS SURGICAL LIGHTING AND VISUALIZATION SYSTEMS.

## (undated) DEVICE — CANNULA SEAL

## (undated) DEVICE — 3M™ STERI-STRIP™ REINFORCED ADHESIVE SKIN CLOSURES, R1547, 1/2 IN X 4 IN (12 MM X 100 MM), 6 STRIPS/ENVELOPE: Brand: 3M™ STERI-STRIP™

## (undated) DEVICE — 2, DISPOSABLE SUCTION/IRRIGATOR WITH DISPOSABLE TIP: Brand: STRYKEFLOW

## (undated) DEVICE — 3M™ IOBAN™ 2 ANTIMICROBIAL INCISE DRAPE 6650EZ: Brand: IOBAN™ 2

## (undated) DEVICE — ENDOPATH XCEL BLADELESS TROCARS WITH STABILITY SLEEVES: Brand: ENDOPATH XCEL

## (undated) DEVICE — COLUMN DRAPE